# Patient Record
Sex: MALE | Race: WHITE | NOT HISPANIC OR LATINO | Employment: OTHER
[De-identification: names, ages, dates, MRNs, and addresses within clinical notes are randomized per-mention and may not be internally consistent; named-entity substitution may affect disease eponyms.]

---

## 2017-01-18 ENCOUNTER — RECORDS - HEALTHEAST (OUTPATIENT)
Dept: ADMINISTRATIVE | Facility: OTHER | Age: 82
End: 2017-01-18

## 2017-01-19 ENCOUNTER — RECORDS - HEALTHEAST (OUTPATIENT)
Dept: ADMINISTRATIVE | Facility: OTHER | Age: 82
End: 2017-01-19

## 2017-02-13 ENCOUNTER — OFFICE VISIT - HEALTHEAST (OUTPATIENT)
Dept: FAMILY MEDICINE | Facility: CLINIC | Age: 82
End: 2017-02-13

## 2017-02-13 DIAGNOSIS — I10 ESSENTIAL HYPERTENSION, BENIGN: ICD-10-CM

## 2017-02-13 DIAGNOSIS — E78.5 HYPERLIPEMIA: ICD-10-CM

## 2017-02-13 LAB
CHOLEST SERPL-MCNC: 217 MG/DL
FASTING STATUS PATIENT QL REPORTED: YES
HBA1C MFR BLD: 7.5 % (ref 3.5–6)
HDLC SERPL-MCNC: 44 MG/DL
LDLC SERPL CALC-MCNC: 148 MG/DL
TRIGL SERPL-MCNC: 126 MG/DL

## 2017-02-13 ASSESSMENT — MIFFLIN-ST. JEOR: SCORE: 1803.59

## 2017-02-21 ENCOUNTER — COMMUNICATION - HEALTHEAST (OUTPATIENT)
Dept: FAMILY MEDICINE | Facility: CLINIC | Age: 82
End: 2017-02-21

## 2017-02-21 DIAGNOSIS — I10 BENIGN ESSENTIAL HYPERTENSION: ICD-10-CM

## 2017-02-21 DIAGNOSIS — E11.9 DIABETES MELLITUS TYPE 2, NONINSULIN DEPENDENT (H): ICD-10-CM

## 2017-03-10 ENCOUNTER — COMMUNICATION - HEALTHEAST (OUTPATIENT)
Dept: LAB | Facility: CLINIC | Age: 82
End: 2017-03-10

## 2017-03-10 DIAGNOSIS — C69.90: ICD-10-CM

## 2017-03-10 DIAGNOSIS — E78.5 HYPERLIPEMIA: ICD-10-CM

## 2017-03-10 DIAGNOSIS — M47.817 LUMBOSACRAL SPONDYLOSIS WITHOUT MYELOPATHY: ICD-10-CM

## 2017-03-10 DIAGNOSIS — M79.89 SWELLING OF LIMB: ICD-10-CM

## 2017-03-13 ENCOUNTER — AMBULATORY - HEALTHEAST (OUTPATIENT)
Dept: LAB | Facility: CLINIC | Age: 82
End: 2017-03-13

## 2017-03-13 DIAGNOSIS — E78.5 HYPERLIPEMIA: ICD-10-CM

## 2017-03-13 DIAGNOSIS — M79.89 SWELLING OF LIMB: ICD-10-CM

## 2017-03-13 DIAGNOSIS — M47.817 LUMBOSACRAL SPONDYLOSIS WITHOUT MYELOPATHY: ICD-10-CM

## 2017-03-13 DIAGNOSIS — C69.90: ICD-10-CM

## 2017-03-13 LAB
CHOLEST SERPL-MCNC: 149 MG/DL
FASTING STATUS PATIENT QL REPORTED: YES
HDLC SERPL-MCNC: 47 MG/DL
LDLC SERPL CALC-MCNC: 76 MG/DL
TRIGL SERPL-MCNC: 129 MG/DL

## 2017-03-21 ENCOUNTER — COMMUNICATION - HEALTHEAST (OUTPATIENT)
Dept: FAMILY MEDICINE | Facility: CLINIC | Age: 82
End: 2017-03-21

## 2017-03-21 DIAGNOSIS — I10 ESSENTIAL HYPERTENSION, BENIGN: ICD-10-CM

## 2017-05-22 ENCOUNTER — COMMUNICATION - HEALTHEAST (OUTPATIENT)
Dept: FAMILY MEDICINE | Facility: CLINIC | Age: 82
End: 2017-05-22

## 2017-05-22 DIAGNOSIS — I10 HTN (HYPERTENSION): ICD-10-CM

## 2017-05-22 DIAGNOSIS — E78.5 HYPERLIPIDEMIA: ICD-10-CM

## 2017-05-24 ENCOUNTER — COMMUNICATION - HEALTHEAST (OUTPATIENT)
Dept: FAMILY MEDICINE | Facility: CLINIC | Age: 82
End: 2017-05-24

## 2017-06-14 ENCOUNTER — OFFICE VISIT - HEALTHEAST (OUTPATIENT)
Dept: FAMILY MEDICINE | Facility: CLINIC | Age: 82
End: 2017-06-14

## 2017-06-14 ENCOUNTER — RECORDS - HEALTHEAST (OUTPATIENT)
Dept: ADMINISTRATIVE | Facility: OTHER | Age: 82
End: 2017-06-14

## 2017-06-14 DIAGNOSIS — E78.5 HYPERLIPEMIA: ICD-10-CM

## 2017-06-14 DIAGNOSIS — E11.9 DIABETES MELLITUS TYPE 2, NONINSULIN DEPENDENT (H): ICD-10-CM

## 2017-06-14 DIAGNOSIS — Z00.00 WELL ADULT EXAM: ICD-10-CM

## 2017-06-14 DIAGNOSIS — I10 ESSENTIAL HYPERTENSION, BENIGN: ICD-10-CM

## 2017-06-14 DIAGNOSIS — M79.89 LEG SWELLING: ICD-10-CM

## 2017-06-14 LAB
CHOLEST SERPL-MCNC: 141 MG/DL
FASTING STATUS PATIENT QL REPORTED: YES
HBA1C MFR BLD: 7.3 % (ref 3.5–6)
HDLC SERPL-MCNC: 43 MG/DL
LDLC SERPL CALC-MCNC: 75 MG/DL
TRIGL SERPL-MCNC: 115 MG/DL

## 2017-06-14 ASSESSMENT — MIFFLIN-ST. JEOR: SCORE: 1783.53

## 2017-06-15 ENCOUNTER — RECORDS - HEALTHEAST (OUTPATIENT)
Dept: ADMINISTRATIVE | Facility: OTHER | Age: 82
End: 2017-06-15

## 2017-09-01 ENCOUNTER — RECORDS - HEALTHEAST (OUTPATIENT)
Dept: ADMINISTRATIVE | Facility: OTHER | Age: 82
End: 2017-09-01

## 2017-10-02 ENCOUNTER — COMMUNICATION - HEALTHEAST (OUTPATIENT)
Dept: FAMILY MEDICINE | Facility: CLINIC | Age: 82
End: 2017-10-02

## 2017-10-02 DIAGNOSIS — E11.9 DIABETES MELLITUS TYPE 2, NONINSULIN DEPENDENT (H): ICD-10-CM

## 2017-11-02 ENCOUNTER — COMMUNICATION - HEALTHEAST (OUTPATIENT)
Dept: FAMILY MEDICINE | Facility: CLINIC | Age: 82
End: 2017-11-02

## 2017-11-02 DIAGNOSIS — E11.9 DIABETES MELLITUS TYPE 2, NONINSULIN DEPENDENT (H): ICD-10-CM

## 2017-11-26 ENCOUNTER — COMMUNICATION - HEALTHEAST (OUTPATIENT)
Dept: FAMILY MEDICINE | Facility: CLINIC | Age: 82
End: 2017-11-26

## 2017-11-26 DIAGNOSIS — I10 HTN (HYPERTENSION): ICD-10-CM

## 2017-11-26 DIAGNOSIS — I10 ESSENTIAL HYPERTENSION, BENIGN: ICD-10-CM

## 2017-11-26 DIAGNOSIS — E78.5 HYPERLIPIDEMIA: ICD-10-CM

## 2017-12-05 ENCOUNTER — COMMUNICATION - HEALTHEAST (OUTPATIENT)
Dept: FAMILY MEDICINE | Facility: CLINIC | Age: 82
End: 2017-12-05

## 2017-12-05 DIAGNOSIS — E11.9 DIABETES MELLITUS TYPE 2, NONINSULIN DEPENDENT (H): ICD-10-CM

## 2017-12-18 ENCOUNTER — OFFICE VISIT - HEALTHEAST (OUTPATIENT)
Dept: FAMILY MEDICINE | Facility: CLINIC | Age: 82
End: 2017-12-18

## 2017-12-18 DIAGNOSIS — E11.9 DIABETES MELLITUS TYPE 2, NONINSULIN DEPENDENT (H): ICD-10-CM

## 2017-12-18 DIAGNOSIS — E78.2 MIXED HYPERLIPIDEMIA: ICD-10-CM

## 2017-12-18 DIAGNOSIS — I10 ESSENTIAL HYPERTENSION, BENIGN: ICD-10-CM

## 2017-12-18 DIAGNOSIS — M17.0 OSTEOARTHRITIS OF KNEES, BILATERAL: ICD-10-CM

## 2017-12-18 LAB
CHOLEST SERPL-MCNC: 147 MG/DL
FASTING STATUS PATIENT QL REPORTED: YES
HBA1C MFR BLD: 6.8 % (ref 3.5–6)
HDLC SERPL-MCNC: 45 MG/DL
LDLC SERPL CALC-MCNC: 79 MG/DL
TRIGL SERPL-MCNC: 115 MG/DL

## 2017-12-18 ASSESSMENT — MIFFLIN-ST. JEOR: SCORE: 1746.33

## 2018-01-05 ENCOUNTER — COMMUNICATION - HEALTHEAST (OUTPATIENT)
Dept: FAMILY MEDICINE | Facility: CLINIC | Age: 83
End: 2018-01-05

## 2018-01-05 DIAGNOSIS — E11.9 DIABETES MELLITUS TYPE 2, NONINSULIN DEPENDENT (H): ICD-10-CM

## 2018-01-07 ENCOUNTER — COMMUNICATION - HEALTHEAST (OUTPATIENT)
Dept: FAMILY MEDICINE | Facility: CLINIC | Age: 83
End: 2018-01-07

## 2018-01-07 DIAGNOSIS — I10 BENIGN ESSENTIAL HYPERTENSION: ICD-10-CM

## 2018-01-08 ENCOUNTER — COMMUNICATION - HEALTHEAST (OUTPATIENT)
Dept: FAMILY MEDICINE | Facility: CLINIC | Age: 83
End: 2018-01-08

## 2018-01-10 ENCOUNTER — COMMUNICATION - HEALTHEAST (OUTPATIENT)
Dept: FAMILY MEDICINE | Facility: CLINIC | Age: 83
End: 2018-01-10

## 2018-01-10 DIAGNOSIS — E78.5 HYPERLIPIDEMIA: ICD-10-CM

## 2018-02-06 ENCOUNTER — RECORDS - HEALTHEAST (OUTPATIENT)
Dept: ADMINISTRATIVE | Facility: OTHER | Age: 83
End: 2018-02-06

## 2018-02-21 ENCOUNTER — COMMUNICATION - HEALTHEAST (OUTPATIENT)
Dept: FAMILY MEDICINE | Facility: CLINIC | Age: 83
End: 2018-02-21

## 2018-02-21 DIAGNOSIS — I10 HTN (HYPERTENSION): ICD-10-CM

## 2018-03-19 ENCOUNTER — OFFICE VISIT - HEALTHEAST (OUTPATIENT)
Dept: FAMILY MEDICINE | Facility: CLINIC | Age: 83
End: 2018-03-19

## 2018-03-19 DIAGNOSIS — E11.9 DIABETES MELLITUS TYPE 2, NONINSULIN DEPENDENT (H): ICD-10-CM

## 2018-03-19 DIAGNOSIS — E78.5 HYPERLIPEMIA: ICD-10-CM

## 2018-03-19 DIAGNOSIS — Z01.810 PREOP CARDIOVASCULAR EXAM: ICD-10-CM

## 2018-03-19 DIAGNOSIS — I10 ESSENTIAL HYPERTENSION, BENIGN: ICD-10-CM

## 2018-03-19 DIAGNOSIS — M17.12 OSTEOARTHRITIS OF LEFT KNEE: ICD-10-CM

## 2018-03-19 LAB
ALBUMIN SERPL-MCNC: 3.8 G/DL (ref 3.5–5)
ALP SERPL-CCNC: 82 U/L (ref 45–120)
ALT SERPL W P-5'-P-CCNC: 14 U/L (ref 0–45)
ANION GAP SERPL CALCULATED.3IONS-SCNC: 13 MMOL/L (ref 5–18)
AST SERPL W P-5'-P-CCNC: 16 U/L (ref 0–40)
ATRIAL RATE - MUSE: 55 BPM
BILIRUB DIRECT SERPL-MCNC: 0.2 MG/DL
BILIRUB SERPL-MCNC: 0.7 MG/DL (ref 0–1)
BUN SERPL-MCNC: 33 MG/DL (ref 8–28)
CALCIUM SERPL-MCNC: 10 MG/DL (ref 8.5–10.5)
CHLORIDE BLD-SCNC: 102 MMOL/L (ref 98–107)
CHOLEST SERPL-MCNC: 153 MG/DL
CO2 SERPL-SCNC: 24 MMOL/L (ref 22–31)
CREAT SERPL-MCNC: 1.17 MG/DL (ref 0.7–1.3)
DIASTOLIC BLOOD PRESSURE - MUSE: NORMAL MMHG
ERYTHROCYTE [DISTWIDTH] IN BLOOD BY AUTOMATED COUNT: 12.5 % (ref 11–14.5)
FASTING STATUS PATIENT QL REPORTED: YES
GFR SERPL CREATININE-BSD FRML MDRD: 60 ML/MIN/1.73M2
GLUCOSE BLD-MCNC: 128 MG/DL (ref 70–125)
HBA1C MFR BLD: 7 % (ref 3.5–6)
HCT VFR BLD AUTO: 38.7 % (ref 40–54)
HDLC SERPL-MCNC: 48 MG/DL
HGB BLD-MCNC: 13.2 G/DL (ref 14–18)
INTERPRETATION ECG - MUSE: NORMAL
LDLC SERPL CALC-MCNC: 80 MG/DL
MCH RBC QN AUTO: 30.6 PG (ref 27–34)
MCHC RBC AUTO-ENTMCNC: 34.1 G/DL (ref 32–36)
MCV RBC AUTO: 90 FL (ref 80–100)
P AXIS - MUSE: 23 DEGREES
PLATELET # BLD AUTO: 239 THOU/UL (ref 140–440)
PMV BLD AUTO: 8.7 FL (ref 7–10)
POTASSIUM BLD-SCNC: 4.3 MMOL/L (ref 3.5–5)
PR INTERVAL - MUSE: 190 MS
PROT SERPL-MCNC: 6.8 G/DL (ref 6–8)
QRS DURATION - MUSE: 108 MS
QT - MUSE: 446 MS
QTC - MUSE: 426 MS
R AXIS - MUSE: -17 DEGREES
RBC # BLD AUTO: 4.31 MILL/UL (ref 4.4–6.2)
SODIUM SERPL-SCNC: 139 MMOL/L (ref 136–145)
SYSTOLIC BLOOD PRESSURE - MUSE: NORMAL MMHG
T AXIS - MUSE: -11 DEGREES
TRIGL SERPL-MCNC: 124 MG/DL
VENTRICULAR RATE- MUSE: 55 BPM
WBC: 8.4 THOU/UL (ref 4–11)

## 2018-03-19 ASSESSMENT — MIFFLIN-ST. JEOR: SCORE: 1738.39

## 2018-03-21 ASSESSMENT — MIFFLIN-ST. JEOR
SCORE: 1729.32
SCORE: 1738.39

## 2018-03-23 ENCOUNTER — ANESTHESIA - HEALTHEAST (OUTPATIENT)
Dept: SURGERY | Facility: CLINIC | Age: 83
End: 2018-03-23

## 2018-03-26 ENCOUNTER — SURGERY - HEALTHEAST (OUTPATIENT)
Dept: SURGERY | Facility: CLINIC | Age: 83
End: 2018-03-26

## 2018-03-26 ASSESSMENT — MIFFLIN-ST. JEOR
SCORE: 1729.32
SCORE: 1721.38

## 2018-03-30 ENCOUNTER — AMBULATORY - HEALTHEAST (OUTPATIENT)
Dept: CARE COORDINATION | Facility: CLINIC | Age: 83
End: 2018-03-30

## 2018-03-30 DIAGNOSIS — E11.9 DM (DIABETES MELLITUS) (H): ICD-10-CM

## 2018-03-30 DIAGNOSIS — I10 HTN (HYPERTENSION): ICD-10-CM

## 2018-04-04 ENCOUNTER — OFFICE VISIT - HEALTHEAST (OUTPATIENT)
Dept: FAMILY MEDICINE | Facility: CLINIC | Age: 83
End: 2018-04-04

## 2018-04-04 DIAGNOSIS — D64.9 ANEMIA: ICD-10-CM

## 2018-04-04 DIAGNOSIS — I10 ESSENTIAL HYPERTENSION, BENIGN: ICD-10-CM

## 2018-04-04 DIAGNOSIS — E11.9 DIABETES MELLITUS TYPE 2, NONINSULIN DEPENDENT (H): ICD-10-CM

## 2018-04-04 DIAGNOSIS — Z96.659 S/P TOTAL KNEE ARTHROPLASTY: ICD-10-CM

## 2018-05-09 ENCOUNTER — COMMUNICATION - HEALTHEAST (OUTPATIENT)
Dept: FAMILY MEDICINE | Facility: CLINIC | Age: 83
End: 2018-05-09

## 2018-05-09 DIAGNOSIS — E78.5 HYPERLIPIDEMIA: ICD-10-CM

## 2018-05-10 ENCOUNTER — COMMUNICATION - HEALTHEAST (OUTPATIENT)
Dept: FAMILY MEDICINE | Facility: CLINIC | Age: 83
End: 2018-05-10

## 2018-05-15 ENCOUNTER — RECORDS - HEALTHEAST (OUTPATIENT)
Dept: ADMINISTRATIVE | Facility: OTHER | Age: 83
End: 2018-05-15

## 2018-06-06 ENCOUNTER — OFFICE VISIT - HEALTHEAST (OUTPATIENT)
Dept: INTERNAL MEDICINE | Facility: CLINIC | Age: 83
End: 2018-06-06

## 2018-06-06 ENCOUNTER — COMMUNICATION - HEALTHEAST (OUTPATIENT)
Dept: INTERNAL MEDICINE | Facility: CLINIC | Age: 83
End: 2018-06-06

## 2018-06-06 DIAGNOSIS — J30.9 ALLERGIC RHINITIS: ICD-10-CM

## 2018-06-06 DIAGNOSIS — R05.9 COUGH: ICD-10-CM

## 2018-06-11 ENCOUNTER — RECORDS - HEALTHEAST (OUTPATIENT)
Dept: ADMINISTRATIVE | Facility: OTHER | Age: 83
End: 2018-06-11

## 2018-06-20 ENCOUNTER — RECORDS - HEALTHEAST (OUTPATIENT)
Dept: ADMINISTRATIVE | Facility: OTHER | Age: 83
End: 2018-06-20

## 2018-06-21 ENCOUNTER — OFFICE VISIT - HEALTHEAST (OUTPATIENT)
Dept: FAMILY MEDICINE | Facility: CLINIC | Age: 83
End: 2018-06-21

## 2018-06-21 DIAGNOSIS — I10 ESSENTIAL HYPERTENSION, BENIGN: ICD-10-CM

## 2018-06-21 DIAGNOSIS — M79.89 LEG SWELLING: ICD-10-CM

## 2018-06-21 DIAGNOSIS — E11.9 DIABETES MELLITUS TYPE 2, NONINSULIN DEPENDENT (H): ICD-10-CM

## 2018-06-21 DIAGNOSIS — Z00.00 MEDICARE ANNUAL WELLNESS VISIT, SUBSEQUENT: ICD-10-CM

## 2018-06-21 DIAGNOSIS — E78.5 HYPERLIPEMIA: ICD-10-CM

## 2018-06-21 DIAGNOSIS — D64.9 ANEMIA: ICD-10-CM

## 2018-06-21 LAB
ALBUMIN SERPL-MCNC: 3.9 G/DL (ref 3.5–5)
ALP SERPL-CCNC: 92 U/L (ref 45–120)
ALT SERPL W P-5'-P-CCNC: 12 U/L (ref 0–45)
ANION GAP SERPL CALCULATED.3IONS-SCNC: 10 MMOL/L (ref 5–18)
AST SERPL W P-5'-P-CCNC: 16 U/L (ref 0–40)
BILIRUB DIRECT SERPL-MCNC: 0.1 MG/DL
BILIRUB SERPL-MCNC: 0.5 MG/DL (ref 0–1)
BUN SERPL-MCNC: 28 MG/DL (ref 8–28)
CALCIUM SERPL-MCNC: 10.4 MG/DL (ref 8.5–10.5)
CHLORIDE BLD-SCNC: 102 MMOL/L (ref 98–107)
CHOLEST SERPL-MCNC: 153 MG/DL
CO2 SERPL-SCNC: 28 MMOL/L (ref 22–31)
CREAT SERPL-MCNC: 1.07 MG/DL (ref 0.7–1.3)
CREAT UR-MCNC: 63.5 MG/DL
ERYTHROCYTE [DISTWIDTH] IN BLOOD BY AUTOMATED COUNT: 12.9 % (ref 11–14.5)
FASTING STATUS PATIENT QL REPORTED: ABNORMAL
GFR SERPL CREATININE-BSD FRML MDRD: >60 ML/MIN/1.73M2
GLUCOSE BLD-MCNC: 130 MG/DL (ref 70–125)
HBA1C MFR BLD: 7 % (ref 3.5–6)
HCT VFR BLD AUTO: 39.8 % (ref 40–54)
HDLC SERPL-MCNC: 43 MG/DL
HGB BLD-MCNC: 13.3 G/DL (ref 14–18)
LDLC SERPL CALC-MCNC: 76 MG/DL
MCH RBC QN AUTO: 30.1 PG (ref 27–34)
MCHC RBC AUTO-ENTMCNC: 33.4 G/DL (ref 32–36)
MCV RBC AUTO: 90 FL (ref 80–100)
MICROALBUMIN UR-MCNC: 1.43 MG/DL (ref 0–1.99)
MICROALBUMIN/CREAT UR: 22.5 MG/G
PLATELET # BLD AUTO: 311 THOU/UL (ref 140–440)
PMV BLD AUTO: 7.7 FL (ref 7–10)
POTASSIUM BLD-SCNC: 4.7 MMOL/L (ref 3.5–5)
PROT SERPL-MCNC: 7.1 G/DL (ref 6–8)
RBC # BLD AUTO: 4.4 MILL/UL (ref 4.4–6.2)
SODIUM SERPL-SCNC: 140 MMOL/L (ref 136–145)
TRIGL SERPL-MCNC: 172 MG/DL
WBC: 9.6 THOU/UL (ref 4–11)

## 2018-06-21 ASSESSMENT — MIFFLIN-ST. JEOR: SCORE: 1749.73

## 2018-06-26 ENCOUNTER — RECORDS - HEALTHEAST (OUTPATIENT)
Dept: ADMINISTRATIVE | Facility: OTHER | Age: 83
End: 2018-06-26

## 2018-07-03 ENCOUNTER — COMMUNICATION - HEALTHEAST (OUTPATIENT)
Dept: FAMILY MEDICINE | Facility: CLINIC | Age: 83
End: 2018-07-03

## 2018-07-03 DIAGNOSIS — E11.9 DIABETES MELLITUS TYPE 2, NONINSULIN DEPENDENT (H): ICD-10-CM

## 2018-07-28 ENCOUNTER — COMMUNICATION - HEALTHEAST (OUTPATIENT)
Dept: FAMILY MEDICINE | Facility: CLINIC | Age: 83
End: 2018-07-28

## 2018-07-28 DIAGNOSIS — E78.5 HYPERLIPIDEMIA: ICD-10-CM

## 2018-08-03 ENCOUNTER — COMMUNICATION - HEALTHEAST (OUTPATIENT)
Dept: NURSING | Facility: CLINIC | Age: 83
End: 2018-08-03

## 2018-08-17 ENCOUNTER — COMMUNICATION - HEALTHEAST (OUTPATIENT)
Dept: FAMILY MEDICINE | Facility: CLINIC | Age: 83
End: 2018-08-17

## 2018-08-20 ENCOUNTER — RECORDS - HEALTHEAST (OUTPATIENT)
Dept: ADMINISTRATIVE | Facility: OTHER | Age: 83
End: 2018-08-20

## 2018-08-22 ENCOUNTER — RECORDS - HEALTHEAST (OUTPATIENT)
Dept: ADMINISTRATIVE | Facility: OTHER | Age: 83
End: 2018-08-22

## 2018-10-01 ENCOUNTER — RECORDS - HEALTHEAST (OUTPATIENT)
Dept: ADMINISTRATIVE | Facility: OTHER | Age: 83
End: 2018-10-01

## 2018-11-19 ENCOUNTER — COMMUNICATION - HEALTHEAST (OUTPATIENT)
Dept: FAMILY MEDICINE | Facility: CLINIC | Age: 83
End: 2018-11-19

## 2018-11-19 DIAGNOSIS — E11.9 DIABETES MELLITUS TYPE 2, NONINSULIN DEPENDENT (H): ICD-10-CM

## 2018-11-19 DIAGNOSIS — I10 HTN (HYPERTENSION): ICD-10-CM

## 2018-12-26 ENCOUNTER — COMMUNICATION - HEALTHEAST (OUTPATIENT)
Dept: FAMILY MEDICINE | Facility: CLINIC | Age: 83
End: 2018-12-26

## 2018-12-26 ENCOUNTER — OFFICE VISIT - HEALTHEAST (OUTPATIENT)
Dept: FAMILY MEDICINE | Facility: CLINIC | Age: 83
End: 2018-12-26

## 2018-12-26 DIAGNOSIS — M47.817 LUMBOSACRAL SPONDYLOSIS WITHOUT MYELOPATHY: ICD-10-CM

## 2018-12-26 DIAGNOSIS — E11.9 DIABETES MELLITUS TYPE 2, NONINSULIN DEPENDENT (H): ICD-10-CM

## 2018-12-26 DIAGNOSIS — I10 ESSENTIAL HYPERTENSION, BENIGN: ICD-10-CM

## 2018-12-26 DIAGNOSIS — I10 BENIGN ESSENTIAL HYPERTENSION: ICD-10-CM

## 2018-12-26 DIAGNOSIS — E78.00 PURE HYPERCHOLESTEROLEMIA: ICD-10-CM

## 2018-12-26 LAB
ALBUMIN SERPL-MCNC: 3.9 G/DL (ref 3.5–5)
ALP SERPL-CCNC: 99 U/L (ref 45–120)
ALT SERPL W P-5'-P-CCNC: 11 U/L (ref 0–45)
ANION GAP SERPL CALCULATED.3IONS-SCNC: 13 MMOL/L (ref 5–18)
AST SERPL W P-5'-P-CCNC: 14 U/L (ref 0–40)
BILIRUB DIRECT SERPL-MCNC: 0.2 MG/DL
BILIRUB SERPL-MCNC: 0.6 MG/DL (ref 0–1)
BUN SERPL-MCNC: 21 MG/DL (ref 8–28)
CALCIUM SERPL-MCNC: 9.8 MG/DL (ref 8.5–10.5)
CHLORIDE BLD-SCNC: 102 MMOL/L (ref 98–107)
CHOLEST SERPL-MCNC: 151 MG/DL
CO2 SERPL-SCNC: 25 MMOL/L (ref 22–31)
CREAT SERPL-MCNC: 1.09 MG/DL (ref 0.7–1.3)
FASTING STATUS PATIENT QL REPORTED: YES
GFR SERPL CREATININE-BSD FRML MDRD: >60 ML/MIN/1.73M2
GLUCOSE BLD-MCNC: 168 MG/DL (ref 70–125)
HBA1C MFR BLD: 7.1 % (ref 3.5–6)
HDLC SERPL-MCNC: 48 MG/DL
LDLC SERPL CALC-MCNC: 68 MG/DL
POTASSIUM BLD-SCNC: 4.8 MMOL/L (ref 3.5–5)
PROT SERPL-MCNC: 6.7 G/DL (ref 6–8)
SODIUM SERPL-SCNC: 140 MMOL/L (ref 136–145)
TRIGL SERPL-MCNC: 174 MG/DL

## 2019-01-02 ENCOUNTER — COMMUNICATION - HEALTHEAST (OUTPATIENT)
Dept: FAMILY MEDICINE | Facility: CLINIC | Age: 84
End: 2019-01-02

## 2019-01-02 DIAGNOSIS — E11.9 DIABETES MELLITUS TYPE 2, NONINSULIN DEPENDENT (H): ICD-10-CM

## 2019-04-10 ENCOUNTER — COMMUNICATION - HEALTHEAST (OUTPATIENT)
Dept: FAMILY MEDICINE | Facility: CLINIC | Age: 84
End: 2019-04-10

## 2019-04-10 DIAGNOSIS — E11.9 DIABETES MELLITUS TYPE 2, NONINSULIN DEPENDENT (H): ICD-10-CM

## 2019-05-21 ENCOUNTER — RECORDS - HEALTHEAST (OUTPATIENT)
Dept: ADMINISTRATIVE | Facility: OTHER | Age: 84
End: 2019-05-21

## 2019-05-23 ENCOUNTER — RECORDS - HEALTHEAST (OUTPATIENT)
Dept: ADMINISTRATIVE | Facility: OTHER | Age: 84
End: 2019-05-23

## 2019-06-16 ENCOUNTER — COMMUNICATION - HEALTHEAST (OUTPATIENT)
Dept: FAMILY MEDICINE | Facility: CLINIC | Age: 84
End: 2019-06-16

## 2019-06-16 DIAGNOSIS — I10 BENIGN ESSENTIAL HYPERTENSION: ICD-10-CM

## 2019-07-08 ENCOUNTER — COMMUNICATION - HEALTHEAST (OUTPATIENT)
Dept: FAMILY MEDICINE | Facility: CLINIC | Age: 84
End: 2019-07-08

## 2019-07-08 DIAGNOSIS — E11.9 DIABETES MELLITUS TYPE 2, NONINSULIN DEPENDENT (H): ICD-10-CM

## 2019-07-17 ENCOUNTER — OFFICE VISIT - HEALTHEAST (OUTPATIENT)
Dept: FAMILY MEDICINE | Facility: CLINIC | Age: 84
End: 2019-07-17

## 2019-07-17 DIAGNOSIS — E78.5 HYPERLIPIDEMIA: ICD-10-CM

## 2019-07-17 DIAGNOSIS — I10 ESSENTIAL HYPERTENSION, BENIGN: ICD-10-CM

## 2019-07-17 DIAGNOSIS — E78.00 PURE HYPERCHOLESTEROLEMIA: ICD-10-CM

## 2019-07-17 DIAGNOSIS — E11.9 DIABETES MELLITUS TYPE 2, NONINSULIN DEPENDENT (H): ICD-10-CM

## 2019-07-17 LAB
ALBUMIN SERPL-MCNC: 4.1 G/DL (ref 3.5–5)
ALP SERPL-CCNC: 79 U/L (ref 45–120)
ALT SERPL W P-5'-P-CCNC: <9 U/L (ref 0–45)
ANION GAP SERPL CALCULATED.3IONS-SCNC: 11 MMOL/L (ref 5–18)
AST SERPL W P-5'-P-CCNC: 14 U/L (ref 0–40)
BILIRUB DIRECT SERPL-MCNC: 0.2 MG/DL
BILIRUB SERPL-MCNC: 0.5 MG/DL (ref 0–1)
BUN SERPL-MCNC: 24 MG/DL (ref 8–28)
CALCIUM SERPL-MCNC: 10 MG/DL (ref 8.5–10.5)
CHLORIDE BLD-SCNC: 103 MMOL/L (ref 98–107)
CHOLEST SERPL-MCNC: 160 MG/DL
CO2 SERPL-SCNC: 26 MMOL/L (ref 22–31)
CREAT SERPL-MCNC: 1.26 MG/DL (ref 0.7–1.3)
CREAT UR-MCNC: 71.3 MG/DL
FASTING STATUS PATIENT QL REPORTED: YES
GFR SERPL CREATININE-BSD FRML MDRD: 55 ML/MIN/1.73M2
GLUCOSE BLD-MCNC: 129 MG/DL (ref 70–125)
HBA1C MFR BLD: 6.5 % (ref 3.5–6)
HDLC SERPL-MCNC: 49 MG/DL
LDLC SERPL CALC-MCNC: 81 MG/DL
MICROALBUMIN UR-MCNC: 1.54 MG/DL (ref 0–1.99)
MICROALBUMIN/CREAT UR: 21.6 MG/G
POTASSIUM BLD-SCNC: 4.9 MMOL/L (ref 3.5–5)
PROT SERPL-MCNC: 7 G/DL (ref 6–8)
SODIUM SERPL-SCNC: 140 MMOL/L (ref 136–145)
TRIGL SERPL-MCNC: 148 MG/DL

## 2019-07-17 ASSESSMENT — MIFFLIN-ST. JEOR: SCORE: 1690.76

## 2019-07-19 ENCOUNTER — RECORDS - HEALTHEAST (OUTPATIENT)
Dept: ADMINISTRATIVE | Facility: OTHER | Age: 84
End: 2019-07-19

## 2019-07-25 ENCOUNTER — RECORDS - HEALTHEAST (OUTPATIENT)
Dept: HEALTH INFORMATION MANAGEMENT | Facility: CLINIC | Age: 84
End: 2019-07-25

## 2019-08-13 ENCOUNTER — COMMUNICATION - HEALTHEAST (OUTPATIENT)
Dept: FAMILY MEDICINE | Facility: CLINIC | Age: 84
End: 2019-08-13

## 2019-08-13 DIAGNOSIS — I10 HTN (HYPERTENSION): ICD-10-CM

## 2019-09-09 ENCOUNTER — COMMUNICATION - HEALTHEAST (OUTPATIENT)
Dept: FAMILY MEDICINE | Facility: CLINIC | Age: 84
End: 2019-09-09

## 2019-09-09 DIAGNOSIS — E11.9 DIABETES MELLITUS TYPE 2, NONINSULIN DEPENDENT (H): ICD-10-CM

## 2019-10-04 ENCOUNTER — RECORDS - HEALTHEAST (OUTPATIENT)
Dept: ADMINISTRATIVE | Facility: OTHER | Age: 84
End: 2019-10-04

## 2019-11-30 ENCOUNTER — COMMUNICATION - HEALTHEAST (OUTPATIENT)
Dept: FAMILY MEDICINE | Facility: CLINIC | Age: 84
End: 2019-11-30

## 2019-11-30 DIAGNOSIS — I10 BENIGN ESSENTIAL HYPERTENSION: ICD-10-CM

## 2019-12-11 ENCOUNTER — RECORDS - HEALTHEAST (OUTPATIENT)
Dept: ADMINISTRATIVE | Facility: OTHER | Age: 84
End: 2019-12-11

## 2019-12-16 ENCOUNTER — COMMUNICATION - HEALTHEAST (OUTPATIENT)
Dept: FAMILY MEDICINE | Facility: CLINIC | Age: 84
End: 2019-12-16

## 2019-12-23 ENCOUNTER — OFFICE VISIT - HEALTHEAST (OUTPATIENT)
Dept: FAMILY MEDICINE | Facility: CLINIC | Age: 84
End: 2019-12-23

## 2019-12-23 ENCOUNTER — COMMUNICATION - HEALTHEAST (OUTPATIENT)
Dept: FAMILY MEDICINE | Facility: CLINIC | Age: 84
End: 2019-12-23

## 2019-12-23 DIAGNOSIS — E11.9 TYPE 2 DIABETES MELLITUS WITHOUT COMPLICATION, WITHOUT LONG-TERM CURRENT USE OF INSULIN (H): ICD-10-CM

## 2019-12-23 DIAGNOSIS — N20.1 CALCULUS OF URETER: ICD-10-CM

## 2019-12-23 DIAGNOSIS — K81.9 CHOLECYSTITIS: ICD-10-CM

## 2019-12-23 DIAGNOSIS — E78.00 PURE HYPERCHOLESTEROLEMIA: ICD-10-CM

## 2019-12-23 DIAGNOSIS — D72.829 LEUKOCYTOSIS, UNSPECIFIED TYPE: ICD-10-CM

## 2019-12-23 LAB
ALBUMIN SERPL-MCNC: 3.9 G/DL (ref 3.5–5)
ALP SERPL-CCNC: 90 U/L (ref 45–120)
ALT SERPL W P-5'-P-CCNC: <9 U/L (ref 0–45)
ANION GAP SERPL CALCULATED.3IONS-SCNC: 13 MMOL/L (ref 5–18)
AST SERPL W P-5'-P-CCNC: 11 U/L (ref 0–40)
BILIRUB DIRECT SERPL-MCNC: 0.2 MG/DL
BILIRUB SERPL-MCNC: 0.6 MG/DL (ref 0–1)
BUN SERPL-MCNC: 20 MG/DL (ref 8–28)
CALCIUM SERPL-MCNC: 10.1 MG/DL (ref 8.5–10.5)
CHLORIDE BLD-SCNC: 103 MMOL/L (ref 98–107)
CHOLEST SERPL-MCNC: 189 MG/DL
CO2 SERPL-SCNC: 28 MMOL/L (ref 22–31)
CREAT SERPL-MCNC: 1.04 MG/DL (ref 0.7–1.3)
ERYTHROCYTE [DISTWIDTH] IN BLOOD BY AUTOMATED COUNT: 13.1 % (ref 11–14.5)
FASTING STATUS PATIENT QL REPORTED: YES
GFR SERPL CREATININE-BSD FRML MDRD: >60 ML/MIN/1.73M2
GLUCOSE BLD-MCNC: 136 MG/DL (ref 70–125)
HCT VFR BLD AUTO: 38.6 % (ref 40–54)
HDLC SERPL-MCNC: 45 MG/DL
HGB BLD-MCNC: 13.3 G/DL (ref 14–18)
LDLC SERPL CALC-MCNC: 112 MG/DL
MCH RBC QN AUTO: 29.8 PG (ref 27–34)
MCHC RBC AUTO-ENTMCNC: 34.3 G/DL (ref 32–36)
MCV RBC AUTO: 87 FL (ref 80–100)
PLATELET # BLD AUTO: 417 THOU/UL (ref 140–440)
PMV BLD AUTO: 7.6 FL (ref 7–10)
POTASSIUM BLD-SCNC: 5.2 MMOL/L (ref 3.5–5)
PROT SERPL-MCNC: 7 G/DL (ref 6–8)
RBC # BLD AUTO: 4.44 MILL/UL (ref 4.4–6.2)
SODIUM SERPL-SCNC: 144 MMOL/L (ref 136–145)
TRIGL SERPL-MCNC: 162 MG/DL
WBC: 11 THOU/UL (ref 4–11)

## 2019-12-24 ENCOUNTER — COMMUNICATION - HEALTHEAST (OUTPATIENT)
Dept: UROLOGY | Facility: CLINIC | Age: 84
End: 2019-12-24

## 2019-12-30 ENCOUNTER — COMMUNICATION - HEALTHEAST (OUTPATIENT)
Dept: FAMILY MEDICINE | Facility: CLINIC | Age: 84
End: 2019-12-30

## 2019-12-30 ENCOUNTER — HOSPITAL ENCOUNTER (OUTPATIENT)
Dept: NUCLEAR MEDICINE | Facility: HOSPITAL | Age: 84
Discharge: HOME | End: 2019-12-30
Attending: FAMILY MEDICINE

## 2019-12-30 DIAGNOSIS — K81.9 CHOLECYSTITIS: ICD-10-CM

## 2019-12-31 ENCOUNTER — COMMUNICATION - HEALTHEAST (OUTPATIENT)
Dept: FAMILY MEDICINE | Facility: CLINIC | Age: 84
End: 2019-12-31

## 2020-01-13 ENCOUNTER — COMMUNICATION - HEALTHEAST (OUTPATIENT)
Dept: UROLOGY | Facility: CLINIC | Age: 85
End: 2020-01-13

## 2020-01-14 ENCOUNTER — COMMUNICATION - HEALTHEAST (OUTPATIENT)
Dept: FAMILY MEDICINE | Facility: CLINIC | Age: 85
End: 2020-01-14

## 2020-01-14 DIAGNOSIS — E11.9 DIABETES MELLITUS TYPE 2, NONINSULIN DEPENDENT (H): ICD-10-CM

## 2020-01-16 ENCOUNTER — COMMUNICATION - HEALTHEAST (OUTPATIENT)
Dept: UROLOGY | Facility: CLINIC | Age: 85
End: 2020-01-16

## 2020-01-27 ENCOUNTER — OFFICE VISIT - HEALTHEAST (OUTPATIENT)
Dept: UROLOGY | Facility: CLINIC | Age: 85
End: 2020-01-27

## 2020-01-27 DIAGNOSIS — N20.0 CALCULUS OF KIDNEY: ICD-10-CM

## 2020-01-27 DIAGNOSIS — N20.1 CALCULUS OF URETER: ICD-10-CM

## 2020-01-27 LAB
ALBUMIN UR-MCNC: NEGATIVE MG/DL
APPEARANCE UR: CLEAR
BILIRUB UR QL STRIP: NEGATIVE
COLOR UR AUTO: YELLOW
GLUCOSE UR STRIP-MCNC: ABNORMAL MG/DL
HGB UR QL STRIP: NEGATIVE
KETONES UR STRIP-MCNC: NEGATIVE MG/DL
LEUKOCYTE ESTERASE UR QL STRIP: NEGATIVE
NITRATE UR QL: NEGATIVE
PH UR STRIP: 5 [PH] (ref 5–8)
SP GR UR STRIP: 1.02 (ref 1–1.03)
UROBILINOGEN UR STRIP-ACNC: ABNORMAL

## 2020-02-12 ENCOUNTER — COMMUNICATION - HEALTHEAST (OUTPATIENT)
Dept: FAMILY MEDICINE | Facility: CLINIC | Age: 85
End: 2020-02-12

## 2020-02-12 DIAGNOSIS — E78.5 HYPERLIPIDEMIA: ICD-10-CM

## 2020-03-12 ENCOUNTER — COMMUNICATION - HEALTHEAST (OUTPATIENT)
Dept: FAMILY MEDICINE | Facility: CLINIC | Age: 85
End: 2020-03-12

## 2020-03-13 ENCOUNTER — COMMUNICATION - HEALTHEAST (OUTPATIENT)
Dept: UROLOGY | Facility: CLINIC | Age: 85
End: 2020-03-13

## 2020-03-16 ENCOUNTER — AMBULATORY - HEALTHEAST (OUTPATIENT)
Dept: UROLOGY | Facility: CLINIC | Age: 85
End: 2020-03-16

## 2020-07-01 ENCOUNTER — COMMUNICATION - HEALTHEAST (OUTPATIENT)
Dept: FAMILY MEDICINE | Facility: CLINIC | Age: 85
End: 2020-07-01

## 2020-07-01 DIAGNOSIS — E11.9 DIABETES MELLITUS TYPE 2, NONINSULIN DEPENDENT (H): ICD-10-CM

## 2020-07-01 DIAGNOSIS — I10 HTN (HYPERTENSION): ICD-10-CM

## 2020-07-24 ENCOUNTER — COMMUNICATION - HEALTHEAST (OUTPATIENT)
Dept: FAMILY MEDICINE | Facility: CLINIC | Age: 85
End: 2020-07-24

## 2020-07-26 ENCOUNTER — COMMUNICATION - HEALTHEAST (OUTPATIENT)
Dept: FAMILY MEDICINE | Facility: CLINIC | Age: 85
End: 2020-07-26

## 2020-07-26 DIAGNOSIS — I10 BENIGN ESSENTIAL HYPERTENSION: ICD-10-CM

## 2020-08-04 ENCOUNTER — OFFICE VISIT - HEALTHEAST (OUTPATIENT)
Dept: FAMILY MEDICINE | Facility: CLINIC | Age: 85
End: 2020-08-04

## 2020-08-04 DIAGNOSIS — N40.0 BENIGN PROSTATIC HYPERPLASIA WITHOUT LOWER URINARY TRACT SYMPTOMS: ICD-10-CM

## 2020-08-04 DIAGNOSIS — E78.5 HYPERLIPIDEMIA, UNSPECIFIED HYPERLIPIDEMIA TYPE: ICD-10-CM

## 2020-08-04 DIAGNOSIS — Z00.00 HEALTH CARE MAINTENANCE: ICD-10-CM

## 2020-08-04 DIAGNOSIS — E66.812 CLASS 2 SEVERE OBESITY DUE TO EXCESS CALORIES WITH SERIOUS COMORBIDITY AND BODY MASS INDEX (BMI) OF 36.0 TO 36.9 IN ADULT (H): ICD-10-CM

## 2020-08-04 DIAGNOSIS — I10 ESSENTIAL HYPERTENSION, BENIGN: ICD-10-CM

## 2020-08-04 DIAGNOSIS — E11.9 DIABETES MELLITUS TYPE 2, NONINSULIN DEPENDENT (H): ICD-10-CM

## 2020-08-04 DIAGNOSIS — E66.01 CLASS 2 SEVERE OBESITY DUE TO EXCESS CALORIES WITH SERIOUS COMORBIDITY AND BODY MASS INDEX (BMI) OF 36.0 TO 36.9 IN ADULT (H): ICD-10-CM

## 2020-08-04 LAB
ALBUMIN SERPL-MCNC: 3.9 G/DL (ref 3.5–5)
ALP SERPL-CCNC: 90 U/L (ref 45–120)
ALT SERPL W P-5'-P-CCNC: <9 U/L (ref 0–45)
ANION GAP SERPL CALCULATED.3IONS-SCNC: 12 MMOL/L (ref 5–18)
AST SERPL W P-5'-P-CCNC: 15 U/L (ref 0–40)
BILIRUB SERPL-MCNC: 0.5 MG/DL (ref 0–1)
BUN SERPL-MCNC: 24 MG/DL (ref 8–28)
CALCIUM SERPL-MCNC: 9.5 MG/DL (ref 8.5–10.5)
CHLORIDE BLD-SCNC: 105 MMOL/L (ref 98–107)
CHOLEST SERPL-MCNC: 149 MG/DL
CO2 SERPL-SCNC: 24 MMOL/L (ref 22–31)
CREAT SERPL-MCNC: 1.05 MG/DL (ref 0.7–1.3)
CREAT UR-MCNC: 142.5 MG/DL
ERYTHROCYTE [DISTWIDTH] IN BLOOD BY AUTOMATED COUNT: 13 % (ref 11–14.5)
FASTING STATUS PATIENT QL REPORTED: YES
GFR SERPL CREATININE-BSD FRML MDRD: >60 ML/MIN/1.73M2
GLUCOSE BLD-MCNC: 122 MG/DL (ref 70–125)
HBA1C MFR BLD: 6.7 %
HCT VFR BLD AUTO: 40.1 % (ref 40–54)
HDLC SERPL-MCNC: 47 MG/DL
HGB BLD-MCNC: 13.2 G/DL (ref 14–18)
LDLC SERPL CALC-MCNC: 77 MG/DL
MCH RBC QN AUTO: 29.3 PG (ref 27–34)
MCHC RBC AUTO-ENTMCNC: 32.8 G/DL (ref 32–36)
MCV RBC AUTO: 89 FL (ref 80–100)
MICROALBUMIN UR-MCNC: 3.61 MG/DL (ref 0–1.99)
MICROALBUMIN/CREAT UR: 25.3 MG/G
PLATELET # BLD AUTO: 241 THOU/UL (ref 140–440)
PMV BLD AUTO: 8.6 FL (ref 7–10)
POTASSIUM BLD-SCNC: 4.6 MMOL/L (ref 3.5–5)
PROT SERPL-MCNC: 6.6 G/DL (ref 6–8)
RBC # BLD AUTO: 4.49 MILL/UL (ref 4.4–6.2)
SODIUM SERPL-SCNC: 141 MMOL/L (ref 136–145)
TRIGL SERPL-MCNC: 124 MG/DL
WBC: 9.9 THOU/UL (ref 4–11)

## 2020-08-04 ASSESSMENT — MIFFLIN-ST. JEOR: SCORE: 1653.34

## 2020-08-06 ENCOUNTER — COMMUNICATION - HEALTHEAST (OUTPATIENT)
Dept: FAMILY MEDICINE | Facility: CLINIC | Age: 85
End: 2020-08-06

## 2020-08-11 ENCOUNTER — RECORDS - HEALTHEAST (OUTPATIENT)
Dept: ADMINISTRATIVE | Facility: OTHER | Age: 85
End: 2020-08-11

## 2020-08-11 LAB — RETINOPATHY: NEGATIVE

## 2020-08-14 ENCOUNTER — RECORDS - HEALTHEAST (OUTPATIENT)
Dept: HEALTH INFORMATION MANAGEMENT | Facility: CLINIC | Age: 85
End: 2020-08-14

## 2020-08-31 ENCOUNTER — COMMUNICATION - HEALTHEAST (OUTPATIENT)
Dept: FAMILY MEDICINE | Facility: CLINIC | Age: 85
End: 2020-08-31

## 2020-08-31 DIAGNOSIS — E11.9 DIABETES MELLITUS TYPE 2, NONINSULIN DEPENDENT (H): ICD-10-CM

## 2020-09-25 ENCOUNTER — COMMUNICATION - HEALTHEAST (OUTPATIENT)
Dept: FAMILY MEDICINE | Facility: CLINIC | Age: 85
End: 2020-09-25

## 2020-09-25 DIAGNOSIS — E11.9 DIABETES MELLITUS TYPE 2, NONINSULIN DEPENDENT (H): ICD-10-CM

## 2020-11-06 ENCOUNTER — RECORDS - HEALTHEAST (OUTPATIENT)
Dept: ADMINISTRATIVE | Facility: OTHER | Age: 85
End: 2020-11-06

## 2020-11-29 ENCOUNTER — HEALTH MAINTENANCE LETTER (OUTPATIENT)
Age: 85
End: 2020-11-29

## 2020-11-29 ENCOUNTER — COMMUNICATION - HEALTHEAST (OUTPATIENT)
Dept: SCHEDULING | Facility: CLINIC | Age: 85
End: 2020-11-29

## 2020-11-30 ENCOUNTER — AMBULATORY - HEALTHEAST (OUTPATIENT)
Dept: OTHER | Facility: CLINIC | Age: 85
End: 2020-11-30

## 2020-11-30 ENCOUNTER — DOCUMENTATION ONLY (OUTPATIENT)
Dept: OTHER | Facility: CLINIC | Age: 85
End: 2020-11-30

## 2020-12-02 ENCOUNTER — OFFICE VISIT - HEALTHEAST (OUTPATIENT)
Dept: PHARMACY | Facility: CLINIC | Age: 85
End: 2020-12-02

## 2020-12-02 DIAGNOSIS — E78.5 HYPERLIPIDEMIA, UNSPECIFIED HYPERLIPIDEMIA TYPE: ICD-10-CM

## 2020-12-02 DIAGNOSIS — I10 ESSENTIAL HYPERTENSION, BENIGN: ICD-10-CM

## 2020-12-02 DIAGNOSIS — Z78.9 TAKES DIETARY SUPPLEMENTS: ICD-10-CM

## 2020-12-02 DIAGNOSIS — R10.9 ABDOMINAL PAIN, UNSPECIFIED ABDOMINAL LOCATION: ICD-10-CM

## 2020-12-02 DIAGNOSIS — E78.5 HYPERLIPIDEMIA: ICD-10-CM

## 2020-12-02 RX ORDER — PRAVASTATIN SODIUM 40 MG
40 TABLET ORAL AT BEDTIME
Qty: 90 TABLET | Refills: 2 | Status: SHIPPED | OUTPATIENT
Start: 2020-12-02 | End: 2021-11-01

## 2020-12-08 ENCOUNTER — COMMUNICATION - HEALTHEAST (OUTPATIENT)
Dept: SCHEDULING | Facility: CLINIC | Age: 85
End: 2020-12-08

## 2020-12-14 ENCOUNTER — OFFICE VISIT - HEALTHEAST (OUTPATIENT)
Dept: FAMILY MEDICINE | Facility: CLINIC | Age: 85
End: 2020-12-14

## 2020-12-14 DIAGNOSIS — R10.84 ABDOMINAL PAIN, GENERALIZED: ICD-10-CM

## 2020-12-14 DIAGNOSIS — E66.01 MORBID OBESITY (H): ICD-10-CM

## 2021-01-19 ENCOUNTER — COMMUNICATION - HEALTHEAST (OUTPATIENT)
Dept: FAMILY MEDICINE | Facility: CLINIC | Age: 86
End: 2021-01-19

## 2021-02-11 ENCOUNTER — AMBULATORY - HEALTHEAST (OUTPATIENT)
Dept: NURSING | Facility: CLINIC | Age: 86
End: 2021-02-11

## 2021-02-19 ENCOUNTER — COMMUNICATION - HEALTHEAST (OUTPATIENT)
Dept: FAMILY MEDICINE | Facility: CLINIC | Age: 86
End: 2021-02-19

## 2021-02-19 DIAGNOSIS — I10 BENIGN ESSENTIAL HYPERTENSION: ICD-10-CM

## 2021-02-19 RX ORDER — LISINOPRIL 2.5 MG/1
2.5 TABLET ORAL DAILY
Qty: 90 TABLET | Refills: 3 | Status: SHIPPED | OUTPATIENT
Start: 2021-02-19 | End: 2021-09-29

## 2021-02-21 ENCOUNTER — COMMUNICATION - HEALTHEAST (OUTPATIENT)
Dept: FAMILY MEDICINE | Facility: CLINIC | Age: 86
End: 2021-02-21

## 2021-02-21 DIAGNOSIS — I10 HTN (HYPERTENSION): ICD-10-CM

## 2021-02-22 RX ORDER — AMLODIPINE BESYLATE 10 MG/1
10 TABLET ORAL DAILY
Qty: 90 TABLET | Refills: 1 | Status: SHIPPED | OUTPATIENT
Start: 2021-02-22 | End: 2021-09-03

## 2021-03-04 ENCOUNTER — AMBULATORY - HEALTHEAST (OUTPATIENT)
Dept: NURSING | Facility: CLINIC | Age: 86
End: 2021-03-04

## 2021-05-18 ENCOUNTER — OFFICE VISIT - HEALTHEAST (OUTPATIENT)
Dept: FAMILY MEDICINE | Facility: CLINIC | Age: 86
End: 2021-05-18

## 2021-05-18 DIAGNOSIS — E66.812 CLASS 2 SEVERE OBESITY DUE TO EXCESS CALORIES WITH SERIOUS COMORBIDITY AND BODY MASS INDEX (BMI) OF 36.0 TO 36.9 IN ADULT (H): ICD-10-CM

## 2021-05-18 DIAGNOSIS — Z23 NEED FOR 23-POLYVALENT PNEUMOCOCCAL POLYSACCHARIDE VACCINE: ICD-10-CM

## 2021-05-18 DIAGNOSIS — E66.01 CLASS 2 SEVERE OBESITY DUE TO EXCESS CALORIES WITH SERIOUS COMORBIDITY AND BODY MASS INDEX (BMI) OF 36.0 TO 36.9 IN ADULT (H): ICD-10-CM

## 2021-05-18 DIAGNOSIS — Z00.00 HEALTH CARE MAINTENANCE: ICD-10-CM

## 2021-05-18 DIAGNOSIS — I10 ESSENTIAL HYPERTENSION: ICD-10-CM

## 2021-05-18 DIAGNOSIS — E11.9 DIABETES MELLITUS TYPE 2, NONINSULIN DEPENDENT (H): ICD-10-CM

## 2021-05-18 LAB
ALBUMIN SERPL-MCNC: 3.7 G/DL (ref 3.5–5)
ALP SERPL-CCNC: 91 U/L (ref 45–120)
ALT SERPL W P-5'-P-CCNC: 10 U/L (ref 0–45)
ANION GAP SERPL CALCULATED.3IONS-SCNC: 11 MMOL/L (ref 5–18)
AST SERPL W P-5'-P-CCNC: 13 U/L (ref 0–40)
BILIRUB SERPL-MCNC: 0.5 MG/DL (ref 0–1)
BUN SERPL-MCNC: 27 MG/DL (ref 8–28)
CALCIUM SERPL-MCNC: 9.1 MG/DL (ref 8.5–10.5)
CHLORIDE BLD-SCNC: 106 MMOL/L (ref 98–107)
CHOLEST SERPL-MCNC: 159 MG/DL
CO2 SERPL-SCNC: 24 MMOL/L (ref 22–31)
CREAT SERPL-MCNC: 1.1 MG/DL (ref 0.7–1.3)
FASTING STATUS PATIENT QL REPORTED: YES
GFR SERPL CREATININE-BSD FRML MDRD: >60 ML/MIN/1.73M2
GLUCOSE BLD-MCNC: 136 MG/DL (ref 70–125)
HBA1C MFR BLD: 6.8 %
HDLC SERPL-MCNC: 41 MG/DL
LDLC SERPL CALC-MCNC: 93 MG/DL
POTASSIUM BLD-SCNC: 4.8 MMOL/L (ref 3.5–5)
PROT SERPL-MCNC: 6.2 G/DL (ref 6–8)
SODIUM SERPL-SCNC: 141 MMOL/L (ref 136–145)
TRIGL SERPL-MCNC: 124 MG/DL

## 2021-05-18 ASSESSMENT — MIFFLIN-ST. JEOR: SCORE: 1633.95

## 2021-05-18 ASSESSMENT — PATIENT HEALTH QUESTIONNAIRE - PHQ9: SUM OF ALL RESPONSES TO PHQ QUESTIONS 1-9: 4

## 2021-05-19 ENCOUNTER — COMMUNICATION - HEALTHEAST (OUTPATIENT)
Dept: FAMILY MEDICINE | Facility: CLINIC | Age: 86
End: 2021-05-19

## 2021-05-24 ENCOUNTER — RECORDS - HEALTHEAST (OUTPATIENT)
Dept: ADMINISTRATIVE | Facility: CLINIC | Age: 86
End: 2021-05-24

## 2021-05-27 ENCOUNTER — RECORDS - HEALTHEAST (OUTPATIENT)
Dept: ADMINISTRATIVE | Facility: CLINIC | Age: 86
End: 2021-05-27

## 2021-05-27 VITALS
RESPIRATION RATE: 20 BRPM | DIASTOLIC BLOOD PRESSURE: 60 MMHG | HEART RATE: 72 BPM | SYSTOLIC BLOOD PRESSURE: 127 MMHG | WEIGHT: 224.4 LBS | BODY MASS INDEX: 37.39 KG/M2 | HEIGHT: 65 IN

## 2021-05-27 VITALS — DIASTOLIC BLOOD PRESSURE: 63 MMHG | HEART RATE: 75 BPM | SYSTOLIC BLOOD PRESSURE: 136 MMHG | TEMPERATURE: 98 F

## 2021-05-27 ASSESSMENT — PATIENT HEALTH QUESTIONNAIRE - PHQ9: SUM OF ALL RESPONSES TO PHQ QUESTIONS 1-9: 4

## 2021-05-27 NOTE — TELEPHONE ENCOUNTER
Refill Approved    Rx renewed per Medication Renewal Policy. Medication was last renewed on 1/3/19.    Last office visit 12/26/18    Osmar Abdi, [facility] Triage/Med Refill 4/10/2019     Requested Prescriptions   Pending Prescriptions Disp Refills     metFORMIN (GLUCOPHAGE) 500 MG tablet 180 tablet 0     Sig: Take 1 tablet (500 mg total) by mouth 2 (two) times a day with meals.       Metformin Refill Protocol Passed - 4/10/2019 10:59 AM        Passed - Blood pressure in last 12 months     BP Readings from Last 1 Encounters:   12/26/18 134/64             Passed - LFT or AST or ALT in last 12 months     Albumin   Date Value Ref Range Status   12/26/2018 3.9 3.5 - 5.0 g/dL Final     Bilirubin, Total   Date Value Ref Range Status   12/26/2018 0.6 0.0 - 1.0 mg/dL Final     Bilirubin, Direct   Date Value Ref Range Status   12/26/2018 0.2 <=0.5 mg/dL Final     Alkaline Phosphatase   Date Value Ref Range Status   12/26/2018 99 45 - 120 U/L Final     AST   Date Value Ref Range Status   12/26/2018 14 0 - 40 U/L Final     ALT   Date Value Ref Range Status   12/26/2018 11 0 - 45 U/L Final     Protein, Total   Date Value Ref Range Status   12/26/2018 6.7 6.0 - 8.0 g/dL Final                Passed - GFR or Serum Creatinine in last 6 months     GFR MDRD Non Af Amer   Date Value Ref Range Status   12/26/2018 >60 >60 mL/min/1.73m2 Final     GFR MDRD Af Amer   Date Value Ref Range Status   12/26/2018 >60 >60 mL/min/1.73m2 Final             Passed - Visit with PCP or prescribing provider visit in last 6 months or next 3 months     Last office visit with prescriber/PCP: 12/26/2018 OR same dept: 12/26/2018 Andrew Doty MD OR same specialty: 12/26/2018 Andrew Doty MD Last physical: Visit date not found Last MTM visit: Visit date not found         Next appt within 3 mo: Visit date not found  Next physical within 3 mo: Visit date not found  Prescriber OR PCP: Andrew Doty MD  Last diagnosis associated with  med order: 1. Diabetes mellitus type 2, noninsulin dependent (H)  - metFORMIN (GLUCOPHAGE) 500 MG tablet; Take 1 tablet (500 mg total) by mouth 2 (two) times a day with meals.  Dispense: 180 tablet; Refill: 0     If protocol passes may refill for 12 months if within 3 months of last provider visit (or a total of 15 months).           Passed - A1C in last 6 months     Hemoglobin A1c   Date Value Ref Range Status   12/26/2018 7.1 (H) 3.5 - 6.0 % Final               Passed - Microalbumin in last year      Microalbumin, Random Urine   Date Value Ref Range Status   06/21/2018 1.43 0.00 - 1.99 mg/dL Final

## 2021-05-28 ENCOUNTER — RECORDS - HEALTHEAST (OUTPATIENT)
Dept: ADMINISTRATIVE | Facility: CLINIC | Age: 86
End: 2021-05-28

## 2021-05-29 NOTE — TELEPHONE ENCOUNTER
Refill Approved    Rx renewed per Medication Renewal Policy. Medication was last renewed on 12/26/18.    Pia Monge, [facility] Triage/Med Refill 6/17/2019     Requested Prescriptions   Pending Prescriptions Disp Refills     lisinopril (PRINIVIL,ZESTRIL) 2.5 MG tablet [Pharmacy Med Name: LISINOPRIL 2.5 MG TABLET] 90 tablet 0     Sig: TAKE 1 TABLET BY MOUTH EVERY DAY       Ace Inhibitors Refill Protocol Passed - 6/16/2019  9:51 AM        Passed - PCP or prescribing provider visit in past 12 months       Last office visit with prescriber/PCP: 12/26/2018 Andrew Doty MD OR same dept: 12/26/2018 Andrew Doty MD OR same specialty: 12/26/2018 Andrew Doty MD  Last physical: 6/21/2018 Last MTM visit: Visit date not found   Next visit within 3 mo: Visit date not found  Next physical within 3 mo: Visit date not found  Prescriber OR PCP: Andrew Doty MD  Last diagnosis associated with med order: 1. Benign essential hypertension  - lisinopril (PRINIVIL,ZESTRIL) 2.5 MG tablet [Pharmacy Med Name: LISINOPRIL 2.5 MG TABLET]; TAKE 1 TABLET BY MOUTH EVERY DAY  Dispense: 90 tablet; Refill: 0    If protocol passes may refill for 12 months if within 3 months of last provider visit (or a total of 15 months).             Passed - Serum Potassium in past 12 months     Lab Results   Component Value Date    Potassium 4.8 12/26/2018             Passed - Blood pressure filed in past 12 months     BP Readings from Last 1 Encounters:   12/26/18 134/64             Passed - Serum Creatinine in past 12 months     Creatinine   Date Value Ref Range Status   12/26/2018 1.09 0.70 - 1.30 mg/dL Final

## 2021-05-30 ENCOUNTER — RECORDS - HEALTHEAST (OUTPATIENT)
Dept: ADMINISTRATIVE | Facility: CLINIC | Age: 86
End: 2021-05-30

## 2021-05-30 VITALS — WEIGHT: 252 LBS | BODY MASS INDEX: 38.19 KG/M2 | HEIGHT: 68 IN

## 2021-05-30 NOTE — TELEPHONE ENCOUNTER
RN cannot approve Refill Request    RN can NOT refill this medication overdue for office visits and/or labs.    Osmar Abdi, [facility] Triage/Med Refill 7/9/2019    Requested Prescriptions   Pending Prescriptions Disp Refills     metFORMIN (GLUCOPHAGE) 500 MG tablet 180 tablet 0     Sig: Take 1 tablet (500 mg total) by mouth 2 (two) times a day with meals.       Metformin Refill Protocol Failed - 7/8/2019  6:23 PM        Failed - Visit with PCP or prescribing provider visit in last 6 months or next 3 months     Last office visit with prescriber/PCP: Visit date not found OR same dept: 12/26/2018 Andrew Doty MD OR same specialty: 12/26/2018 Andrew Doty MD Last physical: Visit date not found Last MTM visit: Visit date not found         Next appt within 3 mo: Visit date not found  Next physical within 3 mo: Visit date not found  Prescriber OR PCP: Andrew Doty MD  Last diagnosis associated with med order: 1. Diabetes mellitus type 2, noninsulin dependent (H)  - metFORMIN (GLUCOPHAGE) 500 MG tablet; Take 1 tablet (500 mg total) by mouth 2 (two) times a day with meals.  Dispense: 180 tablet; Refill: 0     If protocol passes may refill for 12 months if within 3 months of last provider visit (or a total of 15 months).           Failed - A1C in last 6 months     Hemoglobin A1c   Date Value Ref Range Status   12/26/2018 7.1 (H) 3.5 - 6.0 % Final               Failed - Microalbumin in last year      Microalbumin, Random Urine   Date Value Ref Range Status   06/21/2018 1.43 0.00 - 1.99 mg/dL Final                  Passed - Blood pressure in last 12 months     BP Readings from Last 1 Encounters:   12/26/18 134/64             Passed - LFT or AST or ALT in last 12 months     Albumin   Date Value Ref Range Status   12/26/2018 3.9 3.5 - 5.0 g/dL Final     Bilirubin, Total   Date Value Ref Range Status   12/26/2018 0.6 0.0 - 1.0 mg/dL Final     Bilirubin, Direct   Date Value Ref Range Status    12/26/2018 0.2 <=0.5 mg/dL Final     Alkaline Phosphatase   Date Value Ref Range Status   12/26/2018 99 45 - 120 U/L Final     AST   Date Value Ref Range Status   12/26/2018 14 0 - 40 U/L Final     ALT   Date Value Ref Range Status   12/26/2018 11 0 - 45 U/L Final     Protein, Total   Date Value Ref Range Status   12/26/2018 6.7 6.0 - 8.0 g/dL Final                Passed - GFR or Serum Creatinine in last 6 months     GFR MDRD Non Af Amer   Date Value Ref Range Status   12/26/2018 >60 >60 mL/min/1.73m2 Final     GFR MDRD Af Amer   Date Value Ref Range Status   12/26/2018 >60 >60 mL/min/1.73m2 Final

## 2021-05-30 NOTE — PROGRESS NOTES
Assessment:  1.  Non-insulin-dependent diabetes mellitus, checking status.  2.  Hypertension controlled.  3.  Hyperlipidemia, checking status.    Plan: Check fasting A1c, lipid hepatic and basic profiles.  Renewed pravastatin prescription and renewed metformin prescription for 90-day supplies.  Follow-up in 4 months i.e. November for next med check if the lab is okay and he continues to do well.  Continue to work at weight reduction through calorie restriction and minimizing the carbohydrates etc.  He understands and agrees.    Subjective: 84-year-old male presenting for follow-up on the above.  Regarding diabetes he brings in his list of blood sugars checked faithfully on a daily basis since December.  Most of the readings are in the 1 30-1 60 range but occasional 120 readings and occasional 170 readings.  He notes he had gone to [facility] last year in the late winter and early spring.  Regarding hypertension no headaches or dizziness or leg swelling.  Regarding lipids no diarrhea constipation muscle aching or muscle weakness.  He notes he has been frustrated that he has not lost more weight with the way that he has tried to avoid bread etc. and his diet.  Patient Active Problem List   Diagnosis     (Lower) Leg Localized Swelling Bilateral     Nephrolithiasis     Choroid Melanoma     Diabetes mellitus type 2, noninsulin dependent (H)     Hyperlipemia     Primary Osteoarthritis Of The Lumbar Vertebrae     Benign Essential Hypertension     Allergic rhinitis     Malignant Melanoma Of The Eye     Anemia     BPH (benign prostatic hyperplasia)     Osteoarthritis of knees, bilateral     S/P total knee arthroplasty     Past Medical History:   Diagnosis Date     Cancer (H)     Eye     Diabetes mellitus (H)      Hyperlipidemia      Hypertension      Kidney disease      Kidney stones      Peptic ulceration 2011    see EGD that year     Pure hypercholesterolemia      No Known Allergies  Current Outpatient  "Medications   Medication Sig Dispense Refill     amLODIPine (NORVASC) 10 MG tablet TAKE ONE TABLET BY MOUTH EVERY DAY 90 tablet 2     aspirin 325 MG tablet Take 1 tablet (325 mg total) by mouth 2 (two) times a day with meals. 60 tablet 0     blood glucose test (ONETOUCH ULTRA TEST) strips Use 1 each As Directed daily Dispense brand per patient's insurance at pharmacy discretion.(E11.9). 100 strip 2     cetirizine (ZYRTEC) 10 mg cap Take by mouth.       generic lancets Use 1 each As Directed daily. Dispense brand per patient's insurance at pharmacy discretion.(E11.9) 100 each 1     hydroCHLOROthiazide (MICROZIDE) 12.5 mg capsule TAKE ONE CAPSULE BY MOUTH EVERY DAY 90 capsule 2     lisinopril (PRINIVIL,ZESTRIL) 2.5 MG tablet TAKE 1 TABLET BY MOUTH EVERY DAY 90 tablet 1     metFORMIN (GLUCOPHAGE) 500 MG tablet Take 1 tablet (500 mg total) by mouth 2 (two) times a day with meals. 180 tablet 1     pravastatin (PRAVACHOL) 80 MG tablet TAKE ONE-HALF TABLET BY MOUTH EVERY EVENING 45 tablet 1     psyllium with dextrose (METAMUCIL JAR) powder Take by mouth 3 (three) times a day.       vitamin A-vitamin C-vit E-min (OCUVITE) Tab tablet Take 1 tablet by mouth daily.        No current facility-administered medications for this visit.      All other review of systems are negative.    Objective:/70   Pulse 68   Ht 5' 5.5\" (1.664 m)   Wt (!) 235 lb (106.6 kg)   SpO2 94%   BMI 38.51 kg/m    H ENT exam is negative.  Neck supple without adenopathy or thyromegaly.  Lungs clear.  Heart regular rate and rhythm without murmur.  Abdomen shows no masses tenderness or paraspinal megaly.  No pitting edema at the ankle.  He is alert with clear speech.  Well-healed scar at the knee consistent with the knee replacement surgery.  He does follow with his ophthalmologist for the history of the choroid melanoma.  "

## 2021-05-30 NOTE — TELEPHONE ENCOUNTER
RN cannot approve Refill Request    RN can NOT refill this medication PCP messaged that patient is overdue for Labs and Office Visit. Last office visit: 12/26/2018 Andrew Doty MD Last Physical: 6/21/2018 Last MTM visit: Visit date not found Last visit same specialty: 12/26/2018 Andrew Doty MD.  Next visit within 3 mo: Visit date not found  Next physical within 3 mo: Visit date not found      Olivia OGDEN Marita, [facility] Triage/Med Refill 7/8/2019    Requested Prescriptions   Pending Prescriptions Disp Refills     metFORMIN (GLUCOPHAGE) 500 MG tablet [Pharmacy Med Name: METFORMIN  MG TABLET] 180 tablet 0     Sig: TAKE 1 TABLET BY MOUTH TWICE A DAY WITH MEALS       Metformin Refill Protocol Failed - 7/8/2019  1:35 AM        Failed - Visit with PCP or prescribing provider visit in last 6 months or next 3 months     Last office visit with prescriber/PCP: Visit date not found OR same dept: 12/26/2018 Andrew Doty MD OR same specialty: 12/26/2018 Andrew Doty MD Last physical: Visit date not found Last MTM visit: Visit date not found         Next appt within 3 mo: Visit date not found  Next physical within 3 mo: Visit date not found  Prescriber OR PCP: Andrew Doty MD  Last diagnosis associated with med order: 1. Diabetes mellitus type 2, noninsulin dependent (H)  - metFORMIN (GLUCOPHAGE) 500 MG tablet [Pharmacy Med Name: METFORMIN  MG TABLET]; TAKE 1 TABLET BY MOUTH TWICE A DAY WITH MEALS  Dispense: 180 tablet; Refill: 0     If protocol passes may refill for 12 months if within 3 months of last provider visit (or a total of 15 months).           Failed - A1C in last 6 months     Hemoglobin A1c   Date Value Ref Range Status   12/26/2018 7.1 (H) 3.5 - 6.0 % Final               Failed - Microalbumin in last year      Microalbumin, Random Urine   Date Value Ref Range Status   06/21/2018 1.43 0.00 - 1.99 mg/dL Final                  Passed - Blood pressure in last 12 months      BP Readings from Last 1 Encounters:   12/26/18 134/64             Passed - LFT or AST or ALT in last 12 months     Albumin   Date Value Ref Range Status   12/26/2018 3.9 3.5 - 5.0 g/dL Final     Bilirubin, Total   Date Value Ref Range Status   12/26/2018 0.6 0.0 - 1.0 mg/dL Final     Bilirubin, Direct   Date Value Ref Range Status   12/26/2018 0.2 <=0.5 mg/dL Final     Alkaline Phosphatase   Date Value Ref Range Status   12/26/2018 99 45 - 120 U/L Final     AST   Date Value Ref Range Status   12/26/2018 14 0 - 40 U/L Final     ALT   Date Value Ref Range Status   12/26/2018 11 0 - 45 U/L Final     Protein, Total   Date Value Ref Range Status   12/26/2018 6.7 6.0 - 8.0 g/dL Final                Passed - GFR or Serum Creatinine in last 6 months     GFR MDRD Non Af Amer   Date Value Ref Range Status   12/26/2018 >60 >60 mL/min/1.73m2 Final     GFR MDRD Af Amer   Date Value Ref Range Status   12/26/2018 >60 >60 mL/min/1.73m2 Final

## 2021-05-30 NOTE — TELEPHONE ENCOUNTER
RN cannot approve Refill Request    RN can NOT refill this medication Protocol failed and NO refill given.       Pia Monge, [facility] Triage/Med Refill 7/8/2019    Requested Prescriptions   Pending Prescriptions Disp Refills     metFORMIN (GLUCOPHAGE) 500 MG tablet 180 tablet 0     Sig: Take 1 tablet (500 mg total) by mouth 2 (two) times a day with meals.       Metformin Refill Protocol Failed - 7/8/2019 10:42 AM        Failed - Visit with PCP or prescribing provider visit in last 6 months or next 3 months     Last office visit with prescriber/PCP: Visit date not found OR same dept: 12/26/2018 Andrew Doty MD OR same specialty: 12/26/2018 Andrew Doty MD Last physical: Visit date not found Last MTM visit: Visit date not found         Next appt within 3 mo: Visit date not found  Next physical within 3 mo: Visit date not found  Prescriber OR PCP: Andrew Doty MD  Last diagnosis associated with med order: 1. Diabetes mellitus type 2, noninsulin dependent (H)  - metFORMIN (GLUCOPHAGE) 500 MG tablet; Take 1 tablet (500 mg total) by mouth 2 (two) times a day with meals.  Dispense: 180 tablet; Refill: 0     If protocol passes may refill for 12 months if within 3 months of last provider visit (or a total of 15 months).           Failed - A1C in last 6 months     Hemoglobin A1c   Date Value Ref Range Status   12/26/2018 7.1 (H) 3.5 - 6.0 % Final               Failed - Microalbumin in last year      Microalbumin, Random Urine   Date Value Ref Range Status   06/21/2018 1.43 0.00 - 1.99 mg/dL Final                  Passed - Blood pressure in last 12 months     BP Readings from Last 1 Encounters:   12/26/18 134/64             Passed - LFT or AST or ALT in last 12 months     Albumin   Date Value Ref Range Status   12/26/2018 3.9 3.5 - 5.0 g/dL Final     Bilirubin, Total   Date Value Ref Range Status   12/26/2018 0.6 0.0 - 1.0 mg/dL Final     Bilirubin, Direct   Date Value Ref Range Status   12/26/2018  0.2 <=0.5 mg/dL Final     Alkaline Phosphatase   Date Value Ref Range Status   12/26/2018 99 45 - 120 U/L Final     AST   Date Value Ref Range Status   12/26/2018 14 0 - 40 U/L Final     ALT   Date Value Ref Range Status   12/26/2018 11 0 - 45 U/L Final     Protein, Total   Date Value Ref Range Status   12/26/2018 6.7 6.0 - 8.0 g/dL Final                Passed - GFR or Serum Creatinine in last 6 months     GFR MDRD Non Af Amer   Date Value Ref Range Status   12/26/2018 >60 >60 mL/min/1.73m2 Final     GFR MDRD Af Amer   Date Value Ref Range Status   12/26/2018 >60 >60 mL/min/1.73m2 Final

## 2021-05-31 ENCOUNTER — RECORDS - HEALTHEAST (OUTPATIENT)
Dept: ADMINISTRATIVE | Facility: CLINIC | Age: 86
End: 2021-05-31

## 2021-05-31 VITALS — HEIGHT: 67 IN | WEIGHT: 242 LBS | BODY MASS INDEX: 37.98 KG/M2

## 2021-05-31 VITALS — WEIGHT: 250.2 LBS | HEIGHT: 67 IN | BODY MASS INDEX: 39.27 KG/M2

## 2021-05-31 NOTE — TELEPHONE ENCOUNTER
Refill Approved    Rx renewed per Medication Renewal Policy. Medication was last renewed on :  HCTZ on 11/21/2018 for 90/2;  .Amlodipine for 90/2 on 11/21/2018  Last OV 7/17/2019  Keyla Matthew, [facility] Triage/Med Refill 8/14/2019     Requested Prescriptions   Pending Prescriptions Disp Refills     amLODIPine (NORVASC) 10 MG tablet [Pharmacy Med Name: AMLODIPINE BESYLATE 10 MG TAB] 90 tablet 1     Sig: TAKE 1 TABLET BY MOUTH EVERY DAY       Calcium-Channel Blockers Protocol Passed - 8/13/2019  7:37 AM        Passed - PCP or prescribing provider visit in past 12 months or next 3 months     Last office visit with prescriber/PCP: 7/17/2019 Andrew Doty MD OR same dept: 7/17/2019 Andrew Doty MD OR same specialty: 7/17/2019 Andrew Doty MD  Last physical: 6/21/2018 Last MTM visit: Visit date not found   Next visit within 3 mo: Visit date not found  Next physical within 3 mo: Visit date not found  Prescriber OR PCP: Andrew Doty MD  Last diagnosis associated with med order: 1. HTN (hypertension)  - amLODIPine (NORVASC) 10 MG tablet [Pharmacy Med Name: AMLODIPINE BESYLATE 10 MG TAB]; TAKE 1 TABLET BY MOUTH EVERY DAY  Dispense: 90 tablet; Refill: 1  - hydroCHLOROthiazide (MICROZIDE) 12.5 mg capsule [Pharmacy Med Name: HYDROCHLOROTHIAZIDE 12.5 MG CP]; TAKE 1 CAPSULE BY MOUTH EVERY DAY  Dispense: 90 capsule; Refill: 1    If protocol passes may refill for 12 months if within 3 months of last provider visit (or a total of 15 months).             Passed - Blood pressure filed in past 12 months     BP Readings from Last 1 Encounters:   07/17/19 128/70             hydroCHLOROthiazide (MICROZIDE) 12.5 mg capsule [Pharmacy Med Name: HYDROCHLOROTHIAZIDE 12.5 MG CP] 90 capsule 1     Sig: TAKE 1 CAPSULE BY MOUTH EVERY DAY       Diuretics/Combination Diuretics Refill Protocol  Passed - 8/13/2019  7:37 AM        Passed - Visit with PCP or prescribing provider visit in past 12 months     Last office visit  with prescriber/PCP: 7/17/2019 Andrew Doty MD OR same dept: 7/17/2019 Andrew Doty MD OR same specialty: 7/17/2019 Andrew Doty MD  Last physical: 6/21/2018 Last MTM visit: Visit date not found   Next visit within 3 mo: Visit date not found  Next physical within 3 mo: Visit date not found  Prescriber OR PCP: Andrew Doty MD  Last diagnosis associated with med order: 1. HTN (hypertension)  - amLODIPine (NORVASC) 10 MG tablet [Pharmacy Med Name: AMLODIPINE BESYLATE 10 MG TAB]; TAKE 1 TABLET BY MOUTH EVERY DAY  Dispense: 90 tablet; Refill: 1  - hydroCHLOROthiazide (MICROZIDE) 12.5 mg capsule [Pharmacy Med Name: HYDROCHLOROTHIAZIDE 12.5 MG CP]; TAKE 1 CAPSULE BY MOUTH EVERY DAY  Dispense: 90 capsule; Refill: 1    If protocol passes may refill for 12 months if within 3 months of last provider visit (or a total of 15 months).             Passed - Serum Potassium in past 12 months      Lab Results   Component Value Date    Potassium 4.9 07/17/2019             Passed - Serum Sodium in past 12 months      Lab Results   Component Value Date    Sodium 140 07/17/2019             Passed - Blood pressure on file in past 12 months     BP Readings from Last 1 Encounters:   07/17/19 128/70             Passed - Serum Creatinine in past 12 months      Creatinine   Date Value Ref Range Status   07/17/2019 1.26 0.70 - 1.30 mg/dL Final

## 2021-06-01 VITALS — WEIGHT: 240 LBS | BODY MASS INDEX: 38.57 KG/M2 | HEIGHT: 66 IN

## 2021-06-01 VITALS — BODY MASS INDEX: 39.38 KG/M2 | WEIGHT: 244 LBS

## 2021-06-01 VITALS — HEIGHT: 67 IN | WEIGHT: 242 LBS | BODY MASS INDEX: 37.98 KG/M2

## 2021-06-01 VITALS — BODY MASS INDEX: 36.53 KG/M2 | HEIGHT: 68 IN | WEIGHT: 241 LBS

## 2021-06-01 NOTE — TELEPHONE ENCOUNTER
Refill Approved    Rx renewed per Medication Renewal Policy. Medication was last renewed on 11/21/18.    Eda Porter, [facility] Triage/Med Refill 9/9/2019     Requested Prescriptions   Pending Prescriptions Disp Refills     ONETOUCH ULTRA BLUE TEST STRIP strips [Pharmacy Med Name: ONE TOUCH ULTRA BLUE TEST STRP] 100 strip 1     Sig: USE TO TEST ONCE DAILY       Diabetic Supplies Refill Protocol Passed - 9/9/2019  9:03 AM        Passed - Visit with PCP or prescribing provider visit in last 6 months     Last office visit with prescriber/PCP: 7/17/2019 Andrew Doty MD OR same dept: 7/17/2019 Andrew Doty MD OR same specialty: 7/17/2019 Andrew Doty MD  Last physical: 6/21/2018 Last MTM visit: Visit date not found   Next visit within 3 mo: Visit date not found  Next physical within 3 mo: Visit date not found  Prescriber OR PCP: Andrew Doty MD  Last diagnosis associated with med order: 1. Diabetes mellitus type 2, noninsulin dependent (H)  - ONETOUCH ULTRA BLUE TEST STRIP strips [Pharmacy Med Name: ONE TOUCH ULTRA BLUE TEST STRP]; USE TO TEST ONCE DAILY  Dispense: 100 strip; Refill: 1    If protocol passes may refill for 12 months if within 3 months of last provider visit (or a total of 15 months).             Passed - A1C in last 6 months     Hemoglobin A1c   Date Value Ref Range Status   07/17/2019 6.5 (H) 3.5 - 6.0 % Final

## 2021-06-02 VITALS — BODY MASS INDEX: 36.88 KG/M2 | WEIGHT: 239 LBS

## 2021-06-03 VITALS — BODY MASS INDEX: 37.77 KG/M2 | WEIGHT: 235 LBS | HEIGHT: 66 IN

## 2021-06-03 NOTE — TELEPHONE ENCOUNTER
Refill Approved    Rx renewed per Medication Renewal Policy. Medication was last renewed on 6/17/19.    Pia Monge, [facility] Triage/Med Refill 11/30/2019     Requested Prescriptions   Pending Prescriptions Disp Refills     lisinopril (PRINIVIL,ZESTRIL) 2.5 MG tablet [Pharmacy Med Name: LISINOPRIL 2.5 MG TABLET] 90 tablet 1     Sig: TAKE 1 TABLET BY MOUTH EVERY DAY       Ace Inhibitors Refill Protocol Passed - 11/30/2019 12:47 AM        Passed - PCP or prescribing provider visit in past 12 months       Last office visit with prescriber/PCP: 7/17/2019 Andrew Doty MD OR same dept: 7/17/2019 Andrew Doty MD OR same specialty: 7/17/2019 Andrew Doty MD  Last physical: 6/21/2018 Last MTM visit: Visit date not found   Next visit within 3 mo: Visit date not found  Next physical within 3 mo: Visit date not found  Prescriber OR PCP: Andrew Doty MD  Last diagnosis associated with med order: 1. Benign essential hypertension  - lisinopril (PRINIVIL,ZESTRIL) 2.5 MG tablet [Pharmacy Med Name: LISINOPRIL 2.5 MG TABLET]; TAKE 1 TABLET BY MOUTH EVERY DAY  Dispense: 90 tablet; Refill: 1    If protocol passes may refill for 12 months if within 3 months of last provider visit (or a total of 15 months).             Passed - Serum Potassium in past 12 months     Lab Results   Component Value Date    Potassium 4.9 07/17/2019             Passed - Blood pressure filed in past 12 months     BP Readings from Last 1 Encounters:   07/17/19 128/70             Passed - Serum Creatinine in past 12 months     Creatinine   Date Value Ref Range Status   07/17/2019 1.26 0.70 - 1.30 mg/dL Final

## 2021-06-04 VITALS
DIASTOLIC BLOOD PRESSURE: 63 MMHG | OXYGEN SATURATION: 98 % | HEART RATE: 68 BPM | HEIGHT: 66 IN | TEMPERATURE: 96.6 F | BODY MASS INDEX: 36.16 KG/M2 | SYSTOLIC BLOOD PRESSURE: 127 MMHG | WEIGHT: 225 LBS

## 2021-06-04 VITALS
TEMPERATURE: 98 F | DIASTOLIC BLOOD PRESSURE: 62 MMHG | WEIGHT: 231 LBS | SYSTOLIC BLOOD PRESSURE: 110 MMHG | OXYGEN SATURATION: 98 % | BODY MASS INDEX: 36.18 KG/M2 | HEART RATE: 68 BPM

## 2021-06-04 NOTE — PROGRESS NOTES
Assessment:  1.  Follow-up cholecystitis, abdominal pain resolved, but in a diabetic definite risk of future difficulty.  2.  Chronic left distal ureteral stone not causing any symptoms.  3.  Type 2 diabetes mellitus, recent A1c is 7.2%.  4.  Hyperlipidemia, checking status.  5.  Follow-up leukocytosis.    Plan: Check CBC, basic and hepatic profiles and lipid profile.  Obtain HIDA scan and then obtain general surgery consultation regarding pros and cons of proceeding with cholecystectomy in this particular circumstance.  The patient and his wife understand and agree with pursuing the above.  If he has any recurrence of abdominal pain he will let us know.  At a bare minimum follow-up to see me in March for his diabetic recheck but earlier if any difficulties with any of the above.  I did explain that diabetics have a higher risk of complications with gallstones and although there were not stones seen on his CT scan of the abdomen, he did have sludge and he did have cholecystitis and with his sequence of symptoms that is the most likely cause of his abdominal pain episode that required a hospitalization.    Subjective: 84-year-old male presenting for follow-up of hospitalization earlier in December.  He had had an egg roll and then developed indigestion and then developed pain that was progressive and became a 9-10 out of level which led him to the hospital to have the evaluation which can be seen.  He notes he has had the calculus in the left distal ureter for perhaps 20 years and he did not have any left flank pain when this episode in December occurred.  It was all mid abdominal pain and across the abdomen.  Since he got home from the hospital, he has not had any abdominal pain.  He notes that they were going to do a scan in the hospital but because the machine was down it could not be done then.  He has no fever or nausea or vomiting at this time.  Patient Active Problem List   Diagnosis     (Lower) Leg Localized  Swelling Bilateral     Nephrolithiasis     Choroid Melanoma     Type 2 diabetes mellitus without complication, without long-term current use of insulin (H)     Hyperlipemia     Primary Osteoarthritis Of The Lumbar Vertebrae     Benign Essential Hypertension     Allergic rhinitis     Malignant Melanoma Of The Eye     Anemia     BPH (benign prostatic hyperplasia)     Osteoarthritis of knees, bilateral     S/P total knee arthroplasty     Abdominal pain     Calculus of ureter     Leukocytosis, unspecified type     Hemorrhage of rectum and anus     Superior mesenteric artery stenosis (H)     Past Medical History:   Diagnosis Date     Cancer (H)     Eye     Diabetes mellitus (H)      Hyperlipidemia      Hypertension      Kidney disease      Kidney stones      Peptic ulceration 2011    see EGD that year     Pure hypercholesterolemia      No Known Allergies  Current Outpatient Medications   Medication Sig Dispense Refill     amLODIPine (NORVASC) 10 MG tablet Take 1 tablet (10 mg total) by mouth daily. 90 tablet 3     aspirin 81 MG EC tablet Take 81 mg by mouth daily.       cetirizine (ZYRTEC) 10 mg cap Take 10 mg by mouth daily as needed.        hydroCHLOROthiazide (MICROZIDE) 12.5 mg capsule Take 1 capsule (12.5 mg total) by mouth daily. 90 capsule 3     lisinopril (PRINIVIL,ZESTRIL) 2.5 MG tablet TAKE 1 TABLET BY MOUTH EVERY DAY 90 tablet 2     metFORMIN (GLUCOPHAGE) 500 MG tablet Take 1 tablet (500 mg total) by mouth 2 (two) times a day with meals. 180 tablet 1     pravastatin (PRAVACHOL) 80 MG tablet TAKE ONE-HALF TABLET BY MOUTH EVERY EVENING 45 tablet 1     psyllium (METAMUCIL) 3.4 gram packet Take 1 packet by mouth 2 (two) times a day.       vitamin A-vitamin C-vit E-min (OCUVITE) Tab tablet Take 1 tablet by mouth daily.        generic lancets Use 1 each As Directed daily. Dispense brand per patient's insurance at pharmacy discretion.(E11.9) 100 each 1     ONETOUCH ULTRA BLUE TEST STRIP strips USE TO TEST ONCE DAILY  100 strip 3     No current facility-administered medications for this visit.      He has an average of 1-2 drinks of alcohol per week.  He does not smoke.  All other review of systems are negative for any other changes.    Objective:/62   Pulse 68   Temp 98  F (36.7  C) (Oral)   Wt (!) 231 lb (104.8 kg)   SpO2 98%   BMI 36.18 kg/m    HEENT is negative.  Neck supple.  Lungs clear.  Heart regular rate and rhythm without murmur.  Abdomen rotund but no masses tenderness or hepatosplenomegaly.  At most trace edema at the ankles.  He is alert with clear speech.    See the CT of the abdomen reports from earlier in December which showed that the gallbladder had mild distention with surrounding inflammation.  There is also moderate to severe atrophy of the pancreas.  Then there was the 1 cm stone in the left distal ureter that was not causing hydronephrosis.

## 2021-06-04 NOTE — TELEPHONE ENCOUNTER
Message left for patient to call clinic to set up an appointment for kidney stone follow-up.  Mel Zuluaga RN

## 2021-06-04 NOTE — TELEPHONE ENCOUNTER
The liver scan(HIDA) was negative.  Recommend following through with surgery consultation regarding their opinion.  Call or return if any recurrence of the abdominal pain.

## 2021-06-05 ENCOUNTER — HEALTH MAINTENANCE LETTER (OUTPATIENT)
Age: 86
End: 2021-06-05

## 2021-06-05 VITALS
BODY MASS INDEX: 35.21 KG/M2 | WEIGHT: 224.8 LBS | TEMPERATURE: 96.3 F | HEART RATE: 68 BPM | DIASTOLIC BLOOD PRESSURE: 67 MMHG | RESPIRATION RATE: 20 BRPM | SYSTOLIC BLOOD PRESSURE: 149 MMHG

## 2021-06-05 NOTE — TELEPHONE ENCOUNTER
Refill Approved    Rx renewed per Medication Renewal Policy. Medication was last renewed on 7/17/19.    Pia Monge, [facility] Triage/Med Refill 1/17/2020     Requested Prescriptions   Pending Prescriptions Disp Refills     metFORMIN (GLUCOPHAGE) 500 MG tablet [Pharmacy Med Name: METFORMIN  MG TABLET] 180 tablet 1     Sig: TAKE 1 TABLET BY MOUTH TWICE A DAY WITH MEALS       Metformin Refill Protocol Passed - 1/14/2020  4:50 PM        Passed - Blood pressure in last 12 months     BP Readings from Last 1 Encounters:   12/23/19 110/62             Passed - LFT or AST or ALT in last 12 months     Albumin   Date Value Ref Range Status   12/23/2019 3.9 3.5 - 5.0 g/dL Final     Bilirubin, Total   Date Value Ref Range Status   12/23/2019 0.6 0.0 - 1.0 mg/dL Final     Bilirubin, Direct   Date Value Ref Range Status   12/23/2019 0.2 <=0.5 mg/dL Final     Alkaline Phosphatase   Date Value Ref Range Status   12/23/2019 90 45 - 120 U/L Final     AST   Date Value Ref Range Status   12/23/2019 11 0 - 40 U/L Final     ALT   Date Value Ref Range Status   12/23/2019 <9 0 - 45 U/L Final     Protein, Total   Date Value Ref Range Status   12/23/2019 7.0 6.0 - 8.0 g/dL Final                Passed - GFR or Serum Creatinine in last 6 months     GFR MDRD Non Af Amer   Date Value Ref Range Status   12/23/2019 >60 >60 mL/min/1.73m2 Final     GFR MDRD Af Amer   Date Value Ref Range Status   12/23/2019 >60 >60 mL/min/1.73m2 Final             Passed - Visit with PCP or prescribing provider visit in last 6 months or next 3 months     Last office visit with prescriber/PCP: 12/23/2019 OR same dept: 12/23/2019 Andrew Doty MD OR same specialty: 12/23/2019 Andrew Doty MD Last physical: Visit date not found Last MTM visit: Visit date not found         Next appt within 3 mo: Visit date not found  Next physical within 3 mo: Visit date not found  Prescriber OR PCP: Andrew Doty MD  Last diagnosis associated with med order:  1. Diabetes mellitus type 2, noninsulin dependent (H)  - metFORMIN (GLUCOPHAGE) 500 MG tablet [Pharmacy Med Name: METFORMIN  MG TABLET]; TAKE 1 TABLET BY MOUTH TWICE A DAY WITH MEALS  Dispense: 180 tablet; Refill: 1     If protocol passes may refill for 12 months if within 3 months of last provider visit (or a total of 15 months).           Passed - A1C in last 6 months     Hemoglobin A1c   Date Value Ref Range Status   12/12/2019 7.2 (H) 4.2 - 6.1 % Final               Passed - Microalbumin in last year      Microalbumin, Random Urine   Date Value Ref Range Status   07/17/2019 1.54 0.00 - 1.99 mg/dL Final

## 2021-06-05 NOTE — PROGRESS NOTES
Patient presents to the office today for evaluation from a Primary Care Referral.  Mel Zuluaga RN

## 2021-06-05 NOTE — PATIENT INSTRUCTIONS - HE
Ureteroscopy    Ureteroscopy is a procedure which is done for clearance of stones from the ureter, kidney or both. There are no incisions involved. The procedure involves your surgeon placing a small scope into your urethra. This is the opening where urine leaves your body.  The surgeon watches as they carefully guide the scope to the stone(s).  Modern flexible ureteroscopes can be used to reach virtually any location within the urinary tract.     The size, shape and location of the stone determines how best to treat the stone(s).  Whenever possible, stones are removed in one piece.  Larger stones need to be broken using a laser before removing in smaller pieces.  The goal is to remove all stones and stone fragments from that side of the body in a single treatment.  Complete stone clearance is an important step to prevent future kidney stone episodes.    Surgery:    Same day outpatient procedure    30-60 minutes    Procedure done in hospital surgical suite    General anesthesia (you will be asleep during the procedure)     Antibiotic prior to surgery to prevent infection    Physician will visit with you and respond to any questions or concerns and consent will be signed prior to going to the operating room    Risks:    Infection - Preoperative antibiotics should prevent new infections but it is possible that unanticipated bacteria may be introduced at time of surgery or that the stones were actually chronically infected before surgery      Injury - The ureter may be injured during this procedure.  This is most likely to happen if the ureter was very inflamed before surgery or if a stone is very tightly impacted.  The surgeon will not aggressively treat a stone if this creates a risk of injury.        Inaccessible Stones -A single procedure is effective in 95% of cases, but if your ureter is very narrow or your kidney stone is very impacted, a stent will be placed and the procedure stopped.  In 1-2 weeks after the  ureter has relaxed, the patient will be brought back to surgery and the procedure can be safely performed.      Incomplete stone clearance -Occasionally stone or stone fragments may not be completely cleared.  These may pass on their own, which may cause discomfort.  Our goal is to remove all possible stones and fragments.    Stent:      An internal soft tube will be placed between the kidney and the bladder while in surgery (after the stone is cleared). The stent will keep the kidney draining.    What should I expect?     It is common for a stent to cause some irritation and discomfort.   You may have:      The need to urinate suddenly     The need to urinate often     Pain during urination     A dull backache, which may get worse during urination     Blood stained urine (like fruit punch) and occasional small clots    It s important to remember the stent is necessary and only temporary. To feel more comfortable:      Drink more than you normally would but you do not have to constantly  flush your kidneys     Limiting your activity may decrease irritation or bleeding    Ibuprofen - 2 tablets every 6-8 hours     Use pain medications as directed.    When is the stent removed?    Most stents are removed within 5 days to 2 weeks after a procedure.     How is the stent removed?     Your stent will be [facility] in [facility] with a small telescope and a grasping tool.  It usually takes less than 1 minute to remove the stent.    What should I expect after the stent is removed?     You should feel normal by the next day    Some patients find:    An increase in back pain about an hour after the stent is removed as the kidney fills up with urine before it starts to empty.  It can be as uncomfortable as your initial stone episode.  Taking pain medications before stent removal may be helpful, but you would need someone else to drive you to and from your appointment.    Bladder symptoms usually disappear by the next  morning.    Small amounts of blood in the urine may be seen occasionally for up to a week.    Diet:      After surgery, there are no dietary restrictions - Drink to thirst, there is no need to increase intake of fluids, as this may increase nausea symptoms. Try to eat smaller, more frequent snacks, instead of large meals.    Activity:    Many people return to work within 1-2 days. Fatigue is normal for a couple of weeks following surgery. With increased activity you may experience more discomfort and you may notice more blood in your urine.      Post-Operative Symptom Control    While you recover from your procedure, you can take steps to ease your recovery.    Medications that prevent further episodes of severe pain and help stones pass: Take these as prescribed on a regular basis even if you are NOT in pain      Ibuprofen (Advil or Motrin) - Is available over the counter Take 2 (200mg) tablets every 6 hours until the stone passes.  o prevents spasm of the ureter.    o Decreases pain      Dramamine - (drowsy version, non-generic formulation) Is available over the counter and decreases spasm of the ureter.  Take 50mg at bedtime every night until the stone passes. In addition, take every 6 hours as needed.  Dramamine:  o Decreases nausea  o Decreases acute pain  o Decreases recurrence of pain for next 24 hours  o Will help you sleep        *This medication will cause increased drowsiness, do not drive or operate machinery for 6 hours      Flomax- Studies show that Flomax decreases irritation from stents.   o Take every day with food until stone passes even if you do not have pain  o Flomax does not relieve pain.        *This medication may cause nasal congestion or light-headedness      Detrol ( Tolterodine) - After surgery Detrol may decrease stent irritation and pelvic pain  o Take as prescribed     *This medication may cause dry mouth, constipation or blurry vision. Stop medication if unable to  urinate.    Medication that are taken as needed to manage break through symptoms: Take these ONLY as required and hopefully not at all      Narcotics (Percocet, Vicodin, Dilaudid)- take as prescribed for severe pain unrelieved by ibuprofen and dramamine  o Take as prescribed for severe pain  o Narcotics have significant side effects and only  cover-up  pain. They have no effect on cause of pain.  o Common side effects:  - Confusion, disorientation and sedation - DO NOT DRIVE OR OPERATE MACHINERY WITHIN 24 HOURS  - Nausea - take Dramamine or Zofran  or Haldol to help control  - Constipation  - Sleep disturbances      Ondansetron (Zofran)-  o Take as prescribed  o Reserve for severe nausea  o May cause constipation, start over the counter Miralax if needed to treat this    Haldol-  o Take as prescribed  o Reserve for severe nausea    Warning Signs/Symptoms - Please call the [facility] 24 hours a day at [phone] IMMEDIATELY if you experience any of these:    Fever greater than 100.1     Chills    Pain NOT CONTROLLED by pain medications    Heavy bleeding or large clots in urine (small clots can be normal)    Persistent nausea and/or vomiting    Post-Operative Follow up:    The stone(s) will be sent from surgery to a lab for composition analysis.  These results are usually available before a one month post-operative visit.  If you had laser treatment to break up your stone, you will usually be scheduled for a low dose CT scan prior to your one month appointment.  This scan allows your surgeon to confirm that all stone fragments were cleared at time of surgery and that there have been no complications.  These results along with possible labs and urine studies will help us develop an individualized plan to prevent new stones from forming and keep existing stones from enlarging.  This visit is usually scheduled about 1 month after your original surgery.    [facility] can respond to your  questions or concerns 24 hours a day at [phone].    Patient Stated Goal: Know what to expect after surgery  Ureteroscopy    Ureteroscopy is a procedure which is done for clearance of stones from the ureter, kidney or both. There are no incisions involved. The procedure involves your surgeon placing a small scope into your urethra. This is the opening where urine leaves your body.  The surgeon watches as they carefully guide the scope to the stone(s).  Modern flexible ureteroscopes can be used to reach virtually any location within the urinary tract.     The size, shape and location of the stone determines how best to treat the stone(s).  Whenever possible, stones are removed in one piece.  Larger stones need to be broken using a laser before removing in smaller pieces.  The goal is to remove all stones and stone fragments from that side of the body in a single treatment.  Complete stone clearance is an important step to prevent future kidney stone episodes.    Surgery:    Same day outpatient procedure    30-60 minutes    Procedure done in hospital surgical suite    General anesthesia (you will be asleep during the procedure)     Antibiotic prior to surgery to prevent infection    Physician will visit with you and respond to any questions or concerns and consent will be signed prior to going to the operating room    Risks:    Infection - Preoperative antibiotics should prevent new infections but it is possible that unanticipated bacteria may be introduced at time of surgery or that the stones were actually chronically infected before surgery      Injury - The ureter may be injured during this procedure.  This is most likely to happen if the ureter was very inflamed before surgery or if a stone is very tightly impacted.  The surgeon will not aggressively treat a stone if this creates a risk of injury.        Inaccessible Stones -A single procedure is effective in 95% of cases, but if your ureter is very narrow or  your kidney stone is very impacted, a stent will be placed and the procedure stopped.  In 1-2 weeks after the ureter has relaxed, the patient will be brought back to surgery and the procedure can be safely performed.      Incomplete stone clearance -Occasionally stone or stone fragments may not be completely cleared.  These may pass on their own, which may cause discomfort.  Our goal is to remove all possible stones and fragments.    Stent:      An internal soft tube will be placed between the kidney and the bladder while in surgery (after the stone is cleared). The stent will keep the kidney draining.    What should I expect?     It is common for a stent to cause some irritation and discomfort.   You may have:      The need to urinate suddenly     The need to urinate often     Pain during urination     A dull backache, which may get worse during urination     Blood stained urine (like fruit punch) and occasional small clots    It s important to remember the stent is necessary and only temporary. To feel more comfortable:      Drink more than you normally would but you do not have to constantly  flush your kidneys     Limiting your activity may decrease irritation or bleeding    Ibuprofen - 2 tablets every 6-8 hours     Use pain medications as directed.    When is the stent removed?    Most stents are removed within 5 days to 2 weeks after a procedure.     How is the stent removed?     Your stent will be removed in [facility] with a small telescope and a grasping tool.  It usually takes less than 1 minute to remove the stent.    What should I expect after the stent is removed?     You should feel normal by the next day    Some patients find:    An increase in back pain about an hour after the stent is removed as the kidney fills up with urine before it starts to empty.  It can be as uncomfortable as your initial stone episode.  Taking pain medications before stent removal may be helpful, but you would  need someone else to drive you to and from your appointment.    Bladder symptoms usually disappear by the next morning.    Small amounts of blood in the urine may be seen occasionally for up to a week.    Diet:      After surgery, there are no dietary restrictions - Drink to thirst, there is no need to increase intake of fluids, as this may increase nausea symptoms. Try to eat smaller, more frequent snacks, instead of large meals.    Activity:    Many people return to work within 1-2 days. Fatigue is normal for a couple of weeks following surgery. With increased activity you may experience more discomfort and you may notice more blood in your urine.      Post-Operative Symptom Control    While you recover from your procedure, you can take steps to ease your recovery.    Medications that prevent further episodes of severe pain and help stones pass: Take these as prescribed on a regular basis even if you are NOT in pain      Ibuprofen (Advil or Motrin) - Is available over the counter Take 2 (200mg) tablets every 6 hours until the stone passes.  o prevents spasm of the ureter.    o Decreases pain      Dramamine - (drowsy version, non-generic formulation) Is available over the counter and decreases spasm of the ureter.  Take 50mg at bedtime every night until the stone passes. In addition, take every 6 hours as needed.  Dramamine:  o Decreases nausea  o Decreases acute pain  o Decreases recurrence of pain for next 24 hours  o Will help you sleep        *This medication will cause increased drowsiness, do not drive or operate machinery for 6 hours      Flomax- Studies show that Flomax decreases irritation from stents.   o Take every day with food until stone passes even if you do not have pain  o Flomax does not relieve pain.        *This medication may cause nasal congestion or light-headedness      Detrol ( Tolterodine) - After surgery Detrol may decrease stent irritation and pelvic pain  o Take as prescribed     *This  medication may cause dry mouth, constipation or blurry vision. Stop medication if unable to urinate.    Medication that are taken as needed to manage break through symptoms: Take these ONLY as required and hopefully not at all      Narcotics (Percocet, Vicodin, Dilaudid)- take as prescribed for severe pain unrelieved by ibuprofen and dramamine  o Take as prescribed for severe pain  o Narcotics have significant side effects and only  cover-up  pain. They have no effect on cause of pain.  o Common side effects:  - Confusion, disorientation and sedation - DO NOT DRIVE OR OPERATE MACHINERY WITHIN 24 HOURS  - Nausea - take Dramamine or Zofran  or Haldol to help control  - Constipation  - Sleep disturbances      Ondansetron (Zofran)-  o Take as prescribed  o Reserve for severe nausea  o May cause constipation, start over the counter Miralax if needed to treat this    Haldol-  o Take as prescribed  o Reserve for severe nausea    Warning Signs/Symptoms - Please call the [facility] 24 hours a day at [phone] IMMEDIATELY if you experience any of these:    Fever greater than 100.1     Chills    Pain NOT CONTROLLED by pain medications    Heavy bleeding or large clots in urine (small clots can be normal)    Persistent nausea and/or vomiting    Post-Operative Follow up:    The stone(s) will be sent from surgery to a lab for composition analysis.  These results are usually available before a one month post-operative visit.  If you had laser treatment to break up your stone, you will usually be scheduled for a low dose CT scan prior to your one month appointment.  This scan allows your surgeon to confirm that all stone fragments were cleared at time of surgery and that there have been no complications.  These results along with possible labs and urine studies will help us develop an individualized plan to prevent new stones from forming and keep existing stones from enlarging.  This visit is usually scheduled about  1 month after your original surgery.    [facility] can respond to your questions or concerns 24 hours a day at [phone].

## 2021-06-05 NOTE — PROGRESS NOTES
Assessment/Plan:        Diagnoses and all orders for this visit:    Calculus of ureter  -     Verify informed consent; Standing  -     Diet NPO; Standing  -     Place sequential compression device; Standing  -     XR Retrograde Pyelogram W or WO KUB Intraoperative; Standing  -     levoFLOXacin 500 mg/100 mL IVPB 500 mg (LEVAQUIN)  -     ketorolac injection 15 mg (TORADOL)  -     acetaminophen tablet 1,000 mg (TYLENOL)  -     sterile water IR irrigation solution 3,000 mL  -     Patient Stated Goal: Know what to expect after surgery  -     Ureteroscopy Education  -     Case Request: CYSTOURETEROSCOPY, WITH LITHOTRIPSY USING HOLMIUM LASER AND URETERAL STENT INSERTION LEFT; Standing  -     XR Abdomen AP; Standing  -     Case Request: CYSTOURETEROSCOPY, WITH LITHOTRIPSY USING HOLMIUM LASER AND URETERAL STENT INSERTION LEFT    Calculus of kidney  -     Urinalysis Macroscopic      Stone Management Plan  [facility] 1/27/2020   Urinary Tract Infection No suspicion of infection   Renal Colic Asymptomatic at this time   Renal Failure No suspicion of renal failure   Current CT date 12/12/2020   Right sided stones? Yes   R Number of ureteral stones No ureteral stones   R Number of kidney stones  1   R GSD of kidney stones 2 - 4   R Hydronephrosis None   R Stone Event No current event   R Current Plan Observe   Observe rationale Limited stone burden with good prognosis for spontaneous passage   Left sided stones? Yes   L Number of ureteral stones 1   L GSD of ureteral stones 14   L Location of ureteral stone Distal   L Number of kidney stones  No renal stones   L GSD of kidney stones N/A   L Hydronephrosis None   L Stone Event New event   Diagnosis date 12/12/2020   Initial location of primary symptomatic stone Distal   Initial GSD of primary symptomatic stone 14   L Current Plan Clear   Clear rationale Poor prognosis         Subjective:      HPI  Mr. Preston Fortune is a 84 y.o.  male presenting to the  "[facility] following hospitalization in December for abdominal pain with incidentally found nonobstructing left distal ureteral stone.    He is a recurrent unidentified composition stone former who has not required stone clearance procedures. He has not previously participated in stone risk evaluation. He has identified modifiable stone risks including:  acidic urine and type II diabetes. He has no identified non-modifiable stone risk factors.    He was hospitalized in December for acute onset mid abdominal pain with nausea and vomiting. CT incidentally noted a nonobstructing, large left distal ureteral stone. Additional findings included partial occlusion of SMA and gall bladder inflammation. He was seen by General Surgery with no interventions recommended. His symptoms spontaneously resolved and he was discharged home. His PCP recommended he return for follow up and he seems to not recall details of our conversation during hospital consultation. He is asymptomatic at present. He denies symptoms of fever, chills, flank pain, nausea, vomiting, urinary frequency and dysuria. He remarks \"I've had a stone for ~ 25 years and it has never caused me a problem\".    CT scan from 12/12/19 is personally reviewed and demonstrates a 14 x 9 mm left distal ureteral stone (< 500 HU). There is a 4 mm right upper pole renal stone. Both of these stones are new from last comparison imaging in 2008.    Significant labs from presentation include no hematuria, no pyuria, negative nitrite, no bacteria, normal WBC, normal creatinine and slightly elevated potassium.    PLAN    85 yo DM male with hx of stone events no prior surgical stone interventions. Asymptomatic left distal ureteral stone. Nonobstructing right renal stone.    Will proceed with ureterscopic stone clearance in March, per patient's request. Risks and benefits were detailed of ureteroscopic stone clearance including potential issues of urinary or " systemic infection, ureteral injury, inaccessible stone, incomplete stone clearance, multiple surgeries, and stent related symptoms of urgency, frequency and hematuria Patient verbalized understanding. Patient agrees with plan as discussed. Preoperative evaluation with primary care has been requested.    Patient also seen and examined by DEWAYNE Cummings   Review of Systems  A 12 point comprehensive review of systems is negative except for HPI    Past Medical History:   Diagnosis Date     Cancer (H)     Eye     Diabetes mellitus (H)      Hyperlipidemia      Hypertension      Kidney disease      Kidney stones      Peptic ulceration 2011    see EGD that year     Pure hypercholesterolemia      Past Surgical History:   Procedure Laterality Date     EYE SURGERY       [place] KNEE SCOPE,DIAGNOSTIC      Description: Arthroscopy Knee Left;  Recorded: 03/17/2008;  Comments: cartilage surgery     [place] RECONSTR TOTAL SHOULDER IMPLANT      Description: Shoulder Arthroplasty Total Shoulder Replacement;  Recorded: 05/06/2008;     [place] TOTAL KNEE ARTHROPLASTY Left 3/26/2018    Procedure: LEFT TOTAL KNEE ARTHROPLASTY;  Surgeon: Darrell Wu MD;  Location: [facility];  Service: Orthopedics     [place] TYMPANOPLASTY      Description: Tympanoplasty;  Recorded: 03/17/2008;       Current Outpatient Medications   Medication Sig Dispense Refill     amLODIPine (NORVASC) 10 MG tablet Take 1 tablet (10 mg total) by mouth daily. 90 tablet 3     aspirin 81 MG EC tablet Take 81 mg by mouth daily.       cetirizine (ZYRTEC) 10 mg cap Take 10 mg by mouth daily as needed.        generic lancets Use 1 each As Directed daily. Dispense brand per patient's insurance at pharmacy discretion.(E11.9) 100 each 1     hydroCHLOROthiazide (MICROZIDE) 12.5 mg capsule Take 1 capsule (12.5 mg total) by mouth daily. 90 capsule 3     lisinopril (PRINIVIL,ZESTRIL) 2.5 MG tablet TAKE 1 TABLET BY MOUTH EVERY DAY 90 tablet 2     metFORMIN (GLUCOPHAGE) 500 MG  tablet TAKE 1 TABLET BY MOUTH TWICE A DAY WITH MEALS 180 tablet 1     ONETOUCH ULTRA BLUE TEST STRIP strips USE TO TEST ONCE DAILY 100 strip 3     pravastatin (PRAVACHOL) 80 MG tablet TAKE ONE-HALF TABLET BY MOUTH EVERY EVENING 45 tablet 1     psyllium (METAMUCIL) 3.4 gram packet Take 1 packet by mouth 2 (two) times a day.       vitamin A-vitamin C-vit E-min (OCUVITE) Tab tablet Take 1 tablet by mouth daily.        No current facility-administered medications for this visit.        No Known Allergies    Social History     Socioeconomic History     Marital status:      Spouse name: Not on file     Number of children: Not on file     Years of education: Not on file     Highest education level: Not on file   Occupational History     Occupation: retired    Social Needs     Financial resource strain: Not on file     Food insecurity:     Worry: Not on file     Inability: Not on file     Transportation needs:     Medical: Not on file     Non-medical: Not on file   Tobacco Use     Smoking status: Former Smoker     Types: Cigarettes     Smokeless tobacco: Former User     Quit date: 12/8/1967   Substance and Sexual Activity     Alcohol use: Not on file     Drug use: No     Sexual activity: Not on file   Lifestyle     Physical activity:     Days per week: Not on file     Minutes per session: Not on file     Stress: Not on file   Relationships     Social connections:     Talks on phone: Not on file     Gets together: Not on file     Attends [employer] service: Not on file     Active member of club or organization: Not on file     Attends meetings of clubs or organizations: Not on file     Relationship status: Not on file     Intimate partner violence:     Fear of current or ex partner: Not on file     Emotionally abused: Not on file     Physically abused: Not on file     Forced sexual activity: Not on file   Other Topics Concern     Not on file   Social History Narrative     5 grown kids. Retired   "\"built steel bridges\".        Family History   Problem Relation Age of Onset     Diabetes Father        Objective:      Physical Exam  Vitals:    01/27/20 1146   BP: 136/63   Pulse: 75   Temp: 98  F (36.7  C)     General - well developed, well nourished, appropriate for age. Appears no distress at this time   Heart - regular rate and rhythm, no murmur  Respiratory - normal effort, clear to auscultation, good air entry without adventitious noises  Abdomen - mildly obese soft, non-tender, no hepatosplenomegaly, no masses.   - no flank tenderness, no suprapubic tenderness, kidney and bladder non-palpable  MSK - normal spinal curvature. no spinal tenderness. normal gait. muscular strength intact.  Neurology - cranial nerves II-XII grossly intact, normal sensation, no unsteadiness  Skin - intact, no bruising, no gouty tophi  Psych - oriented to time, place, and person, normal mood and affect.    Labs  Urinalysis POC (Office):  Nitrite, UA   Date Value Ref Range Status   01/27/2020 Negative Negative Final   03/25/2015 Negative Negative Final       Lab Urinalysis:  Blood, UA   Date Value Ref Range Status   01/27/2020 Negative Negative Final   03/25/2015 Negative Negative Final     Nitrite, UA   Date Value Ref Range Status   01/27/2020 Negative Negative Final   03/25/2015 Negative Negative Final     Leukocytes, UA   Date Value Ref Range Status   01/27/2020 Negative Negative Final   03/25/2015 Negative Negative Final     pH, UA   Date Value Ref Range Status   01/27/2020 5.0 5.0 - 8.0 Final   03/25/2015 6.0 4.5 - 8.0 Final    and Acute Labs   CBC   WBC   Date Value Ref Range Status   12/23/2019 11.0 4.0 - 11.0 thou/uL Final   12/13/2019 13.0 (H) 4.0 - 11.0 thou/uL Final   12/12/2019 17.0 (H) 4.0 - 11.0 thou/uL Final     Hemoglobin   Date Value Ref Range Status   12/23/2019 13.3 (L) 14.0 - 18.0 g/dL Final   12/13/2019 12.2 (L) 14.0 - 18.0 g/dL Final   12/12/2019 12.7 (L) 14.0 - 18.0 g/dL Final     Platelets   Date Value Ref " Range Status   12/23/2019 417 140 - 440 thou/uL Final   12/13/2019 215 140 - 440 thou/uL Final   12/12/2019 285 140 - 440 thou/uL Final    and Renal Panel  [facility]  Creatinine   Date Value Ref Range Status   12/23/2019 1.04 0.70 - 1.30 mg/dL Final   12/13/2019 0.92 0.70 - 1.30 mg/dL Final   12/12/2019 0.91 0.70 - 1.30 mg/dL Final     Potassium   Date Value Ref Range Status   12/23/2019 5.2 (H) 3.5 - 5.0 mmol/L Final   12/13/2019 4.0 3.5 - 5.0 mmol/L Final   12/12/2019 4.1 3.5 - 5.0 mmol/L Final     Calcium   Date Value Ref Range Status   12/23/2019 10.1 8.5 - 10.5 mg/dL Final   12/13/2019 9.3 8.5 - 10.5 mg/dL Final   12/12/2019 9.6 8.5 - 10.5 mg/dL Final

## 2021-06-06 NOTE — TELEPHONE ENCOUNTER
Please notify him that Dr. Doty will be back on Monday. Send this message back to Dr. Doty's bucket afterward.

## 2021-06-06 NOTE — TELEPHONE ENCOUNTER
Refill Approved    Rx renewed per Medication Renewal Policy. Medication was last renewed on 7/17/19.    Saskia Curry, [facility] Triage/Med Refill 2/17/2020     Requested Prescriptions   Pending Prescriptions Disp Refills     pravastatin (PRAVACHOL) 80 MG tablet [Pharmacy Med Name: PRAVASTATIN SODIUM 80 MG TAB] 45 tablet 1     Sig: TAKE ONE-HALF TABLET BY MOUTH EVERY EVENING       Statins Refill Protocol (Hmg CoA Reductase Inhibitors) Passed - 2/12/2020  4:41 PM        Passed - PCP or prescribing provider visit in past 12 months      Last office visit with prescriber/PCP: 12/23/2019 Andrew Doty MD OR same dept: 12/23/2019 Andrew Doty MD OR same specialty: 12/23/2019 Andrew Doty MD  Last physical: 6/21/2018 Last MTM visit: Visit date not found   Next visit within 3 mo: Visit date not found  Next physical within 3 mo: Visit date not found  Prescriber OR PCP: Andrew Doty MD  Last diagnosis associated with med order: 1. Hyperlipidemia  - pravastatin (PRAVACHOL) 80 MG tablet [Pharmacy Med Name: PRAVASTATIN SODIUM 80 MG TAB]; TAKE ONE-HALF TABLET BY MOUTH EVERY EVENING  Dispense: 45 tablet; Refill: 1    If protocol passes may refill for 12 months if within 3 months of last provider visit (or a total of 15 months).

## 2021-06-06 NOTE — TELEPHONE ENCOUNTER
Upcoming Appointment Question  When is the appointment: 3/20/20  What is your appointment for?: The patient is scheduled to come in clinic on 3/20/20 for pre op.  Patient is scheduled to have kidney stone procedure on 3/24/20   The patient is not having any pain and no difficulty with urination.  He is asking to delay the stone procedure?  Isaiah  is asking for Dr Doty's advise please.   Who is your appointment scheduled with?: PCP only  What is your question/concern?: see above  Okay to leave a detailed message?: Yes [phone]

## 2021-06-06 NOTE — PROGRESS NOTES
Phone call    Large (14 mm) left distal stone, deferred surgery, currently has evolving sinus infection.    Pain free    Risk of renal damage vs scheduled surgery in 84 year old DM in time of COVID risk    Will follow up by phone in 2-3 weeks. Suspect will delay surgery further. May consider repeat imaging.

## 2021-06-06 NOTE — TELEPHONE ENCOUNTER
Pt contacted. Pt wanted to cancel preop for 3/16/2020. This has been canceled.  Pt wanting to give Dr Doty this information. He will wait a little longer for the surgery.

## 2021-06-06 NOTE — TELEPHONE ENCOUNTER
I called and talked with him.  He notes that he had been in [place] and he had a sinus infection occur that he is still recovering from.  I explained that when that is cleared up it is fine for him to reschedule for the surgery and reschedule the preop.

## 2021-06-08 NOTE — PROGRESS NOTES
Assessment:  #1.  Non-insulin-dependent diabetes mellitus, not controlled.  2.  Hypertension controlled.  3.  Hyperlipidemia, checking status off of his pravastatin.  #4.  Bowel movement changes resolved off of pravastatin.    Plan: Check fasting A1c, lipid hepatic and basic profiles.  Recommend he try restarting pravastatin at the 40 mg dose i.e. half of the 80 mg pill.  If A1c is significantly increased, would have him start trial of metformin medication.  Otherwise continue efforts at weight reduction with diet and exercise.  Refills on medication as needed.  Follow-up in 3-4 months or earlier as needed.    Subjective: 81-year-old male here for follow-up of the above.  He did have a colonoscopy and only benign lipoma was found.  He did go ahead and stop the pravastatin and his bowel movement issues did substantially improved.  Regarding diabetes, he notes his average morning blood sugar is up about 20 points.  Most are in the 150-160 type range.  Hypertension no headaches or dizziness or leg swelling.  Regarding lipids, he is off the pravastatin as above because of the bowel movement changes, his last fasting reading in November had been on the 80 mg of pravastatin.  Past Medical History:   Diagnosis Date     Cancer     Eye     Diabetes mellitus      Hyperlipidemia      Hypertension      Kidney disease      Kidney stones      Peptic ulceration 2011    see EGD that year     Pure hypercholesterolemia      No Known Allergies  Current Outpatient Prescriptions   Medication Sig Dispense Refill     ACETAMINOPHEN (TYLENOL ARTHRITIS PAIN ORAL) Take 1 tablet by mouth 4 (four) times a day as needed.       amLODIPine (NORVASC) 10 MG tablet TAKE 1 TABLET BY MOUTH DAILY 90 tablet 2     aspirin 81 MG EC tablet Take 81 mg by mouth daily.       cholecalciferol, vitamin D3, (VITAMIN D3) 1,000 unit capsule Take 1,000 Units by mouth daily.       doxazosin (CARDURA) 4 MG tablet TAKE 1 TABLET BY MOUTH EVERY EVENING 90 tablet 2      "fexofenadine (ALLEGRA) 180 MG tablet Take 180 mg by mouth daily as needed.       FOLIC ACID ORAL Take by mouth daily.       hydroCHLOROthiazide (MICROZIDE) 12.5 mg capsule TAKE 1 CAPSULE BY MOUTH EVERY MORNING 90 capsule 3     ibuprofen 200 mg cap Take 2 tablets by mouth 3 (three) times a day.       lisinopril (PRINIVIL,ZESTRIL) 2.5 MG tablet TAKE 1 TABLET BY MOUTH DAILY 90 tablet 0     omeprazole (PRILOSEC) 20 MG capsule TAKE 1 CAPSULE BY MOUTH DAILY 30 capsule 3     ONE TOUCH ULTRA TEST Strp        ONETOUCH ULTRA TEST strips TESTING DAILY 100 strip 2     vitamin A-vitamin C-vit E-min (OCUVITE) Tab tablet Take by mouth daily.       pravastatin (PRAVACHOL) 80 MG tablet TAKE 1 TABLET BY MOUTH EVERY EVENING 90 tablet 0     No current facility-administered medications for this visit.      He is not taking the pravastatin but otherwise the list is accurate above.  All other review of systems negative further changes.  He does use a cane for ambulation.    Objective:  Visit Vitals     /66     Pulse 78     Resp 18     Ht 5' 7.75\" (1.721 m)     Wt (!) 252 lb (114.3 kg)     BMI 38.6 kg/m2     Nose and oropharynx are negative.  Neck is supple without adenopathy or thyromegaly.  Lungs are clear.  Heart regular rate and rhythm without murmur.  Abdomen rotund but no masses tenderness or hepatosplenomegaly.  Trace edema at the ankles.  Ambulates slowly but independently with his single pronged cane.  He is alert with clear speech.  "

## 2021-06-09 NOTE — TELEPHONE ENCOUNTER
RN cannot approve Refill Request    RN can NOT refill this medication Protocol failed and NO refill given.      Pia Monge, [facility] Triage/Med Refill 7/2/2020    Requested Prescriptions   Pending Prescriptions Disp Refills     metFORMIN (GLUCOPHAGE) 500 MG tablet [Pharmacy Med Name: METFORMIN  MG TABLET] 180 tablet 1     Sig: TAKE 1 TABLET BY MOUTH TWICE A DAY WITH MEALS       Metformin Refill Protocol Failed - 7/1/2020  5:03 PM        Failed - Visit with PCP or prescribing provider visit in last 6 months or next 3 months     Last office visit with prescriber/PCP: Visit date not found OR same dept: 12/23/2019 Andrew Doty MD OR same specialty: 12/23/2019 Andrew Doty MD Last physical: Visit date not found Last MTM visit: Visit date not found         Next appt within 3 mo: Visit date not found  Next physical within 3 mo: Visit date not found  Prescriber OR PCP: Andrew Doty MD  Last diagnosis associated with med order: 1. HTN (hypertension)  - amLODIPine (NORVASC) 10 MG tablet; TAKE 1 TABLET BY MOUTH EVERY DAY  Dispense: 90 tablet; Refill: 1  - hydroCHLOROthiazide (MICROZIDE) 12.5 mg capsule; TAKE 1 CAPSULE BY MOUTH EVERY DAY  Dispense: 90 capsule; Refill: 1    2. Diabetes mellitus type 2, noninsulin dependent (H)  - metFORMIN (GLUCOPHAGE) 500 MG tablet [Pharmacy Med Name: METFORMIN  MG TABLET]; TAKE 1 TABLET BY MOUTH TWICE A DAY WITH MEALS  Dispense: 180 tablet; Refill: 1     If protocol passes may refill for 12 months if within 3 months of last provider visit (or a total of 15 months).           Failed - A1C in last 6 months     Hemoglobin A1c   Date Value Ref Range Status   12/12/2019 7.2 (H) 4.2 - 6.1 % Final               Passed - Blood pressure in last 12 months     BP Readings from Last 1 Encounters:   01/27/20 136/63             Passed - LFT or AST or ALT in last 12 months     Albumin   Date Value Ref Range Status   12/23/2019 3.9 3.5 - 5.0 g/dL Final     Bilirubin,  Total   Date Value Ref Range Status   12/23/2019 0.6 0.0 - 1.0 mg/dL Final     Bilirubin, Direct   Date Value Ref Range Status   12/23/2019 0.2 <=0.5 mg/dL Final     Alkaline Phosphatase   Date Value Ref Range Status   12/23/2019 90 45 - 120 U/L Final     AST   Date Value Ref Range Status   12/23/2019 11 0 - 40 U/L Final     ALT   Date Value Ref Range Status   12/23/2019 <9 0 - 45 U/L Final     Protein, Total   Date Value Ref Range Status   12/23/2019 7.0 6.0 - 8.0 g/dL Final                Passed - GFR or Serum Creatinine in last 6 months     GFR MDRD Non Af Amer   Date Value Ref Range Status   12/23/2019 >60 >60 mL/min/1.73m2 Final     GFR MDRD Af Amer   Date Value Ref Range Status   12/23/2019 >60 >60 mL/min/1.73m2 Final             Passed - Microalbumin in last year      Microalbumin, Random Urine   Date Value Ref Range Status   07/17/2019 1.54 0.00 - 1.99 mg/dL Final                   Signed Prescriptions Disp Refills    amLODIPine (NORVASC) 10 MG tablet 90 tablet 1     Sig: TAKE 1 TABLET BY MOUTH EVERY DAY       Calcium-Channel Blockers Protocol Passed - 7/1/2020  5:03 PM        Passed - PCP or prescribing provider visit in past 12 months or next 3 months     Last office visit with prescriber/PCP: 12/23/2019 Andrew Doty MD OR same dept: 12/23/2019 Andrew Doty MD OR same specialty: 12/23/2019 Andrew Doty MD  Last physical: 6/21/2018 Last MTM visit: Visit date not found   Next visit within 3 mo: Visit date not found  Next physical within 3 mo: Visit date not found  Prescriber OR PCP: Andrew Doty MD  Last diagnosis associated with med order: 1. HTN (hypertension)  - amLODIPine (NORVASC) 10 MG tablet; TAKE 1 TABLET BY MOUTH EVERY DAY  Dispense: 90 tablet; Refill: 1  - hydroCHLOROthiazide (MICROZIDE) 12.5 mg capsule; TAKE 1 CAPSULE BY MOUTH EVERY DAY  Dispense: 90 capsule; Refill: 1    2. Diabetes mellitus type 2, noninsulin dependent (H)  - metFORMIN (GLUCOPHAGE) 500 MG tablet [Pharmacy  Med Name: METFORMIN  MG TABLET]; TAKE 1 TABLET BY MOUTH TWICE A DAY WITH MEALS  Dispense: 180 tablet; Refill: 1    If protocol passes may refill for 12 months if within 3 months of last provider visit (or a total of 15 months).             Passed - Blood pressure filed in past 12 months     BP Readings from Last 1 Encounters:   01/27/20 136/63               hydroCHLOROthiazide (MICROZIDE) 12.5 mg capsule 90 capsule 1     Sig: TAKE 1 CAPSULE BY MOUTH EVERY DAY       Diuretics/Combination Diuretics Refill Protocol  Passed - 7/1/2020  5:03 PM        Passed - Visit with PCP or prescribing provider visit in past 12 months     Last office visit with prescriber/PCP: 12/23/2019 Andrew Doty MD OR same dept: 12/23/2019 Andrew Doty MD OR same specialty: 12/23/2019 Andrew Doty MD  Last physical: 6/21/2018 Last MTM visit: Visit date not found   Next visit within 3 mo: Visit date not found  Next physical within 3 mo: Visit date not found  Prescriber OR PCP: Andrew Doty MD  Last diagnosis associated with med order: 1. HTN (hypertension)  - amLODIPine (NORVASC) 10 MG tablet; TAKE 1 TABLET BY MOUTH EVERY DAY  Dispense: 90 tablet; Refill: 1  - hydroCHLOROthiazide (MICROZIDE) 12.5 mg capsule; TAKE 1 CAPSULE BY MOUTH EVERY DAY  Dispense: 90 capsule; Refill: 1    2. Diabetes mellitus type 2, noninsulin dependent (H)  - metFORMIN (GLUCOPHAGE) 500 MG tablet [Pharmacy Med Name: METFORMIN  MG TABLET]; TAKE 1 TABLET BY MOUTH TWICE A DAY WITH MEALS  Dispense: 180 tablet; Refill: 1    If protocol passes may refill for 12 months if within 3 months of last provider visit (or a total of 15 months).             Passed - Serum Potassium in past 12 months      Lab Results   Component Value Date    Potassium 5.2 (H) 12/23/2019             Passed - Serum Sodium in past 12 months      Lab Results   Component Value Date    Sodium 144 12/23/2019             Passed - Blood pressure on file in past 12 months     BP  Readings from Last 1 Encounters:   01/27/20 136/63             Passed - Serum Creatinine in past 12 months      Creatinine   Date Value Ref Range Status   12/23/2019 1.04 0.70 - 1.30 mg/dL Final

## 2021-06-09 NOTE — TELEPHONE ENCOUNTER
Refill Approved    Rx renewed per Medication Renewal Policy. Medication was last renewed on 8/14/19.    Pia Monge, [facility] Triage/Med Refill 7/2/2020     Requested Prescriptions   Pending Prescriptions Disp Refills     amLODIPine (NORVASC) 10 MG tablet [Pharmacy Med Name: AMLODIPINE BESYLATE 10 MG TAB] 90 tablet 3     Sig: TAKE 1 TABLET BY MOUTH EVERY DAY       Calcium-Channel Blockers Protocol Passed - 7/1/2020  5:03 PM        Passed - PCP or prescribing provider visit in past 12 months or next 3 months     Last office visit with prescriber/PCP: 12/23/2019 Andrew Doty MD OR same dept: 12/23/2019 Andrew Doty MD OR same specialty: 12/23/2019 Andrew Doty MD  Last physical: 6/21/2018 Last MTM visit: Visit date not found   Next visit within 3 mo: Visit date not found  Next physical within 3 mo: Visit date not found  Prescriber OR PCP: Andrew Doty MD  Last diagnosis associated with med order: 1. HTN (hypertension)  - amLODIPine (NORVASC) 10 MG tablet [Pharmacy Med Name: AMLODIPINE BESYLATE 10 MG TAB]; TAKE 1 TABLET BY MOUTH EVERY DAY  Dispense: 90 tablet; Refill: 3  - hydroCHLOROthiazide (MICROZIDE) 12.5 mg capsule [Pharmacy Med Name: HYDROCHLOROTHIAZIDE 12.5 MG CP]; TAKE 1 CAPSULE BY MOUTH EVERY DAY  Dispense: 90 capsule; Refill: 3    2. Diabetes mellitus type 2, noninsulin dependent (H)  - metFORMIN (GLUCOPHAGE) 500 MG tablet [Pharmacy Med Name: METFORMIN  MG TABLET]; TAKE 1 TABLET BY MOUTH TWICE A DAY WITH MEALS  Dispense: 180 tablet; Refill: 1    If protocol passes may refill for 12 months if within 3 months of last provider visit (or a total of 15 months).             Passed - Blood pressure filed in past 12 months     BP Readings from Last 1 Encounters:   01/27/20 136/63                hydroCHLOROthiazide (MICROZIDE) 12.5 mg capsule [Pharmacy Med Name: HYDROCHLOROTHIAZIDE 12.5 MG CP] 90 capsule 3     Sig: TAKE 1 CAPSULE BY MOUTH EVERY DAY       Diuretics/Combination  Diuretics Refill Protocol  Passed - 7/1/2020  5:03 PM        Passed - Visit with PCP or prescribing provider visit in past 12 months     Last office visit with prescriber/PCP: 12/23/2019 Andrew Doty MD OR same dept: 12/23/2019 Andrew Doty MD OR same specialty: 12/23/2019 Andrew Doty MD  Last physical: 6/21/2018 Last MTM visit: Visit date not found   Next visit within 3 mo: Visit date not found  Next physical within 3 mo: Visit date not found  Prescriber OR PCP: Andrew Doty MD  Last diagnosis associated with med order: 1. HTN (hypertension)  - amLODIPine (NORVASC) 10 MG tablet [Pharmacy Med Name: AMLODIPINE BESYLATE 10 MG TAB]; TAKE 1 TABLET BY MOUTH EVERY DAY  Dispense: 90 tablet; Refill: 3  - hydroCHLOROthiazide (MICROZIDE) 12.5 mg capsule [Pharmacy Med Name: HYDROCHLOROTHIAZIDE 12.5 MG CP]; TAKE 1 CAPSULE BY MOUTH EVERY DAY  Dispense: 90 capsule; Refill: 3    2. Diabetes mellitus type 2, noninsulin dependent (H)  - metFORMIN (GLUCOPHAGE) 500 MG tablet [Pharmacy Med Name: METFORMIN  MG TABLET]; TAKE 1 TABLET BY MOUTH TWICE A DAY WITH MEALS  Dispense: 180 tablet; Refill: 1    If protocol passes may refill for 12 months if within 3 months of last provider visit (or a total of 15 months).             Passed - Serum Potassium in past 12 months      Lab Results   Component Value Date    Potassium 5.2 (H) 12/23/2019             Passed - Serum Sodium in past 12 months      Lab Results   Component Value Date    Sodium 144 12/23/2019             Passed - Blood pressure on file in past 12 months     BP Readings from Last 1 Encounters:   01/27/20 136/63             Passed - Serum Creatinine in past 12 months      Creatinine   Date Value Ref Range Status   12/23/2019 1.04 0.70 - 1.30 mg/dL Final                metFORMIN (GLUCOPHAGE) 500 MG tablet [Pharmacy Med Name: METFORMIN  MG TABLET] 180 tablet 1     Sig: TAKE 1 TABLET BY MOUTH TWICE A DAY WITH MEALS       Metformin Refill Protocol  Failed - 7/1/2020  5:03 PM        Failed - Visit with PCP or prescribing provider visit in last 6 months or next 3 months     Last office visit with prescriber/PCP: Visit date not found OR same dept: 12/23/2019 Andrew Doty MD OR same specialty: 12/23/2019 Andrew Doty MD Last physical: Visit date not found Last MTM visit: Visit date not found         Next appt within 3 mo: Visit date not found  Next physical within 3 mo: Visit date not found  Prescriber OR PCP: Andrew Doty MD  Last diagnosis associated with med order: 1. HTN (hypertension)  - amLODIPine (NORVASC) 10 MG tablet [Pharmacy Med Name: AMLODIPINE BESYLATE 10 MG TAB]; TAKE 1 TABLET BY MOUTH EVERY DAY  Dispense: 90 tablet; Refill: 3  - hydroCHLOROthiazide (MICROZIDE) 12.5 mg capsule [Pharmacy Med Name: HYDROCHLOROTHIAZIDE 12.5 MG CP]; TAKE 1 CAPSULE BY MOUTH EVERY DAY  Dispense: 90 capsule; Refill: 3    2. Diabetes mellitus type 2, noninsulin dependent (H)  - metFORMIN (GLUCOPHAGE) 500 MG tablet [Pharmacy Med Name: METFORMIN  MG TABLET]; TAKE 1 TABLET BY MOUTH TWICE A DAY WITH MEALS  Dispense: 180 tablet; Refill: 1     If protocol passes may refill for 12 months if within 3 months of last provider visit (or a total of 15 months).           Failed - A1C in last 6 months     Hemoglobin A1c   Date Value Ref Range Status   12/12/2019 7.2 (H) 4.2 - 6.1 % Final               Passed - Blood pressure in last 12 months     BP Readings from Last 1 Encounters:   01/27/20 136/63             Passed - LFT or AST or ALT in last 12 months     Albumin   Date Value Ref Range Status   12/23/2019 3.9 3.5 - 5.0 g/dL Final     Bilirubin, Total   Date Value Ref Range Status   12/23/2019 0.6 0.0 - 1.0 mg/dL Final     Bilirubin, Direct   Date Value Ref Range Status   12/23/2019 0.2 <=0.5 mg/dL Final     Alkaline Phosphatase   Date Value Ref Range Status   12/23/2019 90 45 - 120 U/L Final     AST   Date Value Ref Range Status   12/23/2019 11 0 - 40 U/L Final      ALT   Date Value Ref Range Status   12/23/2019 <9 0 - 45 U/L Final     Protein, Total   Date Value Ref Range Status   12/23/2019 7.0 6.0 - 8.0 g/dL Final                Passed - GFR or Serum Creatinine in last 6 months     GFR MDRD Non Af Amer   Date Value Ref Range Status   12/23/2019 >60 >60 mL/min/1.73m2 Final     GFR MDRD Af Amer   Date Value Ref Range Status   12/23/2019 >60 >60 mL/min/1.73m2 Final             Passed - Microalbumin in last year      Microalbumin, Random Urine   Date Value Ref Range Status   07/17/2019 1.54 0.00 - 1.99 mg/dL Final

## 2021-06-09 NOTE — TELEPHONE ENCOUNTER
Patient needs office visit with labs with Dr. Doty or another provider in July.  I renewed his medication for Metformin for 3 months only.    Alberto Mckeon MD

## 2021-06-09 NOTE — TELEPHONE ENCOUNTER
Talked with him.  Because of my leave due to back surgery, his recent appointment had to be cancelled.  Appropriately given where he lives in [place], he decided to try a physician at the [facility].  I told him I thought that was a good idea. While I do not know the physicians there personally, I have heard good things about the clinic.  He will call if any questions.

## 2021-06-10 NOTE — TELEPHONE ENCOUNTER
Refill Approved    Rx renewed per Medication Renewal Policy. Medication was last renewed on 11/30/19.    Pia Monge, [facility] Triage/Med Refill 7/28/2020     Requested Prescriptions   Pending Prescriptions Disp Refills     lisinopriL (PRINIVIL,ZESTRIL) 2.5 MG tablet [Pharmacy Med Name: LISINOPRIL 2.5 MG TABLET] 90 tablet 2     Sig: TAKE 1 TABLET BY MOUTH EVERY DAY       Ace Inhibitors Refill Protocol Passed - 7/26/2020  5:40 PM        Passed - PCP or prescribing provider visit in past 12 months       Last office visit with prescriber/PCP: 12/23/2019 Andrew Doty MD OR same dept: 12/23/2019 Andrew Doty MD OR same specialty: 12/23/2019 Andrew Doty MD  Last physical: 6/21/2018 Last MTM visit: Visit date not found   Next visit within 3 mo: Visit date not found  Next physical within 3 mo: Visit date not found  Prescriber OR PCP: Andrew Doty MD  Last diagnosis associated with med order: 1. Benign essential hypertension  - lisinopriL (PRINIVIL,ZESTRIL) 2.5 MG tablet [Pharmacy Med Name: LISINOPRIL 2.5 MG TABLET]; TAKE 1 TABLET BY MOUTH EVERY DAY  Dispense: 90 tablet; Refill: 2    If protocol passes may refill for 12 months if within 3 months of last provider visit (or a total of 15 months).             Passed - Serum Potassium in past 12 months     Lab Results   Component Value Date    Potassium 5.2 (H) 12/23/2019             Passed - Blood pressure filed in past 12 months     BP Readings from Last 1 Encounters:   01/27/20 136/63             Passed - Serum Creatinine in past 12 months     Creatinine   Date Value Ref Range Status   12/23/2019 1.04 0.70 - 1.30 mg/dL Final

## 2021-06-10 NOTE — PROGRESS NOTES
Assessment and Plan:       1. Diabetes mellitus type 2, noninsulin dependent (H)  controlled  - Glycosylated Hemoglobin A1c  - Microalbumin, Random Urine    2. Benign Essential Hypertension  controlled    3. Hyperlipidemia, unspecified hyperlipidemia type    - Comprehensive Metabolic Panel  - Lipid Cascade FASTING    4. Benign prostatic hyperplasia without lower urinary tract symptoms      5. Class 2 severe obesity due to excess calories with serious comorbidity and body mass index (BMI) of 36.0 to 36.9 in adult (H)      6. Health care maintenance    - 2(CBC w/o Differential)      PLAN:  Fasting labs    Complete health care directive    The following high BMI interventions were performed this visit: encouragement to exercise and dietary management education, guidance, and counseling         The patient's current medical problems were reviewed.    The following high BMI interventions were performed this visit: encouragement to exercise and dietary management education, guidance, and counseling  The following health maintenance schedule was reviewed with the patient and provided in printed form in the after visit summary:   Health Maintenance   Topic Date Due     HEPATITIS B VACCINES (1 of 3 - Risk 3-dose series) 06/27/1954     MEDICARE ANNUAL WELLNESS VISIT  06/21/2019     A1C  06/12/2020     DIABETIC FOOT EXAM  07/17/2020     MICROALBUMIN  07/17/2020     FALL RISK ASSESSMENT  07/17/2020     INFLUENZA VACCINE RULE BASED (1) 08/01/2020     DIABETIC EYE EXAM  10/04/2020     BMP  12/23/2020     LIPID  12/23/2020     TD 18+ HE  06/12/2022     ADVANCE CARE PLANNING  06/21/2023     COLORECTAL CANCER SCREENING  01/18/2027     PNEUMOCOCCAL IMMUNIZATION 65+ LOW/MEDIUM RISK  Completed     ZOSTER VACCINES  Completed        Subjective:   Chief Complaint: Preston Fortune is an 85 y.o. male here for an Annual Wellness visit.   HPI:   Eye appt coming up.    Review of Systems:  Past h/o melanoma of the eye.  Ear discharge  High  blood pressure  No cough on ACE  No muscle aches on statin.    Please see above.  The rest of the review of systems are negative for all systems.    Patient Care Team:  Luis Angel Parra MD as PCP - General (Family Medicine)  Andrew Doty MD as Assigned PCP     Patient Active Problem List   Diagnosis     (Lower) Leg Localized Swelling Bilateral     Nephrolithiasis     Choroid Melanoma     Type 2 diabetes mellitus without complication, without long-term current use of insulin (H)     Hyperlipemia     Primary Osteoarthritis Of The Lumbar Vertebrae     Benign Essential Hypertension     Allergic rhinitis     Malignant Melanoma Of The Eye     Anemia     BPH (benign prostatic hyperplasia)     Osteoarthritis of knees, bilateral     S/P total knee arthroplasty     Abdominal pain     Calculus of ureter     Leukocytosis, unspecified type     Hemorrhage of rectum and anus     Superior mesenteric artery stenosis (H)     Past Medical History:   Diagnosis Date     Cancer (H)     Eye     Diabetes mellitus (H)      Hyperlipidemia      Hypertension      Kidney disease      Kidney stones      Peptic ulceration 2011    see EGD that year     Pure hypercholesterolemia       Past Surgical History:   Procedure Laterality Date     EYE SURGERY       TN KNEE SCOPE,DIAGNOSTIC      Description: Arthroscopy Knee Left;  Recorded: 03/17/2008;  Comments: cartilage surgery     TN RECONSTR TOTAL SHOULDER IMPLANT      Description: Shoulder Arthroplasty Total Shoulder Replacement;  Recorded: 05/06/2008;     TN TOTAL KNEE ARTHROPLASTY Left 3/26/2018    Procedure: LEFT TOTAL KNEE ARTHROPLASTY;  Surgeon: Darrell Wu MD;  Location: [facility];  Service: Orthopedics     TN TYMPANOPLASTY      Description: Tympanoplasty;  Recorded: 03/17/2008;      Family History   Problem Relation Age of Onset     Diabetes Father       Social History     Socioeconomic History     Marital status:      Spouse name: Not on file     Number of  "children: Not on file     Years of education: Not on file     Highest education level: Not on file   Occupational History     Occupation: retired    Social Needs     Financial resource strain: Not on file     Food insecurity     Worry: Not on file     Inability: Not on file     Transportation needs     Medical: Not on file     Non-medical: Not on file   Tobacco Use     Smoking status: Former Smoker     Types: Cigarettes     Smokeless tobacco: Former User     Quit date: 12/8/1967   Substance and Sexual Activity     Alcohol use: Not on file     Drug use: No     Sexual activity: Not on file   Lifestyle     Physical activity     Days per week: Not on file     Minutes per session: Not on file     Stress: Not on file   Relationships     Social connections     Talks on phone: Not on file     Gets together: Not on file     Attends [employer] service: Not on file     Active member of club or organization: Not on file     Attends meetings of clubs or organizations: Not on file     Relationship status: Not on file     Intimate partner violence     Fear of current or ex partner: Not on file     Emotionally abused: Not on file     Physically abused: Not on file     Forced sexual activity: Not on file   Other Topics Concern     Not on file   Social History Narrative     5 grown kids. 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       Current Outpatient Medications   Medication Sig Dispense Refill     amLODIPine (NORVASC) 10 MG tablet TAKE 1 TABLET BY MOUTH EVERY DAY 90 tablet 1     aspirin 81 MG EC tablet Take 81 mg by mouth daily.       cetirizine (ZYRTEC) 10 mg cap Take 10 mg by mouth daily as needed.        generic lancets Use 1 each As Directed daily. Dispense brand per patient's insurance at pharmacy discretion.(E11.9) 100 each 1     hydroCHLOROthiazide (MICROZIDE) 12.5 mg capsule TAKE 1 CAPSULE BY MOUTH EVERY DAY 90 capsule 1     lisinopriL (PRINIVIL,ZESTRIL) 2.5 MG tablet TAKE 1 TABLET BY MOUTH EVERY DAY 90 tablet " "1     metFORMIN (GLUCOPHAGE) 500 MG tablet TAKE 1 TABLET BY MOUTH TWICE A DAY WITH MEALS 180 tablet 0     ONETOUCH ULTRA BLUE TEST STRIP strips USE TO TEST ONCE DAILY 100 strip 3     pravastatin (PRAVACHOL) 80 MG tablet TAKE ONE-HALF TABLET BY MOUTH EVERY EVENING 45 tablet 4     psyllium (METAMUCIL) 3.4 gram packet Take 1 packet by mouth 2 (two) times a day.       vitamin A-vitamin C-vit E-min (OCUVITE) Tab tablet Take 1 tablet by mouth daily.        No current facility-administered medications for this visit.       Objective:   Vital Signs:   Visit Vitals  /63 (Patient Site: Left Arm, Patient Position: Sitting, Cuff Size: Adult Large)   Pulse 68   Temp 96.6  F (35.9  C) (Oral)   Ht 5' 6\" (1.676 m)   Wt (!) 225 lb (102.1 kg)   SpO2 98%   BMI 36.32 kg/m       Wt Readings from Last 3 Encounters:   08/04/20 (!) 225 lb (102.1 kg)   12/23/19 (!) 231 lb (104.8 kg)   12/11/19 (!) 237 lb 3.2 oz (107.6 kg)          VisionScreening:  No exam data present     PHYSICAL EXAM  /63 (Patient Site: Left Arm, Patient Position: Sitting, Cuff Size: Adult Large)   Pulse 68   Temp 96.6  F (35.9  C) (Oral)   Ht 5' 6\" (1.676 m)   Wt (!) 225 lb (102.1 kg)   SpO2 98%   BMI 36.32 kg/m      General Appearance:    Alert, cooperative, no distress, appears stated age   Head:    Normocephalic, without obvious abnormality, atraumatic   Eyes:    PERRL, conjunctiva/corneas clear, EOM's intact, fundi     benign, both eyes        Ears:    Normal TM's and external ear canals, both ears   Nose:   Nares normal, septum midline, mucosa normal, no drainage    or sinus tenderness   Throat:   Lips, mucosa, and tongue normal; teeth and gums normal   Neck:   Supple, symmetrical, trachea midline, no adenopathy;        thyroid:  No enlargement/tenderness/nodules; no carotid    bruit or JVD   Back:     Symmetric, no curvature, ROM normal, no CVA tenderness   Lungs:     Clear to auscultation bilaterally, respirations unlabored   Chest wall:    No " tenderness or deformity   Heart:    Regular rate and rhythm, S1 and S2 normal, no murmur, rub    or gallop   Abdomen:     Soft, non-tender, bowel sounds active all four quadrants,     no masses, no organomegaly   Genitalia:    No exam   Rectal:    No exam   Extremities:   Extremities normal, atraumatic, no cyanosis or edema       Skin:   Skin color, texture, turgor normal, no rashes or lesions   Lymph nodes:   Cervical, supraclavicular, and axillary nodes normal   Neurologic:   CNII-XII intact. Normal strength, sensation and reflexes       Throughout    FEET:  MF TESTING: NL              SKIN EXAM:  NL              VASCULAR:   R DP  PULSE: +                                      L DP  PULSE: +                                      R PT  PULSE: +                                      L PT  PULSE: -         Lab Results   Component Value Date    HGBA1C 7.2 (H) 12/12/2019     Results for orders placed or performed during the hospital encounter of 12/11/19   Comprehensive Metabolic Panel   Result Value Ref Range    Sodium 137 136 - 145 mmol/L    Potassium 4.1 3.5 - 5.0 mmol/L    Chloride 103 98 - 107 mmol/L    CO2 24 22 - 31 mmol/L    Anion Gap, Calculation 10 5 - 18 mmol/L    Glucose 176 (H) 70 - 125 mg/dL    BUN 15 8 - 28 mg/dL    Creatinine 0.91 0.70 - 1.30 mg/dL    GFR MDRD Af Amer >60 >60 mL/min/1.73m2    GFR MDRD Non Af Amer >60 >60 mL/min/1.73m2    Bilirubin, Total 0.7 0.0 - 1.0 mg/dL    Calcium 9.6 8.5 - 10.5 mg/dL    Protein, Total 6.6 6.0 - 8.0 g/dL    Albumin 3.5 3.5 - 5.0 g/dL    Alkaline Phosphatase 79 45 - 120 U/L    AST 12 0 - 40 U/L    ALT <9 0 - 45 U/L     Lab Results   Component Value Date    MICROALBUR 1.54 07/17/2019           Assessment Results 6/21/2018   Activities of Daily Living No help needed   Instrumental Activities of Daily Living No help needed   Mini Cog Total Score 4   Some recent data might be hidden     A Mini-Cog score of 0-2 suggests the possibility of dementia, score of 3-5 suggests no  dementia    Identified Health Risks:

## 2021-06-10 NOTE — PATIENT INSTRUCTIONS - HE
Fasting labs    Complete health care directive    The following high BMI interventions were performed this visit: encouragement to exercise and dietary management education, guidance, and counseling

## 2021-06-11 NOTE — TELEPHONE ENCOUNTER
Refill Approved    Rx renewed per Medication Renewal Policy. Medication was last renewed on 9/9/19.    Pia Monge, [facility] Connection Triage/Med Refill 9/2/2020     Requested Prescriptions   Pending Prescriptions Disp Refills     blood glucose test (ONETOUCH ULTRA BLUE TEST STRIP) strips 100 strip 3     Sig: USE TO TEST ONCE DAILY       Diabetic Supplies Refill Protocol Passed - 8/31/2020  8:16 AM        Passed - Visit with PCP or prescribing provider visit in last 6 months     Last office visit with prescriber/PCP: Visit date not found OR same dept: Visit date not found OR same specialty: 12/23/2019 Andrew Doty MD  Last physical: 8/4/2020 Last MTM visit: Visit date not found   Next visit within 3 mo: Visit date not found  Next physical within 3 mo: Visit date not found  Prescriber OR PCP: Luis Angel Parra MD  Last diagnosis associated with med order: 1. Diabetes mellitus type 2, noninsulin dependent (H)  - blood glucose test (ONETOUCH ULTRA BLUE TEST STRIP) strips; USE TO TEST ONCE DAILY  Dispense: 100 strip; Refill: 3    If protocol passes may refill for 12 months if within 3 months of last provider visit (or a total of 15 months).             Passed - A1C in last 6 months     Hemoglobin A1c   Date Value Ref Range Status   08/04/2020 6.7 (H) <=5.6 % Final     Comment:     Normal <5.7% Prediabete 5.7-6.4% Diabletes 6.5% or higher - adopted from ADA consensus guidelines

## 2021-06-11 NOTE — TELEPHONE ENCOUNTER
Refill Approved    Rx renewed per Medication Renewal Policy. Medication was last renewed on 7/2/20, last OV 8/4/20.    Beatrice Tomlinson, [facility] Triage/Med Refill 9/28/2020     Requested Prescriptions   Pending Prescriptions Disp Refills     metFORMIN (GLUCOPHAGE) 500 MG tablet [Pharmacy Med Name: METFORMIN  MG TABLET] 180 tablet 0     Sig: TAKE 1 TABLET BY MOUTH TWICE A DAY WITH MEALS       Metformin Refill Protocol Passed - 9/25/2020 12:39 AM        Passed - Blood pressure in last 12 months     BP Readings from Last 1 Encounters:   08/04/20 127/63             Passed - LFT or AST or ALT in last 12 months     Albumin   Date Value Ref Range Status   08/04/2020 3.9 3.5 - 5.0 g/dL Final     Bilirubin, Total   Date Value Ref Range Status   08/04/2020 0.5 0.0 - 1.0 mg/dL Final     Bilirubin, Direct   Date Value Ref Range Status   12/23/2019 0.2 <=0.5 mg/dL Final     Alkaline Phosphatase   Date Value Ref Range Status   08/04/2020 90 45 - 120 U/L Final     AST   Date Value Ref Range Status   08/04/2020 15 0 - 40 U/L Final     ALT   Date Value Ref Range Status   08/04/2020 <9 0 - 45 U/L Final     Protein, Total   Date Value Ref Range Status   08/04/2020 6.6 6.0 - 8.0 g/dL Final                Passed - GFR or Serum Creatinine in last 6 months     GFR MDRD Non Af Amer   Date Value Ref Range Status   08/04/2020 >60 >60 mL/min/1.73m2 Final     GFR MDRD Af Amer   Date Value Ref Range Status   08/04/2020 >60 >60 mL/min/1.73m2 Final             Passed - Visit with PCP or prescribing provider visit in last 6 months or next 3 months     Last office visit with prescriber/PCP: Visit date not found OR same dept: 12/23/2019 Andrew Doty MD OR same specialty: 12/23/2019 Andrew Doty MD Last physical: Visit date not found Last MTM visit: Visit date not found         Next appt within 3 mo: Visit date not found  Next physical within 3 mo: Visit date not found  Prescriber OR PCP: Alberto Mckeon MD  Last diagnosis  associated with med order: 1. Diabetes mellitus type 2, noninsulin dependent (H)  - metFORMIN (GLUCOPHAGE) 500 MG tablet [Pharmacy Med Name: METFORMIN  MG TABLET]; TAKE 1 TABLET BY MOUTH TWICE A DAY WITH MEALS  Dispense: 180 tablet; Refill: 0     If protocol passes may refill for 12 months if within 3 months of last provider visit (or a total of 15 months).           Passed - A1C in last 6 months     Hemoglobin A1c   Date Value Ref Range Status   08/04/2020 6.7 (H) <=5.6 % Final     Comment:     Normal <5.7% Prediabete 5.7-6.4% Diabletes 6.5% or higher - adopted from ADA consensus guidelines               Passed - Microalbumin in last year      Microalbumin, Random Urine   Date Value Ref Range Status   08/04/2020 3.61 (H) 0.00 - 1.99 mg/dL Final

## 2021-06-11 NOTE — PROGRESS NOTES
Assessment:      Healthy male exam.    1. Well adult exam     2. Diabetes mellitus type 2, noninsulin dependent  metFORMIN (GLUCOPHAGE) 500 MG tablet    Glycosylated Hemoglobin A1c    Basic Metabolic Panel   3. Benign Essential Hypertension  Basic Metabolic Panel   4. Hyperlipemia  Lipid Cascade    Hepatic Profile   5. Leg swelling  Hemoglobin          Plan:       Recommend discontinue doxazosin.  recommend start metformin 500 mg p.o. twice daily-#60 refillable ×3 done.  Reinforced that if he ever has any vomiting or persisting diarrhea to let us know, that he would hold metformin in those situations as well as with any surgery or contrast dye exposure.  Continue to follow blood sugars.  Follow-up diabetic recheck in 3 months.  Call earlier if any difficulties.  Recommend that he continue efforts at weight reduction.  Continue to use cane for gait instability.  Use low-sodium diet physical activity and avoiding prolonged sitting to minimize swelling in the legs at the same time he continues to pursue weight reduction.  Definitely try avoiding any caffeine and alcohol for several weeks to see if that helps the nighttime urination.    Subjective:      Preston Fortune is a 81 y.o. male who presents for an annual exam. The patient reports that there is not domestic violence in his life.  He has intermittent nighttime urination and some nights he does not have another nights he can be up 4-5 times.  He does have occasional caffeine and occasional drink of alcohol but not more than 2 drinks of alcohol per week.  He notes his blood sugars have tended to be higher and he does check them twice a day.  See the scanned report.  He has not been on medication before for his diabetes.  Over the last 2 years his A1c has gradually increased.    Healthy Habits:   Regular Exercise: Yes  Sunscreen Use: No  Healthy Diet: Yes  Dental Visits Regularly: Yes  Seat Belt: Yes  Sexually active: Yes  Monthly Self Testicular Exams:   No  Hemoccults: No  Flex Sig: No  Colonoscopy: Yes  Lipid Profile: Yes  Glucose Screen: Yes  Prevention of Osteoporosis: Yes  Last Dexa: No  Guns at Home:  No      Immunization History   Administered Date(s) Administered     DT (pediatric) 10/29/1990, 11/20/2002     Hep A, historic 09/03/2008, 07/06/2009     Influenza, inj, historic 11/08/2007, 10/02/2008, 10/15/2009, 10/04/2010, 10/13/2011, 09/24/2012     Influenza,seasonal quad, PF, 36+MOS 10/12/2015     Pneumo Conj 13-V (2010&after) 10/12/2015     Pneumo Polysac 23-V 11/17/1999, 09/03/2008     Td, historic 11/20/2002, 06/12/2012     Tdap 06/12/2012     ZOSTER 11/11/2008     Immunization status: up to date and documented.    No exam data present     Current Outpatient Prescriptions   Medication Sig Dispense Refill     ACETAMINOPHEN (TYLENOL ARTHRITIS PAIN ORAL) Take 1 tablet by mouth 4 (four) times a day as needed.       amLODIPine (NORVASC) 10 MG tablet TAKE 1 TABLET BY MOUTH DAILY 90 tablet 1     aspirin 81 MG EC tablet Take 81 mg by mouth daily.       cholecalciferol, vitamin D3, (VITAMIN D3) 1,000 unit capsule Take 1,000 Units by mouth daily.       fexofenadine (ALLEGRA) 180 MG tablet Take 180 mg by mouth daily as needed.       FOLIC ACID ORAL Take by mouth daily.       hydroCHLOROthiazide (MICROZIDE) 12.5 mg capsule TAKE 1 CAPSULE BY MOUTH EVERY MORNING 90 capsule 3     ibuprofen 200 mg cap Take 2 tablets by mouth 3 (three) times a day.       lisinopril (PRINIVIL,ZESTRIL) 2.5 MG tablet TAKE 1 TABLET BY MOUTH DAILY 90 tablet 3     omeprazole (PRILOSEC) 20 MG capsule TAKE 1 CAPSULE BY MOUTH DAILY 30 capsule 3     ONE TOUCH ULTRA TEST Strp        ONETOUCH ULTRA TEST strips TESTING DAILY 100 strip 3     pravastatin (PRAVACHOL) 80 MG tablet Take 0.5 tablets (40 mg total) by mouth every evening. 45 tablet 1     vitamin A-vitamin C-vit E-min (OCUVITE) Tab tablet Take by mouth daily.       metFORMIN (GLUCOPHAGE) 500 MG tablet Take 1 tablet (500 mg total) by mouth 2  "(two) times a day with meals. 60 tablet 3     No current facility-administered medications for this visit.      Past Medical History:   Diagnosis Date     Cancer     Eye     Diabetes mellitus      Hyperlipidemia      Hypertension      Kidney disease      Kidney stones      Peptic ulceration 2011    see EGD that year     Pure hypercholesterolemia      Past Surgical History:   Procedure Laterality Date     PA KNEE SCOPE,DIAGNOSTIC      Description: Arthroscopy Knee Left;  Recorded: 03/17/2008;  Comments: cartilage surgery     PA RECONSTR TOTAL SHOULDER IMPLANT      Description: Shoulder Arthroplasty Total Shoulder Replacement;  Recorded: 05/06/2008;     PA TYMPANOPLASTY      Description: Tympanoplasty;  Recorded: 03/17/2008;     Review of patient's allergies indicates no known allergies.  Family History   Problem Relation Age of Onset     Diabetes Father      Social History     Social History     Marital status:      Spouse name: N/A     Number of children: N/A     Years of education: N/A     Occupational History     Not on file.     Social History Main Topics     Smoking status: Former Smoker     Types: Cigarettes     Smokeless tobacco: Former User     Quit date: 12/8/1967     Alcohol use 1.0 oz/week     2 Standard drinks or equivalent per week     Drug use: No     Sexual activity: Not on file     Other Topics Concern     Not on file     Social History Narrative       Review of Systems  Review of Systems   He notes he tends to have aching in the various joints and notes that  arthritis is there and notes that he just does feel older.          Objective:     Vitals:    06/14/17 0759   BP: 126/50   Pulse: 60   Temp: 98.6  F (37  C)   Weight: (!) 250 lb 3.2 oz (113.5 kg)   Height: 5' 7\" (1.702 m)     Body mass index is 39.19 kg/(m^2).    Physical  Physical Exam   Head normocephalic.  External ears and TMs normal.  Eyes show pupils equal round and reactive to light and accommodation.  He does see ophthalmologist " on regular basis for his eye situation.  Nose and oropharynx negative.  Neck supple without adenopathy or thyromegaly.  Lungs clear.  Heart regular rate and rhythm without murmur.  Abdomen shows no masses tenderness or hepatosplenomegaly.  He is overweight.  Genitalia normal and no hernia.  Rectal examination shows smooth enlargement of the prostate and no focal nodules.  Extremities show trace edema at the ankles.  Is alert with clear speech.  He ambulates with his cane.  Is somewhat slow to walk.  No focal neurologic abnormalities.

## 2021-06-13 NOTE — TELEPHONE ENCOUNTER
Minry;  It is the 10 mg amlodipine dose mentioned in the discharge summary (two 5 mg tabs or one 10 mg tab).

## 2021-06-13 NOTE — PROGRESS NOTES
MTM Transition of Care Encounter  Assessment & Plan                                                     Post Discharge Medication Reconciliation Status: discharge medications reconciled and changed, per note/orders    Abdominal Pain: Patient is no longer having symptoms.  Encouraged him to continue low-fat diet.  He will discuss future surgery with PCP at upcoming appointment.    Hypertension: Reviewed discharge summary, and rationale for discontinuing hydrochlorothiazide.  Discussed that it was likely discontinued due to risk of cholelithiasis.  Reviewed that possibly it was discontinued in case he develops vomiting and the risk of TONIA.  He does not check his blood pressure at home, but review to monitor for chest pain.  We will check his blood pressure at his upcoming appointment, and will defer to PCP about whether to restart HCTZ.  Discussed to closely monitor for significant changes in edema, it does sound like his edema is slightly worse, likely due to loss of diuretic and concomitant amlodipine.  He will monitor.  Plan:  1.  BP check at PCP appointment.  Remain off HCTZ until then  2.  Closely monitor for worsening in edema.    Type 2 Diabetes: A1c was checked on admission and is at goal less than 7%.  Reported blood sugars at home sound well controlled and at goal.  Will refill his pravastatin for 40 mg tablets so that he does not need to split half.    Supplements: Okay to continue supplements.  Last vitamin D level was at goal, but value is outdated.      Follow Up  As needed with MTM  PCP on 12/14/2020    Subjective & Objective                                                       Preston Fortune is a 85 y.o. male called for a transitions of care visit. he was discharged from [facility] on 11/30/2020 for abdominal pain.    Patient consented to a telehealth visit: Yes    Chief Complaint: Patient wonders why hydrochlorothiazide was stopped    Medication Adherence/Access: No adherence issues.  He wonders  about the timing of his medicines.    Abdominal Pain: presented with acute abdominal pain following a meal and admitted on 11/26/2020 for leukocytosis. Hospital course was significant for elevated LFTs which trended downwards but continued to be slightly elevated. Underwent evaluation by general surgery, CT of the abdomen pelvis did not show any worsening of the superior mesenteric artery stenosis. There was concern of patient having acute cholecystitis but exam was absolutely asymptomatic, his WBC count which was elevated on presentation also resolved. After discussion with general surgery it became evident that there was no intent for the patient to undergo urgent cholecystectomy, and can be revisited as an outpatient.At discharge they discontinuing patient's hydrochlorothiazide as it may worsen the issue of acute cholecystitis. Advised a low-fat diet as well.  Since home, he denies any more abdominal pain.  Normal bowel movements.  Is following the low-fat diet.    Hypertension: Continues amlodipine 10 mg daily and lisinopril 2.5 mg daily. No longer taking hydrochlorothiazide 12.5 mg daily.  Not sure why it was stopped, but confirms that he is no longer taking.  Patient does not monitor BP at home.  Reports that his lower feet are swollen, wears compression stockings. Had some edema prior as well, but this is a little bit worse however not bothersome.   BP Readings from Last 3 Encounters:   11/30/20 159/71   08/04/20 127/63   01/27/20 136/63       Type 2 Diabetes: Currently taking metformin 500 mg two times a day.   Tests BG 1 times daily. Reports blood sugars: 140 this morning, but in general 120-125. No diarhhea.    Last A1c checked 11/26/20 = 6.9%.   Microalbumin checked 8/4/20  Taking pravastatin 80 mg half tablet (40 mg) HS. Hard to split and sometimes does not get an exact half. Last lipids checked 12/23/19  Is taking aspirin 81 mg for primary prevention.     Supplements: Taking Preservision twice daily and  vitamin D 2000 units daily.  Vitamin D, Total (25-Hydroxy)   Date Value Ref Range Status   10/13/2011 53.0 30.0 - 80.0 ng/mL Final     Comment:                    Deficiency              <10.0 ng/mL               Insufficiency       10.0-29.9 ng/mL               Sufficiency         30.0-80.0 ng/mL               Toxicity (possible)    >100.0 ng/mL           PMH: reviewed in EPIC   Allergies/ADRs: reviewed in EPIC   Alcohol: reviewed in EPIC  Tobacco:   Social History     Tobacco Use   Smoking Status Former Smoker     Types: Cigarettes   Smokeless Tobacco Former User     Quit date: 12/8/1967     Recent Vitals:   BP Readings from Last 3 Encounters:   11/30/20 159/71   08/04/20 127/63   01/27/20 136/63      Wt Readings from Last 3 Encounters:   11/30/20 (!) 236 lb 3.2 oz (107.1 kg)   08/04/20 (!) 225 lb (102.1 kg)   12/23/19 (!) 231 lb (104.8 kg)     ----------------    The patient declined an after visit summary    I spent 22 minutes with this patient today;  . All changes were made via collaborative practice agreement with Luis Angel Parra MD. A copy of the visit note was provided to the patient's provider.     Linnea Parish, Pharm.D., [facility]  Medication Therapy Management Pharmacist  [facility]    Telemedicine Visit Details    Type of service:  Telephone     Start Time: 10:05 AM  End Time (time video/phone call stopped): 10:27 AM    Originating Location (pt. Location): Home    Distant Location (provider location):  [place] MED[facility]ERAPY MANAGEMENT [facility]    Mode of Communication:   Telephone     Current Outpatient Medications   Medication Sig Dispense Refill     acetaminophen (TYLENOL) 500 MG tablet Take 500 mg by mouth every 6 (six) hours as needed for pain.       amLODIPine (NORVASC) 10 MG tablet TAKE 1 TABLET BY MOUTH EVERY DAY 90 tablet 1     aspirin 81 MG EC tablet Take 81 mg by mouth daily.       blood glucose test (ONETOUCH ULTRA BLUE TEST STRIP) strips USE TO TEST  ONCE DAILY 100 strip 3     cetirizine (ZYRTEC) 10 mg cap Take 10 mg by mouth daily as needed.        generic lancets Use 1 each As Directed daily. Dispense brand per patient's insurance at pharmacy discretion.(E11.9) 100 each 1     lisinopriL (PRINIVIL,ZESTRIL) 2.5 MG tablet TAKE 1 TABLET BY MOUTH EVERY DAY 90 tablet 1     metFORMIN (GLUCOPHAGE) 500 MG tablet TAKE 1 TABLET BY MOUTH TWICE A DAY WITH MEALS 180 tablet 2     pravastatin (PRAVACHOL) 80 MG tablet TAKE ONE-HALF TABLET BY MOUTH EVERY EVENING 45 tablet 4     psyllium (METAMUCIL) 3.4 gram packet Take 1 packet by mouth daily.        vit C/E/zinc ox/jadiel/lut/zeax (ICAPS AREDS2 ORAL) Take 1 capsule by mouth 2 (two) times a day.       No current facility-administered medications for this visit.

## 2021-06-13 NOTE — TELEPHONE ENCOUNTER
Recently hospitalized - discharged one week ago. Medication list includes Amlodipine 10 mg daily - pharmacy [facility] [place]     Has been taking amlodipine 10 mg daily - states the pharmacy gave him a prescription for 5 mg tablets.     [facility] - [phone]    Called pharmacy - Amlodipine 5mg prescription was sent to pharmacy on 11/30 (Dr. Alexandre)- unclear why the dosage would've changed. Patient filled the prescription yesterday. Took 10 mg today. Patient states he had no low BP issues in the hospital. Advised patient that message would be sent to PCP tomorrow to advise on dosage going forward. Patient has follow-up appointment scheduled for 12/14.    Mary Jimenes RN, Triage Nurse Advisor      Reason for Disposition    Caller has NON-URGENT medication question about med that PCP prescribed and triager unable to answer question    Additional Information    Negative: Drug overdose and nurse unable to answer question    Negative: Caller requesting information not related to medicine    Negative: Caller requesting a prescription for Strep throat and has a positive culture result    Negative: Rash while taking a medication or within 3 days of stopping it    Negative: Immunization reaction suspected    Negative: [1] Asthma and [2] having symptoms of asthma (cough, wheezing, etc)    Negative: MORE THAN A DOUBLE DOSE of a prescription or over-the-counter (OTC) drug    Negative: [1] DOUBLE DOSE (an extra dose or lesser amount) of over-the-counter (OTC) drug AND [2] any symptoms (e.g., dizziness, nausea, pain, sleepiness)    Negative: [1] DOUBLE DOSE (an extra dose or lesser amount) of prescription drug AND [2] any symptoms (e.g., dizziness, nausea, pain, sleepiness)    Negative: Took another person's prescription drug    Negative: [1] DOUBLE DOSE (an extra dose or lesser amount) of prescription drug AND [2] NO symptoms (Exception: a double dose of antibiotics)    Negative: Diabetes drug error or overdose (e.g., insulin or  "extra dose)    Negative: [1] Request for URGENT new prescription or refill of \"essential\" medication (i.e., likelihood of harm to patient if not taken) AND [2] triager unable to fill per unit policy    Negative: [1] Prescription not at pharmacy AND [2] was prescribed today by PCP    Negative: Pharmacy calling with prescription questions and triager unable to answer question    Negative: Caller has URGENT medication question about med that PCP prescribed and triager unable to answer question    Protocols used: MEDICATION QUESTION CALL-A-AH      "

## 2021-06-13 NOTE — TELEPHONE ENCOUNTER
Left message to call back for: returning pt call  Information to relay to patient:  See both providers message below.

## 2021-06-13 NOTE — TELEPHONE ENCOUNTER
Pt should could continue taking amlodipine 20 mg daily as included in his recent hospital discharge summary.  A BP check in the clinic would be a good idea.  Just schedule a nurse BP check.  Also if he is able to do any home BP checks those would be good too.   If he only has the 5 mg amlodipine tablets then he can take two daily.  Let me know when a refill is needed and then I can call in the 10 mg tabs.   He should report any dizziness or lightheadedness.

## 2021-06-13 NOTE — PROGRESS NOTES
"Hospital Follow-up Visit:    Assessment/Plan:     1. Abdominal pain, generalized  Possible recent acut cholecystitis suggested by WBC, CT Scan, and hepato biliary scan.  - Ambulatory referral to General Surgery        PLAN:   Consult with General Surgery for possible gallbladder disease and need for an \"interval cholycystectomy\"          Subjective:     Preston Fortune is a 85 y.o. male who presents for a hospital discharge follow up.  Follow up abd pain episode (1st episode about a year ago).   This was the 2nd episode, pain up to 8/10.  Had Thanksgiving dinner about 1-2pm and then started having discomfort within an hour and by that evening had his son bring him to the ER.       Hospital/Nursing Home/ Rehab Facility: [facility]  Date of Admission: 11-26-20  Date of Discharge:11-30-20  Reason(s) for Admission:intense pain over the whole gut.  Felt like on fire.  After the first day had no pain.             Do you have any problems taking your medication regularly?  None       Have you had any changes in your medication since discharge? None       Have you had any difficulty following your discharge or treatment plan?  No    Summary of hospitalization:  Hospital discharge summary reviewed  Diagnostic Tests/Treatments reviewed.  Follow up needed: follow up with general surgery.  Other Healthcare Providers Involved in Patient's Care: Patient Care Team:  Luis Angel Parra MD as PCP - General (Family Medicine)  Luis Angel Parra MD as Assigned PCP  Vasyl Bright MD as Assigned Surgical Provider  Linnea Parish, PharmD as Pharmacist (Pharmacist)      Update since discharge: {improved   Information from family, SNF, care coordination: patient     Post Discharge Medication Reconciliation: discharge medications reconciled, continue medications without change  Plan of care communicated with: patient    Objective:     Vitals:    12/14/20 1035   BP: 149/67   Pulse: 68   Resp: 20   Temp: (!) 96.3 "  F (35.7  C)   TempSrc: Oral   Weight: (!) 224 lb 12.8 oz (102 kg)         Physical Exam:  /67 (Patient Site: Left Arm, Patient Position: Sitting, Cuff Size: Adult Regular)   Pulse 68   Temp (!) 96.3  F (35.7  C) (Oral)   Resp 20   Wt (!) 224 lb 12.8 oz (102 kg)   BMI 35.21 kg/m      General Appearance:    Alert, cooperative, no distress, appears stated age                       Neck:   Supple, symmetrical, trachea midline, no adenopathy;        thyroid:  No enlargement/tenderness/nodules; no carotid    bruit or JVD       Lungs:     Clear to auscultation bilaterally, respirations unlabored   Chest wall:    No tenderness or deformity   Heart:    Regular rate and rhythm, S1 and S2 normal, no murmur, rub    or gallop   Abdomen:     Soft, non-tender, bowel sounds active all four quadrants,     no masses, no organomegaly  SPECIFICALLY NEG TESFAYE AND NO RUQ TX           Extremities:   Extremities normal, atraumatic, no cyanosis or edema       Skin:   Skin color, texture, turgor normal, no rashes or lesions   Lymph nodes:   Cervical, supraclavicular, and axillary nodes normal   Neurologic:   CNII-XII intact. Normal strength, sensation and reflexes       throughout       Lab Results   Component Value Date    WBC 8.0 11/29/2020    HGB 10.6 (L) 11/29/2020    HCT 32.5 (L) 11/29/2020    MCV 88 11/29/2020     11/30/2020       Coding guidelines for this visit:  Type of Medical   Decision Making Face-to-Face Visit       within 7 Days of discharge Face-to-Face Visit        within 14 days of discharge   Moderate Complexity [id number]   High Complexity [id number]     NM Hepatobiliary WO EF Or Pharm (Order [id number])  Imaging  Date: 11/28/2020 Department: [facility] Released By: Rhonda Menon CNMT Authorizing: Osmar Hidalgo MD   Study Result    EXAM: NM HEPATOBILIARY WO EF OR PHARM  LOCATION: [facility]  DATE/TIME: 11/28/2020 10:06  AM     INDICATION: Cholelithiasis abdominal pain, gallstones, elevated lft's  COMPARISON: Ultrasound 11/27/2020  TECHNIQUE: 8.53 mCi of Tc-99m mebrofenin, IV. Anterior planar imaging of the abdomen immediately after injection and at 20 hours post injection     FINDINGS: At 1 hour postinjection, no radiotracer uptake visualized within the biliary system, gallbladder or bowel. Delayed radiotracer uptake within the cardiac blood pool is consistent with hepatocellular dysfunction. At 20 hours post injection, a   small amount of radiotracer within the bowel. The biliary system and gallbladder are not visualized.     IMPRESSION:   1.  No visualization of the gallbladder at 20 hours after radiotracer injection. This is most consistent with a cystic duct obstruction due to acute cholecystitis.  2.  Hepatocellular dysfunction.     CT Abdomen Pelvis Without Oral With IV Contrast (Order [id number])  Imaging  Date: 12/12/2019 Department: [facility] MS/Bariatric/Renal Released By/Authorizing: Rl Cuadra MD (auto-released)   Study Result    EXAM: CT ABDOMEN PELVIS WO ORAL W IV CONTRAST  LOCATION: [facility]  DATE/TIME: 12/12/2019 2:05 PM     INDICATION: Abdominal pain, acute, nonlocalized pain  COMPARISON: CT 10/29/2008 and 12/11/2019  TECHNIQUE: CT scan of the abdomen and pelvis was performed following injection of IV contrast. Multiplanar reformats were obtained. Dose reduction techniques were used.  CONTRAST: Iohexol (Omni) 100 mL     FINDINGS:   LOWER CHEST: Heart size is normal. Coronary atherosclerosis is extensive as is mitral annular calcification. The lung bases are clear.     HEPATOBILIARY: No focal liver lesion. Gallbladder is mildly distended with surrounding inflammation. No radiopaque stones.     PANCREAS: Moderately to severely atrophic pancreas.     SPLEEN: Normal.     ADRENAL GLANDS: Normal.     KIDNEYS/BLADDER: There are several small bilateral simple appearing renal  cysts. Nonobstructive 3 mm right renal calculus. 10 mm calculus in the downstream left ureter just above the UVJ however, there is no hydronephrosis or hydroureter.     BOWEL: No bowel obstruction. No free fluid or free air. Normal appendix. Small fat-containing paraumbilical hernia.     LYMPH NODES: No adenopathy.     VASCULATURE: Moderate aortic atherosclerosis. No aneurysm. Prominent atherosclerosis at the origin of the SMA as noted on the prior study.     PELVIC ORGANS: Normal.     OTHER: None.     MUSCULOSKELETAL: Severe multilevel disc and endplate degenerative findings in the lumbar spine. Severe multilevel facet arthropathy.     IMPRESSION:   1.  Redemonstrated is the nonobstructive 10 mm calculus in the downstream left ureter.  2.  Mildly distended gallbladder with surrounding inflammation. Inflammation is new from the comparison of 1 day prior. Though there are no stones, correlate with suspicion of cholecystitis.  3.  Nonobstructive 3 mm right renal calculus.  4.  Additional chronic findings as above.          Electronically signed by Luis Angel Parra MD 12/14/20 10:41 AM

## 2021-06-13 NOTE — PATIENT INSTRUCTIONS - HE
"Consult with General Surgery for possible gallbladder disease and need for an \"interval cholycystectomy\"  "

## 2021-06-14 NOTE — PROGRESS NOTES
Assessment:  1.  Non-insulin-dependent diabetes mellitus, checking status.  2.  Hypertension controlled.  3.  Hyperlipidemia, checking status.  4.  Left temple seborrheic keratosis.  5.  Bilateral knee osteoarthritis.    Plan: Check fasting A1c, lipid hepatic and basic profiles.  Reassured regarding the left temple area.  Refer to orthopedics regarding the knees and for consideration of trial of steroid injection.  Continue diet and exercise efforts and weight reduction efforts.  If lab is fine follow-up at minimum in 4 months.    Subjective: 82-year-old male presenting for follow-up on the above and evaluation.  Regarding diabetes he does check blood sugars daily and many of the readings are in the 130-150 range.  He does not check other times of the day.  He has been trying to watch diet.  Regarding hypertension no headaches dizziness or leg swelling.  Regarding lipids no diarrhea constipation muscle aching or muscle weakness.  He has a spot on the left temple that he specifically wants to get checked that showed up in the last several months and his wife was concerned about it.  He has a history of the choroidal melanoma.  Past Medical History:   Diagnosis Date     Cancer     Eye     Diabetes mellitus      Hyperlipidemia      Hypertension      Kidney disease      Kidney stones      Peptic ulceration 2011    see EGD that year     Pure hypercholesterolemia      No Known Allergies  Current Outpatient Prescriptions   Medication Sig Dispense Refill     amLODIPine (NORVASC) 10 MG tablet TAKE 1 TABLET BY MOUTH DAILY 90 tablet 0     aspirin 81 MG EC tablet Take 81 mg by mouth daily.       blood glucose test (ONETOUCH ULTRA TEST) strips Use 1 each As Directed daily. Dispense brand per patient's insurance at pharmacy discretion.(E11.9) 100 strip 1     cholecalciferol, vitamin D3, (VITAMIN D3) 1,000 unit capsule Take 1,000 Units by mouth daily.       FOLIC ACID ORAL Take by mouth daily.       generic lancets Use 1 each As  "Directed daily. Dispense brand per patient's insurance at pharmacy discretion.(E11.9) 100 each 1     hydroCHLOROthiazide (MICROZIDE) 12.5 mg capsule TAKE 1 CAPSULE BY MOUTH EVERY MORNING 90 capsule 0     ibuprofen 200 mg cap Take 2 tablets by mouth 3 (three) times a day.       lisinopril (PRINIVIL,ZESTRIL) 2.5 MG tablet TAKE 1 TABLET BY MOUTH DAILY 90 tablet 3     loratadine (CLARITIN) 10 mg tablet Take 10 mg by mouth daily.       metFORMIN (GLUCOPHAGE) 500 MG tablet TAKE 1 TABLET BY MOUTH TWICE DAILY WITH MEALS 60 tablet 0     omeprazole (PRILOSEC) 20 MG capsule TAKE 1 CAPSULE BY MOUTH DAILY 30 capsule 3     pravastatin (PRAVACHOL) 80 MG tablet TAKE 1/2 TABLET(40 MG) BY MOUTH EVERY EVENING 45 tablet 0     vitamin A-vitamin C-vit E-min (OCUVITE) Tab tablet Take by mouth daily.       ACETAMINOPHEN (TYLENOL ARTHRITIS PAIN ORAL) Take 1 tablet by mouth 4 (four) times a day as needed.       No current facility-administered medications for this visit.      He also has had bilateral knee pain is been ongoing and knows that he has arthritis in the joints.  He notes that he had a buckle handle tear in his left knee and had surgery on that in years past.  He is seen [facility] before and also had a right shoulder surgery.    Objective:/72  Pulse 60  Ht 5' 7\" (1.702 m)  Wt (!) 242 lb (109.8 kg)  BMI 37.9 kg/m2  HEENT shows no acute change.  The left temple has a typical seborrheic keratosis.  That is within the hairline.  Neck supple without adenopathy or thyromegaly.  Lungs clear.  Heart regular rate and rhythm without murmur.  Abdomen shows no masses tenderness or hepatosplenomegaly.  No pedal edema.  He does walk with a cane.  Bilateral knees have the thickening consistent with chronic knee osteoarthritis.  "

## 2021-06-15 NOTE — TELEPHONE ENCOUNTER
Refill Approved    Rx renewed per Medication Renewal Policy. Medication was last renewed on 7/28/20, last OV 12/14/20.    Beatrice Tomlinson, [facility] Triage/Med Refill 2/19/2021     Requested Prescriptions   Pending Prescriptions Disp Refills     lisinopriL (PRINIVIL,ZESTRIL) 2.5 MG tablet [Pharmacy Med Name: LISINOPRIL 2.5 MG TABLET] 90 tablet 1     Sig: TAKE 1 TABLET BY MOUTH EVERY DAY       [facility] Inhibitors Refill Protocol Passed - 2/19/2021  1:06 PM        Passed - PCP or prescribing provider visit in past 12 months       Last office visit with prescriber/PCP: 12/23/2019 Andrew Doty MD OR same dept: Visit date not found OR same specialty: 12/14/2020 Luis Angel Parra MD  Last physical: 6/21/2018 Last MTM visit: Visit date not found   Next visit within 3 mo: Visit date not found  Next physical within 3 mo: Visit date not found  Prescriber OR PCP: Andrew Doty MD  Last diagnosis associated with med order: 1. Benign essential hypertension  - lisinopriL (PRINIVIL,ZESTRIL) 2.5 MG tablet [Pharmacy Med Name: LISINOPRIL 2.5 MG TABLET]; TAKE 1 TABLET BY MOUTH EVERY DAY  Dispense: 90 tablet; Refill: 1    If protocol passes may refill for 12 months if within 3 months of last provider visit (or a total of 15 months).             Passed - Serum Potassium in past 12 months     Lab Results   Component Value Date    Potassium 4.0 11/30/2020             Passed - Blood pressure filed in past 12 months     BP Readings from Last 1 Encounters:   12/14/20 149/67             Passed - Serum Creatinine in past 12 months     Creatinine   Date Value Ref Range Status   11/30/2020 0.87 0.70 - 1.30 mg/dL Final

## 2021-06-16 PROBLEM — E66.01 MORBID OBESITY (H): Status: ACTIVE | Noted: 2020-12-14

## 2021-06-16 PROBLEM — R10.9 ABDOMINAL PAIN: Chronic | Status: ACTIVE | Noted: 2019-12-12

## 2021-06-16 PROBLEM — K55.1 SUPERIOR MESENTERIC ARTERY STENOSIS (H): Chronic | Status: ACTIVE | Noted: 2019-12-12

## 2021-06-16 PROBLEM — Z96.659 S/P TOTAL KNEE ARTHROPLASTY: Status: ACTIVE | Noted: 2018-03-26

## 2021-06-16 PROBLEM — K62.5 HEMORRHAGE OF RECTUM AND ANUS: Status: ACTIVE | Noted: 2019-05-21

## 2021-06-16 PROBLEM — R79.89 ABNORMAL LFTS: Status: ACTIVE | Noted: 2020-11-28

## 2021-06-16 PROBLEM — M17.0 OSTEOARTHRITIS OF KNEES, BILATERAL: Status: ACTIVE | Noted: 2018-03-07

## 2021-06-16 PROBLEM — K55.1: Status: ACTIVE | Noted: 2020-11-26

## 2021-06-16 NOTE — ANESTHESIA CARE TRANSFER NOTE
Last vitals:   Vitals:    03/26/18 0928   BP: 106/55   Pulse: 67   Resp: 16   Temp: 36.9  C (98.4  F)   SpO2: 100%     Patient's level of consciousness is drowsy  Spontaneous respirations: yes  Maintains airway independently: yes  Dentition unchanged: yes  Oropharynx: oropharynx clear of all foreign objects    QCDR Measures:  ASA# 20 - Surgical Safety Checklist: WHO surgical safety checklist completed prior to induction  PQRS# 430 - Adult PONV Prevention: 4558F - Pt received => 2 anti-emetic agents (different classes) preop & intraop  ASA# 8 - Peds PONV Prevention: NA - Not pediatric patient, not GA or 2 or more risk factors NOT present  PQRS# 424 - Dang-op Temp Management: 4559F - At least one body temp DOCUMENTED => 35.5C or 95.9F within required timeframe  PQRS# 426 - PACU Transfer Protocol: - Transfer of care checklist used  ASA# 14 - Acute Post-op Pain: ASA14B - Patient did NOT experience pain >= 7 out of 10   Nkechi Billings

## 2021-06-16 NOTE — ANESTHESIA PROCEDURE NOTES
Peripheral Block    Patient location during procedure: pre-op  Start time: 3/26/2018 7:03 AM  End time: 3/26/2018 7:05 AM  post-op analgesia per surgeon order as noted in medical record  Staffing:  Performing  Anesthesiologist: GAEL BERRY  Preanesthetic Checklist  Completed: patient identified, site marked, risks, benefits, and alternatives discussed, timeout performed, consent obtained, at patient's request, airway assessed, oxygen available, suction available, emergency drugs available and hand hygiene performed  Peripheral Block  Block type: saphenous, adductor canal block  Prep: ChloraPrep  Patient position: supine  Patient monitoring: cardiac monitor, continuous pulse oximetry, heart rate and blood pressure  Laterality: left  Injection technique: ultrasound guided    Ultrasound used to visualize needle placement in proximity to nerve being blocked: yes   Permanent ultrasound image captured for medical record  Sterile gel and probe cover used for ultrasound.    Needle  Needle type: echogenic   Needle gauge: 21 G  Needle length: 4 in    Assessment  Injection assessment: no difficulty with injection, negative aspiration for heme, incremental injection and no paresthesia on injection

## 2021-06-16 NOTE — PROGRESS NOTES
Preoperative Exam    Scheduled Procedure: LEFT TOTAL KNEE ARTHROPLASTY  Surgery Date:  3/26/18  Surgery Location: [facility], fax [phone]    Surgeon:  Dr. Darrell Wu    Assessment/Plan:     1. Preop cardiovascular exam  Glycosylated Hemoglobin A1c    Basic Metabolic Panel    HM2(CBC w/o Differential)    Electrocardiogram Perform - Clinic   2. Osteoarthritis of left knee     3. Diabetes mellitus type 2, noninsulin dependent  Glycosylated Hemoglobin A1c    Basic Metabolic Panel   4. Benign Essential Hypertension  Basic Metabolic Panel    Electrocardiogram Perform - Clinic   5. Hyperlipemia  Lipid Cascade    Hepatic Profile     He understands to hold the aspirin here in the week before his surgery, hold the ibuprofen for 4 days before the procedure, hold the metformin for 24 hours before the procedure and then can resume that when he is eating again after the procedure, hold the multiple vitamin for his I i.e. Ocuvite for this next week due to its vitamin E component, and on the morning of surgery he can take the blood pressure medicines including the hydrochlorothiazide lisinopril, and take his amlodipine per his regular schedule.  I did explain that since he is on 3 medications it would be okay for him to take the amlodipine in the evening instead of the morning and then continue to take the lisinopril and hydrochlorothiazide in the morning to likely have overall more even blood pressure control.      Surgical Procedure Risk: Low (reported cardiac risk generally < 1%)  Have you had prior anesthesia?: Yes  Have you or any family members had a previous anesthesia reaction:  No  Do you or any family members have a history of a clotting or bleeding disorder?: No  Cardiac Risk Assessment: no increased risk for major cardiac complications    Patient approved for surgery with general or local anesthesia.    Please Note:  no special considerations    Functional Status: Independent  Patient plans to recover  at home with family.     Subjective:      Preston Fortune is a 82 y.o. male who presents for a preoperative consultation.  He has had ongoing left knee pain for long time- had history  Of surgery left knee for buckle handle tear 30 years ago or more. Lately worsening progressively. Has left knee pain that worsens with any activity.    He does check blood sugars on a daily basis and they are usually about 140.  His limited activity due to his knee has made it hard for him to pursue weight reduction.    All other systems reviewed and are negative, other than those listed in the HPI.    Pertinent History  Do you have difficulty breathing or chest pain after walking up a flight of stairs: No  History of obstructive sleep apnea: No  Steroid use in the last 6 months: No  Frequent Aspirin/NSAID use: Yes: daily asa & ibuprofen  Prior Blood Transfusion: No  Prior Blood Transfusion Reaction: N/A  If for some reason prior to, during or after the procedure, if it is medically indicated, would you be willing to have a blood transfusion?:  There is no transfusion refusal.    Current Outpatient Prescriptions   Medication Sig Dispense Refill     amLODIPine (NORVASC) 10 MG tablet Take 1 tablet (10 mg total) by mouth daily. 90 tablet 2     blood glucose test (ONETOUCH ULTRA TEST) strips Use 1 each As Directed daily. Dispense brand per patient's insurance at pharmacy discretion.(E11.9) 100 strip 1     cholecalciferol, vitamin D3, (VITAMIN D3) 1,000 unit capsule Take 1,000 Units by mouth daily.       FOLIC ACID ORAL Take by mouth daily.       generic lancets Use 1 each As Directed daily. Dispense brand per patient's insurance at pharmacy discretion.(E11.9) 100 each 1     hydroCHLOROthiazide (MICROZIDE) 12.5 mg capsule TAKE 1 CAPSULE BY MOUTH EVERY MORNING 90 capsule 0     ibuprofen 200 mg cap Take 2 tablets by mouth 3 (three) times a day.       lisinopril (PRINIVIL,ZESTRIL) 2.5 MG tablet Take 1 tablet (2.5 mg total) by mouth daily. 90  tablet 3     loratadine (CLARITIN) 10 mg tablet Take 10 mg by mouth daily.       metFORMIN (GLUCOPHAGE) 500 MG tablet TAKE 1 TABLET BY MOUTH TWICE DAILY WITH MEALS 180 tablet 1     omeprazole (PRILOSEC) 20 MG capsule TAKE 1 CAPSULE BY MOUTH DAILY 30 capsule 3     pravastatin (PRAVACHOL) 80 MG tablet TAKE 1/2 TABLET(40 MG) BY MOUTH EVERY EVENING 45 tablet 0     vitamin A-vitamin C-vit E-min (OCUVITE) Tab tablet Take by mouth daily.       aspirin 81 MG EC tablet Take 81 mg by mouth daily.       No current facility-administered medications for this visit.         No Known Allergies    Patient Active Problem List   Diagnosis     (Lower) Leg Localized Swelling Bilateral     Nephrolithiasis     Choroid Melanoma     Diabetes mellitus type 2, noninsulin dependent     Hyperlipemia     Primary Osteoarthritis Of The Lumbar Vertebrae     Benign Essential Hypertension     Allergic Rhinitis     Malignant Melanoma Of The Eye     Anemia     BPH (benign prostatic hyperplasia)     Osteoarthritis of knees, bilateral       Past Medical History:   Diagnosis Date     Cancer     Eye     Diabetes mellitus      Hyperlipidemia      Hypertension      Kidney disease      Kidney stones      Peptic ulceration 2011    see EGD that year     Pure hypercholesterolemia        Past Surgical History:   Procedure Laterality Date     CA KNEE SCOPE,DIAGNOSTIC      Description: Arthroscopy Knee Left;  Recorded: 03/17/2008;  Comments: cartilage surgery     CA RECONSTR TOTAL SHOULDER IMPLANT      Description: Shoulder Arthroplasty Total Shoulder Replacement;  Recorded: 05/06/2008;     CA TYMPANOPLASTY      Description: Tympanoplasty;  Recorded: 03/17/2008;       Social History     Social History     Marital status:      Spouse name: N/A     Number of children: N/A     Years of education: N/A     Occupational History     Not on file.     Social History Main Topics     Smoking status: Former Smoker     Types: Cigarettes     Smokeless tobacco: Former  "User     Quit date: 12/8/1967     Alcohol use 1.0 oz/week     2 Standard drinks or equivalent per week     Drug use: No     Sexual activity: Not on file     Other Topics Concern     Not on file     Social History Narrative       Patient Care Team:  Andrew Doty MD as PCP - General          Objective:     Vitals:    03/19/18 1035   BP: 132/60   Pulse: 60   Resp: 18   Temp: 97.7  F (36.5  C)   TempSrc: Oral   SpO2: 96%   Weight: (!) 242 lb (109.8 kg)   Height: 5' 6.5\" (1.689 m)         Physical Exam:  Alert male.  Head normocephalic.  External ears and TMs normal.  Eyes show pupils equal round and react to light and accommodation.  Nose and oropharynx no acute change.  Neck supple without adenopathy or thyromegaly.  Lungs are clear to auscultation.  Heart regular rate and rhythm without murmur.  Abdomen shows no masses tenderness or hepatosplenomegaly but he does have panniculus present.  Genitalia show no acute change.  Only trace edema at the ankles.  Peripheral pulses okay.  No focal neurologic abnormalities.  Is alert with clear speech.    There are no Patient Instructions on file for this visit.    EKG: Normal sinus rhythm.  I do not see any acute change.  It looks unchanged from 2/21/2012.  That EKG is available in the old all scripts chart.    Labs:  Labs pending at this time.  Results will be reviewed when available.    Immunization History   Administered Date(s) Administered     DT (pediatric) 10/29/1990, 11/20/2002     Hep A, historic 09/03/2008, 07/06/2009     Influenza high dose, seasonal 11/02/2016, 11/01/2017     Influenza, inj, historic,unspecified 11/08/2007, 10/02/2008, 10/15/2009, 10/04/2010, 10/13/2011, 09/24/2012     Influenza,seasonal quad, PF, 36+MOS 10/12/2015     Pneumo Conj 13-V (2010&after) 10/12/2015     Pneumo Polysac 23-V 11/17/1999, 09/03/2008     Td,adult,historic,unspecified 11/20/2002, 06/12/2012     Tdap 06/12/2012     ZOSTER, LIVE 11/11/2008           Electronically signed by " Andrew Doty MD 03/19/18 10:28 AM

## 2021-06-16 NOTE — ANESTHESIA PROCEDURE NOTES
Spinal Block    Patient location during procedure: OR  Start time: 3/26/2018 7:25 AM  End time: 3/26/2018 7:30 AM  Reason for block: primary anesthetic    Staffing:  Performing  Anesthesiologist: GAEL BERRY    Preanesthetic Checklist  Completed: patient identified, risks, benefits, and alternatives discussed, timeout performed, consent obtained, airway assessed, oxygen available, suction available, emergency drugs available and hand hygiene performed  Spinal Block  Patient position: sitting  Prep: ChloraPrep  Patient monitoring: heart rate, continuous pulse ox and blood pressure  Approach: left paramedian  Location: L3-4  Injection technique: single-shot  Needle gauge: 22 G

## 2021-06-16 NOTE — ANESTHESIA PROCEDURE NOTES
Peripheral Block    Patient location during procedure: pre-op  Start time: 3/26/2018 6:55 AM  End time: 3/26/2018 7:02 AM  post-op analgesia per surgeon order as noted in medical record  Staffing:  Performing  Anesthesiologist: GAEL BERRY  Preanesthetic Checklist  Completed: patient identified, site marked, risks, benefits, and alternatives discussed, timeout performed, consent obtained, at patient's request, airway assessed, oxygen available, suction available, emergency drugs available and hand hygiene performed  Peripheral Block  Block type: sciatic, popliteal (selective tibial)  Prep: ChloraPrep  Patient position: supine  Patient monitoring: cardiac monitor, continuous pulse oximetry, heart rate and blood pressure  Laterality: left  Injection technique: ultrasound guided    Ultrasound used to visualize needle placement in proximity to nerve being blocked: yes   Permanent ultrasound image captured for medical record  Sterile gel and probe cover used for ultrasound.    Needle  Needle type: echogenic   Needle gauge: 21 G  Needle length: 4 in  no peripheral nerve catheter placed  Assessment  Injection assessment: no difficulty with injection, negative aspiration for heme and incremental injection

## 2021-06-16 NOTE — ANESTHESIA POSTPROCEDURE EVALUATION
Patient: Preston Fortune  LEFT TOTAL KNEE ARTHROPLASTY  Anesthesia type: spinal    Patient location: PACU  Last vitals:   Vitals:    03/26/18 1010   BP: 106/57   Pulse: 61   Resp: 16   Temp: 36.6  C (97.8  F)   SpO2: 99%     Post vital signs: stable  Level of consciousness: awake and responds to simple questions  Post-anesthesia pain: pain controlled  Post-anesthesia nausea and vomiting: no  Pulmonary: unassisted, return to baseline  Cardiovascular: stable and blood pressure at baseline  Hydration: adequate  Anesthetic events: no    QCDR Measures:  ASA# 11 - Dang-op Cardiac Arrest: ASA11B - Patient did NOT experience unanticipated cardiac arrest  ASA# 12 - Dang-op Mortality Rate: ASA12B - Patient did NOT die  ASA# 13 - PACU Re-Intubation Rate: NA - No ETT / LMA used for case  ASA# 10 - Composite Anes Safety: ASA10A - No serious adverse event    Additional Notes:

## 2021-06-16 NOTE — ANESTHESIA PREPROCEDURE EVALUATION
Anesthesia Evaluation      Patient summary reviewed     Airway   Mallampati: II   Pulmonary - negative ROS and normal exam                          Cardiovascular - normal exam  (+) hypertension, ,     Rhythm: regular  Rate: normal,         Neuro/Psych - negative ROS     Endo/Other    (+) diabetes mellitus, arthritis,      GI/Hepatic/Renal            Dental    (+) lower dentures and upper dentures                Chemistry        Component Value Date/Time     03/19/2018 1125    K 4.3 03/19/2018 1125     03/19/2018 1125    CO2 24 03/19/2018 1125    BUN 33 (H) 03/19/2018 1125    CREATININE 1.17 03/19/2018 1125     (H) 03/19/2018 1125        Component Value Date/Time    CALCIUM 10.0 03/19/2018 1125    ALKPHOS 82 03/19/2018 1125    AST 16 03/19/2018 1125    ALT 14 03/19/2018 1125    BILITOT 0.7 03/19/2018 1125        Lab Results   Component Value Date    WBC 8.4 03/19/2018    HGB 13.2 (L) 03/19/2018    HCT 38.7 (L) 03/19/2018    MCV 90 03/19/2018     03/19/2018                Anesthesia Plan  Planned anesthetic: spinal and peripheral nerve block    ASA 2     Anesthetic plan and risks discussed with: patient    Post-op plan: routine recovery

## 2021-06-17 NOTE — PROGRESS NOTES
Assessment and Plan:     Patient has been advised of split billing requirements and indicates understanding: Yes  1. Health care maintenance    - Lipid [place] FASTING  - Comprehensive Metabolic Panel    2. Essential hypertension  controlled    3. Diabetes mellitus type 2, noninsulin dependent (H)  controlled  - Glycosylated Hemoglobin A1c    4. Class 2 severe obesity due to excess calories with serious comorbidity and body mass index (BMI) of 36.0 to 36.9 in adult (H)      5. Need for 23-polyvalent pneumococcal polysaccharide vaccine    - Pneumococcal polysaccharide vaccine 23-valent 1 yo or older, subq/IM      PLAN:  Recommend Pneumovax 23 booster    Fasting labs    The following high BMI interventions were performed this visit: encouragement to exercise and dietary management education, guidance, and counseling      The patient's current medical problems were reviewed.    I have had an Advance Directives discussion with the patient.   HAS ONE IN PROGRESS AT HOME.  The following health maintenance schedule was reviewed with the patient and provided in printed form in the after visit summary:   Health Maintenance   Topic Date Due     DIABETIC FOOT EXAM  07/17/2020     A1C  05/26/2021     LIPID  08/04/2021     MICROALBUMIN  08/04/2021     DIABETIC EYE EXAM  08/11/2021     BMP  11/30/2021     MEDICARE ANNUAL WELLNESS VISIT  05/18/2022     FALL RISK ASSESSMENT  05/18/2022     TD 18+ HE  06/12/2022     ADVANCE CARE PLANNING  11/30/2025     COLORECTAL CANCER SCREENING  01/18/2027     Pneumococcal Vaccine: Pediatrics (0 to 5 Years) and At-Risk Patients (6 to 64 Years)  Completed     Pneumococcal Vaccine: 65+ Years  Completed     INFLUENZA VACCINE RULE BASED  Completed     ZOSTER VACCINES  Completed     COVID-19 Vaccine  Completed       Subjective:   Chief Complaint: Preston Fortune is an 85 y.o. male here for an Annual Wellness visit.   HPI:   Sees optho regularly. Had a history of eye cancer.    Review of Systems:    discharge from the left ear  Nasal discharge  Swelling in ankles or legs  Diarrhea, episode of this a month ago.   Loss of memory  No cough on lisinopril  No muscle aches on the statin.    Please see above.  The rest of the review of systems are negative for all systems.    Patient Care Team:  Luis Angel Parra MD as PCP - General (Family Medicine)  Luis Angel Parra MD as Assigned PCP  Vasyl Bright MD as Assigned Surgical Provider  Linnea Parish, PharmD as Pharmacist (Pharmacist)     Patient Active Problem List   Diagnosis     (Lower) Leg Localized Swelling Bilateral     Nephrolithiasis     Choroid Melanoma     Type 2 diabetes mellitus without complication, without long-term current use of insulin (H)     Hyperlipemia     Primary Osteoarthritis Of The Lumbar Vertebrae     Benign Essential Hypertension     Allergic rhinitis     Malignant Melanoma Of The Eye     Anemia     BPH (benign prostatic hyperplasia)     Osteoarthritis of knees, bilateral     S/P total knee arthroplasty     Abdominal pain     Calculus of ureter     Leukocytosis, unspecified type     Hemorrhage of rectum and anus     Superior mesenteric artery stenosis (H)     Intestinal angina (H)     Abnormal LFTs     Obesity (BMI 35.0-39.9) with comorbidity (H)     Past Medical History:   Diagnosis Date     Arthritis      Cancer (H)     Eye     Diabetes mellitus (H)      History of transfusion      Hyperlipidemia      Hypertension      Kidney disease      Kidney stones      Peptic ulceration 2011    see EGD that year     Pure hypercholesterolemia       Past Surgical History:   Procedure Laterality Date     EYE SURGERY       CT KNEE SCOPE,DIAGNOSTIC      Description: Arthroscopy Knee Left;  Recorded: 03/17/2008;  Comments: cartilage surgery     CT RECONSTR TOTAL SHOULDER IMPLANT      Description: Shoulder Arthroplasty Total Shoulder Replacement;  Recorded: 05/06/2008;     CT TOTAL KNEE ARTHROPLASTY Left 3/26/2018    Procedure: LEFT  "TOTAL KNEE ARTHROPLASTY;  Surgeon: Darrell Wu MD;  Location: [facility] Main OR;  Service: Orthopedics     TX TYMPANOPLASTY      Description: Tympanoplasty;  Recorded: 03/17/2008;     TONSILLECTOMY        Family History   Problem Relation Age of Onset     Diabetes Father       Social History     Socioeconomic History     Marital status:      Spouse name: Not on file     Number of children: Not on file     Years of education: Not on file     Highest education level: Not on file   Occupational History     Occupation: retired    Social Needs     Financial resource strain: Not on file     Food insecurity     Worry: Not on file     Inability: Not on file     Transportation needs     Medical: Not on file     Non-medical: Not on file   Tobacco Use     Smoking status: Former Smoker     Types: Cigarettes     Smokeless tobacco: Former User     Quit date: 12/8/1967   Substance and Sexual Activity     Alcohol use: Yes     Alcohol/week: 4.0 standard drinks     Types: 2 Standard drinks or equivalent, 2 Cans of beer per week     Drug use: No     Sexual activity: Yes     Partners: Female     Birth control/protection: None   Lifestyle     Physical activity     Days per week: Not on file     Minutes per session: Not on file     Stress: Not on file   Relationships     Social connections     Talks on phone: Not on file     Gets together: Not on file     Attends L[employer]-day service: Not on file     Active member of club or organization: Not on file     Attends meetings of clubs or organizations: Not on file     Relationship status: Not on file     Intimate partner violence     Fear of current or ex partner: Not on file     Emotionally abused: Not on file     Physically abused: Not on file     Forced sexual activity: Not on file   Other Topics Concern     Not on file   Social History Narrative     5 grown kids. 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       Current Outpatient Medications   Medication Sig " "Dispense Refill     acetaminophen (TYLENOL) 500 MG tablet Take 500 mg by mouth every 6 (six) hours as needed for pain.       amLODIPine (NORVASC) 10 MG tablet Take 1 tablet (10 mg total) by mouth daily. 90 tablet 1     aspirin 81 MG EC tablet Take 81 mg by mouth daily.       blood glucose test (ONETOUCH ULTRA BLUE TEST STRIP) strips USE TO TEST ONCE DAILY 100 strip 3     cetirizine (ZYRTEC) 10 mg cap Take 10 mg by mouth daily as needed.        cholecalciferol, vitamin D3, 50 mcg (2,000 unit) Tab Take 2,000 Units by mouth daily.       generic lancets Use 1 each As Directed daily. Dispense brand per patient's insurance at pharmacy discretion.(E11.9) 100 each 1     lisinopriL (PRINIVIL,ZESTRIL) 2.5 MG tablet Take 1 tablet (2.5 mg total) by mouth daily. 90 tablet 3     metFORMIN (GLUCOPHAGE) 500 MG tablet TAKE 1 TABLET BY MOUTH TWICE A DAY WITH MEALS 180 tablet 2     pravastatin (PRAVACHOL) 40 MG tablet Take 1 tablet (40 mg total) by mouth at bedtime. 90 tablet 2     psyllium (METAMUCIL) 3.4 gram packet Take 1 packet by mouth daily.        vit C/E/zinc ox/jadiel/lut/zeax (ICAPS AREDS2 ORAL) Take 1 capsule by mouth 2 (two) times a day.       No current facility-administered medications for this visit.       Objective:   Vital Signs:   Visit Vitals  /60 (Patient Site: Left Arm, Patient Position: Sitting, Cuff Size: Adult Large)   Pulse 72   Resp 20   Ht 5' 4.95\" (1.65 m)   Wt (!) 224 lb 6.4 oz (101.8 kg)   BMI 37.40 kg/m       Wt Readings from Last 3 Encounters:   05/18/21 (!) 224 lb 6.4 oz (101.8 kg)   12/14/20 (!) 224 lb 12.8 oz (102 kg)   11/30/20 (!) 236 lb 3.2 oz (107.1 kg)        VisionScreening:  No exam data present     PHYSICAL EXAM  /60 (Patient Site: Left Arm, Patient Position: Sitting, Cuff Size: Adult Large)   Pulse 72   Resp 20   Ht 5' 4.95\" (1.65 m)   Wt (!) 224 lb 6.4 oz (101.8 kg)   BMI 37.40 kg/m      General Appearance:    Alert, cooperative, no distress, appears stated age   Head:    " Normocephalic, without obvious abnormality, atraumatic   Eyes:    PERRL, conjunctiva/corneas clear, EOM's intact, fundi     benign, both eyes        Ears:    Normal TM's and external ear canals, both ears   Nose:   Nares normal, septum midline, mucosa normal, no drainage    or sinus tenderness   Throat:   Lips, mucosa, and tongue normal; teeth and gums normal   Neck:   Supple, symmetrical, trachea midline, no adenopathy;        thyroid:  No enlargement/tenderness/nodules; no carotid    bruit or JVD   Back:     Symmetric, no curvature, ROM normal, no CVA tenderness   Lungs:     Clear to auscultation bilaterally, respirations unlabored   Chest wall:    No tenderness or deformity   Heart:    Regular rate and rhythm, S1 and S2 normal, no murmur, rub    or gallop   Abdomen:     Soft, non-tender, bowel sounds active all four quadrants,     no masses, no organomegaly   Genitalia:    Not examined   Rectal:    Not examined   Extremities:   Extremities normal, atraumatic, no cyanosis or edema       Skin:   Skin color, texture, turgor normal, no rashes or lesions   Lymph nodes:   Cervical and supraclavicular nodes normal       FEET:  MF TESTING: NL              SKIN EXAM:  NL              VASCULAR:   R DP  PULSE: -                                      L DP  PULSE: -                                      R PT  PULSE: +                                      L PT  PULSE: +    Vitamin D, Total (25-Hydroxy)   Date Value Ref Range Status   10/13/2011 53.0 30.0 - 80.0 ng/mL Final     Comment:                    Deficiency              <10.0 ng/mL               Insufficiency       10.0-29.9 ng/mL               Sufficiency         30.0-80.0 ng/mL               Toxicity (possible)    >100.0 ng/mL      Results for orders placed or performed during the hospital encounter of 11/26/20   Comprehensive Metabolic Panel   Result Value Ref Range    Sodium 140 136 - 145 mmol/L    Potassium 4.0 3.5 - 5.0 mmol/L    Chloride 109 (H) 98 - 107 mmol/L     CO2 25 22 - 31 mmol/L    Anion Gap, Calculation 6 5 - 18 mmol/L    Glucose 135 (H) 70 - 125 mg/dL    BUN 14 8 - 28 mg/dL    Creatinine 0.87 0.70 - 1.30 mg/dL    GFR MDRD Af Amer >60 >60 mL/min/1.73m2    GFR MDRD Non Af Amer >60 >60 mL/min/1.73m2    Bilirubin, Total 3.2 (H) 0.0 - 1.0 mg/dL    Calcium 8.9 8.5 - 10.5 mg/dL    Protein, Total 5.7 (L) 6.0 - 8.0 g/dL    Albumin 2.6 (L) 3.5 - 5.0 g/dL    Alkaline Phosphatase 333 (H) 45 - 120 U/L    AST 64 (H) 0 - 40 U/L     (H) 0 - 45 U/L     Lab Results   Component Value Date    CHOL 149 08/04/2020    CHOL 189 12/23/2019    CHOL 160 07/17/2019     Lab Results   Component Value Date    HDL 47 08/04/2020    HDL 45 12/23/2019    HDL 49 07/17/2019     Lab Results   Component Value Date    LDLCALC 77 08/04/2020    LDLCALC 112 12/23/2019    LDLCALC 81 07/17/2019     Lab Results   Component Value Date    TRIG 124 08/04/2020    TRIG 162 (H) 12/23/2019    TRIG 148 07/17/2019     No components found for: CHOLHDL    Vitamin D, Total (25-Hydroxy)   Date Value Ref Range Status   10/13/2011 53.0 30.0 - 80.0 ng/mL Final     Comment:                    Deficiency              <10.0 ng/mL               Insufficiency       10.0-29.9 ng/mL               Sufficiency         30.0-80.0 ng/mL               Toxicity (possible)    >100.0 ng/mL      Lab Results   Component Value Date    MICROALBUR 3.61 (H) 08/04/2020     Lab Results   Component Value Date    HGBA1C 6.9 (H) 11/26/2020       Assessment Results 5/18/2021   Activities of Daily Living No help needed   Instrumental Activities of Daily Living No help needed   Mini Cog Total Score 4   Some recent data might be hidden     A Mini-Cog score of 0-2 suggests the possibility of dementia, score of 3-5 suggests no dementia    Identified Health Risks:     The patient was counseled and encouraged to consider modifying their diet and eating habits. He was provided with information on recommended healthy diet options.  The patient was provided  with written information regarding signs of hearing loss.  He is at risk for falling and has been provided with information to reduce the risk of falling at home.  Information regarding advance directives (living [place]), including where he can download the appropriate form, was provided to the patient via the AVS.

## 2021-06-17 NOTE — PROGRESS NOTES
Assessment:  1.  Hypertension with adequate control for now.  2.  Non-insulin-dependent diabetes mellitus with adequate control for now.  3.  Status post recent left total knee replacement.  4.  Anemia post surgical blood loss for the above.    Plan: Continue current medication regimen except that I recommend he take his amlodipine in the evening instead of the morning so he is not taking all 3 of his blood pressure medicines at the same time.  Continue careful diet.  Follow through with his regular exercise for the therapy for the leg.  He sees orthopedics next week regarding recheck.  I reassured him I did not see any sign of infection in the incision.  It did not look worthwhile to recheck hemoglobin again this soon after the readings in the hospital.  I explained that at a minimum we should have him follow-up in mid to late June for his next diabetic recheck but if any difficulties whatsoever to feel free to follow-up sooner, especially in 1 month or so as needed.  He will keep up the aspirin 325 mg twice daily for 1 month but then he can revert to use of the aspirin 81 mg once a day for the longer term for prophylaxis.  He has had a history of touchy stomach and if he is having any problems beforehand he can let us know.    Subjective: 82-year-old male presenting for follow-up after recent left total knee replacement.  Here with his wife.  He notes that they told him to take his amlodipine on the morning before he left home to go for the surgery as well as his other blood pressure medicines and he is wondering now how I recommend that he take it for down the line.  See the preop discussion.  He is not having any dizziness.  He notes some soreness at the top of the incision above the left knee.  No fever nausea or vomiting.  His blood sugars been in the 120 type range.  His wife thinks he has been making very good progress.  Past Medical History:   Diagnosis Date     Cancer     Eye     Diabetes mellitus       Hyperlipidemia      Hypertension      Kidney disease      Kidney stones      Peptic ulceration 2011    see EGD that year     Pure hypercholesterolemia      No Known Allergies  Current Outpatient Prescriptions   Medication Sig Dispense Refill     acetaminophen (TYLENOL) 500 MG tablet Take 2 tablets (1,000 mg total) by mouth 3 (three) times a day.  0     amLODIPine (NORVASC) 10 MG tablet Take 1 tablet (10 mg total) by mouth daily. 90 tablet 2     aspirin 325 MG tablet Take 1 tablet (325 mg total) by mouth 2 (two) times a day with meals. 60 tablet 0     blood glucose test (ONETOUCH ULTRA TEST) strips Use 1 each As Directed daily. Dispense brand per patient's insurance at pharmacy discretion.(E11.9) 100 strip 1     cholecalciferol, vitamin D3, (VITAMIN D3) 1,000 unit capsule Take 1,000 Units by mouth daily.       folic acid (FOLVITE) 400 MCG tablet Take 400 mcg by mouth daily.       generic lancets Use 1 each As Directed daily. Dispense brand per patient's insurance at pharmacy discretion.(E11.9) 100 each 1     hydroCHLOROthiazide (HYDRODIURIL) 12.5 MG tablet Take 12.5 mg by mouth daily.       lisinopril (PRINIVIL,ZESTRIL) 2.5 MG tablet Take 1 tablet (2.5 mg total) by mouth daily. 90 tablet 3     loratadine (CLARITIN) 10 mg tablet Take 10 mg by mouth daily.       metFORMIN (GLUCOPHAGE) 500 MG tablet Take 500 mg by mouth 2 (two) times a day with meals.       oxyCODONE (ROXICODONE) 5 MG immediate release tablet Take 0.5-1 tablets (2.5-5 mg total) by mouth every 4 (four) hours as needed. 60 tablet 0     pravastatin (PRAVACHOL) 80 MG tablet Take 40 mg by mouth at bedtime. TAKES 1/2 OF 80 MG = 40 MG       docusate sodium (COLACE) 100 MG capsule Take 1 capsule (100 mg total) by mouth 2 (two) times a day.  0     omeprazole (PRILOSEC) 20 MG capsule Take 20 mg by mouth daily as needed.       vitamin A-vitamin C-vit E-min (OCUVITE) Tab tablet Take 1 tablet by mouth daily.        No current facility-administered medications for  this visit.      He notes he is only taken 3/2 pills of the narcotic medication.  He notes he did get very sleepy on the oxycodone.    Objective:/60  Pulse 68  Wt (!) 244 lb (110.7 kg)  BMI 39.38 kg/m2  HEENT shows no acute change.  Lungs clear.  Heart regular rate and rhythm.  Left knee shows no significant erythema in the incision.  There is a mild prominence at the superior aspect of the incision.  No fussiness.  He is using his walker.  He is alert with clear speech.

## 2021-06-18 NOTE — PROGRESS NOTES
"[facility] Clinic Note  Patient Name: Preston Fortune  Patient Age: 82 y.o.  YOB: 1935  MRN: [id number]  ?  Date of Visit: 6/6/2018  Reason for Office Visit:   Chief Complaint   Patient presents with     Cough     Pt stated hes been coughing for a week. Voice is hoarse.Sore throat as well.        HPI: Preston Fortune 82 y.o. male who presents to clinic for cough x 1 week.  \"I have had a lot of allergies and this has been a bad spring so far\". Itchy, sore throat, dry cough, postnasal, itchy watery eyes, sneezing. No shortness of breath, centeno, fever/chills chest pain. No facial pain or nasal congestion. Has tried OTC claritin, zyrtec without much relief. Has not tried any nasal sprays.        Review of Systems: As noted in HPI     Current Scheduled Meds:  Outpatient Encounter Prescriptions as of 6/6/2018   Medication Sig Dispense Refill     acetaminophen (TYLENOL) 500 MG tablet Take 2 tablets (1,000 mg total) by mouth 3 (three) times a day.  0     amLODIPine (NORVASC) 10 MG tablet Take 1 tablet (10 mg total) by mouth daily. 90 tablet 2     aspirin 325 MG tablet Take 1 tablet (325 mg total) by mouth 2 (two) times a day with meals. 60 tablet 0     blood glucose test (ONETOUCH ULTRA TEST) strips Use 1 each As Directed daily. Dispense brand per patient's insurance at pharmacy discretion.(E11.9) 100 strip 1     cholecalciferol, vitamin D3, (VITAMIN D3) 1,000 unit capsule Take 1,000 Units by mouth daily.       docusate sodium (COLACE) 100 MG capsule Take 1 capsule (100 mg total) by mouth 2 (two) times a day.  0     folic acid (FOLVITE) 400 MCG tablet Take 400 mcg by mouth daily.       generic lancets Use 1 each As Directed daily. Dispense brand per patient's insurance at pharmacy discretion.(E11.9) 100 each 1     hydroCHLOROthiazide (HYDRODIURIL) 12.5 MG tablet Take 1 tablet (12.5 mg total) by mouth daily. (Patient taking differently: Take 12.5 mg by mouth daily. ) 90 tablet 1     lisinopril " (PRINIVIL,ZESTRIL) 2.5 MG tablet Take 1 tablet (2.5 mg total) by mouth daily. 90 tablet 3     loratadine (CLARITIN) 10 mg tablet Take 10 mg by mouth daily.       metFORMIN (GLUCOPHAGE) 500 MG tablet Take 500 mg by mouth 2 (two) times a day with meals.       omeprazole (PRILOSEC) 20 MG capsule Take 20 mg by mouth daily as needed.       oxyCODONE (ROXICODONE) 5 MG immediate release tablet Take 0.5-1 tablets (2.5-5 mg total) by mouth every 4 (four) hours as needed. 60 tablet 0     pravastatin (PRAVACHOL) 80 MG tablet Take 40 mg by mouth at bedtime. TAKES 1/2 OF 80 MG = 40 MG       pravastatin (PRAVACHOL) 80 MG tablet TAKE ONE-HALF TABLET BY MOUTH EVERY EVENING 45 tablet 0     vitamin A-vitamin C-vit E-min (OCUVITE) Tab tablet Take 1 tablet by mouth daily.        fluticasone (FLONASE) 50 mcg/actuation nasal spray 2 sprays into each nostril daily as needed for rhinitis or allergies (post nasal drip). 16 g 1     No facility-administered encounter medications on file as of 6/6/2018.        Objective / Physical Examination:  /72 (Patient Site: Right Arm, Patient Position: Sitting, Cuff Size: Adult Regular)  Pulse 80  Temp 97.8  F (36.6  C) (Oral)   SpO2 96%  Wt Readings from Last 3 Encounters:   04/04/18 (!) 244 lb (110.7 kg)   03/26/18 (!) 240 lb (108.9 kg)   03/19/18 (!) 242 lb (109.8 kg)     There is no height or weight on file to calculate BMI. (>25?)    General Appearance: Alert and oriented in no acute distress  Eyes: Conjunctivae clear   Nose: Septum midline, nares patent, mucosa moist and without drainage  Throat: Lips and mucosa moist. pharynx without erythema or exudate  Neck: Supple, trachea midline. No cervical adenopathy.   Lungs: Clear to auscultation bilaterally. Normal inspiratory and expiratory effort. No w/r/r  Cardiovascular: RRR   Integumentary: Warm and dry.  Neuro: Alert and oriented, follows commands appropriately.     Assessment / Plan / Medical Decision Making:      Encounter Diagnoses    Name Primary?     Allergic rhinitis Yes     Cough         1. Allergic rhinitis  2. Cough    Likely 2/2 to allergies and rhinitis.   Will try flonase prn  Follow up with PCP if not improving     - fluticasone (FLONASE) 50 mcg/actuation nasal spray; 2 sprays into each nostril daily as needed for rhinitis or allergies (post nasal drip).  Dispense: 16 g; Refill: 1    Per Walls MD  [facility]

## 2021-06-18 NOTE — PROGRESS NOTES
Assessment and Plan:   Annual Wellness Visit  1. Medicare annual wellness visit, subsequent     2. Diabetes mellitus type 2, noninsulin dependent (H)  Glycosylated Hemoglobin A1c    Basic Metabolic Panel    Microalbumin, Random Urine   3. Benign Essential Hypertension  Basic Metabolic Panel   4. Hyperlipemia  Lipid Cascade    Hepatic Profile   5. Anemia  HM2(CBC w/o Differential)   6. Leg swelling  Basic Metabolic Panel    Hepatic Profile    HM2(CBC w/o Differential)       Recommend continued efforts at weight reduction through reducing portion sizes and regular physical activity.  Explained that weight reduction can help with the leg swelling as well as the diabetic control as well as his energy level and helping to protect the joints.  Continue current medication.  Refills as needed.  Follow-up on diabetes in 4 months.  Explained that his hemoglobin preoperatively in March was borderline.  He is otherwise up-to-date on preventive care measures.      The patient's current medical problems were reviewed.    I have had an Advance Directives discussion with the patient.  The following health maintenance schedule was reviewed with the patient and provided in printed form in the after visit summary:   Health Maintenance   Topic Date Due     DIABETES URINE MICROALBUMIN  07/06/2010     DIABETES HEMOGLOBIN A1C  09/19/2018     DIABETES FOLLOW-UP  12/21/2018     DIABETES OPHTHALMOLOGY EXAM  06/11/2019     DIABETES FOOT EXAM  06/21/2019     FALL RISK ASSESSMENT  06/21/2019     TD 18+ HE  06/12/2022     ADVANCE DIRECTIVES DISCUSSED WITH PATIENT  03/29/2023     COLONOSCOPY  01/18/2027     PNEUMOCOCCAL POLYSACCHARIDE VACCINE AGE 65 AND OVER  Completed     INFLUENZA VACCINE RULE BASED  Completed     PNEUMOCOCCAL CONJUGATE VACCINE FOR ADULTS (PCV13 OR PREVNAR)  Completed     ZOSTER VACCINE  Completed        Subjective:   Chief Complaint: Preston Fortune is an 82 y.o. male here for an Annual Wellness visit.   HPI:  Left knee  replaced in March. Making good progress- saw ortho yesterday. Regarding diabetes, checks blood sugars daily, brings in readings from the last several months and many readings are in the mid 100 range.  Takes his medication as listed.  Has been able to be a little more active now with the healing of the left knee.  Regarding hypertension no headaches or dizziness but he has had the leg swelling about the same in the last several years.  Regarding lipids no diarrhea or constipation or muscle aching or muscle weakness.  He does note that he can have an active cough or gag reflex if he has comes in the back of his mouth.  When he had the postnasal drainage and thought he had some sinus difficulty over the winter it made the coughing frequent.  He does not have any significant coughing now or postnasal drainage at this time.  He thinks the current swelling is about the same as the last year.  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    Review of Systems:  Please see above.  The rest of the review of systems are negative for all systems.    Patient Care Team:  Andrew Doty MD as PCP - General     Patient Active Problem List   Diagnosis     (Lower) Leg Localized Swelling Bilateral     Nephrolithiasis     Choroid Melanoma     Diabetes mellitus type 2, noninsulin dependent (H)     Hyperlipemia     Primary Osteoarthritis Of The Lumbar Vertebrae     Benign Essential Hypertension     Allergic rhinitis     Malignant Melanoma Of The Eye     Anemia     BPH (benign prostatic hyperplasia)     Osteoarthritis of knees, bilateral     S/P total knee arthroplasty     Past Medical History:   Diagnosis Date     Cancer (H)     Eye     Diabetes mellitus (H)      Hyperlipidemia      Hypertension      Kidney disease      Kidney stones      Peptic ulceration 2011    see EGD that year     Pure hypercholesterolemia       Past Surgical History:   Procedure Laterality Date      EYE SURGERY       RI KNEE SCOPE,DIAGNOSTIC      Description: Arthroscopy Knee Left;  Recorded: 03/17/2008;  Comments: cartilage surgery     RI RECONSTR TOTAL SHOULDER IMPLANT      Description: Shoulder Arthroplasty Total Shoulder Replacement;  Recorded: 05/06/2008;     RI TOTAL KNEE ARTHROPLASTY Left 3/26/2018    Procedure: LEFT TOTAL KNEE ARTHROPLASTY;  Surgeon: Darrell Wu MD;  Location: [facility];  Service: Orthopedics     RI TYMPANOPLASTY      Description: Tympanoplasty;  Recorded: 03/17/2008;      Family History   Problem Relation Age of Onset     Diabetes Father       Social History     Social History     Marital status:      Spouse name: N/A     Number of children: N/A     Years of education: N/A     Occupational History     Not on file.     Social History Main Topics     Smoking status: Former Smoker     Types: Cigarettes     Smokeless tobacco: Former User     Quit date: 12/8/1967     Alcohol use Not on file     Drug use: No     Sexual activity: Not on file     Other Topics Concern     Not on file     Social History Narrative      Current Outpatient Prescriptions   Medication Sig Dispense Refill     acetaminophen (TYLENOL) 500 MG tablet Take 2 tablets (1,000 mg total) by mouth 3 (three) times a day.  0     amLODIPine (NORVASC) 10 MG tablet Take 1 tablet (10 mg total) by mouth daily. 90 tablet 2     aspirin 325 MG tablet Take 1 tablet (325 mg total) by mouth 2 (two) times a day with meals. 60 tablet 0     blood glucose test (ONETOUCH ULTRA TEST) strips Use 1 each As Directed daily. Dispense brand per patient's insurance at pharmacy discretion.(E11.9) 100 strip 1     docusate sodium (COLACE) 100 MG capsule Take 1 capsule (100 mg total) by mouth 2 (two) times a day.  0     folic acid (FOLVITE) 400 MCG tablet Take 400 mcg by mouth daily.       generic lancets Use 1 each As Directed daily. Dispense brand per patient's insurance at pharmacy discretion.(E11.9) 100 each 1      "hydroCHLOROthiazide (HYDRODIURIL) 12.5 MG tablet Take 1 tablet (12.5 mg total) by mouth daily. (Patient taking differently: Take 12.5 mg by mouth daily. ) 90 tablet 1     lisinopril (PRINIVIL,ZESTRIL) 2.5 MG tablet Take 1 tablet (2.5 mg total) by mouth daily. 90 tablet 3     loratadine (CLARITIN) 10 mg tablet Take 10 mg by mouth daily.       metFORMIN (GLUCOPHAGE) 500 MG tablet Take 500 mg by mouth 2 (two) times a day with meals.       omeprazole (PRILOSEC) 20 MG capsule Take 20 mg by mouth daily as needed.       pravastatin (PRAVACHOL) 80 MG tablet TAKE ONE-HALF TABLET BY MOUTH EVERY EVENING 45 tablet 0     vitamin A-vitamin C-vit E-min (OCUVITE) Tab tablet Take 1 tablet by mouth daily.        cholecalciferol, vitamin D3, (VITAMIN D3) 1,000 unit capsule Take 1,000 Units by mouth daily.       fluticasone (FLONASE) 50 mcg/actuation nasal spray SHAKE LIQUID AND USE 2 SPRAYS IN EACH NOSTRIL DAILY AS NEEDED FOR RHINITIS OR ALLERGIES(POST NASAL DRIP) 48 g 1     No current facility-administered medications for this visit.       Objective:   Vital Signs:   Visit Vitals     /74     Pulse 63     Temp 97.7  F (36.5  C) (Oral)     Resp 24     Ht 5' 7.5\" (1.715 m)     Wt (!) 241 lb (109.3 kg)     SpO2 96%     BMI 37.19 kg/m2        VisionScreening:  No exam data present     PHYSICAL EXAM  Alert male.  External ears and TMs normal.  Eyes show pupils equal round and reactive to light and accommodation.  Nose not remarkable.  Oropharynx negative.  Neck is supple without adenopathy or thyromegaly.  Lungs are clear to auscultation.  Heart regular rate and rhythm without murmur.  Abdomen shows no masses tenderness or hepatosplenomegaly.  No rash underneath the panniculus.  Genitalia show normal testes with no hernia.  Rectal examination has mild smooth enlargement of the prostate with no focal nodules.  He does have trace to 1+ edema at the ankles.  Peripheral pulses are okay.  There is no skin ulceration or dermatitis.  He is " alert with clear speech.    Assessment Results 6/21/2018   Activities of Daily Living No help needed   Instrumental Activities of Daily Living No help needed   Mini Cog Total Score 4   Some recent data might be hidden     A Mini-Cog score of 0-2 suggests the possibility of dementia, score of 3-5 suggests no dementia    Identified Health Risks:     The patient was provided with suggestions to help him develop a healthy lifestyle.   The patient was counseled and encouraged to consider modifying their diet and eating habits. He was provided with information on recommended healthy diet options.  The patient was provided with written information regarding signs of hearing loss.  Information regarding advance directives (living smith), including where he can download the appropriate form, was provided to the patient via the AVS.

## 2021-06-18 NOTE — PATIENT INSTRUCTIONS - HE
Patient Instructions by Luis Angel Parra MD at 5/18/2021  9:10 AM     Author: Luis Angel Parra MD Service: -- Author Type: Physician    Filed: 5/18/2021  9:32 AM Encounter Date: 5/18/2021 Status: Addendum    : Luis Angel Parra MD (Physician)    Related Notes: Original Note by Luis Angel Parra MD (Physician) filed at 5/18/2021  9:01 AM       Recommend Pneumovax 23 booster    Fasting labs    The following high BMI interventions were performed this visit: encouragement to exercise and dietary management education, guidance, and counseling      Patient Education   Understanding USDA MyPlate  The USDA (US Department of Agriculture) has guidelines to help you make healthy food choices. These are called MyPlate. MyPlate shows the food groups that make up healthy meals using the image of a place setting. Before you eat, think about the healthiest choices for what to put onto your plate or into your cup or bowl. To learn more about building a healthy plate, visit www.choosemyplate.gov.       The Food Groups    Fruits: Any fruit or 100% fruit juice counts as part of the Fruit Group. Fruits may be fresh, canned, frozen, or dried, and may be whole, cut-up, or pureed. Make half your plate fruits and vegetables.    Vegetables: Any vegetable or 100% vegetable juice counts as a member of the Vegetable Group. Vegetables may be fresh, frozen, canned, or dried. They can be served raw or cooked and may be whole, cut-up, or mashed. Make half your plate fruits and vegetables.     Grains: All foods made from grains are part of the Grains Group. These include wheat, rice, oats, cornmeal, and barley such as bread, pasta, oatmeal, cereal, tortillas, and grits. Grains should be no more than a quarter of your plate. At least half of your grains should be whole grains.    Protein: This group includes meat, poultry, seafood, beans and peas, eggs, processed soy products (like tofu), nuts (including nut butters),  and seeds. Make protein choices no more than a quarter of your plate. Meat and poultry choices should be lean or low fat.    Dairy: All fluid milk products and foods made from milk that contain calcium, like yogurt and cheese are part of the Dairy Group. (Foods that have little calcium, such as cream, butter, and cream cheese, are not part of the group.) Most dairy choices should be low-fat or fat-free.    Oils: These are fats that are liquid at room temperature. They include canola, corn, olive, soybean, and sunflower oil. Foods that are mainly oil include mayonnaise, certain salad dressings, and soft margarines. You should have only 5 to 7 teaspoons of oils a day. You probably already get this much from the food you eat.  Use [employer] to Help Build Your Meals  The SuperTracker can help you plan and track your meals and activity. You can look up individual foods to see or compare their nutritional value. You can get guidelines for what and how much you should eat. You can compare your food choices. And you can assess personal physical activities and see ways you can improve. Go to www.[employer].gov/supertracker/.    6992-9804 The [employer]. [address] [place] [address], [place] [address]. All rights reserved. This information is not intended as a substitute for professional medical care. Always follow your healthcare professional's instructions.           Patient Education   Signs of Hearing Loss  Hearing loss is a problem shared by many people. In fact, it is one of the most common health conditions, particularly as people age. Most people over age 65 have some hearing loss, and by age 80, almost everyone does. Because hearing loss usually occurs slowly over the years, you may not realize your hearing ability has gotten worse.       Have your hearing checked  Contact your [facility] care provider if you:    Have to strain to hear normal conversation.    Have to watch other peoples faces very carefully to  follow what theyre saying.    Need to ask people to repeat what theyve said.    Often misunderstand what people are saying.    Turn the volume of the television or radio up so high that others complain.    Feel that people are mumbling when theyre talking to you.    Find that the effort to hear leaves you feeling tired and irritated.    Notice, when using the phone, that you hear better with 1 ear than the other.    3797-5351 The [employer]. [address] [place] [address]. All rights reserved. This information is not intended as a substitute for professional medical care. Always follow your healthcare professional's instructions.         Patient Education   Preventing Falls in the Home  As you get older, falls are more likely. Thats because your reaction time slows. Your muscles and joints may also get stiffer, making them less flexible. Illness, medications, and vision changes can also affect your balance. A fall could leave you unable to live on your own. To make your home safer, follow these tips:    Floors    Put nonskid pads under area rugs.    Remove throw rugs.    Replace worn floor coverings.    Tack carpets firmly to each step on carpeted stairs. Put nonskid strips on the edges of uncarpeted stairs.    Keep floors and stairs free of clutter and cords.    Arrange furniture so there are clear pathways.    Clean up any spills right away.    Bathrooms    Install grab bars in the tub or shower.    Apply nonskid strips or put a nonskid rubber mat in the tub or shower.    Sit on a bath chair to bathe.    Use bathmats with nonskid backing.    Lighting    Keep a flashlight in each room.    Put a nightlight along the pathway between the bedroom and the bathroom.    3641-8043 [employer]. [address] [place] [address]. All rights reserved. This information is not intended as a substitute for professional medical care. Always follow your healthcare professional's  instructions.         Patient Education   Understanding Advance Care Planning  Advance care planning is the process of deciding ones own future medical care. It helps ensure that if you cant speak for yourself, your wishes can still be carried out. The plan is a series of legal documents that note a persons wishes. The documents vary by state. Advance care planning may be done when a person has a serious illness that is expected to get worse. It may be done before major surgery. And it can help you and your family be prepared in case of a major illness or injury. Advance care planning helps with making decisions at these times.       A health care proxy is a person who acts as the voice of a patient when the patient cant speak for himself or herself. The name of this role varies by state. It may be called a Durable Medical Power of  or Durable Power of  for Healthcare. It may be called an agent, surrogate, or advocate. Or it may be called a representative or decision maker. It is an official duty that is identified by a legal document. The document also varies by state.    Why Is Advance Care Planning Important?  If a person communicates their healthcare wishes:    They will be given medical care that matches their values and goals.    Their family members will not be forced to make decisions in a crisis with no guidance.  Creating a Plan  Making an advance care plan is often done in 3 steps:    Thinking about ones wishes. To create an advance care plan, you should think about what kind of medical treatment you would want if you lose the ability to communicate. Are there any situations in which you would refuse or stop treatment? Are there therapies you would want or not want? And whom do you want to make decisions for you? There are many places to learn more about how to plan for your care. Ask your doctor or  for resources.    Picking a health care proxy. This means choosing a trusted  person to speak for you only when you cant speak for yourself. When you cannot make medical decisions, your proxy makes sure the instructions in your advance care plan are followed. A proxy does not make decisions based on his or her own opinions. They must put aside those opinions and values if needed, and carry out your wishes.    Filling out the legal documents. There are several kinds of legal documents for advance care planning. Each one tells health care providers your wishes. The documents may vary by state. They must be signed and may need to be witnessed or notarized. You can cancel or change them whenever you wish. Depending on your state, the documents may include a Healthcare Proxy form, Living Will, Durable Medical Power of , Advance Directive, or others.  The Familys Role  The best help a family can give is to support their loved ones wishes. Open and honest communication is vital. Family should express any concerns they have about the patients choices while the patient can still make decisions.    9527-9664 The [employer]. [address] [place] [address]. All rights reserved. This information is not intended as a substitute for professional medical care. Always follow your healthcare professional's instructions.         Also, [facility] offers a free, downloadable health care directive that allows you to share your treatment choices and personal preferences if you cannot communicate your wishes. It also allows you to appoint another person (called a health care agent) to make health care decisions if you are unable to do so. You can download an advance directive by going here: [email or web address]     Patient Education   Personalized Prevention Plan  You are due for the preventive services outlined below.  Your care team is available to assist you in scheduling these services.  If you have already completed any of these items,  please share that information with your care team to update in your medical record.  Health Maintenance Due   Topic Date Due   ? DIABETIC FOOT EXAM  07/17/2020   ? A1C  05/26/2021

## 2021-06-19 NOTE — PROGRESS NOTES
Care guide spoke to patient today about [facility] enrollment. He states he has company over and now is not a good time to talk about it. He would like a phone call back to discuss at a later date.

## 2021-06-20 NOTE — LETTER
Letter by Luis Angel Parra MD at      Author: Luis Angel Parra MD Service: -- Author Type: --    Filed:  Encounter Date: 8/6/2020 Status: (Other)         Preston Fortune  [facility] [facility] [address]             August 6, 2020         Dear Mr. Fortune,    Below are the results from your recent visit:    Resulted Orders   Glycosylated Hemoglobin A1c   Result Value Ref Range    Hemoglobin A1c 6.7 (H) <=5.6 %      Comment:      Normal <5.7% Prediabete 5.7-6.4% Diabletes 6.5% or higher - adopted from ADA consensus guidelines   Comprehensive Metabolic Panel   Result Value Ref Range    Sodium 141 136 - 145 mmol/L    Potassium 4.6 3.5 - 5.0 mmol/L    Chloride 105 98 - 107 mmol/L    CO2 24 22 - 31 mmol/L    Anion Gap, Calculation 12 5 - 18 mmol/L    Glucose 122 70 - 125 mg/dL    BUN 24 8 - 28 mg/dL    Creatinine 1.05 0.70 - 1.30 mg/dL    GFR MDRD Af Amer >60 >60 mL/min/1.73m2    GFR MDRD Non Af Amer >60 >60 mL/min/1.73m2    Bilirubin, Total 0.5 0.0 - 1.0 mg/dL    Calcium 9.5 8.5 - 10.5 mg/dL    Protein, Total 6.6 6.0 - 8.0 g/dL    Albumin 3.9 3.5 - 5.0 g/dL    Alkaline Phosphatase 90 45 - 120 U/L    AST 15 0 - 40 U/L    ALT <9 0 - 45 U/L    Narrative    Fasting Glucose reference range is 70-99 mg/dL per  American Diabetes Association (ADA) guidelines.   Lipid [place] FASTING   Result Value Ref Range    Cholesterol 149 <=199 mg/dL    Triglycerides 124 <=149 mg/dL    HDL Cholesterol 47 >=40 mg/dL    LDL Calculated 77 <=129 mg/dL    Patient Fasting > 8hrs? Yes    HM2(CBC w/o Differential)   Result Value Ref Range    WBC 9.9 4.0 - 11.0 thou/uL    RBC 4.49 4.40 - 6.20 mill/uL    Hemoglobin 13.2 (L) 14.0 - 18.0 g/dL    Hematocrit 40.1 40.0 - 54.0 %    MCV 89 80 - 100 fL    MCH 29.3 27.0 - 34.0 pg    MCHC 32.8 32.0 - 36.0 g/dL    RDW 13.0 11.0 - 14.5 %    Platelets 241 140 - 440 thou/uL    MPV 8.6 7.0 - 10.0 fL   Microalbumin, Random Urine   Result Value Ref Range    Microalbumin, Random Urine 3.61 (H) 0.00 -  1.99 mg/dL    Creatinine, Urine 142.5 mg/dL    Microalbumin/Creatinine Ratio Random Urine 25.3 (H) <=19.9 mg/g    Narrative    Microalbumin, Random Urine  <2.0 mg/dL . . . . . . . . Normal  3.0-30.0 mg/dL . . . . . . Microalbuminuria  >30.0 mg/dL . . . . . .  . Clinical Proteinuria    Microalbumin/Creatinine Ratio, Random Urine  <20 mg/g . . . . .. . . . Normal   mg/g . . . . . . . Microalbuminuria  >300 mg/g . . . . . . . . Clinical Proteinuria           Hi Isaiah:  Your diabetes remains well controlled.  There is a trace of protein in the urine but your kidney function remains NORMAL.  You are on lisinopril which also helps to protect kidney function.  The cholesterol panel is excellent.  Your hemoglobin is slightly below normal but is stable at your baseline.  The remaining labs are NORMAL.  It was a pleasure to meet and see you in clinic recently.    Please call with questions or contact us using [employer].    Sincerely,        Electronically signed by Luis Angel Parra MD

## 2021-06-21 NOTE — LETTER
Letter by Luis Angel Parra MD at      Author: Luis Angel Parra MD Service: -- Author Type: --    Filed:  Encounter Date: 5/19/2021 Status: (Other)         Preston Fortune  [address] [facility] [address]             May 19, 2021         Dear Mr. Fortune,    Below are the results from your recent visit:    Resulted Orders   Lipid [place] FASTING   Result Value Ref Range    Cholesterol 159 <=199 mg/dL    Triglycerides 124 <=149 mg/dL    HDL Cholesterol 41 >=40 mg/dL    LDL Calculated 93 <=129 mg/dL    Patient Fasting > 8hrs? Yes    Comprehensive Metabolic Panel   Result Value Ref Range    Sodium 141 136 - 145 mmol/L    Potassium 4.8 3.5 - 5.0 mmol/L    Chloride 106 98 - 107 mmol/L    CO2 24 22 - 31 mmol/L    Anion Gap, Calculation 11 5 - 18 mmol/L    Glucose 136 (H) 70 - 125 mg/dL    BUN 27 8 - 28 mg/dL    Creatinine 1.10 0.70 - 1.30 mg/dL    GFR MDRD Af Amer >60 >60 mL/min/1.73m2    GFR MDRD Non Af Amer >60 >60 mL/min/1.73m2    Bilirubin, Total 0.5 0.0 - 1.0 mg/dL    Calcium 9.1 8.5 - 10.5 mg/dL    Protein, Total 6.2 6.0 - 8.0 g/dL    Albumin 3.7 3.5 - 5.0 g/dL    Alkaline Phosphatase 91 45 - 120 U/L    AST 13 0 - 40 U/L    ALT 10 0 - 45 U/L    Narrative    Fasting Glucose reference range is 70-99 mg/dL per  American Diabetes Association (ADA) guidelines.   Glycosylated Hemoglobin A1c   Result Value Ref Range    Hemoglobin A1c 6.8 (H) <=5.6 %       Hi Isaiah:  Your cholesterol panel is excellent.  The diabetic control is at goal of  A1C<7.  The remaining labs are NORMAL.  It was a pleasure seeing you in clinic recently.    Please call with questions or contact us using [employer].    Sincerely,        Electronically signed by Luis Angel Parra MD

## 2021-06-22 NOTE — TELEPHONE ENCOUNTER
Refill Approved    Rx renewed per Medication Renewal Policy. Medication was last renewed on 7/5/18.    Last office visit 12/26/18      Osmar Abdi, [facility] Triage/Med Refill 1/3/2019     Requested Prescriptions   Pending Prescriptions Disp Refills     metFORMIN (GLUCOPHAGE) 500 MG tablet [Pharmacy Med Name: METFORMIN 500MG TABLETS] 60 tablet 0     Sig: TAKE 1 TABLET BY MOUTH TWICE DAILY WITH MEALS.    Metformin Refill Protocol Passed - 1/2/2019  1:04 PM       Passed - Blood pressure in last 12 months    BP Readings from Last 1 Encounters:   12/26/18 134/64            Passed - LFT or AST or ALT in last 12 months    Albumin   Date Value Ref Range Status   12/26/2018 3.9 3.5 - 5.0 g/dL Final     Bilirubin, Total   Date Value Ref Range Status   12/26/2018 0.6 0.0 - 1.0 mg/dL Final     Bilirubin, Direct   Date Value Ref Range Status   12/26/2018 0.2 <=0.5 mg/dL Final     Alkaline Phosphatase   Date Value Ref Range Status   12/26/2018 99 45 - 120 U/L Final     AST   Date Value Ref Range Status   12/26/2018 14 0 - 40 U/L Final     ALT   Date Value Ref Range Status   12/26/2018 11 0 - 45 U/L Final     Protein, Total   Date Value Ref Range Status   12/26/2018 6.7 6.0 - 8.0 g/dL Final               Passed - GFR or Serum Creatinine in last 6 months    GFR MDRD Non Af Amer   Date Value Ref Range Status   12/26/2018 >60 >60 mL/min/1.73m2 Final     GFR MDRD Af Amer   Date Value Ref Range Status   12/26/2018 >60 >60 mL/min/1.73m2 Final            Passed - Visit with PCP or prescribing provider visit in last 6 months or next 3 months    Last office visit with prescriber/PCP: 12/26/2018 OR same dept: 12/26/2018 Andrew Doty MD OR same specialty: 12/26/2018 Andrew Doty MD Last physical: Visit date not found Last MTM visit: Visit date not found         Next appt within 3 mo: Visit date not found  Next physical within 3 mo: Visit date not found  Prescriber OR PCP: Andrew Doty MD  Last diagnosis  associated with med order: 1. Diabetes mellitus type 2, noninsulin dependent (H)  - metFORMIN (GLUCOPHAGE) 500 MG tablet [Pharmacy Med Name: METFORMIN 500MG TABLETS]; TAKE 1 TABLET BY MOUTH TWICE DAILY WITH MEALS.  Dispense: 60 tablet; Refill: 0     If protocol passes may refill for 12 months if within 3 months of last provider visit (or a total of 15 months).          Passed - A1C in last 6 months    Hemoglobin A1c   Date Value Ref Range Status   12/26/2018 7.1 (H) 3.5 - 6.0 % Final              Passed - Microalbumin in last year     Microalbumin, Random Urine   Date Value Ref Range Status   06/21/2018 1.43 0.00 - 1.99 mg/dL Final

## 2021-06-22 NOTE — PROGRESS NOTES
Assessment:  1.  Non-insulin-dependent diabetes mellitus, checking status.  2.  Hypertension controlled.  3.  Hyperlipidemia checking status.  4.  Lumbar and knee osteoarthritis.    Plan: Check fasting A1c, lipid hepatic and basic profiles.  Continue current medication.  Continue his exercise and continue to work a gradual weight reduction by trying to further reduce portion sizes.  If lab okay follow-up in 4 months.  Okay to try resuming Tylenol as needed and be unusual for that to cause stomach pain.    Subjective: 83-year-old male presenting for follow-up on the above.  Regarding diabetes, he does check blood sugars daily and the readings vary mostly from 140-160 with some 120 and 130 range.  He is exercising on his stationary cycle often 45 minutes/day.  He does try to watch the extras in his meals and avoid snacking.  He thinks he gained some weight from [place] yesterday.  Regarding hypertension no headaches or dizziness or leg swelling.  Regarding lipids no diarrhea constipation muscle aching or muscle weakness.  He had felt that he had stomach pains from Tylenol and stopped taking it and the symptoms did clear, that was 6-8 weeks ago and he has not tried to be taking it.  He does have the daily pain from his arthritis.  Past Medical History:   Diagnosis Date     Cancer (H)     Eye     Diabetes mellitus (H)      Hyperlipidemia      Hypertension      Kidney disease      Kidney stones      Peptic ulceration 2011    see EGD that year     Pure hypercholesterolemia      No Known Allergies  Current Outpatient Medications   Medication Sig Dispense Refill     amLODIPine (NORVASC) 10 MG tablet TAKE ONE TABLET BY MOUTH EVERY DAY 90 tablet 2     aspirin 325 MG tablet Take 1 tablet (325 mg total) by mouth 2 (two) times a day with meals. 60 tablet 0     blood glucose test (ONETOUCH ULTRA TEST) strips Use 1 each As Directed daily Dispense brand per patient's insurance at pharmacy discretion.(E11.9). 100 strip 2      cetirizine (ZYRTEC) 10 mg cap Take by mouth.       generic lancets Use 1 each As Directed daily. Dispense brand per patient's insurance at pharmacy discretion.(E11.9) 100 each 1     hydroCHLOROthiazide (MICROZIDE) 12.5 mg capsule TAKE ONE CAPSULE BY MOUTH EVERY DAY 90 capsule 2     lisinopril (PRINIVIL,ZESTRIL) 2.5 MG tablet Take 1 tablet (2.5 mg total) by mouth daily. 90 tablet 3     metFORMIN (GLUCOPHAGE) 500 MG tablet Take 1 tablet (500 mg total) by mouth 2 (two) times a day with meals. 180 tablet 1     omeprazole (PRILOSEC) 20 MG capsule Take 20 mg by mouth daily as needed.       pravastatin (PRAVACHOL) 80 MG tablet TAKE ONE-HALF TABLET BY MOUTH EVERY EVENING 45 tablet 3     psyllium with dextrose (METAMUCIL JAR) powder Take by mouth 3 (three) times a day.       vitamin A-vitamin C-vit E-min (OCUVITE) Tab tablet Take 1 tablet by mouth daily.        cholecalciferol, vitamin D3, (VITAMIN D3) 1,000 unit capsule Take 1,000 Units by mouth daily.       No current facility-administered medications for this visit.      All other review of systems are negative.    Objective:/64 (Patient Site: Left Arm, Patient Position: Sitting, Cuff Size: Adult Large)   Pulse 69   Resp 24   Wt (!) 239 lb (108.4 kg)   SpO2 98%   BMI 36.88 kg/m    HEENT exam shows no acute change.  Neck supple without adenopathy or thyromegaly.  Lungs clear.  Heart regular rate and rhythm without murmur.  Abdomen shows no masses tenderness or hepatosplenomegaly.  No pedal edema.  He is alert with clear speech.

## 2021-07-01 ENCOUNTER — COMMUNICATION - HEALTHEAST (OUTPATIENT)
Dept: FAMILY MEDICINE | Facility: CLINIC | Age: 86
End: 2021-07-01

## 2021-07-01 LAB — RETINOPATHY: NORMAL

## 2021-07-02 ENCOUNTER — AMBULATORY - HEALTHEAST (OUTPATIENT)
Dept: LAB | Facility: CLINIC | Age: 86
End: 2021-07-02

## 2021-07-02 DIAGNOSIS — R19.7 DIARRHEA, UNSPECIFIED TYPE: ICD-10-CM

## 2021-07-04 NOTE — TELEPHONE ENCOUNTER
Telephone Encounter by Marisa La MD at 7/1/2021  5:28 PM     Author: Marisa La MD Service: -- Author Type: Physician    Filed: 7/1/2021  5:29 PM Encounter Date: 7/1/2021 Status: Signed    : Marisa La MD (Physician)       Spoke with patient regarding today's lab results including elevated potassium of 5.4.  He is currently taking lisinopril for hypertension only.  For this reason, recommend he start lisinopril 2.5 mg daily.  He should return to clinic in approximately 1 week to recheck blood pressure and potassium labs.  Please assist patient in scheduling appointment with PCP or other provider.

## 2021-07-04 NOTE — TELEPHONE ENCOUNTER
Telephone Encounter by Karen Lo RT (R) at 7/1/2021  5:53 PM     Author: Karen Lo RT (R) Service: -- Author Type: Radiologic Technologist    Filed: 7/1/2021  5:53 PM Encounter Date: 7/1/2021 Status: Signed    : Karen Lo RT (R) (Radiologic Technologist)       Contacted and scheduled 7/8 with Fidel

## 2021-07-05 ENCOUNTER — AMBULATORY - HEALTHEAST (OUTPATIENT)
Dept: FAMILY MEDICINE | Facility: CLINIC | Age: 86
End: 2021-07-05

## 2021-07-05 DIAGNOSIS — R19.7 DIARRHEA, UNSPECIFIED TYPE: ICD-10-CM

## 2021-07-05 LAB
G LAMBLIA AG STL QL IA: NORMAL
O+P STL MICRO: NORMAL

## 2021-07-09 ENCOUNTER — COMMUNICATION - HEALTHEAST (OUTPATIENT)
Dept: FAMILY MEDICINE | Facility: CLINIC | Age: 86
End: 2021-07-09

## 2021-07-22 NOTE — LETTER
Letter by Luis Angel Parra MD at      Author: Luis Angel Parra MD Service: -- Author Type: --    Filed:  Encounter Date: 7/9/2021 Status: (Other)         Preston Fortune  [facility] [facility] [address]             July 9, 2021         Dear Mr. Fortune,    Below are the results from your recent visit:    Resulted Orders   Basic Metabolic Panel   Result Value Ref Range    Sodium 143 136 - 145 mmol/L    Potassium 5.1 (H) 3.5 - 5.0 mmol/L    Chloride 106 98 - 107 mmol/L    CO2 24 22 - 31 mmol/L    Anion Gap, Calculation 13 5 - 18 mmol/L    Glucose 144 (H) 70 - 125 mg/dL    Calcium 9.0 8.5 - 10.5 mg/dL    BUN 20 8 - 28 mg/dL    Creatinine 1.02 0.70 - 1.30 mg/dL    GFR MDRD Af Amer >60 >60 mL/min/1.73m2    GFR MDRD Non Af Amer >60 >60 mL/min/1.73m2    Narrative    Fasting Glucose reference range is 70-99 mg/dL per  American Diabetes Association (ADA) guidelines.       Hi Isaiah:  Your potassium looks better.   It is now just a hair above normal.  It may be rechecked with your next routine lab draw.  Your kidney function was normal.  The blood sugar was consistent with known diabetes.      Please call with questions or contact us using [employer].    Sincerely,        Electronically signed by Luis Angel Parra MD

## 2021-07-30 DIAGNOSIS — E11.9 DIABETES MELLITUS TYPE 2, NONINSULIN DEPENDENT (H): ICD-10-CM

## 2021-08-02 NOTE — TELEPHONE ENCOUNTER
Incoming call from patient wanting to follow up on refill request. Patient stated he est care with Dr. Parra and he's not sure if this rx went to [place] or to . Patient would like provider to refill rx.

## 2021-08-27 DIAGNOSIS — E11.9 DIABETES MELLITUS TYPE 2, NONINSULIN DEPENDENT (H): ICD-10-CM

## 2021-08-28 RX ORDER — BLOOD SUGAR DIAGNOSTIC
STRIP MISCELLANEOUS
Qty: 100 STRIP | Refills: 3 | Status: SHIPPED | OUTPATIENT
Start: 2021-08-28 | End: 2022-09-27

## 2021-09-03 DIAGNOSIS — I10 HTN (HYPERTENSION): ICD-10-CM

## 2021-09-03 RX ORDER — AMLODIPINE BESYLATE 10 MG/1
TABLET ORAL
Qty: 90 TABLET | Refills: 1 | Status: SHIPPED | OUTPATIENT
Start: 2021-09-03 | End: 2022-03-09

## 2021-09-04 NOTE — TELEPHONE ENCOUNTER
"Last Written Prescription Date:  2/22/21  Last Fill Quantity: 90,  # refills: 1   Last office visit provider:  7/8/21     Requested Prescriptions   Pending Prescriptions Disp Refills     amLODIPine (NORVASC) 10 MG tablet [Pharmacy Med Name: AMLODIPINE BESYLATE 10 MG TAB] 90 tablet 1     Sig: TAKE 1 TABLET BY MOUTH EVERY DAY       Calcium Channel Blockers Protocol  Passed - 9/3/2021  8:09 AM        Passed - Blood pressure under 140/90 in past 12 months     BP Readings from Last 3 Encounters:   07/08/21 132/67   07/01/21 137/70   05/18/21 127/60                 Passed - Recent (12 mo) or future (30 days) visit within the authorizing provider's specialty     Patient has had an office visit with the authorizing provider or a provider within the authorizing providers department within the previous 12 mos or has a future within next 30 days. See \"Patient Info\" tab in inbasket, or \"Choose Columns\" in Meds & Orders section of the refill encounter.              Passed - Medication is active on med list        Passed - Patient is age 18 or older        Passed - Normal serum creatinine on file in past 12 months     Recent Labs   Lab Test 07/08/21  1536   CR 1.02       Ok to refill medication if creatinine is low               Mag Pedro RN 09/03/21 9:07 PM  "

## 2021-09-06 ENCOUNTER — TRANSFERRED RECORDS (OUTPATIENT)
Dept: HEALTH INFORMATION MANAGEMENT | Facility: CLINIC | Age: 86
End: 2021-09-06

## 2021-09-10 ENCOUNTER — TELEPHONE (OUTPATIENT)
Dept: FAMILY MEDICINE | Facility: CLINIC | Age: 86
End: 2021-09-10

## 2021-09-10 DIAGNOSIS — D64.9 ANEMIA, UNSPECIFIED TYPE: ICD-10-CM

## 2021-09-10 DIAGNOSIS — E11.9 TYPE 2 DIABETES MELLITUS WITHOUT COMPLICATION, WITHOUT LONG-TERM CURRENT USE OF INSULIN (H): Primary | ICD-10-CM

## 2021-09-10 DIAGNOSIS — E78.5 HYPERLIPIDEMIA, UNSPECIFIED HYPERLIPIDEMIA TYPE: ICD-10-CM

## 2021-09-10 NOTE — TELEPHONE ENCOUNTER
Incoming call from patient stating that his feet is swelling up. Patient has an appt with Dr. Parra at the end of this month for a med check, but he would like to get some lab work done beforehand. Patient is wondering if provider can order the labs and if we can reach back out to him to help him schedule. Communicated to pt I will send a message to the provider's team.

## 2021-09-14 ENCOUNTER — TRANSFERRED RECORDS (OUTPATIENT)
Dept: HEALTH INFORMATION MANAGEMENT | Facility: CLINIC | Age: 86
End: 2021-09-14

## 2021-09-20 ENCOUNTER — LAB (OUTPATIENT)
Dept: LAB | Facility: CLINIC | Age: 86
End: 2021-09-20
Payer: COMMERCIAL

## 2021-09-20 DIAGNOSIS — D64.9 ANEMIA, UNSPECIFIED TYPE: ICD-10-CM

## 2021-09-20 DIAGNOSIS — E11.9 TYPE 2 DIABETES MELLITUS WITHOUT COMPLICATION, WITHOUT LONG-TERM CURRENT USE OF INSULIN (H): ICD-10-CM

## 2021-09-20 DIAGNOSIS — E78.5 HYPERLIPIDEMIA, UNSPECIFIED HYPERLIPIDEMIA TYPE: ICD-10-CM

## 2021-09-20 LAB
ALBUMIN SERPL-MCNC: 3.6 G/DL (ref 3.5–5)
ALP SERPL-CCNC: 87 U/L (ref 45–120)
ALT SERPL W P-5'-P-CCNC: <9 U/L (ref 0–45)
ANION GAP SERPL CALCULATED.3IONS-SCNC: 12 MMOL/L (ref 5–18)
AST SERPL W P-5'-P-CCNC: 12 U/L (ref 0–40)
BILIRUB SERPL-MCNC: 0.5 MG/DL (ref 0–1)
BUN SERPL-MCNC: 14 MG/DL (ref 8–28)
CALCIUM SERPL-MCNC: 9.6 MG/DL (ref 8.5–10.5)
CHLORIDE BLD-SCNC: 104 MMOL/L (ref 98–107)
CHOLEST SERPL-MCNC: 136 MG/DL
CO2 SERPL-SCNC: 25 MMOL/L (ref 22–31)
CREAT SERPL-MCNC: 0.99 MG/DL (ref 0.7–1.3)
ERYTHROCYTE [DISTWIDTH] IN BLOOD BY AUTOMATED COUNT: 13.3 % (ref 10–15)
FASTING STATUS PATIENT QL REPORTED: YES
GFR SERPL CREATININE-BSD FRML MDRD: 69 ML/MIN/1.73M2
GLUCOSE BLD-MCNC: 108 MG/DL (ref 70–125)
HBA1C MFR BLD: 6.4 % (ref 0–5.6)
HCT VFR BLD AUTO: 40.3 % (ref 40–53)
HDLC SERPL-MCNC: 45 MG/DL
HGB BLD-MCNC: 12.7 G/DL (ref 13.3–17.7)
LDLC SERPL CALC-MCNC: 67 MG/DL
MCH RBC QN AUTO: 28.2 PG (ref 26.5–33)
MCHC RBC AUTO-ENTMCNC: 31.5 G/DL (ref 31.5–36.5)
MCV RBC AUTO: 90 FL (ref 78–100)
PLATELET # BLD AUTO: 280 10E3/UL (ref 150–450)
POTASSIUM BLD-SCNC: 4.5 MMOL/L (ref 3.5–5)
PROT SERPL-MCNC: 6.4 G/DL (ref 6–8)
RBC # BLD AUTO: 4.5 10E6/UL (ref 4.4–5.9)
SODIUM SERPL-SCNC: 141 MMOL/L (ref 136–145)
TRIGL SERPL-MCNC: 118 MG/DL
WBC # BLD AUTO: 9.5 10E3/UL (ref 4–11)

## 2021-09-20 PROCEDURE — 36415 COLL VENOUS BLD VENIPUNCTURE: CPT

## 2021-09-20 PROCEDURE — 80061 LIPID PANEL: CPT

## 2021-09-20 PROCEDURE — 80053 COMPREHEN METABOLIC PANEL: CPT

## 2021-09-20 PROCEDURE — 83036 HEMOGLOBIN GLYCOSYLATED A1C: CPT

## 2021-09-20 PROCEDURE — 85027 COMPLETE CBC AUTOMATED: CPT

## 2021-09-25 ENCOUNTER — HEALTH MAINTENANCE LETTER (OUTPATIENT)
Age: 86
End: 2021-09-25

## 2021-09-29 ENCOUNTER — OFFICE VISIT (OUTPATIENT)
Dept: FAMILY MEDICINE | Facility: CLINIC | Age: 86
End: 2021-09-29
Payer: COMMERCIAL

## 2021-09-29 VITALS
RESPIRATION RATE: 20 BRPM | DIASTOLIC BLOOD PRESSURE: 56 MMHG | SYSTOLIC BLOOD PRESSURE: 134 MMHG | WEIGHT: 221.8 LBS | BODY MASS INDEX: 36.97 KG/M2 | HEART RATE: 66 BPM

## 2021-09-29 DIAGNOSIS — E11.9 TYPE 2 DIABETES MELLITUS WITHOUT COMPLICATION, WITHOUT LONG-TERM CURRENT USE OF INSULIN (H): ICD-10-CM

## 2021-09-29 DIAGNOSIS — R19.5 LOOSE STOOLS: ICD-10-CM

## 2021-09-29 DIAGNOSIS — Z23 NEED FOR PROPHYLACTIC VACCINATION AND INOCULATION AGAINST INFLUENZA: ICD-10-CM

## 2021-09-29 DIAGNOSIS — I10 ESSENTIAL HYPERTENSION: ICD-10-CM

## 2021-09-29 PROCEDURE — 99214 OFFICE O/P EST MOD 30 MIN: CPT | Mod: 25 | Performed by: FAMILY MEDICINE

## 2021-09-29 PROCEDURE — 90662 IIV NO PRSV INCREASED AG IM: CPT | Performed by: FAMILY MEDICINE

## 2021-09-29 PROCEDURE — G0008 ADMIN INFLUENZA VIRUS VAC: HCPCS | Performed by: FAMILY MEDICINE

## 2021-09-29 NOTE — PATIENT INSTRUCTIONS
Stop metformin on a trial basis.  If sxs resolve then the plan will be to stay off metformin and to follow A1C's closely.     Stay off lisinopril.  Monitor Bps as they appear to be controlled on amlodipine.     High dose flu vaccine

## 2021-09-29 NOTE — PROGRESS NOTES
DIAGNOSIS:  Encounter Diagnoses   Name Primary?     Loose stools, ? Secondary to metformin      Need for prophylactic vaccination and inoculation against influenza      Type 2 diabetes mellitus without complication, without long-term current use of insulin (H), good control      Essential hypertension well controlled           PLAN:     Stop metformin on a trial basis.  If sxs resolve then the plan will be to stay off metformin and to follow A1C's closely.     Stay off lisinopril.  Monitor Bps as they appear to be controlled on amlodipine.      High dose flu vaccine      HPI:    Loose stools persist.   Takes a couple imodium a day which has been going on a couple months.  Had stool for bacteria/virus check in July and is negative.   Has been on metformin for years.           Current Outpatient Medications   Medication     acetaminophen (TYLENOL) 500 MG tablet     amLODIPine (NORVASC) 10 MG tablet     aspirin 81 MG EC tablet     cetirizine (ZYRTEC) 10 mg cap     cholecalciferol, vitamin D3, 50 mcg (2,000 unit) Tab     generic lancets     ONETOUCH ULTRA test strip     pravastatin (PRAVACHOL) 40 MG tablet     psyllium (METAMUCIL) 3.4 gram packet     vit C/E/zinc ox/jadiel/lut/zeax (ICAPS AREDS2 ORAL)     generic lancets     No current facility-administered medications for this visit.       Pmh: reviewed  Psh: reviewed  Allergy:  reviewed      EXAM:    /56 (BP Location: Left arm, Patient Position: Sitting, Cuff Size: Adult Regular)   Pulse 66   Resp 20   Wt 100.6 kg (221 lb 12.8 oz)   BMI 36.97 kg/m    GEN:   ALERT, NAD, ORIENTED TIMES THREE  LUNGS: CTA  COR: RRR WITHOUT MURMUR  EXT: WITHOUT EDEMA/SWELLING    Last Comprehensive Metabolic Panel:  Sodium   Date Value Ref Range Status   09/20/2021 141 136 - 145 mmol/L Final     Potassium   Date Value Ref Range Status   09/20/2021 4.5 3.5 - 5.0 mmol/L Final     Chloride   Date Value Ref Range Status   09/20/2021 104 98 - 107 mmol/L Final     Carbon Dioxide (CO2)    Date Value Ref Range Status   09/20/2021 25 22 - 31 mmol/L Final     Anion Gap   Date Value Ref Range Status   09/20/2021 12 5 - 18 mmol/L Final     Glucose   Date Value Ref Range Status   09/20/2021 108 70 - 125 mg/dL Final     Urea Nitrogen   Date Value Ref Range Status   09/20/2021 14 8 - 28 mg/dL Final     Creatinine   Date Value Ref Range Status   09/20/2021 0.99 0.70 - 1.30 mg/dL Final     GFR Estimate   Date Value Ref Range Status   09/20/2021 69 >60 mL/min/1.73m2 Final     Comment:     As of July 11, 2021, eGFR is calculated by the CKD-EPI creatinine equation, without race adjustment. eGFR can be influenced by muscle mass, exercise, and diet. The reported eGFR is an estimation only and is only applicable if the renal function is stable.   07/08/2021 >60 >60 mL/min/1.73m2 Final     Calcium   Date Value Ref Range Status   09/20/2021 9.6 8.5 - 10.5 mg/dL Final     Bilirubin Total   Date Value Ref Range Status   09/20/2021 0.5 0.0 - 1.0 mg/dL Final     Alkaline Phosphatase   Date Value Ref Range Status   09/20/2021 87 45 - 120 U/L Final     ALT   Date Value Ref Range Status   09/20/2021 <9 0 - 45 U/L Final     AST   Date Value Ref Range Status   09/20/2021 12 0 - 40 U/L Final     CBC RESULTS: Recent Labs   Lab Test 09/20/21  0925   WBC 9.5   RBC 4.50   HGB 12.7*   HCT 40.3   MCV 90   MCH 28.2   MCHC 31.5   RDW 13.3          Lab Results   Component Value Date    A1C 6.4 09/20/2021    A1C 6.8 05/18/2021    A1C 6.9 11/26/2020    A1C 6.7 08/04/2020    A1C 7.2 12/12/2019

## 2021-10-31 DIAGNOSIS — E78.5 HYPERLIPIDEMIA: ICD-10-CM

## 2021-11-01 RX ORDER — PRAVASTATIN SODIUM 40 MG
TABLET ORAL
Qty: 90 TABLET | Refills: 3 | Status: SHIPPED | OUTPATIENT
Start: 2021-11-01 | End: 2022-08-15

## 2021-11-01 NOTE — TELEPHONE ENCOUNTER
"Last Written Prescription Date:  12/2/20  Last Fill Quantity: 90,  # refills: 2   Last office visit provider:  9/29/21     Requested Prescriptions   Pending Prescriptions Disp Refills     pravastatin (PRAVACHOL) 40 MG tablet [Pharmacy Med Name: PRAVASTATIN SODIUM 40 MG TAB] 90 tablet 2     Sig: TAKE 1 TABLET BY MOUTH EVERYDAY AT BEDTIME       Statins Protocol Passed - 10/31/2021 10:47 AM        Passed - LDL on file in past 12 months     Recent Labs   Lab Test 09/20/21  0925   LDL 67             Passed - No abnormal creatine kinase in past 12 months     No lab results found.             Passed - Recent (12 mo) or future (30 days) visit within the authorizing provider's specialty     Patient has had an office visit with the authorizing provider or a provider within the authorizing providers department within the previous 12 mos or has a future within next 30 days. See \"Patient Info\" tab in inbasket, or \"Choose Columns\" in Meds & Orders section of the refill encounter.              Passed - Medication is active on med list        Passed - Patient is age 18 or older             Luis Angel Abreu RN 11/01/21 12:45 PM  "

## 2022-01-28 ENCOUNTER — OFFICE VISIT (OUTPATIENT)
Dept: FAMILY MEDICINE | Facility: CLINIC | Age: 87
End: 2022-01-28
Payer: COMMERCIAL

## 2022-01-28 VITALS
RESPIRATION RATE: 18 BRPM | SYSTOLIC BLOOD PRESSURE: 126 MMHG | BODY MASS INDEX: 37.83 KG/M2 | HEART RATE: 60 BPM | DIASTOLIC BLOOD PRESSURE: 63 MMHG | WEIGHT: 227 LBS

## 2022-01-28 DIAGNOSIS — E66.01 MORBID OBESITY (H): ICD-10-CM

## 2022-01-28 DIAGNOSIS — E78.5 HYPERLIPIDEMIA, UNSPECIFIED HYPERLIPIDEMIA TYPE: ICD-10-CM

## 2022-01-28 DIAGNOSIS — I10 ESSENTIAL HYPERTENSION: ICD-10-CM

## 2022-01-28 DIAGNOSIS — E11.9 TYPE 2 DIABETES MELLITUS WITHOUT COMPLICATION, WITHOUT LONG-TERM CURRENT USE OF INSULIN (H): ICD-10-CM

## 2022-01-28 LAB — HBA1C MFR BLD: 7.1 % (ref 0–5.6)

## 2022-01-28 PROCEDURE — 99213 OFFICE O/P EST LOW 20 MIN: CPT | Mod: 25 | Performed by: FAMILY MEDICINE

## 2022-01-28 PROCEDURE — 83036 HEMOGLOBIN GLYCOSYLATED A1C: CPT | Performed by: FAMILY MEDICINE

## 2022-01-28 PROCEDURE — 36415 COLL VENOUS BLD VENIPUNCTURE: CPT | Performed by: FAMILY MEDICINE

## 2022-01-28 PROCEDURE — 99397 PER PM REEVAL EST PAT 65+ YR: CPT | Performed by: FAMILY MEDICINE

## 2022-01-28 NOTE — PROGRESS NOTES
DIAGNOSIS:  Encounter Diagnoses   Name Primary?     Type 2 diabetes mellitus without complication, without long-term current use of insulin (H), well controlled      Essential hypertension, controlled      Hyperlipidemia, unspecified hyperlipidemia type, at goal      Obesity (BMI 35.0-39.9) with comorbidity (H), work on weight reduction         PLAN:     Td due in June 2022    The following high BMI interventions were performed this visit: encouragement to exercise   Work on a 10# weight loss    Check A1C in 3 months.             HPI:  Blood sugar this am 107.  Highest 130  Rides an exercise bike every day.  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       Pmh: reviewed  Psh: reviewed  Allergy:  reviewed      EXAM:    /63   Pulse 60   Resp 18   Wt 103 kg (227 lb)   BMI 37.83 kg/m     Wt Readings from Last 4 Encounters:   01/28/22 103 kg (227 lb)   09/29/21 100.6 kg (221 lb 12.8 oz)   07/08/21 101.5 kg (223 lb 12.8 oz)   07/01/21 102 kg (224 lb 12.8 oz)     GEN:   ALERT, NAD, ORIENTED TIMES THREE  LUNGS: CTA  COR: RRR WITHOUT MURMUR  EXT: WITHOUT EDEMA/SWELLING    Foot exam May 2021    Lab Results   Component Value Date    A1C 7.1 01/28/2022    A1C 6.4 09/20/2021    A1C 6.8 05/18/2021    A1C 6.9 11/26/2020    A1C 6.7 08/04/2020     Lab Results   Component Value Date    CHOL 136 09/20/2021     Lab Results   Component Value Date    HDL 45 09/20/2021      Lab Results   Component Value Date    LDL 67 09/20/2021     Lab Results   Component Value Date    TRIG 118 09/20/2021       No results found for: CHOLHDLRATIO

## 2022-01-28 NOTE — PATIENT INSTRUCTIONS
Td due in June 2022    The following high BMI interventions were performed this visit: encouragement to exercise   Work on a 10# weight loss    Check A1C in 3 months.

## 2022-03-09 DIAGNOSIS — I10 HTN (HYPERTENSION): ICD-10-CM

## 2022-03-09 RX ORDER — AMLODIPINE BESYLATE 10 MG/1
TABLET ORAL
Qty: 90 TABLET | Refills: 1 | Status: SHIPPED | OUTPATIENT
Start: 2022-03-09 | End: 2022-08-12

## 2022-03-10 NOTE — TELEPHONE ENCOUNTER
"Last Written Prescription Date:  9/3/2021  Last Fill Quantity: 90,  # refills: 1   Last office visit provider:  1/28/2022     Requested Prescriptions   Pending Prescriptions Disp Refills     amLODIPine (NORVASC) 10 MG tablet [Pharmacy Med Name: AMLODIPINE BESYLATE 10 MG TAB] 90 tablet 1     Sig: TAKE 1 TABLET BY MOUTH EVERY DAY       Calcium Channel Blockers Protocol  Passed - 3/9/2022 10:11 AM        Passed - Blood pressure under 140/90 in past 12 months     BP Readings from Last 3 Encounters:   01/28/22 126/63   09/29/21 134/56   07/08/21 132/67                 Passed - Recent (12 mo) or future (30 days) visit within the authorizing provider's specialty     Patient has had an office visit with the authorizing provider or a provider within the authorizing providers department within the previous 12 mos or has a future within next 30 days. See \"Patient Info\" tab in inbasket, or \"Choose Columns\" in Meds & Orders section of the refill encounter.              Passed - Medication is active on med list        Passed - Patient is age 18 or older        Passed - Normal serum creatinine on file in past 12 months     Recent Labs   Lab Test 09/20/21  0925   CR 0.99       Ok to refill medication if creatinine is low               Rhonda Ziegler RN 03/09/22 11:39 PM  "

## 2022-06-27 ENCOUNTER — OFFICE VISIT (OUTPATIENT)
Dept: FAMILY MEDICINE | Facility: CLINIC | Age: 87
End: 2022-06-27
Payer: COMMERCIAL

## 2022-06-27 VITALS
SYSTOLIC BLOOD PRESSURE: 128 MMHG | HEIGHT: 65 IN | BODY MASS INDEX: 37.82 KG/M2 | RESPIRATION RATE: 16 BRPM | WEIGHT: 227 LBS | DIASTOLIC BLOOD PRESSURE: 65 MMHG | HEART RATE: 61 BPM

## 2022-06-27 DIAGNOSIS — Z23 NEED FOR TD VACCINE: ICD-10-CM

## 2022-06-27 DIAGNOSIS — Z00.00 HEALTHCARE MAINTENANCE: ICD-10-CM

## 2022-06-27 DIAGNOSIS — E11.9 TYPE 2 DIABETES MELLITUS WITHOUT COMPLICATION, WITHOUT LONG-TERM CURRENT USE OF INSULIN (H): ICD-10-CM

## 2022-06-27 DIAGNOSIS — I10 ESSENTIAL HYPERTENSION: ICD-10-CM

## 2022-06-27 DIAGNOSIS — E78.5 HYPERLIPIDEMIA, UNSPECIFIED HYPERLIPIDEMIA TYPE: ICD-10-CM

## 2022-06-27 LAB
ALBUMIN SERPL-MCNC: 3.7 G/DL (ref 3.5–5)
ALP SERPL-CCNC: 97 U/L (ref 45–120)
ALT SERPL W P-5'-P-CCNC: 9 U/L (ref 0–45)
ANION GAP SERPL CALCULATED.3IONS-SCNC: 9 MMOL/L (ref 5–18)
AST SERPL W P-5'-P-CCNC: 13 U/L (ref 0–40)
BILIRUB SERPL-MCNC: 0.7 MG/DL (ref 0–1)
BUN SERPL-MCNC: 17 MG/DL (ref 8–28)
CALCIUM SERPL-MCNC: 9.5 MG/DL (ref 8.5–10.5)
CHLORIDE BLD-SCNC: 105 MMOL/L (ref 98–107)
CHOLEST SERPL-MCNC: 143 MG/DL
CO2 SERPL-SCNC: 28 MMOL/L (ref 22–31)
CREAT SERPL-MCNC: 0.9 MG/DL (ref 0.7–1.3)
ERYTHROCYTE [DISTWIDTH] IN BLOOD BY AUTOMATED COUNT: 13.4 % (ref 10–15)
FASTING STATUS PATIENT QL REPORTED: YES
GFR SERPL CREATININE-BSD FRML MDRD: 83 ML/MIN/1.73M2
GLUCOSE BLD-MCNC: 127 MG/DL (ref 70–125)
HBA1C MFR BLD: 7.1 % (ref 0–5.6)
HCT VFR BLD AUTO: 40.6 % (ref 40–53)
HDLC SERPL-MCNC: 40 MG/DL
HGB BLD-MCNC: 13.1 G/DL (ref 13.3–17.7)
HOLD SPECIMEN: NORMAL
LDLC SERPL CALC-MCNC: 70 MG/DL
MCH RBC QN AUTO: 28.6 PG (ref 26.5–33)
MCHC RBC AUTO-ENTMCNC: 32.3 G/DL (ref 31.5–36.5)
MCV RBC AUTO: 89 FL (ref 78–100)
PLATELET # BLD AUTO: 253 10E3/UL (ref 150–450)
POTASSIUM BLD-SCNC: 4.4 MMOL/L (ref 3.5–5)
PROT SERPL-MCNC: 6.5 G/DL (ref 6–8)
RBC # BLD AUTO: 4.58 10E6/UL (ref 4.4–5.9)
SODIUM SERPL-SCNC: 142 MMOL/L (ref 136–145)
TRIGL SERPL-MCNC: 164 MG/DL
WBC # BLD AUTO: 7.6 10E3/UL (ref 4–11)

## 2022-06-27 PROCEDURE — 85027 COMPLETE CBC AUTOMATED: CPT | Performed by: FAMILY MEDICINE

## 2022-06-27 PROCEDURE — 90714 TD VACC NO PRESV 7 YRS+ IM: CPT | Performed by: FAMILY MEDICINE

## 2022-06-27 PROCEDURE — 99214 OFFICE O/P EST MOD 30 MIN: CPT | Mod: 25 | Performed by: FAMILY MEDICINE

## 2022-06-27 PROCEDURE — 36415 COLL VENOUS BLD VENIPUNCTURE: CPT | Performed by: FAMILY MEDICINE

## 2022-06-27 PROCEDURE — 80053 COMPREHEN METABOLIC PANEL: CPT | Performed by: FAMILY MEDICINE

## 2022-06-27 PROCEDURE — 90471 IMMUNIZATION ADMIN: CPT | Performed by: FAMILY MEDICINE

## 2022-06-27 PROCEDURE — 80061 LIPID PANEL: CPT | Performed by: FAMILY MEDICINE

## 2022-06-27 PROCEDURE — 83036 HEMOGLOBIN GLYCOSYLATED A1C: CPT | Performed by: FAMILY MEDICINE

## 2022-06-27 NOTE — PROGRESS NOTES
"      Mallory Colon is a 87 year old presenting for the following health issues:  Diabetes (Fasting labs ) and Hypertension (Medication check )      History of Present Illness       Diabetes:   He presents for follow up of diabetes.  He is checking home blood glucose one time daily. He checks blood glucose before meals.  Blood glucose is never over 200 and never under 70. When his blood glucose is low, the patient is asymptomatic for confusion, blurred vision, lethargy and reports not feeling dizzy, shaky, or weak.  He has no concerns regarding his diabetes at this time.  He is having numbness in feet and weight gain. The patient has had a diabetic eye exam in the last 12 months. Eye exam performed on Last summer. Location of last eye exam [facility].        Hypertension: He presents for follow up of hypertension.  He does not check blood pressure  regularly outside of the clinic. Outside blood pressures have been over 140/90. He does not follow a low salt diet.       DID NOT TAKE BP MEDS THIS AM  No calf pain with walking        Objective    /65 (BP Location: Left arm, Patient Position: Sitting, Cuff Size: Adult Large)   Pulse 61   Resp 16   Ht 1.638 m (5' 4.5\")   Wt 103 kg (227 lb)   BMI 38.36 kg/m    Body mass index is 38.36 kg/m .  Physical Exam   GENERAL: healthy, alert and no distress  RESP: lungs clear to auscultation - no rales, rhonchi or wheezes  CV: regular rate and rhythm, normal S1 S2, no S3 or S4, no murmur, click or rub, no peripheral edema and peripheral pulses strong  MS: no gross musculoskeletal defects noted, no edema    FEET:  MF TESTING:  DIMINISHED SENSATION              SKIN EXAM:  NL              VASCULAR:   R DP  PULSE: -                                      L DP  PULSE: -                                      R PT  PULSE: -                                      L PT  PULSE: -    Lab Results   Component Value Date    CHOL 143 06/27/2022     Lab Results   Component Value Date    " HDL 40 06/27/2022     Lab Results   Component Value Date    LDL 70 06/27/2022     Lab Results   Component Value Date    TRIG 164 06/27/2022     No results found for: CHOLHDLRATIO    Lab Results   Component Value Date    A1C 7.1 06/27/2022    A1C 7.1 01/28/2022    A1C 6.4 09/20/2021    A1C 6.8 05/18/2021    A1C 6.9 11/26/2020     Encounter Diagnoses   Name Primary?     Type 2 diabetes mellitus without complication, without long-term current use of insulin (H), good control      Need for Td vaccine      Hyperlipidemia, unspecified hyperlipidemia type, stable on statin      Healthcare maintenance      Essential hypertension, controlled       .   PLAN:   Update the Td    Check fasting labs (lipids, CMP, and CBC)    ANNUAL EYE EXAMS    Recheck the A1Cs every 3 months   ..

## 2022-06-27 NOTE — LETTER
June 29, 2022      Isaiah Fortune  [address] [facility] [address]        Dear ,  We are writing to inform you of your test results.    Volodymyr Colon:  Your diabetic control remains actually quite good for being diet controlled.  We certainly could consider a low dose of metformin OR we would recheck the A1C in another 3 months to ensure that it remains stable. Let me know your preference.  Your hemoglobin is very slightly below normal but remains stable.  The cholesterol panel is great and the remaining labs are normal.    Resulted Orders   Hemoglobin A1c   Result Value Ref Range    Hemoglobin A1C 7.1 (H) 0.0 - 5.6 %      Comment:      Normal <5.7%   Prediabetes 5.7-6.4%    Diabetes 6.5% or higher     Note: Adopted from ADA consensus guidelines.   CBC with platelets   Result Value Ref Range    WBC Count 7.6 4.0 - 11.0 10e3/uL    RBC Count 4.58 4.40 - 5.90 10e6/uL    Hemoglobin 13.1 (L) 13.3 - 17.7 g/dL    Hematocrit 40.6 40.0 - 53.0 %    MCV 89 78 - 100 fL    MCH 28.6 26.5 - 33.0 pg    MCHC 32.3 31.5 - 36.5 g/dL    RDW 13.4 10.0 - 15.0 %    Platelet Count 253 150 - 450 10e3/uL   Comprehensive metabolic panel (BMP + Alb, Alk Phos, ALT, AST, Total. Bili, TP)   Result Value Ref Range    Sodium 142 136 - 145 mmol/L    Potassium 4.4 3.5 - 5.0 mmol/L    Chloride 105 98 - 107 mmol/L    Carbon Dioxide (CO2) 28 22 - 31 mmol/L    Anion Gap 9 5 - 18 mmol/L    Urea Nitrogen 17 8 - 28 mg/dL    Creatinine 0.90 0.70 - 1.30 mg/dL    Calcium 9.5 8.5 - 10.5 mg/dL    Glucose 127 (H) 70 - 125 mg/dL    Alkaline Phosphatase 97 45 - 120 U/L    AST 13 0 - 40 U/L    ALT 9 0 - 45 U/L    Protein Total 6.5 6.0 - 8.0 g/dL    Albumin 3.7 3.5 - 5.0 g/dL    Bilirubin Total 0.7 0.0 - 1.0 mg/dL    GFR Estimate 83 >60 mL/min/1.73m2      Comment:      Effective December 21, 2021 eGFRcr in adults is calculated using the 2021 CKD-EPI creatinine equation which includes age and gender (Gurinder et al., NEJM, DOI: 10.1056/NILGjr0349132)   Lipid panel  reflex to direct LDL Fasting   Result Value Ref Range    Cholesterol 143 <=199 mg/dL    Triglycerides 164 (H) <=149 mg/dL    Direct Measure HDL 40 >=40 mg/dL      Comment:      HDL Cholesterol Reference Range:     0-2 years:   No reference ranges established for patients under 2 years old  at [facility] for lipid analytes.    2-8 years:  Greater than 45 mg/dL     18 years and older:   Female: Greater than or equal to 50 mg/dL   Male:   Greater than or equal to 40 mg/dL    LDL Cholesterol Calculated 70 <=129 mg/dL    Patient Fasting > 8hrs? Yes        If you have any questions or concerns, please call the clinic at the number listed above.       Sincerely,      Luis Angel Parra MD

## 2022-06-27 NOTE — PATIENT INSTRUCTIONS
Update the Td    Check fasting labs (lipids, CMP, and CBC)    ANNUAL EYE EXAMS    Recheck the A1Cs every 3 months

## 2022-06-30 ENCOUNTER — TELEPHONE (OUTPATIENT)
Dept: FAMILY MEDICINE | Facility: CLINIC | Age: 87
End: 2022-06-30

## 2022-06-30 NOTE — TELEPHONE ENCOUNTER
----- Message from Luis Angel Parra MD sent at 6/29/2022  7:28 PM CDT -----  Hi Isaiah:  Your diabetic control remains actually quite good for being diet controlled.  We certainly could consider a low dose of metformin OR we would recheck the A1C in another 3 months to ensure that it remains stable. Let me know luis enrique preference.  Your hemoglobin is very slightly below normal but remains stable.  The cholesterol panel is great and the remaining labs are normal.

## 2022-06-30 NOTE — TELEPHONE ENCOUNTER
Relayed doctors message.  Patient verbalized understanding with no questions.  Will just check A1C in 3 months

## 2022-07-01 ENCOUNTER — TRANSFERRED RECORDS (OUTPATIENT)
Dept: MULTI SPECIALTY CLINIC | Facility: CLINIC | Age: 87
End: 2022-07-01

## 2022-08-11 DIAGNOSIS — I10 HTN (HYPERTENSION): ICD-10-CM

## 2022-08-12 RX ORDER — AMLODIPINE BESYLATE 10 MG/1
10 TABLET ORAL DAILY
Qty: 90 TABLET | Refills: 3 | Status: SHIPPED | OUTPATIENT
Start: 2022-08-12 | End: 2023-08-21

## 2022-08-12 NOTE — TELEPHONE ENCOUNTER
"Routing refill request to provider for review/approval because:  Early refill requested.    Last Written Prescription Date:  3/9/22  Last Fill Quantity: 90,  # refills: 1   Last office visit provider:  6/27/22     Requested Prescriptions   Pending Prescriptions Disp Refills     amLODIPine (NORVASC) 10 MG tablet [Pharmacy Med Name: AMLODIPINE BESYLATE 10 MG TAB] 90 tablet 1     Sig: TAKE 1 TABLET BY MOUTH EVERY DAY       Calcium Channel Blockers Protocol  Passed - 8/12/2022  2:33 PM        Passed - Blood pressure under 140/90 in past 12 months     BP Readings from Last 3 Encounters:   06/27/22 128/65   01/28/22 126/63   09/29/21 134/56                 Passed - Recent (12 mo) or future (30 days) visit within the authorizing provider's specialty     Patient has had an office visit with the authorizing provider or a provider within the authorizing providers department within the previous 12 mos or has a future within next 30 days. See \"Patient Info\" tab in inbasket, or \"Choose Columns\" in Meds & Orders section of the refill encounter.              Passed - Medication is active on med list        Passed - Patient is age 18 or older        Passed - Normal serum creatinine on file in past 12 months     Recent Labs   Lab Test 06/27/22  0913   CR 0.90       Ok to refill medication if creatinine is low               Luis Angel Abreu RN 08/12/22 2:33 PM  "

## 2022-08-15 DIAGNOSIS — E78.5 HYPERLIPIDEMIA: ICD-10-CM

## 2022-08-15 RX ORDER — PRAVASTATIN SODIUM 40 MG
TABLET ORAL
Qty: 90 TABLET | Refills: 3 | Status: SHIPPED | OUTPATIENT
Start: 2022-08-15 | End: 2023-08-23

## 2022-08-15 NOTE — TELEPHONE ENCOUNTER
Medication Question or Refill        What medication are you calling about (include dose and sig)?: pravastatin 40mg    Controlled Substance Agreement on file:   CSA -- Patient Level:    CSA: None found at the patient level.       Who prescribed the medication?: Fidel    Do you need a refill? Yes:     When did you use the medication last? yesterday    Patient offered an appointment? No    Do you have any questions or concerns?  Yes: Pt is in [place] and forgot medication, needs asap as he has none    Preferred Pharmacy:       [facility]/pharmacy #[id number] [place] [id number] [address] [address]  Phone: [phone] Fax: [phone]      Could we send this information to you in Health system or would you prefer to receive a phone call?:   Patient would prefer a phone call   Okay to leave a detailed message?: Yes at Cell number on file:    Telephone Information:   Mobile [phone]

## 2022-09-27 DIAGNOSIS — E11.9 DIABETES MELLITUS TYPE 2, NONINSULIN DEPENDENT (H): ICD-10-CM

## 2022-09-28 RX ORDER — BLOOD SUGAR DIAGNOSTIC
STRIP MISCELLANEOUS
Qty: 100 STRIP | Refills: 0 | Status: SHIPPED | OUTPATIENT
Start: 2022-09-28 | End: 2023-01-18

## 2022-09-28 NOTE — TELEPHONE ENCOUNTER
"Last Written Prescription Date:  8/28/21  Last Fill Quantity: 100,  # refills: 3   Last office visit provider:  6/27/22     Requested Prescriptions   Pending Prescriptions Disp Refills     blood glucose (ONETOUCH ULTRA) test strip 100 strip 3     Sig: Use to test blood sugar 1 times daily or as directed.       Diabetic Supplies Protocol Passed - 9/28/2022 10:10 AM        Passed - Medication is active on med list        Passed - Patient is 18 years of age or older        Passed - Recent (6 mo) or future (30 days) visit within the authorizing provider's specialty     Patient had office visit in the last 6 months or has a visit in the next 30 days with authorizing provider.  See \"Patient Info\" tab in inbasket, or \"Choose Columns\" in Meds & Orders section of the refill encounter.                 Luis Angel Abreu RN 09/28/22 10:10 AM  "

## 2022-10-18 ENCOUNTER — TRANSFERRED RECORDS (OUTPATIENT)
Dept: HEALTH INFORMATION MANAGEMENT | Facility: CLINIC | Age: 87
End: 2022-10-18

## 2022-10-18 LAB — RETINOPATHY: NEGATIVE

## 2022-12-26 ENCOUNTER — HEALTH MAINTENANCE LETTER (OUTPATIENT)
Age: 87
End: 2022-12-26

## 2023-01-18 ENCOUNTER — OFFICE VISIT (OUTPATIENT)
Dept: FAMILY MEDICINE | Facility: CLINIC | Age: 88
End: 2023-01-18
Payer: COMMERCIAL

## 2023-01-18 VITALS
DIASTOLIC BLOOD PRESSURE: 78 MMHG | BODY MASS INDEX: 37.36 KG/M2 | OXYGEN SATURATION: 97 % | HEART RATE: 72 BPM | RESPIRATION RATE: 16 BRPM | WEIGHT: 224.2 LBS | SYSTOLIC BLOOD PRESSURE: 157 MMHG | HEIGHT: 65 IN

## 2023-01-18 DIAGNOSIS — E11.9 TYPE 2 DIABETES MELLITUS WITHOUT COMPLICATION, WITHOUT LONG-TERM CURRENT USE OF INSULIN (H): ICD-10-CM

## 2023-01-18 DIAGNOSIS — E11.9 DIABETES MELLITUS TYPE 2, NONINSULIN DEPENDENT (H): ICD-10-CM

## 2023-01-18 DIAGNOSIS — Z00.00 ENCOUNTER FOR MEDICARE ANNUAL WELLNESS EXAM: ICD-10-CM

## 2023-01-18 DIAGNOSIS — E78.5 HYPERLIPIDEMIA, UNSPECIFIED HYPERLIPIDEMIA TYPE: ICD-10-CM

## 2023-01-18 DIAGNOSIS — I10 ESSENTIAL HYPERTENSION, BENIGN: ICD-10-CM

## 2023-01-18 LAB
ALBUMIN SERPL BCG-MCNC: 4.3 G/DL (ref 3.5–5.2)
ALP SERPL-CCNC: 98 U/L (ref 40–129)
ALT SERPL W P-5'-P-CCNC: 12 U/L (ref 10–50)
ANION GAP SERPL CALCULATED.3IONS-SCNC: 18 MMOL/L (ref 7–15)
AST SERPL W P-5'-P-CCNC: 21 U/L (ref 10–50)
BILIRUB SERPL-MCNC: 0.5 MG/DL
BUN SERPL-MCNC: 22.1 MG/DL (ref 8–23)
CALCIUM SERPL-MCNC: 10 MG/DL (ref 8.8–10.2)
CHLORIDE SERPL-SCNC: 103 MMOL/L (ref 98–107)
CHOLEST SERPL-MCNC: 155 MG/DL
CREAT SERPL-MCNC: 1.14 MG/DL (ref 0.67–1.17)
CREAT UR-MCNC: 176 MG/DL
DEPRECATED HCO3 PLAS-SCNC: 22 MMOL/L (ref 22–29)
ERYTHROCYTE [DISTWIDTH] IN BLOOD BY AUTOMATED COUNT: 13.6 % (ref 10–15)
GFR SERPL CREATININE-BSD FRML MDRD: 63 ML/MIN/1.73M2
GLUCOSE SERPL-MCNC: 158 MG/DL (ref 70–99)
HBA1C MFR BLD: 7.4 % (ref 0–5.6)
HCT VFR BLD AUTO: 43.2 % (ref 40–53)
HDLC SERPL-MCNC: 50 MG/DL
HGB BLD-MCNC: 13.7 G/DL (ref 13.3–17.7)
HOLD SPECIMEN: NORMAL
LDLC SERPL CALC-MCNC: 76 MG/DL
MCH RBC QN AUTO: 27.9 PG (ref 26.5–33)
MCHC RBC AUTO-ENTMCNC: 31.7 G/DL (ref 31.5–36.5)
MCV RBC AUTO: 88 FL (ref 78–100)
MICROALBUMIN UR-MCNC: 608 MG/L
MICROALBUMIN/CREAT UR: 345.45 MG/G CR (ref 0–17)
NONHDLC SERPL-MCNC: 105 MG/DL
PLATELET # BLD AUTO: 250 10E3/UL (ref 150–450)
POTASSIUM SERPL-SCNC: 4.5 MMOL/L (ref 3.4–5.3)
PROT SERPL-MCNC: 7.1 G/DL (ref 6.4–8.3)
RBC # BLD AUTO: 4.91 10E6/UL (ref 4.4–5.9)
SODIUM SERPL-SCNC: 143 MMOL/L (ref 136–145)
TRIGL SERPL-MCNC: 146 MG/DL
WBC # BLD AUTO: 7.9 10E3/UL (ref 4–11)

## 2023-01-18 PROCEDURE — 85027 COMPLETE CBC AUTOMATED: CPT | Performed by: FAMILY MEDICINE

## 2023-01-18 PROCEDURE — 82043 UR ALBUMIN QUANTITATIVE: CPT | Performed by: FAMILY MEDICINE

## 2023-01-18 PROCEDURE — 80061 LIPID PANEL: CPT | Performed by: FAMILY MEDICINE

## 2023-01-18 PROCEDURE — G0439 PPPS, SUBSEQ VISIT: HCPCS | Performed by: FAMILY MEDICINE

## 2023-01-18 PROCEDURE — 36415 COLL VENOUS BLD VENIPUNCTURE: CPT | Performed by: FAMILY MEDICINE

## 2023-01-18 PROCEDURE — 80053 COMPREHEN METABOLIC PANEL: CPT | Performed by: FAMILY MEDICINE

## 2023-01-18 PROCEDURE — 99214 OFFICE O/P EST MOD 30 MIN: CPT | Mod: 25 | Performed by: FAMILY MEDICINE

## 2023-01-18 PROCEDURE — 82570 ASSAY OF URINE CREATININE: CPT | Performed by: FAMILY MEDICINE

## 2023-01-18 PROCEDURE — 83036 HEMOGLOBIN GLYCOSYLATED A1C: CPT | Performed by: FAMILY MEDICINE

## 2023-01-18 RX ORDER — BLOOD SUGAR DIAGNOSTIC
STRIP MISCELLANEOUS
Qty: 100 STRIP | Refills: 0 | Status: SHIPPED | OUTPATIENT
Start: 2023-01-18 | End: 2023-05-28

## 2023-01-18 ASSESSMENT — ENCOUNTER SYMPTOMS
MYALGIAS: 0
FEVER: 0
HEMATOCHEZIA: 0
COUGH: 0
FREQUENCY: 1
SHORTNESS OF BREATH: 0
JOINT SWELLING: 0
DYSURIA: 0
SORE THROAT: 0
DIARRHEA: 0
HEARTBURN: 0
DIZZINESS: 0
ARTHRALGIAS: 0
PALPITATIONS: 0
CONSTIPATION: 0
WEAKNESS: 0
NERVOUS/ANXIOUS: 0
HEMATURIA: 0
HEADACHES: 0
PARESTHESIAS: 0
ABDOMINAL PAIN: 0
EYE PAIN: 0
NAUSEA: 0
CHILLS: 0

## 2023-01-18 ASSESSMENT — ACTIVITIES OF DAILY LIVING (ADL): CURRENT_FUNCTION: NO ASSISTANCE NEEDED

## 2023-01-18 NOTE — PATIENT INSTRUCTIONS
Check another A1C in 3 months.    Fasting labs today    Home BP monitoring and follow up if > 140/90

## 2023-01-18 NOTE — LETTER
January 21, 2023      Isaiah Fortune  [address] [facility] [address]        Dear ,  We are writing to inform you of your test results.    Volodymyr Colon:  Your diabetic control is fair and the plan is to recheck the A1C in 3 months.  In the meantime continue the diabetic diet and work on exercise and weight loss.    The cholesterol panel is excellent.    There is a large amount of protein in your urine.  It would be a good idea for you to start a BP medication called LISINOPRIL.  This will help with your blood pressure control as well as help protect the kidneys.  If you are interested in starting this let me know and I will call it in.  We would then recheck a potassium and kidney function in 3 weeks.  You would need to watch for any swelling of the lips (occurs infrequently) or a tickly cough (which occurs more often).    The remaining labs are normal.    Resulted Orders   HEMOGLOBIN A1C   Result Value Ref Range    Hemoglobin A1C 7.4 (H) 0.0 - 5.6 %      Comment:      Normal <5.7%   Prediabetes 5.7-6.4%    Diabetes 6.5% or higher     Note: Adopted from ADA consensus guidelines.   Comprehensive metabolic panel (BMP + Alb, Alk Phos, ALT, AST, Total. Bili, TP)   Result Value Ref Range    Sodium 143 136 - 145 mmol/L    Potassium 4.5 3.4 - 5.3 mmol/L    Chloride 103 98 - 107 mmol/L    Carbon Dioxide (CO2) 22 22 - 29 mmol/L    Anion Gap 18 (H) 7 - 15 mmol/L    Urea Nitrogen 22.1 8.0 - 23.0 mg/dL    Creatinine 1.14 0.67 - 1.17 mg/dL    Calcium 10.0 8.8 - 10.2 mg/dL    Glucose 158 (H) 70 - 99 mg/dL    Alkaline Phosphatase 98 40 - 129 U/L    AST 21 10 - 50 U/L    ALT 12 10 - 50 U/L    Protein Total 7.1 6.4 - 8.3 g/dL    Albumin 4.3 3.5 - 5.2 g/dL    Bilirubin Total 0.5 <=1.2 mg/dL    GFR Estimate 63 >60 mL/min/1.73m2      Comment:      Effective December 21, 2021 eGFRcr in adults is calculated using the 2021 CKD-EPI creatinine equation which includes age and gender (Gurinder et al., NEJM, DOI: 10.1056/[id number])    Lipid panel   Result Value Ref Range    Cholesterol 155 <200 mg/dL    Triglycerides 146 <150 mg/dL    Direct Measure HDL 50 >=40 mg/dL    LDL Cholesterol Calculated 76 <=100 mg/dL    Non HDL Cholesterol 105 <130 mg/dL    Narrative    Cholesterol  Desirable:  <200 mg/dL    Triglycerides  Normal:  Less than 150 mg/dL  Borderline High:  150-199 mg/dL  High:  200-499 mg/dL  Very High:  Greater than or equal to 500 mg/dL    Direct Measure HDL  Female:  Greater than or equal to 50 mg/dL   Male:  Greater than or equal to 40 mg/dL    LDL Cholesterol  Desirable:  <100mg/dL  Above Desirable:  100-129 mg/dL   Borderline High:  130-159 mg/dL   High:  160-189 mg/dL   Very High:  >= 190 mg/dL    Non HDL Cholesterol  Desirable:  130 mg/dL  Above Desirable:  130-159 mg/dL  Borderline High:  160-189 mg/dL  High:  190-219 mg/dL  Very High:  Greater than or equal to 220 mg/dL   CBC with platelets   Result Value Ref Range    WBC Count 7.9 4.0 - 11.0 10e3/uL    RBC Count 4.91 4.40 - 5.90 10e6/uL    Hemoglobin 13.7 13.3 - 17.7 g/dL    Hematocrit 43.2 40.0 - 53.0 %    MCV 88 78 - 100 fL    MCH 27.9 26.5 - 33.0 pg    MCHC 31.7 31.5 - 36.5 g/dL    RDW 13.6 10.0 - 15.0 %    Platelet Count 250 150 - 450 10e3/uL   Albumin Random Urine Quantitative with Creat Ratio   Result Value Ref Range    Albumin Urine mg/L 608.0 mg/L      Comment:      The reference ranges have not been established in urine albumin. The results should be integrated into the clinical context for interpretation.    Albumin Urine mg/g Cr 345.45 (H) 0.00 - 17.00 mg/g Cr      Comment:      Microalbuminuria is defined as an albumin:creatinine ratio of 17 to 299 for males and 25 to 299 for females. A ratio of albumin:creatinine of 300 or higher is indicative of overt proteinuria.  Due to biologic variability, positive results should be confirmed by a second, first-morning random or 24-hour timed urine specimen. If there is discrepancy, a third specimen is recommended. When 2  out of 3 results are in the microalbuminuria range, this is evidence for incipient nephropathy and warrants increased efforts at glucose control, blood pressure control, and institution of therapy with an angiotensin-converting-enzyme (ACE) inhibitor (if the patient can tolerate it).      Creatinine Urine mg/dL 176.0 mg/dL      Comment:      The reference ranges have not been established in urine creatinine. The results should be integrated into the clinical context for interpretation.       If you have any questions or concerns, please call the clinic at the number listed above.       Sincerely,      Luis Angel Parra MD

## 2023-01-18 NOTE — PROGRESS NOTES
"SUBJECTIVE:   Isaiah is a 87 year old who presents for Preventive Visit.  Patient has been advised of split billing requirements and indicates understanding: Yes  Are you in the first 12 months of your Medicare coverage?  No    Healthy Habits:     In general, how would you rate your overall health?  Good    Frequency of exercise:  6-7 days/week    Duration of exercise:  45-60 minutes    Do you usually eat at least 4 servings of fruit and vegetables a day, include whole grains    & fiber and avoid regularly eating high fat or \"junk\" foods?  No    Taking medications regularly:  Yes    Barriers to taking medications:  None    Medication side effects:  Not applicable    Ability to successfully perform activities of daily living:  No assistance needed    Home Safety:  No safety concerns identified    Hearing Impairment:  Difficulty following a conversation in a noisy restaurant or crowded room and difficulty understanding soft or whispered speech    In the past 6 months, have you been bothered by leaking of urine?  No    In general, how would you rate your overall mental or emotional health?  Good      PHQ-2 Total Score: 0    Additional concerns today:  No      Have you ever done Advance Care Planning? (For example, a Health Directive, POLST, or a discussion with a medical provider or your loved ones about your wishes): Yes, patient states has an Advance Care Planning document and will bring a copy to the clinic.       Fall risk  Fallen 2 or more times in the past year?: No  Any fall with injury in the past year?: No    Cognitive Screening   1) Repeat 3 items (Leader, Season, Table)    2) Clock draw: NORMAL  3) 3 item recall: Recalls 3 objects  Results: 3 items recalled: COGNITIVE IMPAIRMENT LESS LIKELY    Mini-CogTM Copyright [employer]. Licensed by the author for use in [facility]; reprinted with permission ([email or web address]@.[email or web address]). All rights reserved.      Do you have sleep apnea, excessive snoring or daytime " drowsiness?: no    Reviewed and updated as needed this visit by clinical staff   Tobacco  Allergies  Meds              Reviewed and updated as needed this visit by Provider                 Social History     Tobacco Use     Smoking status: Former     Types: Cigarettes     Smokeless tobacco: Former     Quit date: 12/8/1967   Substance Use Topics     Alcohol use: Yes     Alcohol/week: 4.0 standard drinks     If you drink alcohol do you typically have >3 drinks per day or >7 drinks per week? No    Alcohol Use 1/18/2023   Prescreen: >3 drinks/day or >7 drinks/week? No   Prescreen: >3 drinks/day or >7 drinks/week? -   No flowsheet data found.      Current providers sharing in care for this patient include:   Patient Care Team:  Luis Angel Parra MD as PCP - General (Family Practice)  Linnea Parish PharmD as Pharmacist (Pharmacist)  Luis Angel Parra MD as Assigned PCP    The following health maintenance items are reviewed in Epic and correct as of today:  Health Maintenance   Topic Date Due     DIABETIC FOOT EXAM  Never done     ANNUAL REVIEW OF HM ORDERS  Never done     MICROALBUMIN  08/04/2021     EYE EXAM  07/01/2022     MEDICARE ANNUAL WELLNESS VISIT  01/28/2023     BMP  06/27/2023     LIPID  06/27/2023     A1C  07/18/2023     FALL RISK ASSESSMENT  01/18/2024     COLORECTAL CANCER SCREENING  01/18/2027     ADVANCE CARE PLANNING  01/18/2028     DTAP/TDAP/TD IMMUNIZATION (3 - Td or Tdap) 06/27/2032     PHQ-2 (once per calendar year)  Completed     INFLUENZA VACCINE  Completed     Pneumococcal Vaccine: 65+ Years  Completed     ZOSTER IMMUNIZATION  Completed     COVID-19 Vaccine  Completed     IPV IMMUNIZATION  Aged Out     MENINGITIS IMMUNIZATION  Aged Out       Review of Systems   Constitutional: Negative for chills and fever.   HENT: Positive for hearing loss. Negative for congestion, ear pain and sore throat.    Eyes: Negative for pain and visual disturbance.   Respiratory: Negative for cough and  "shortness of breath.    Cardiovascular: Negative for chest pain, palpitations and peripheral edema.   Gastrointestinal: Negative for abdominal pain, constipation, diarrhea, heartburn, hematochezia and nausea.   Genitourinary: Positive for frequency. Negative for dysuria, genital sores, hematuria and urgency.   Musculoskeletal: Negative for arthralgias, joint swelling and myalgias.   Skin: Negative for rash.   Neurological: Negative for dizziness, weakness, headaches and paresthesias.   Psychiatric/Behavioral: Negative for mood changes. The patient is not nervous/anxious.          OBJECTIVE:   BP (!) 157/78   Pulse 72   Resp 16   Ht 1.638 m (5' 4.5\")   Wt 101.7 kg (224 lb 3.2 oz)   SpO2 97%   BMI 37.89 kg/m   Estimated body mass index is 37.89 kg/m  as calculated from the following:    Height as of this encounter: 1.638 m (5' 4.5\").    Weight as of this encounter: 101.7 kg (224 lb 3.2 oz).     Wt Readings from Last 4 Encounters:   01/18/23 101.7 kg (224 lb 3.2 oz)   06/27/22 103 kg (227 lb)   01/28/22 103 kg (227 lb)   09/29/21 100.6 kg (221 lb 12.8 oz)     Physical Exam  GENERAL: healthy, alert and no distress  EYES: Eyes grossly normal to inspection, PERRL and conjunctivae and sclerae normal  HENT: ear canals and TM's normal, nose and mouth without ulcers or lesions  NECK: no adenopathy, no asymmetry, masses, or scars and thyroid normal to palpation  RESP: lungs clear to auscultation - no rales, rhonchi or wheezes  CV: regular rate and rhythm, normal S1 S2, no S3 or S4, no murmur, click or rub, no peripheral edema and peripheral pulses strong  ABDOMEN: soft, nontender, no hepatosplenomegaly, no masses and bowel sounds normal  RECTAL: deferred  MS: no gross musculoskeletal defects noted, no edema  SKIN: no suspicious lesions or rashes  NEURO: Normal strength and tone, mentation intact and speech normal  PSYCH: mentation appears normal, affect normal/bright    Lab Results   Component Value Date    A1C 7.4 " "01/18/2023    A1C 7.1 06/27/2022    A1C 7.1 01/28/2022    A1C 6.4 09/20/2021    A1C 6.8 05/18/2021         ASSESSMENT / PLAN:     Encounter Diagnoses   Name Primary?     Encounter for Medicare annual wellness exam      Diabetes mellitus type 2, noninsulin dependent (H), well controlled      Type 2 diabetes mellitus without complication, without long-term current use of insulin (H)      Benign Essential Hypertension, slightly elevated, follow home BPs      Hyperlipidemia, unspecified hyperlipidemia type, stable on statin, check lipids and ALT         PLAN:   Check another A1C in 3 months.    Fasting labs today     Home BP monitoring and follow up if > 140/90    Patient has been advised of split billing requirements and indicates understanding: Yes      COUNSELING:  Reviewed preventive health counseling, as reflected in patient instructions       Regular exercise       Healthy diet/nutrition      BMI:   Estimated body mass index is 37.89 kg/m  as calculated from the following:    Height as of this encounter: 1.638 m (5' 4.5\").    Weight as of this encounter: 101.7 kg (224 lb 3.2 oz).   Weight management plan: Discussed healthy diet and exercise guidelines      He reports that he has quit smoking. His smoking use included cigarettes. He quit smokeless tobacco use about 55 years ago.      Appropriate preventive services were discussed with this patient, including applicable screening as appropriate for cardiovascular disease, diabetes, osteopenia/osteoporosis, and glaucoma.  As appropriate for age/gender, discussed screening for colorectal cancer, prostate cancer, breast cancer, and cervical cancer. Checklist reviewing preventive services available has been given to the patient.    Reviewed patients plan of care and provided an AVS. The Basic Care Plan (routine screening as documented in Health Maintenance) for Preston meets the Care Plan requirement. This Care Plan has been established and reviewed with the " Patient.      Luis Angel Parra MD  [facility]    Identified Health Risks:

## 2023-01-21 ENCOUNTER — MYC MEDICAL ADVICE (OUTPATIENT)
Dept: FAMILY MEDICINE | Facility: CLINIC | Age: 88
End: 2023-01-21
Payer: COMMERCIAL

## 2023-01-21 DIAGNOSIS — R80.9 PROTEINURIA, UNSPECIFIED TYPE: Primary | ICD-10-CM

## 2023-01-21 DIAGNOSIS — I10 ESSENTIAL HYPERTENSION, BENIGN: ICD-10-CM

## 2023-01-23 RX ORDER — LISINOPRIL 5 MG/1
5 TABLET ORAL DAILY
Qty: 30 TABLET | Refills: 1 | Status: SHIPPED | OUTPATIENT
Start: 2023-01-23 | End: 2023-02-16

## 2023-01-23 NOTE — TELEPHONE ENCOUNTER
Informed patient of Dr. Parra's message below. Patient voiced understanding. He will call back to schedule follow up with Dr. Parra.

## 2023-01-23 NOTE — TELEPHONE ENCOUNTER
Begin lisinopril 5 mg daily.  I will call the new rx.  See me in 3 weeks for a BP check and follow up labs.

## 2023-02-10 ENCOUNTER — OFFICE VISIT (OUTPATIENT)
Dept: FAMILY MEDICINE | Facility: CLINIC | Age: 88
End: 2023-02-10
Payer: COMMERCIAL

## 2023-02-10 VITALS
SYSTOLIC BLOOD PRESSURE: 134 MMHG | RESPIRATION RATE: 24 BRPM | BODY MASS INDEX: 37.4 KG/M2 | OXYGEN SATURATION: 99 % | HEART RATE: 60 BPM | DIASTOLIC BLOOD PRESSURE: 62 MMHG | TEMPERATURE: 97.8 F | HEIGHT: 65 IN | WEIGHT: 224.5 LBS

## 2023-02-10 DIAGNOSIS — I10 ESSENTIAL HYPERTENSION, BENIGN: ICD-10-CM

## 2023-02-10 DIAGNOSIS — R80.9 PROTEINURIA, UNSPECIFIED TYPE: ICD-10-CM

## 2023-02-10 LAB
ANION GAP SERPL CALCULATED.3IONS-SCNC: 11 MMOL/L (ref 7–15)
BUN SERPL-MCNC: 26.4 MG/DL (ref 8–23)
CALCIUM SERPL-MCNC: 9.2 MG/DL (ref 8.8–10.2)
CHLORIDE SERPL-SCNC: 105 MMOL/L (ref 98–107)
CREAT SERPL-MCNC: 1.1 MG/DL (ref 0.67–1.17)
DEPRECATED HCO3 PLAS-SCNC: 25 MMOL/L (ref 22–29)
GFR SERPL CREATININE-BSD FRML MDRD: 65 ML/MIN/1.73M2
GLUCOSE SERPL-MCNC: 145 MG/DL (ref 70–99)
POTASSIUM SERPL-SCNC: 4.7 MMOL/L (ref 3.4–5.3)
SODIUM SERPL-SCNC: 141 MMOL/L (ref 136–145)

## 2023-02-10 PROCEDURE — 99213 OFFICE O/P EST LOW 20 MIN: CPT | Performed by: FAMILY MEDICINE

## 2023-02-10 PROCEDURE — 36415 COLL VENOUS BLD VENIPUNCTURE: CPT | Performed by: FAMILY MEDICINE

## 2023-02-10 PROCEDURE — 80048 BASIC METABOLIC PNL TOTAL CA: CPT | Performed by: FAMILY MEDICINE

## 2023-02-10 NOTE — PROGRESS NOTES
"  Assessment & Plan     Proteinuria, unspecified type, on ACE (18 days)    - Basic metabolic panel  (Ca, Cl, CO2, Creat, Gluc, K, Na, BUN); Future    Benign Essential Hypertension  controlled      No follow-ups on file.    Luis Angel Parra MD  [facility] CEASAR Colon is a 87 year old presenting for the following health issues:  Diabetes      HPI     Started lisinopril 18 days ago.  No lightheadedness.  No tickly cough.  Home BP was recently 144/64      Objective    /62   Pulse 60   Temp 97.8  F (36.6  C) (Oral)   Resp 24   Ht 1.638 m (5' 4.5\")   Wt 101.8 kg (224 lb 8 oz)   SpO2 99%   BMI 37.94 kg/m    Body mass index is 37.94 kg/m .     BP Readings from Last 6 Encounters:   02/10/23 134/62   01/18/23 (!) 157/78   06/27/22 128/65   01/28/22 126/63   09/29/21 134/56   07/08/21 132/67     Physical Exam   GENERAL: healthy, alert and no distress  RESP: lungs clear to auscultation - no rales, rhonchi or wheezes  CV: regular rate and rhythm, normal S1 S2, no S3 or S4, no murmur, click or rub, no peripheral edema and peripheral pulses strong  MS: no gross musculoskeletal defects noted, no edema    Lab Results   Component Value Date    A1C 7.4 01/18/2023    A1C 7.1 06/27/2022    A1C 7.1 01/28/2022    A1C 6.4 09/20/2021    A1C 6.8 05/18/2021       Encounter Diagnoses   Name Primary?     Proteinuria, unspecified type, on ACE      Benign Essential Hypertension, controlled       PLAN:   Recheck BMP today         "

## 2023-02-10 NOTE — LETTER
February 10, 2023      Isaiah Fortune  [facility] [facility] [address]        Dear ,  We are writing to inform you of your test results.    Hi Isaiah:  Your potassium and kidney function are normal.  The blood sugar is good in light of your diabetic state.  The remaining labs are normal.    Resulted Orders   Basic metabolic panel  (Ca, Cl, CO2, Creat, Gluc, K, Na, BUN)   Result Value Ref Range    Sodium 141 136 - 145 mmol/L    Potassium 4.7 3.4 - 5.3 mmol/L    Chloride 105 98 - 107 mmol/L    Carbon Dioxide (CO2) 25 22 - 29 mmol/L    Anion Gap 11 7 - 15 mmol/L    Urea Nitrogen 26.4 (H) 8.0 - 23.0 mg/dL    Creatinine 1.10 0.67 - 1.17 mg/dL    Calcium 9.2 8.8 - 10.2 mg/dL    Glucose 145 (H) 70 - 99 mg/dL    GFR Estimate 65 >60 mL/min/1.73m2      Comment:      eGFR calculated using 2021 CKD-EPI equation.       If you have any questions or concerns, please call the clinic at the number listed above.       Sincerely,      Luis Angel Parra MD

## 2023-02-14 DIAGNOSIS — I10 ESSENTIAL HYPERTENSION, BENIGN: ICD-10-CM

## 2023-02-14 DIAGNOSIS — R80.9 PROTEINURIA, UNSPECIFIED TYPE: ICD-10-CM

## 2023-02-16 RX ORDER — LISINOPRIL 5 MG/1
TABLET ORAL
Qty: 30 TABLET | Refills: 11 | Status: SHIPPED | OUTPATIENT
Start: 2023-02-16 | End: 2024-02-09

## 2023-02-16 NOTE — TELEPHONE ENCOUNTER
"Last Written Prescription Date:  1/23/23  Last Fill Quantity: 30,  # refills: 1   Last office visit provider:  2/10/23     Requested Prescriptions   Pending Prescriptions Disp Refills     lisinopril (ZESTRIL) 5 MG tablet [Pharmacy Med Name: LISINOPRIL 5 MG TABLET] 30 tablet 1     Sig: TAKE 1 TABLET BY MOUTH EVERY DAY       ACE Inhibitors (Including Combos) Protocol Passed - 2/14/2023  9:32 AM        Passed - Blood pressure under 140/90 in past 12 months     BP Readings from Last 3 Encounters:   02/10/23 134/62   01/18/23 (!) 157/78   06/27/22 128/65                 Passed - Recent (12 mo) or future (30 days) visit within the authorizing provider's specialty     Patient has had an office visit with the authorizing provider or a provider within the authorizing providers department within the previous 12 mos or has a future within next 30 days. See \"Patient Info\" tab in inbasket, or \"Choose Columns\" in Meds & Orders section of the refill encounter.              Passed - Medication is active on med list        Passed - Patient is age 18 or older        Passed - Normal serum creatinine on file in past 12 months     Recent Labs   Lab Test 02/10/23  0852   CR 1.10       Ok to refill medication if creatinine is low          Passed - Normal serum potassium on file in past 12 months     Recent Labs   Lab Test 02/10/23  0852   POTASSIUM 4.7                  Pia Monge, RN 02/16/23 10:03 AM  "

## 2023-04-20 ENCOUNTER — LAB (OUTPATIENT)
Dept: LAB | Facility: CLINIC | Age: 88
End: 2023-04-20
Payer: COMMERCIAL

## 2023-04-20 DIAGNOSIS — E11.9 TYPE 2 DIABETES MELLITUS WITHOUT COMPLICATION, WITHOUT LONG-TERM CURRENT USE OF INSULIN (H): ICD-10-CM

## 2023-04-20 LAB — HBA1C MFR BLD: 7.6 % (ref 0–5.6)

## 2023-04-20 PROCEDURE — 83036 HEMOGLOBIN GLYCOSYLATED A1C: CPT

## 2023-04-20 PROCEDURE — 36415 COLL VENOUS BLD VENIPUNCTURE: CPT

## 2023-05-28 DIAGNOSIS — E11.9 DIABETES MELLITUS TYPE 2, NONINSULIN DEPENDENT (H): ICD-10-CM

## 2023-05-28 RX ORDER — BLOOD SUGAR DIAGNOSTIC
STRIP MISCELLANEOUS
Qty: 100 STRIP | Refills: 0 | Status: SHIPPED | OUTPATIENT
Start: 2023-05-28 | End: 2023-10-10

## 2023-05-29 NOTE — TELEPHONE ENCOUNTER
"Last Written Prescription Date:  1/18/2023  Last Fill Quantity: 100,  # refills: 0   Last office visit provider:  2/10/2023     Requested Prescriptions   Pending Prescriptions Disp Refills     ONETOUCH ULTRA test strip [Pharmacy Med Name: ONE TOUCH ULTRA BLUE TEST STRP] 100 strip 0     Sig: USE TO TEST ONCE DAILY       Diabetic Supplies Protocol Passed - 5/28/2023  1:51 PM        Passed - Medication is active on med list        Passed - Patient is 18 years of age or older        Passed - Recent (6 mo) or future (30 days) visit within the authorizing provider's specialty     Patient had office visit in the last 6 months or has a visit in the next 30 days with authorizing provider.  See \"Patient Info\" tab in inbasket, or \"Choose Columns\" in Meds & Orders section of the refill encounter.                 Rhonda Ziegler RN 05/28/23 11:05 PM  "

## 2023-08-21 DIAGNOSIS — I10 HTN (HYPERTENSION): ICD-10-CM

## 2023-08-21 RX ORDER — AMLODIPINE BESYLATE 10 MG/1
10 TABLET ORAL DAILY
Qty: 90 TABLET | Refills: 1 | Status: SHIPPED | OUTPATIENT
Start: 2023-08-21 | End: 2024-02-09

## 2023-08-21 NOTE — TELEPHONE ENCOUNTER
"Last Written Prescription Date:  8/12/22  Last Fill Quantity: 90,  # refills: 3   Last office visit provider:   2/10/23 with mak    Requested Prescriptions   Pending Prescriptions Disp Refills    amLODIPine (NORVASC) 10 MG tablet [Pharmacy Med Name: AMLODIPINE BESYLATE 10 MG TAB] 90 tablet 1     Sig: TAKE 1 TABLET (10 MG) BY MOUTH DAILY.       Calcium Channel Blockers Protocol  Passed - 8/21/2023 12:27 PM        Passed - Blood pressure under 140/90 in past 12 months     BP Readings from Last 3 Encounters:   02/10/23 134/62   01/18/23 (!) 157/78   06/27/22 128/65                 Passed - Recent (12 mo) or future (30 days) visit within the authorizing provider's specialty     Patient has had an office visit with the authorizing provider or a provider within the authorizing providers department within the previous 12 mos or has a future within next 30 days. See \"Patient Info\" tab in inbasket, or \"Choose Columns\" in Meds & Orders section of the refill encounter.              Passed - Medication is active on med list        Passed - Patient is age 18 or older        Passed - Normal serum creatinine on file in past 12 months     Recent Labs   Lab Test 02/10/23  0852   CR 1.10       Ok to refill medication if creatinine is low               BRYAN YAN RN 08/21/23 12:27 PM  "

## 2023-08-23 DIAGNOSIS — E78.5 HYPERLIPIDEMIA: ICD-10-CM

## 2023-08-23 RX ORDER — PRAVASTATIN SODIUM 40 MG
TABLET ORAL
Qty: 90 TABLET | Refills: 2 | Status: SHIPPED | OUTPATIENT
Start: 2023-08-23 | End: 2024-03-12

## 2023-08-23 NOTE — TELEPHONE ENCOUNTER
"Last Written Prescription Date:  8/15/22  Last Fill Quantity: 90,  # refills: 3   Last office visit provider:  2/10/23     Requested Prescriptions   Pending Prescriptions Disp Refills    pravastatin (PRAVACHOL) 40 MG tablet [Pharmacy Med Name: PRAVASTATIN SODIUM 40 MG TAB] 90 tablet 2     Sig: TAKE 1 TABLET BY MOUTH EVERYDAY AT BEDTIME       Statins Protocol Passed - 8/23/2023  8:56 AM        Passed - LDL on file in past 12 months     Recent Labs   Lab Test 01/18/23  1019   LDL 76             Passed - No abnormal creatine kinase in past 12 months     No lab results found.             Passed - Recent (12 mo) or future (30 days) visit within the authorizing provider's specialty     Patient has had an office visit with the authorizing provider or a provider within the authorizing providers department within the previous 12 mos or has a future within next 30 days. See \"Patient Info\" tab in inbasket, or \"Choose Columns\" in Meds & Orders section of the refill encounter.              Passed - Medication is active on med list        Passed - Patient is age 18 or older             Yandy Souza RN 08/23/23 12:55 PM  "

## 2023-10-09 DIAGNOSIS — E11.9 DIABETES MELLITUS TYPE 2, NONINSULIN DEPENDENT (H): ICD-10-CM

## 2023-10-10 RX ORDER — BLOOD SUGAR DIAGNOSTIC
STRIP MISCELLANEOUS
Qty: 100 STRIP | Refills: 0 | Status: SHIPPED | OUTPATIENT
Start: 2023-10-10

## 2023-11-26 ENCOUNTER — HEALTH MAINTENANCE LETTER (OUTPATIENT)
Age: 88
End: 2023-11-26

## 2023-11-29 ENCOUNTER — TRANSFERRED RECORDS (OUTPATIENT)
Dept: HEALTH INFORMATION MANAGEMENT | Facility: CLINIC | Age: 88
End: 2023-11-29
Payer: COMMERCIAL

## 2023-11-29 LAB — RETINOPATHY: NEGATIVE

## 2023-12-14 ENCOUNTER — NURSE TRIAGE (OUTPATIENT)
Dept: NURSING | Facility: CLINIC | Age: 88
End: 2023-12-14
Payer: COMMERCIAL

## 2023-12-15 ENCOUNTER — OFFICE VISIT (OUTPATIENT)
Dept: FAMILY MEDICINE | Facility: CLINIC | Age: 88
End: 2023-12-15
Payer: COMMERCIAL

## 2023-12-15 VITALS
DIASTOLIC BLOOD PRESSURE: 83 MMHG | HEART RATE: 72 BPM | SYSTOLIC BLOOD PRESSURE: 141 MMHG | RESPIRATION RATE: 20 BRPM | TEMPERATURE: 98.1 F | OXYGEN SATURATION: 97 % | BODY MASS INDEX: 38.96 KG/M2 | HEIGHT: 64 IN | WEIGHT: 228.2 LBS

## 2023-12-15 DIAGNOSIS — E11.9 TYPE 2 DIABETES MELLITUS WITHOUT COMPLICATION, WITHOUT LONG-TERM CURRENT USE OF INSULIN (H): ICD-10-CM

## 2023-12-15 DIAGNOSIS — D64.9 ANEMIA, UNSPECIFIED TYPE: Primary | ICD-10-CM

## 2023-12-15 DIAGNOSIS — Z00.00 ENCOUNTER FOR MEDICARE ANNUAL WELLNESS EXAM: ICD-10-CM

## 2023-12-15 DIAGNOSIS — I10 ESSENTIAL HYPERTENSION, BENIGN: ICD-10-CM

## 2023-12-15 DIAGNOSIS — E66.01 MORBID OBESITY (H): ICD-10-CM

## 2023-12-15 DIAGNOSIS — E78.5 HYPERLIPIDEMIA, UNSPECIFIED HYPERLIPIDEMIA TYPE: ICD-10-CM

## 2023-12-15 LAB
ALBUMIN SERPL BCG-MCNC: 4.2 G/DL (ref 3.5–5.2)
ALP SERPL-CCNC: 95 U/L (ref 40–150)
ALT SERPL W P-5'-P-CCNC: 11 U/L (ref 0–70)
ANION GAP SERPL CALCULATED.3IONS-SCNC: 11 MMOL/L (ref 7–15)
AST SERPL W P-5'-P-CCNC: 17 U/L (ref 0–45)
BILIRUB SERPL-MCNC: 0.3 MG/DL
BUN SERPL-MCNC: 30 MG/DL (ref 8–23)
CALCIUM SERPL-MCNC: 9.5 MG/DL (ref 8.8–10.2)
CHLORIDE SERPL-SCNC: 105 MMOL/L (ref 98–107)
CHOLEST SERPL-MCNC: 136 MG/DL
CREAT SERPL-MCNC: 1.29 MG/DL (ref 0.67–1.17)
DEPRECATED HCO3 PLAS-SCNC: 25 MMOL/L (ref 22–29)
EGFRCR SERPLBLD CKD-EPI 2021: 54 ML/MIN/1.73M2
ERYTHROCYTE [DISTWIDTH] IN BLOOD BY AUTOMATED COUNT: 13.7 % (ref 10–15)
FASTING STATUS PATIENT QL REPORTED: YES
GLUCOSE SERPL-MCNC: 139 MG/DL (ref 70–99)
HBA1C MFR BLD: 6.8 % (ref 0–5.6)
HCT VFR BLD AUTO: 37.7 % (ref 40–53)
HDLC SERPL-MCNC: 48 MG/DL
HGB BLD-MCNC: 11.9 G/DL (ref 13.3–17.7)
LDLC SERPL CALC-MCNC: 68 MG/DL
MCH RBC QN AUTO: 28.7 PG (ref 26.5–33)
MCHC RBC AUTO-ENTMCNC: 31.6 G/DL (ref 31.5–36.5)
MCV RBC AUTO: 91 FL (ref 78–100)
NONHDLC SERPL-MCNC: 88 MG/DL
PLATELET # BLD AUTO: 261 10E3/UL (ref 150–450)
POTASSIUM SERPL-SCNC: 5.1 MMOL/L (ref 3.4–5.3)
PROT SERPL-MCNC: 7 G/DL (ref 6.4–8.3)
RBC # BLD AUTO: 4.15 10E6/UL (ref 4.4–5.9)
SODIUM SERPL-SCNC: 141 MMOL/L (ref 135–145)
TRIGL SERPL-MCNC: 98 MG/DL
WBC # BLD AUTO: 8.5 10E3/UL (ref 4–11)

## 2023-12-15 PROCEDURE — 83540 ASSAY OF IRON: CPT | Performed by: FAMILY MEDICINE

## 2023-12-15 PROCEDURE — 85027 COMPLETE CBC AUTOMATED: CPT | Performed by: FAMILY MEDICINE

## 2023-12-15 PROCEDURE — 80061 LIPID PANEL: CPT | Performed by: FAMILY MEDICINE

## 2023-12-15 PROCEDURE — 99214 OFFICE O/P EST MOD 30 MIN: CPT | Mod: 25 | Performed by: FAMILY MEDICINE

## 2023-12-15 PROCEDURE — 36415 COLL VENOUS BLD VENIPUNCTURE: CPT | Performed by: FAMILY MEDICINE

## 2023-12-15 PROCEDURE — 80053 COMPREHEN METABOLIC PANEL: CPT | Performed by: FAMILY MEDICINE

## 2023-12-15 PROCEDURE — G0439 PPPS, SUBSEQ VISIT: HCPCS | Performed by: FAMILY MEDICINE

## 2023-12-15 PROCEDURE — 83036 HEMOGLOBIN GLYCOSYLATED A1C: CPT | Performed by: FAMILY MEDICINE

## 2023-12-15 PROCEDURE — 82728 ASSAY OF FERRITIN: CPT | Performed by: FAMILY MEDICINE

## 2023-12-15 RX ORDER — RESPIRATORY SYNCYTIAL VIRUS VACCINE 120MCG/0.5
0.5 KIT INTRAMUSCULAR ONCE
Qty: 1 EACH | Refills: 0 | Status: CANCELLED | OUTPATIENT
Start: 2023-12-15 | End: 2023-12-15

## 2023-12-15 ASSESSMENT — ENCOUNTER SYMPTOMS
NAUSEA: 0
CHILLS: 0
CONSTIPATION: 0
HEADACHES: 0
WEAKNESS: 0
DIARRHEA: 0
FREQUENCY: 0
HEMATOCHEZIA: 0
MYALGIAS: 0
COUGH: 0
PARESTHESIAS: 0
HEMATURIA: 0
JOINT SWELLING: 0
DYSURIA: 0
EYE PAIN: 0
DIZZINESS: 0
HEARTBURN: 0
SORE THROAT: 0
NERVOUS/ANXIOUS: 0
PALPITATIONS: 0
SHORTNESS OF BREATH: 0
ABDOMINAL PAIN: 0

## 2023-12-15 ASSESSMENT — ACTIVITIES OF DAILY LIVING (ADL): CURRENT_FUNCTION: NO ASSISTANCE NEEDED

## 2023-12-15 NOTE — LETTER
"December 19, 2023      Isaiah Fortune  [address] [address] [facility] [address]        Dear ,  We are writing to inform you of your test results.    Hi Isaiah:  Your iron storage is normal.  Your iron level is just slightly low.  Try to enrich your diet with foods rich in iron.  You may Google \"iron rich foods\" for a complete list.  I would also recheck a hemoglobin in 1-2 months for which I have placed an order.    Resulted Orders   HEMOGLOBIN A1C   Result Value Ref Range    Hemoglobin A1C 6.8 (H) 0.0 - 5.6 %      Comment:      Normal <5.7%   Prediabetes 5.7-6.4%    Diabetes 6.5% or higher     Note: Adopted from ADA consensus guidelines.   CBC with platelets   Result Value Ref Range    WBC Count 8.5 4.0 - 11.0 10e3/uL    RBC Count 4.15 (L) 4.40 - 5.90 10e6/uL    Hemoglobin 11.9 (L) 13.3 - 17.7 g/dL    Hematocrit 37.7 (L) 40.0 - 53.0 %    MCV 91 78 - 100 fL    MCH 28.7 26.5 - 33.0 pg    MCHC 31.6 31.5 - 36.5 g/dL    RDW 13.7 10.0 - 15.0 %    Platelet Count 261 150 - 450 10e3/uL   Comprehensive metabolic panel (BMP + Alb, Alk Phos, ALT, AST, Total. Bili, TP)   Result Value Ref Range    Sodium 141 135 - 145 mmol/L      Comment:      Reference intervals for this test were updated on 09/26/2023 to more accurately reflect our healthy population. There may be differences in the flagging of prior results with similar values performed with this method. Interpretation of those prior results can be made in the context of the updated reference intervals.     Potassium 5.1 3.4 - 5.3 mmol/L    Carbon Dioxide (CO2) 25 22 - 29 mmol/L    Anion Gap 11 7 - 15 mmol/L    Urea Nitrogen 30.0 (H) 8.0 - 23.0 mg/dL    Creatinine 1.29 (H) 0.67 - 1.17 mg/dL    GFR Estimate 54 (L) >60 mL/min/1.73m2    Calcium 9.5 8.8 - 10.2 mg/dL    Chloride 105 98 - 107 mmol/L    Glucose 139 (H) 70 - 99 mg/dL    Alkaline Phosphatase 95 40 - 150 U/L      Comment:      Reference intervals for this test were updated on 11/14/2023 to more accurately reflect our " healthy population. There may be differences in the flagging of prior results with similar values performed with this method. Interpretation of those prior results can be made in the context of the updated reference intervals.    AST 17 0 - 45 U/L      Comment:      Reference intervals for this test were updated on 6/12/2023 to more accurately reflect our healthy population. There may be differences in the flagging of prior results with similar values performed with this method. Interpretation of those prior results can be made in the context of the updated reference intervals.    ALT 11 0 - 70 U/L      Comment:      Reference intervals for this test were updated on 6/12/2023 to more accurately reflect our healthy population. There may be differences in the flagging of prior results with similar values performed with this method. Interpretation of those prior results can be made in the context of the updated reference intervals.      Protein Total 7.0 6.4 - 8.3 g/dL    Albumin 4.2 3.5 - 5.2 g/dL    Bilirubin Total 0.3 <=1.2 mg/dL   Lipid Profile (Chol, Trig, HDL, LDL calc)   Result Value Ref Range    Cholesterol 136 <200 mg/dL    Triglycerides 98 <150 mg/dL    Direct Measure HDL 48 >=40 mg/dL    LDL Cholesterol Calculated 68 <=100 mg/dL    Non HDL Cholesterol 88 <130 mg/dL    Patient Fasting > 8hrs? Yes     Narrative    Cholesterol  Desirable:  <200 mg/dL    Triglycerides  Normal:  Less than 150 mg/dL  Borderline High:  150-199 mg/dL  High:  200-499 mg/dL  Very High:  Greater than or equal to 500 mg/dL    Direct Measure HDL  Female:  Greater than or equal to 50 mg/dL   Male:  Greater than or equal to 40 mg/dL    LDL Cholesterol  Desirable:  <100mg/dL  Above Desirable:  100-129 mg/dL   Borderline High:  130-159 mg/dL   High:  160-189 mg/dL   Very High:  >= 190 mg/dL    Non HDL Cholesterol  Desirable:  130 mg/dL  Above Desirable:  130-159 mg/dL  Borderline High:  160-189 mg/dL  High:  190-219 mg/dL  Very High:   Greater than or equal to 220 mg/dL   Ferritin   Result Value Ref Range    Ferritin 113 31 - 409 ng/mL   Iron   Result Value Ref Range    Iron 47 (L) 61 - 157 ug/dL       If you have any questions or concerns, please call the clinic at the number listed above.       Sincerely,      Luis Angel Parra MD

## 2023-12-15 NOTE — TELEPHONE ENCOUNTER
Patient exposed to covid with doctors appointment tomorrow asking if he should cancel. Patient advised to wear a mask to his appointment as precaution.     Reason for Disposition   General information question, no triage required and triager able to answer question    Protocols used: Information Only Call - No Triage-A-

## 2023-12-15 NOTE — LETTER
December 18, 2023      Isaiah Fortune  [address] [facility] [address]        Dear ,  We are writing to inform you of your test results.    Volodymyr Colon:  Your diabetes is under good control.  You are slightly anemic and I have added an iron and ferritin (iron storage) level to further look into this.  Your kidney function is just a touch diminished and this may be due to being low on fluids/volume at the time of the blood draw.  This can be rechecked at your next blood draw in a well hydrated state.  The cholesterol panel is excellent and the remaining labs are normal.        Resulted Orders   HEMOGLOBIN A1C   Result Value Ref Range    Hemoglobin A1C 6.8 (H) 0.0 - 5.6 %      Comment:      Normal <5.7%   Prediabetes 5.7-6.4%    Diabetes 6.5% or higher     Note: Adopted from ADA consensus guidelines.   CBC with platelets   Result Value Ref Range    WBC Count 8.5 4.0 - 11.0 10e3/uL    RBC Count 4.15 (L) 4.40 - 5.90 10e6/uL    Hemoglobin 11.9 (L) 13.3 - 17.7 g/dL    Hematocrit 37.7 (L) 40.0 - 53.0 %    MCV 91 78 - 100 fL    MCH 28.7 26.5 - 33.0 pg    MCHC 31.6 31.5 - 36.5 g/dL    RDW 13.7 10.0 - 15.0 %    Platelet Count 261 150 - 450 10e3/uL   Comprehensive metabolic panel (BMP + Alb, Alk Phos, ALT, AST, Total. Bili, TP)   Result Value Ref Range    Sodium 141 135 - 145 mmol/L      Comment:      Reference intervals for this test were updated on 09/26/2023 to more accurately reflect our healthy population. There may be differences in the flagging of prior results with similar values performed with this method. Interpretation of those prior results can be made in the context of the updated reference intervals.     Potassium 5.1 3.4 - 5.3 mmol/L    Carbon Dioxide (CO2) 25 22 - 29 mmol/L    Anion Gap 11 7 - 15 mmol/L    Urea Nitrogen 30.0 (H) 8.0 - 23.0 mg/dL    Creatinine 1.29 (H) 0.67 - 1.17 mg/dL    GFR Estimate 54 (L) >60 mL/min/1.73m2    Calcium 9.5 8.8 - 10.2 mg/dL    Chloride 105 98 - 107 mmol/L    Glucose 139 (H)  70 - 99 mg/dL    Alkaline Phosphatase 95 40 - 150 U/L      Comment:      Reference intervals for this test were updated on 11/14/2023 to more accurately reflect our healthy population. There may be differences in the flagging of prior results with similar values performed with this method. Interpretation of those prior results can be made in the context of the updated reference intervals.    AST 17 0 - 45 U/L      Comment:      Reference intervals for this test were updated on 6/12/2023 to more accurately reflect our healthy population. There may be differences in the flagging of prior results with similar values performed with this method. Interpretation of those prior results can be made in the context of the updated reference intervals.    ALT 11 0 - 70 U/L      Comment:      Reference intervals for this test were updated on 6/12/2023 to more accurately reflect our healthy population. There may be differences in the flagging of prior results with similar values performed with this method. Interpretation of those prior results can be made in the context of the updated reference intervals.      Protein Total 7.0 6.4 - 8.3 g/dL    Albumin 4.2 3.5 - 5.2 g/dL    Bilirubin Total 0.3 <=1.2 mg/dL   Lipid Profile (Chol, Trig, HDL, LDL calc)   Result Value Ref Range    Cholesterol 136 <200 mg/dL    Triglycerides 98 <150 mg/dL    Direct Measure HDL 48 >=40 mg/dL    LDL Cholesterol Calculated 68 <=100 mg/dL    Non HDL Cholesterol 88 <130 mg/dL    Patient Fasting > 8hrs? Yes     Narrative    Cholesterol  Desirable:  <200 mg/dL    Triglycerides  Normal:  Less than 150 mg/dL  Borderline High:  150-199 mg/dL  High:  200-499 mg/dL  Very High:  Greater than or equal to 500 mg/dL    Direct Measure HDL  Female:  Greater than or equal to 50 mg/dL   Male:  Greater than or equal to 40 mg/dL    LDL Cholesterol  Desirable:  <100mg/dL  Above Desirable:  100-129 mg/dL   Borderline High:  130-159 mg/dL   High:  160-189 mg/dL   Very High:   >= 190 mg/dL    Non HDL Cholesterol  Desirable:  130 mg/dL  Above Desirable:  130-159 mg/dL  Borderline High:  160-189 mg/dL  High:  190-219 mg/dL  Very High:  Greater than or equal to 220 mg/dL       If you have any questions or concerns, please call the clinic at the number listed above.       Sincerely,      Luis Angel Parra MD

## 2023-12-15 NOTE — PROGRESS NOTES
"SUBJECTIVE:   Isaiah is a 88 year old, presenting for the followin/15/2023     9:08 AM   Additional Questions   Roomed by Asiya ANAYA CMA       Are you in the first 12 months of your Medicare coverage?  No    Healthy Habits:     In general, how would you rate your overall health?  Fair    Frequency of exercise:  6-7 days/week    Duration of exercise:  30-45 minutes    Do you usually eat at least 4 servings of fruit and vegetables a day, include whole grains    & fiber and avoid regularly eating high fat or \"junk\" foods?  No    Taking medications regularly:  Yes    Medication side effects:  None    Ability to successfully perform activities of daily living:  No assistance needed    Home Safety:  No safety concerns identified    Hearing Impairment:  Difficulty following a conversation in a noisy restaurant or crowded room, need to ask people to speak up or repeat themselves and difficulty understanding soft or whispered speech    In the past 6 months, have you been bothered by leaking of urine?  No    In general, how would you rate your overall mental or emotional health?  Good      Today's PHQ-2 Score:       12/15/2023     9:03 AM   PHQ-2 ( 1999 [employer])   Q1: Little interest or pleasure in doing things 0   Q2: Feeling down, depressed or hopeless 0   PHQ-2 Score 0   Q1: Little interest or pleasure in doing things Not at all   Q2: Feeling down, depressed or hopeless Not at all   PHQ-2 Score 0           Have you ever done Advance Care Planning? (For example, a Health Directive, POLST, or a discussion with a medical provider or your loved ones about your wishes): Yes, patient states has an Advance Care Planning document and will bring a copy to the clinic.      Fall risk  Fallen 2 or more times in the past year?: No  Any fall with injury in the past year?: No  click delete button to remove this line now  Cognitive Screening   1) Repeat 3 items (Leader, Season, Table)    2) Clock draw: NORMAL  3) 3 item recall: " Recalls 2 objects   Results: NORMAL clock, 1-2 items recalled: COGNITIVE IMPAIRMENT LESS LIKELY    Mini-CogTM Copyright BU[employer]. Licensed by the author for use in [facility]; reprinted with permission (michael@.[email or web address]). All rights reserved.      Do you have sleep apnea, excessive snoring or daytime drowsiness? : no    Reviewed and updated as needed this visit by clinical staff   Tobacco  Allergies               Reviewed and updated as needed this visit by Provider                 Social History     Tobacco Use    Smoking status: Former     Types: Cigarettes    Smokeless tobacco: Former     Quit date: 12/8/1967   Substance Use Topics    Alcohol use: Yes     Alcohol/week: 4.0 standard drinks of alcohol     Alcohol: little        12/15/2023     9:02 AM   Alcohol Use   Prescreen: >3 drinks/day or >7 drinks/week? No          No data to display              Do you have a current opioid prescription? No  Do you use any other controlled substances or medications that are not prescribed by a provider? None          Current providers sharing in care for this patient include:   Patient Care Team:  Luis Angel Parra MD as PCP - General (Family Practice)  Linnea Horner PharmD as Pharmacist (Pharmacist)  Luis Angel Parra MD as Assigned PCP    The following health maintenance items are reviewed in Epic and correct as of today:  Health Maintenance   Topic Date Due    DIABETIC FOOT EXAM  Never done    A1C  10/20/2023    MEDICARE ANNUAL WELLNESS VISIT  01/18/2024    LIPID  01/18/2024    MICROALBUMIN  01/18/2024    BMP  02/10/2024    EYE EXAM  11/29/2024    ANNUAL REVIEW OF HM ORDERS  12/15/2024    FALL RISK ASSESSMENT  12/15/2024    COLORECTAL CANCER SCREENING  01/18/2027    ADVANCE CARE PLANNING  12/15/2028    DTAP/TDAP/TD IMMUNIZATION (3 - Td or Tdap) 06/27/2032    PHQ-2 (once per calendar year)  Completed    INFLUENZA VACCINE  Completed    Pneumococcal Vaccine: 65+ Years  Completed    ZOSTER IMMUNIZATION  Completed  "   RSV VACCINE (Pregnancy & 60+)  Completed    COVID-19 Vaccine  Completed    IPV IMMUNIZATION  Aged Out    HPV IMMUNIZATION  Aged Out    MENINGITIS IMMUNIZATION  Aged Out    RSV MONOCLONAL ANTIBODY  Aged Out           Review of Systems   Constitutional:  Negative for chills.   HENT:  Positive for hearing loss. Negative for congestion, ear pain and sore throat.    Eyes:  Negative for pain and visual disturbance.   Respiratory:  Negative for cough and shortness of breath.    Cardiovascular:  Positive for peripheral edema. Negative for chest pain and palpitations.   Gastrointestinal:  Negative for abdominal pain, constipation, diarrhea, heartburn, hematochezia and nausea.   Genitourinary:  Negative for dysuria, frequency, genital sores, hematuria, impotence and urgency.   Musculoskeletal:  Negative for joint swelling and myalgias.   Skin:  Negative for rash.   Neurological:  Negative for dizziness, weakness, headaches and paresthesias.   Psychiatric/Behavioral:  Negative for mood changes. The patient is not nervous/anxious.        OBJECTIVE:   BP (!) 141/83   Pulse 72   Temp 98.1  F (36.7  C) (Oral)   Resp 20   Ht 1.632 m (5' 4.25\")   Wt 103.5 kg (228 lb 3.2 oz)   SpO2 97%   BMI 38.87 kg/m   Estimated body mass index is 38.87 kg/m  as calculated from the following:    Height as of this encounter: 1.632 m (5' 4.25\").    Weight as of this encounter: 103.5 kg (228 lb 3.2 oz).    Wt Readings from Last 4 Encounters:   12/15/23 103.5 kg (228 lb 3.2 oz)   02/10/23 101.8 kg (224 lb 8 oz)   01/18/23 101.7 kg (224 lb 3.2 oz)   06/27/22 103 kg (227 lb)      Physical Exam  GENERAL: healthy, alert and no distress  EYES: Eyes grossly normal to inspection, PERRL and conjunctivae and sclerae normal  HENT: ear canals and TM's normal, nose and mouth without ulcers or lesions  NECK: no adenopathy, no asymmetry, masses, or scars and thyroid normal to palpation  RESP: lungs clear to auscultation - no rales, rhonchi or wheezes  CV: " regular rate and rhythm, normal S1 S2, no S3 or S4, no murmur, click or rub, no peripheral edema and peripheral pulses strong  ABDOMEN: soft, nontender, no hepatosplenomegaly, no masses and bowel sounds normal  RECTAL: deferred  MS: no gross musculoskeletal defects noted, no edema  SKIN: no suspicious lesions or rashes  NEURO: Normal strength and tone, mentation intact and speech normal  PSYCH: mentation appears normal, affect normal/bright    Lab Results   Component Value Date    A1C 6.8 12/15/2023    A1C 7.6 04/20/2023    A1C 7.4 01/18/2023    A1C 7.1 06/27/2022    A1C 7.1 01/28/2022       Last Comprehensive Metabolic Panel:  Sodium   Date Value Ref Range Status   02/10/2023 141 136 - 145 mmol/L Final     Potassium   Date Value Ref Range Status   02/10/2023 4.7 3.4 - 5.3 mmol/L Final   06/27/2022 4.4 3.5 - 5.0 mmol/L Final     Chloride   Date Value Ref Range Status   02/10/2023 105 98 - 107 mmol/L Final   06/27/2022 105 98 - 107 mmol/L Final     Carbon Dioxide (CO2)   Date Value Ref Range Status   02/10/2023 25 22 - 29 mmol/L Final   06/27/2022 28 22 - 31 mmol/L Final     Anion Gap   Date Value Ref Range Status   02/10/2023 11 7 - 15 mmol/L Final   06/27/2022 9 5 - 18 mmol/L Final     Glucose   Date Value Ref Range Status   02/10/2023 145 (H) 70 - 99 mg/dL Final   06/27/2022 127 (H) 70 - 125 mg/dL Final     Urea Nitrogen   Date Value Ref Range Status   02/10/2023 26.4 (H) 8.0 - 23.0 mg/dL Final   06/27/2022 17 8 - 28 mg/dL Final     Creatinine   Date Value Ref Range Status   02/10/2023 1.10 0.67 - 1.17 mg/dL Final     GFR Estimate   Date Value Ref Range Status   02/10/2023 65 >60 mL/min/1.73m2 Final     Comment:     eGFR calculated using 2021 CKD-EPI equation.   07/08/2021 >60 >60 mL/min/1.73m2 Final     Calcium   Date Value Ref Range Status   02/10/2023 9.2 8.8 - 10.2 mg/dL Final     Bilirubin Total   Date Value Ref Range Status   01/18/2023 0.5 <=1.2 mg/dL Final     Alkaline Phosphatase   Date Value Ref Range  "Status   01/18/2023 98 40 - 129 U/L Final     ALT   Date Value Ref Range Status   01/18/2023 12 10 - 50 U/L Final     AST   Date Value Ref Range Status   01/18/2023 21 10 - 50 U/L Final                    ASSESSMENT / PLAN:       ICD-10-CM    1. Encounter for Medicare annual wellness exam  Z00.00 REVIEW OF HEALTH MAINTENANCE PROTOCOL ORDERS     CBC with platelets      2. Type 2 diabetes mellitus without complication, without long-term current use of insulin (H), CONTROLLED E11.9 HEMOGLOBIN A1C      3. Benign Essential Hypertension, STABLE I10       4. Hyperlipidemia, unspecified hyperlipidemia type, CHECK LIPIDS, STABLE ON STASTIN E78.5 Comprehensive metabolic panel (BMP + Alb, Alk Phos, ALT, AST, Total. Bili, TP)     Lipid Profile (Chol, Trig, HDL, LDL calc)      5. Obesity (BMI 35.0-39.9) with comorbidity (H)  E66.01           PLAN:  Vaccinations are up to date    Fasting labs        COUNSELING:  Reviewed preventive health counseling, as reflected in patient instructions       Regular exercise       Healthy diet/nutrition      BMI:   Estimated body mass index is 38.87 kg/m  as calculated from the following:    Height as of this encounter: 1.632 m (5' 4.25\").    Weight as of this encounter: 103.5 kg (228 lb 3.2 oz).   Weight management plan: Discussed healthy diet and exercise guidelines      He reports that he has quit smoking. His smoking use included cigarettes. He quit smokeless tobacco use about 56 years ago.      Appropriate preventive services were discussed with this patient, including applicable screening as appropriate for fall prevention, nutrition, physical activity, Tobacco-use cessation, weight loss and cognition.  Checklist reviewing preventive services available has been given to the patient.    Reviewed patients plan of care and provided an AVS. The Basic Care Plan (routine screening as documented in Health Maintenance) for Preston meets the Care Plan requirement. This Care Plan has been established " and reviewed with the Patient.      Luis Angel Parra MD  [facility]    Identified Health Risks:

## 2023-12-18 LAB
FERRITIN SERPL-MCNC: 113 NG/ML (ref 31–409)
IRON SERPL-MCNC: 47 UG/DL (ref 61–157)

## 2023-12-19 ENCOUNTER — TELEPHONE (OUTPATIENT)
Dept: FAMILY MEDICINE | Facility: CLINIC | Age: 88
End: 2023-12-19
Payer: COMMERCIAL

## 2023-12-19 NOTE — TELEPHONE ENCOUNTER
"----- Message from Luis Angel Parra MD sent at 12/19/2023 11:35 AM CST -----  Please verify  result letter seen by by  Volodymyr Colon:  Your iron storage is normal.  Your iron level is just slightly low.  Try to enrich your diet with foods rich in iron.  You may Google \"iron rich foods\" for a complete list.  I would also recheck a hemoglobin in 1-2 months for which I have placed an order.  "

## 2023-12-19 NOTE — TELEPHONE ENCOUNTER
Left message to call back for: Results  Information to relay to patient: LMTCB, please see message below.

## 2023-12-21 NOTE — TELEPHONE ENCOUNTER
Patient Returning Call    Reason for call:  Returning the clinic's call     Information relayed to patient:  Read Dr. Parra's message, voiced understanding and had no further questions or concerns.     Patient has additional questions:  No

## 2024-02-09 DIAGNOSIS — R80.9 PROTEINURIA, UNSPECIFIED TYPE: ICD-10-CM

## 2024-02-09 DIAGNOSIS — I10 HTN (HYPERTENSION): ICD-10-CM

## 2024-02-09 DIAGNOSIS — I10 ESSENTIAL HYPERTENSION, BENIGN: ICD-10-CM

## 2024-02-09 RX ORDER — LISINOPRIL 5 MG/1
5 TABLET ORAL DAILY
Qty: 90 TABLET | Refills: 0 | Status: SHIPPED | OUTPATIENT
Start: 2024-02-09 | End: 2024-03-12

## 2024-02-09 RX ORDER — AMLODIPINE BESYLATE 10 MG/1
10 TABLET ORAL DAILY
Qty: 90 TABLET | Refills: 0 | Status: SHIPPED | OUTPATIENT
Start: 2024-02-09 | End: 2024-03-12

## 2024-02-09 NOTE — TELEPHONE ENCOUNTER
S-(situation): patient calling to request refills for amlodipine and lisinopril.     B-(background): Patient PCP was Dr. Parra who recently retired. Currently has appointment scheduled with Dr. Tomlinson to set up care in March.   Appointments in Next Year      Mar 12, 2024  1:30 PM  (Arrive by 1:10 PM)  Provider Visit with Cornell Tomlinson MD  [facility] ([facility] [place] ) [phone]           A-(assessment): Will be out of medication in a week. Confirmed pharmacy with patient     R-(recommendations): Will route to [facility] and Dr. Tomlinson for review.       Katerina GAINES

## 2024-03-12 ENCOUNTER — MYC MEDICAL ADVICE (OUTPATIENT)
Dept: FAMILY MEDICINE | Facility: CLINIC | Age: 89
End: 2024-03-12

## 2024-03-12 ENCOUNTER — OFFICE VISIT (OUTPATIENT)
Dept: FAMILY MEDICINE | Facility: CLINIC | Age: 89
End: 2024-03-12
Payer: COMMERCIAL

## 2024-03-12 VITALS
DIASTOLIC BLOOD PRESSURE: 72 MMHG | TEMPERATURE: 97.1 F | HEIGHT: 64 IN | SYSTOLIC BLOOD PRESSURE: 160 MMHG | HEART RATE: 93 BPM | OXYGEN SATURATION: 95 % | WEIGHT: 226 LBS | BODY MASS INDEX: 38.58 KG/M2 | RESPIRATION RATE: 18 BRPM

## 2024-03-12 DIAGNOSIS — E11.9 TYPE 2 DIABETES MELLITUS WITHOUT COMPLICATION, WITHOUT LONG-TERM CURRENT USE OF INSULIN (H): Primary | ICD-10-CM

## 2024-03-12 DIAGNOSIS — I10 HYPERTENSION GOAL BP (BLOOD PRESSURE) < 140/90: ICD-10-CM

## 2024-03-12 DIAGNOSIS — C69.32 MALIGNANT NEOPLASM OF LEFT CHOROID (H): ICD-10-CM

## 2024-03-12 DIAGNOSIS — E11.9 TYPE 2 DIABETES, HBA1C GOAL < 7% (H): ICD-10-CM

## 2024-03-12 DIAGNOSIS — R80.9 PROTEINURIA, UNSPECIFIED TYPE: ICD-10-CM

## 2024-03-12 DIAGNOSIS — E78.5 HYPERLIPIDEMIA LDL GOAL <70: ICD-10-CM

## 2024-03-12 DIAGNOSIS — Z51.81 MEDICATION MONITORING ENCOUNTER: ICD-10-CM

## 2024-03-12 DIAGNOSIS — M15.0 PRIMARY OSTEOARTHRITIS INVOLVING MULTIPLE JOINTS: ICD-10-CM

## 2024-03-12 DIAGNOSIS — C69.92: ICD-10-CM

## 2024-03-12 DIAGNOSIS — N20.0 CALCULUS OF KIDNEY: ICD-10-CM

## 2024-03-12 DIAGNOSIS — Z91.09 ENVIRONMENTAL ALLERGIES: ICD-10-CM

## 2024-03-12 DIAGNOSIS — N18.2 CKD (CHRONIC KIDNEY DISEASE) STAGE 2, GFR 60-89 ML/MIN: ICD-10-CM

## 2024-03-12 DIAGNOSIS — N28.9 KIDNEY DISEASE: ICD-10-CM

## 2024-03-12 DIAGNOSIS — D64.9 ANEMIA, UNSPECIFIED TYPE: ICD-10-CM

## 2024-03-12 DIAGNOSIS — K55.1 SUPERIOR MESENTERIC ARTERY STENOSIS (H): ICD-10-CM

## 2024-03-12 DIAGNOSIS — E66.01 MORBID OBESITY (H): ICD-10-CM

## 2024-03-12 PROBLEM — M19.90 OSTEOARTHRITIS: Status: ACTIVE | Noted: 2024-03-12

## 2024-03-12 PROCEDURE — 99215 OFFICE O/P EST HI 40 MIN: CPT | Performed by: FAMILY MEDICINE

## 2024-03-12 RX ORDER — LISINOPRIL 5 MG/1
5 TABLET ORAL DAILY
Qty: 90 TABLET | Refills: 3 | Status: SHIPPED | OUTPATIENT
Start: 2024-03-12

## 2024-03-12 RX ORDER — AMLODIPINE BESYLATE 10 MG/1
10 TABLET ORAL DAILY
Qty: 90 TABLET | Refills: 3 | Status: SHIPPED | OUTPATIENT
Start: 2024-03-12

## 2024-03-12 RX ORDER — PRAVASTATIN SODIUM 40 MG
TABLET ORAL
Qty: 90 TABLET | Refills: 3 | Status: SHIPPED | OUTPATIENT
Start: 2024-03-12

## 2024-03-12 NOTE — PROGRESS NOTES
Assessment & Plan     Type 2 diabetes mellitus without complication, without long-term current use of insulin (H)    - lisinopril (ZESTRIL) 5 MG tablet  Dispense: 90 tablet; Refill: 3  - pravastatin (PRAVACHOL) 40 MG tablet  Dispense: 90 tablet; Refill: 3  - Comprehensive metabolic panel  - Lipid panel reflex to direct LDL Fasting  - UA Macroscopic with reflex to Microscopic and Culture  - Albumin Random Urine Quantitative with Creat Ratio  - TSH with free T4 reflex  - Hemoglobin A1c  - PRIMARY CARE FOLLOW-UP SCHEDULING    Type 2 diabetes, HbA1c goal < 7% (H)    - lisinopril (ZESTRIL) 5 MG tablet  Dispense: 90 tablet; Refill: 3  - pravastatin (PRAVACHOL) 40 MG tablet  Dispense: 90 tablet; Refill: 3  - Comprehensive metabolic panel  - Lipid panel reflex to direct LDL Fasting  - UA Macroscopic with reflex to Microscopic and Culture  - Albumin Random Urine Quantitative with Creat Ratio  - TSH with free T4 reflex  - Hemoglobin A1c  - PRIMARY CARE FOLLOW-UP SCHEDULING    CKD (chronic kidney disease) stage 2, GFR 60-89 ml/min    - amLODIPine (NORVASC) 10 MG tablet  Dispense: 90 tablet; Refill: 3  - Comprehensive metabolic panel  - CBC with platelets  - UA Macroscopic with reflex to Microscopic and Culture  - Albumin Random Urine Quantitative with Creat Ratio  - TSH with free T4 reflex  - Hemoglobin A1c  - PRIMARY CARE FOLLOW-UP SCHEDULING    Kidney disease    - amLODIPine (NORVASC) 10 MG tablet  Dispense: 90 tablet; Refill: 3  - Comprehensive metabolic panel  - CBC with platelets  - UA Macroscopic with reflex to Microscopic and Culture  - Albumin Random Urine Quantitative with Creat Ratio  - Hemoglobin A1c  - PRIMARY CARE FOLLOW-UP SCHEDULING    Hypertension goal BP (blood pressure) < 140/90    - amLODIPine (NORVASC) 10 MG tablet  Dispense: 90 tablet; Refill: 3  - Comprehensive metabolic panel  - CBC with platelets  - UA Macroscopic with reflex to Microscopic and Culture  - Albumin Random Urine Quantitative with Creat  Ratio  - TSH with free T4 reflex  - PRIMARY CARE FOLLOW-UP SCHEDULING    Hyperlipidemia LDL goal <70    - Comprehensive metabolic panel  - CBC with platelets  - CK total  - PRIMARY CARE FOLLOW-UP SCHEDULING    Proteinuria, unspecified type    - lisinopril (ZESTRIL) 5 MG tablet  Dispense: 90 tablet; Refill: 3  - Albumin Random Urine Quantitative with Creat Ratio  - PRIMARY CARE FOLLOW-UP SCHEDULING    Malignant neoplasm of left choroid (H)    - Comprehensive metabolic panel  - CBC with platelets  - PRIMARY CARE FOLLOW-UP SCHEDULING    Malignant neoplasm of left eye (H)    - Comprehensive metabolic panel  - CBC with platelets  - PRIMARY CARE FOLLOW-UP SCHEDULING    Anemia, unspecified type    - CBC with platelets  - Iron and iron binding capacity  - Ferritin  - PRIMARY CARE FOLLOW-UP SCHEDULING    Primary osteoarthritis involving multiple joints    - PRIMARY CARE FOLLOW-UP SCHEDULING    Nephrolithiasis    - UA Macroscopic with reflex to Microscopic and Culture  - PRIMARY CARE FOLLOW-UP SCHEDULING    Superior mesenteric artery stenosis (H24)    - PRIMARY CARE FOLLOW-UP SCHEDULING    Environmental allergies    - PRIMARY CARE FOLLOW-UP SCHEDULING    Morbid obesity (H)    - PRIMARY CARE FOLLOW-UP SCHEDULING    Medication monitoring encounter    - Comprehensive metabolic panel  - Lipid panel reflex to direct LDL Fasting  - CBC with platelets  - CK total  - UA Macroscopic with reflex to Microscopic and Culture  - Albumin Random Urine Quantitative with Creat Ratio  - TSH with free T4 reflex  - Hemoglobin A1c  - Iron and iron binding capacity  - Ferritin  - PRIMARY CARE FOLLOW-UP SCHEDULING      Prescription drug management    40 minutes spent by me on the date of the encounter doing chart review, history and exam, documentation and further activities per the note    Work on weight loss  Regular exercise    Plan:    1) Medications: refilled, consider adjustment    2) Labs: reviewed / ordered    3) Immunizations: consider  prevnar 20, hepatitis B    4) Imaging/Diagnostics: NA    5) Consults: consider sleep consult    6) watch swelling - amlodipine    7) Ophthalmology    8) watch BP      Subjective   Isaiah is a 88 year old, presenting for the following health issues:  [facility]        3/12/2024     1:08 PM   Additional Questions   Roomed by Rebecca FRIEDMAN     History of Present Illness       Reason for visit:  Need new primary care MD    He eats 2-3 servings of fruits and vegetables daily.He consumes 0 sweetened beverage(s) daily.He exercises with enough effort to increase his heart rate 30 to 60 minutes per day.  He exercises with enough effort to increase his heart rate 7 days per week.   He is taking medications regularly.       Diabetes Follow-up- has some swelling in both ankles- few months.  Sleeps a lot, sometimes son states in the middle of a conversation as well.  Patient's wife is asking if he needs all his medications. Discuss bowel issues, maybe to much psyllium ?     How often are you checking your blood sugar? A few times a month  What time of day are you checking your blood sugars (select all that apply)?  Before meals  Have you had any blood sugars above 200?  No  Have you had any blood sugars below 70?  No  What symptoms do you notice when your blood sugar is low?  None  What concerns do you have today about your diabetes? None   Do you have any of these symptoms? (Select all that apply)  No numbness or tingling in feet.  No redness, sores or blisters on feet.  No complaints of excessive thirst.  No reports of blurry vision.  No significant changes to weight.    Lab Results   Component Value Date    A1C 6.8 12/15/2023    A1C 7.6 04/20/2023    A1C 7.4 01/18/2023    A1C 7.1 06/27/2022    A1C 7.1 01/28/2022         Hyperlipidemia Follow-Up    Are you regularly taking any medication or supplement to lower your cholesterol?   Yes- Pravastatin  Are you having muscle aches or other side effects that you think could be caused by  "your cholesterol lowering medication?  No    Recent Labs   Lab Test 12/15/23  0942 01/18/23  1019   CHOL 136 155   HDL 48 50   LDL 68 76   TRIG 98 146     CKD 2 / Hypertension Follow-up    Do you check your blood pressure regularly outside of the clinic? No   Are you following a low salt diet? No  Are your blood pressures ever more than 140 on the top number (systolic) OR more   than 90 on the bottom number (diastolic), for example 140/90? No    BP Readings from Last 3 Encounters:   03/12/24 (!) 160/72   12/15/23 (!) 141/83   02/10/23 134/62     Creatinine   Date Value Ref Range Status   12/15/2023 1.29 (H) 0.67 - 1.17 mg/dL Final     GFR Estimate   Date Value Ref Range Status   12/15/2023 54 (L) >60 mL/min/1.73m2 Final   07/08/2021 >60 >60 mL/min/1.73m2 Final     Left eye Melanoma - followed by [facility]    Anemia    Hemoglobin   Date Value Ref Range Status   12/15/2023 11.9 (L) 13.3 - 17.7 g/dL Final   01/18/2023 13.7 13.3 - 17.7 g/dL Final     OA - stable    Environmental Allergies - dust - zyrtec      Review of Systems  CONSTITUTIONAL: NEGATIVE for fever, chills, change in weight  INTEGUMENTARY/SKIN: NEGATIVE for worrisome rashes, moles or lesions  EYES: NEGATIVE for vision changes or irritation  ENT/MOUTH: NEGATIVE for ear, mouth and throat problems  RESP: NEGATIVE for significant cough or SOB  CV: NEGATIVE for chest pain, palpitations or peripheral edema  GI: NEGATIVE for nausea, abdominal pain, heartburn, or change in bowel habits  : NEGATIVE for frequency, dysuria, or hematuria  MUSCULOSKELETAL: NEGATIVE for significant arthralgias or myalgia  NEURO: NEGATIVE for weakness, dizziness or paresthesias  ENDOCRINE: NEGATIVE for temperature intolerance, skin/hair changes  HEME: NEGATIVE for bleeding problems  PSYCHIATRIC: NEGATIVE for changes in mood or affect      Objective    BP (!) 160/72   Pulse 93   Temp 97.1  F (36.2  C)   Resp 18   Ht 1.632 m (5' 4.25\")   Wt 102.5 kg (226 lb)   SpO2 95%   " BMI 38.49 kg/m    Body mass index is 38.49 kg/m .    Physical Exam   GENERAL: alert and no distress  EYES: Eyes grossly normal to inspection, PERRL and conjunctivae and sclerae normal  HENT: ear canals and TM's normal, nose and mouth without ulcers or lesions  NECK: no adenopathy, no asymmetry, masses, or scars  RESP: lungs clear to auscultation - no rales, rhonchi or wheezes  CV: regular rate and rhythm, normal S1 S2, no S3 or S4, 2/6 systolic murmur, click or rub, no peripheral edema  ABDOMEN: soft, nontender, no hepatosplenomegaly, no masses and bowel sounds normal  MS: no gross musculoskeletal defects noted, mild edema  SKIN: no suspicious lesions or rashes  NEURO: Normal strength and tone, mentation intact and speech normal  PSYCH: mentation appears normal, affect normal/bright    Reviewed labs, labs ordered      Signed Electronically by:              Cornell Tomlinson MD, FAAFP     [facility]  [address] [place] [id number]  [email or web address]   Office: [phone]  Fax: [phone]

## 2024-03-15 ENCOUNTER — LAB (OUTPATIENT)
Dept: LAB | Facility: CLINIC | Age: 89
End: 2024-03-15
Payer: COMMERCIAL

## 2024-03-15 DIAGNOSIS — N28.9 KIDNEY DISEASE: ICD-10-CM

## 2024-03-15 DIAGNOSIS — N20.0 CALCULUS OF KIDNEY: ICD-10-CM

## 2024-03-15 DIAGNOSIS — E11.9 TYPE 2 DIABETES, HBA1C GOAL < 7% (H): ICD-10-CM

## 2024-03-15 DIAGNOSIS — C69.32 MALIGNANT NEOPLASM OF LEFT CHOROID (H): ICD-10-CM

## 2024-03-15 DIAGNOSIS — R80.9 PROTEINURIA, UNSPECIFIED TYPE: ICD-10-CM

## 2024-03-15 DIAGNOSIS — C69.92: ICD-10-CM

## 2024-03-15 DIAGNOSIS — I10 HYPERTENSION GOAL BP (BLOOD PRESSURE) < 140/90: ICD-10-CM

## 2024-03-15 DIAGNOSIS — D64.9 ANEMIA, UNSPECIFIED TYPE: ICD-10-CM

## 2024-03-15 DIAGNOSIS — N18.2 CKD (CHRONIC KIDNEY DISEASE) STAGE 2, GFR 60-89 ML/MIN: ICD-10-CM

## 2024-03-15 DIAGNOSIS — E11.9 TYPE 2 DIABETES MELLITUS WITHOUT COMPLICATION, WITHOUT LONG-TERM CURRENT USE OF INSULIN (H): ICD-10-CM

## 2024-03-15 DIAGNOSIS — E78.5 HYPERLIPIDEMIA LDL GOAL <70: ICD-10-CM

## 2024-03-15 DIAGNOSIS — Z51.81 MEDICATION MONITORING ENCOUNTER: ICD-10-CM

## 2024-03-15 LAB
ALBUMIN UR-MCNC: 30 MG/DL
APPEARANCE UR: CLEAR
BACTERIA #/AREA URNS HPF: ABNORMAL /HPF
BILIRUB UR QL STRIP: NEGATIVE
COLOR UR AUTO: YELLOW
ERYTHROCYTE [DISTWIDTH] IN BLOOD BY AUTOMATED COUNT: 13.9 % (ref 10–15)
GLUCOSE UR STRIP-MCNC: NEGATIVE MG/DL
HBA1C MFR BLD: 7 % (ref 0–5.6)
HCT VFR BLD AUTO: 35.1 % (ref 40–53)
HGB BLD-MCNC: 11.3 G/DL (ref 13.3–17.7)
HGB UR QL STRIP: NEGATIVE
KETONES UR STRIP-MCNC: NEGATIVE MG/DL
LEUKOCYTE ESTERASE UR QL STRIP: NEGATIVE
MCH RBC QN AUTO: 29.4 PG (ref 26.5–33)
MCHC RBC AUTO-ENTMCNC: 32.2 G/DL (ref 31.5–36.5)
MCV RBC AUTO: 91 FL (ref 78–100)
MUCOUS THREADS #/AREA URNS LPF: PRESENT /LPF
NITRATE UR QL: NEGATIVE
PH UR STRIP: 5.5 [PH] (ref 5–7)
PLATELET # BLD AUTO: 245 10E3/UL (ref 150–450)
RBC # BLD AUTO: 3.85 10E6/UL (ref 4.4–5.9)
RBC #/AREA URNS AUTO: ABNORMAL /HPF
SP GR UR STRIP: 1.02 (ref 1–1.03)
UROBILINOGEN UR STRIP-ACNC: 0.2 E.U./DL
WBC # BLD AUTO: 8.6 10E3/UL (ref 4–11)
WBC #/AREA URNS AUTO: ABNORMAL /HPF

## 2024-03-15 PROCEDURE — 36415 COLL VENOUS BLD VENIPUNCTURE: CPT

## 2024-03-15 PROCEDURE — 80061 LIPID PANEL: CPT

## 2024-03-15 PROCEDURE — 85027 COMPLETE CBC AUTOMATED: CPT

## 2024-03-15 PROCEDURE — 82570 ASSAY OF URINE CREATININE: CPT

## 2024-03-15 PROCEDURE — 83540 ASSAY OF IRON: CPT

## 2024-03-15 PROCEDURE — 82043 UR ALBUMIN QUANTITATIVE: CPT

## 2024-03-15 PROCEDURE — 83550 IRON BINDING TEST: CPT

## 2024-03-15 PROCEDURE — 80053 COMPREHEN METABOLIC PANEL: CPT

## 2024-03-15 PROCEDURE — 84443 ASSAY THYROID STIM HORMONE: CPT

## 2024-03-15 PROCEDURE — 81001 URINALYSIS AUTO W/SCOPE: CPT

## 2024-03-15 PROCEDURE — 82550 ASSAY OF CK (CPK): CPT

## 2024-03-15 PROCEDURE — 83036 HEMOGLOBIN GLYCOSYLATED A1C: CPT

## 2024-03-15 PROCEDURE — 82728 ASSAY OF FERRITIN: CPT

## 2024-03-16 LAB
ALBUMIN SERPL BCG-MCNC: 4.2 G/DL (ref 3.5–5.2)
ALP SERPL-CCNC: 90 U/L (ref 40–150)
ALT SERPL W P-5'-P-CCNC: 11 U/L (ref 0–70)
ANION GAP SERPL CALCULATED.3IONS-SCNC: 12 MMOL/L (ref 7–15)
AST SERPL W P-5'-P-CCNC: 18 U/L (ref 0–45)
BILIRUB SERPL-MCNC: 0.3 MG/DL
BUN SERPL-MCNC: 28.6 MG/DL (ref 8–23)
CALCIUM SERPL-MCNC: 9.6 MG/DL (ref 8.8–10.2)
CHLORIDE SERPL-SCNC: 106 MMOL/L (ref 98–107)
CHOLEST SERPL-MCNC: 129 MG/DL
CK SERPL-CCNC: 107 U/L (ref 39–308)
CREAT SERPL-MCNC: 1.27 MG/DL (ref 0.67–1.17)
CREAT UR-MCNC: 105 MG/DL
DEPRECATED HCO3 PLAS-SCNC: 25 MMOL/L (ref 22–29)
EGFRCR SERPLBLD CKD-EPI 2021: 55 ML/MIN/1.73M2
FASTING STATUS PATIENT QL REPORTED: YES
FERRITIN SERPL-MCNC: 98 NG/ML (ref 31–409)
GLUCOSE SERPL-MCNC: 126 MG/DL (ref 70–99)
HDLC SERPL-MCNC: 49 MG/DL
IRON BINDING CAPACITY (ROCHE): 244 UG/DL (ref 240–430)
IRON SATN MFR SERPL: 23 % (ref 15–46)
IRON SERPL-MCNC: 55 UG/DL (ref 61–157)
LDLC SERPL CALC-MCNC: 59 MG/DL
MICROALBUMIN UR-MCNC: 118 MG/L
MICROALBUMIN/CREAT UR: 112.38 MG/G CR (ref 0–17)
NONHDLC SERPL-MCNC: 80 MG/DL
POTASSIUM SERPL-SCNC: 5.4 MMOL/L (ref 3.4–5.3)
PROT SERPL-MCNC: 6.9 G/DL (ref 6.4–8.3)
SODIUM SERPL-SCNC: 143 MMOL/L (ref 135–145)
TRIGL SERPL-MCNC: 104 MG/DL
TSH SERPL DL<=0.005 MIU/L-ACNC: 1.37 UIU/ML (ref 0.3–4.2)

## 2024-03-24 DIAGNOSIS — N18.2 CKD (CHRONIC KIDNEY DISEASE) STAGE 2, GFR 60-89 ML/MIN: ICD-10-CM

## 2024-03-24 DIAGNOSIS — Z51.81 MEDICATION MONITORING ENCOUNTER: ICD-10-CM

## 2024-03-24 DIAGNOSIS — E87.6 HYPOKALEMIA: Primary | ICD-10-CM

## 2024-03-24 DIAGNOSIS — I10 HYPERTENSION GOAL BP (BLOOD PRESSURE) < 140/90: ICD-10-CM

## 2024-03-24 DIAGNOSIS — E11.9 TYPE 2 DIABETES, HBA1C GOAL < 7% (H): ICD-10-CM

## 2024-03-24 DIAGNOSIS — E11.9 TYPE 2 DIABETES MELLITUS WITHOUT COMPLICATION, WITHOUT LONG-TERM CURRENT USE OF INSULIN (H): ICD-10-CM

## 2024-04-01 ENCOUNTER — TELEPHONE (OUTPATIENT)
Dept: FAMILY MEDICINE | Facility: CLINIC | Age: 89
End: 2024-04-01
Payer: COMMERCIAL

## 2024-04-01 NOTE — TELEPHONE ENCOUNTER
Attempt #1  Called Phone # [phone]  Left a non detailed voicemail to please call back and ask for any available triage nurse @ [phone].     Minimally elevated potassium  Stable, but minimally decreased kidney function  Basic metabolic panel is  ordered- includes potassium and kidney function

## 2024-04-01 NOTE — TELEPHONE ENCOUNTER
Patient Returning Call    Reason for call:  PT NEEDS CLARIFICATION ON IF HE NEEDS TO SCHEDULE A POTASSIUM LAB OR THE BASIC METABOLIC. HE WAS SURE IT WAS JUST THE POTASSIUM BUT NO NOTES AND ORDERS FOR JUST THAT LAB. PLEASE CALL PT BACK TO CONFIRM.     Information relayed to patient:  WHEN ABLE    Patient has additional questions:  No      Could we send this information to you in [employer] or would you prefer to receive a phone call?:   Patient would prefer a phone call   Okay to leave a detailed message?: Yes at Cell number on file:    Telephone Information:   Mobile [phone]

## 2024-04-01 NOTE — TELEPHONE ENCOUNTER
Pt calling     Advised pt on the information below     Patient stated an understanding and agreed with plan.    Namita Fish RN, BSN  [facility]

## 2024-04-02 ENCOUNTER — LAB (OUTPATIENT)
Dept: LAB | Facility: CLINIC | Age: 89
End: 2024-04-02
Payer: COMMERCIAL

## 2024-04-02 DIAGNOSIS — I10 HYPERTENSION GOAL BP (BLOOD PRESSURE) < 140/90: ICD-10-CM

## 2024-04-02 DIAGNOSIS — E11.9 TYPE 2 DIABETES MELLITUS WITHOUT COMPLICATION, WITHOUT LONG-TERM CURRENT USE OF INSULIN (H): ICD-10-CM

## 2024-04-02 DIAGNOSIS — E87.6 HYPOKALEMIA: ICD-10-CM

## 2024-04-02 DIAGNOSIS — Z51.81 MEDICATION MONITORING ENCOUNTER: ICD-10-CM

## 2024-04-02 DIAGNOSIS — N18.2 CKD (CHRONIC KIDNEY DISEASE) STAGE 2, GFR 60-89 ML/MIN: ICD-10-CM

## 2024-04-02 DIAGNOSIS — E11.9 TYPE 2 DIABETES, HBA1C GOAL < 7% (H): ICD-10-CM

## 2024-04-02 LAB
ANION GAP SERPL CALCULATED.3IONS-SCNC: 11 MMOL/L (ref 7–15)
BUN SERPL-MCNC: 32.3 MG/DL (ref 8–23)
CALCIUM SERPL-MCNC: 9.7 MG/DL (ref 8.8–10.2)
CHLORIDE SERPL-SCNC: 106 MMOL/L (ref 98–107)
CREAT SERPL-MCNC: 1.21 MG/DL (ref 0.67–1.17)
DEPRECATED HCO3 PLAS-SCNC: 26 MMOL/L (ref 22–29)
EGFRCR SERPLBLD CKD-EPI 2021: 58 ML/MIN/1.73M2
GLUCOSE SERPL-MCNC: 172 MG/DL (ref 70–99)
POTASSIUM SERPL-SCNC: 5.4 MMOL/L (ref 3.4–5.3)
SODIUM SERPL-SCNC: 143 MMOL/L (ref 135–145)

## 2024-04-02 PROCEDURE — 36415 COLL VENOUS BLD VENIPUNCTURE: CPT

## 2024-04-02 PROCEDURE — 80048 BASIC METABOLIC PNL TOTAL CA: CPT

## 2024-04-05 ENCOUNTER — TRANSFERRED RECORDS (OUTPATIENT)
Dept: HEALTH INFORMATION MANAGEMENT | Facility: CLINIC | Age: 89
End: 2024-04-05
Payer: COMMERCIAL

## 2024-04-05 LAB — RETINOPATHY: NEGATIVE

## 2024-11-10 ENCOUNTER — HEALTH MAINTENANCE LETTER (OUTPATIENT)
Age: 89
End: 2024-11-10

## 2024-12-16 ENCOUNTER — OFFICE VISIT (OUTPATIENT)
Dept: FAMILY MEDICINE | Facility: CLINIC | Age: 89
End: 2024-12-16
Payer: COMMERCIAL

## 2024-12-16 VITALS
WEIGHT: 222.8 LBS | DIASTOLIC BLOOD PRESSURE: 60 MMHG | TEMPERATURE: 97.4 F | HEIGHT: 64 IN | OXYGEN SATURATION: 98 % | BODY MASS INDEX: 38.04 KG/M2 | HEART RATE: 75 BPM | SYSTOLIC BLOOD PRESSURE: 136 MMHG | RESPIRATION RATE: 16 BRPM

## 2024-12-16 DIAGNOSIS — R80.9 PROTEINURIA, UNSPECIFIED TYPE: ICD-10-CM

## 2024-12-16 DIAGNOSIS — Z91.09 ENVIRONMENTAL ALLERGIES: ICD-10-CM

## 2024-12-16 DIAGNOSIS — C69.92: ICD-10-CM

## 2024-12-16 DIAGNOSIS — I10 HYPERTENSION GOAL BP (BLOOD PRESSURE) < 140/90: ICD-10-CM

## 2024-12-16 DIAGNOSIS — E78.5 HYPERLIPIDEMIA LDL GOAL <70: ICD-10-CM

## 2024-12-16 DIAGNOSIS — D64.9 ANEMIA, UNSPECIFIED TYPE: ICD-10-CM

## 2024-12-16 DIAGNOSIS — M15.0 PRIMARY OSTEOARTHRITIS INVOLVING MULTIPLE JOINTS: ICD-10-CM

## 2024-12-16 DIAGNOSIS — E66.01 MORBID OBESITY (H): ICD-10-CM

## 2024-12-16 DIAGNOSIS — Z00.00 MEDICARE ANNUAL WELLNESS VISIT, SUBSEQUENT: ICD-10-CM

## 2024-12-16 DIAGNOSIS — Z51.81 MEDICATION MONITORING ENCOUNTER: ICD-10-CM

## 2024-12-16 DIAGNOSIS — E11.22 TYPE 2 DIABETES MELLITUS WITH STAGE 2 CHRONIC KIDNEY DISEASE, WITHOUT LONG-TERM CURRENT USE OF INSULIN (H): ICD-10-CM

## 2024-12-16 DIAGNOSIS — Z00.00 ROUTINE GENERAL MEDICAL EXAMINATION AT A HEALTH CARE FACILITY: Primary | ICD-10-CM

## 2024-12-16 DIAGNOSIS — E11.9 TYPE 2 DIABETES, HBA1C GOAL < 7% (H): ICD-10-CM

## 2024-12-16 DIAGNOSIS — N18.2 CKD (CHRONIC KIDNEY DISEASE) STAGE 2, GFR 60-89 ML/MIN: ICD-10-CM

## 2024-12-16 DIAGNOSIS — N20.0 CALCULUS OF KIDNEY: ICD-10-CM

## 2024-12-16 DIAGNOSIS — K59.00 CONSTIPATION, UNSPECIFIED CONSTIPATION TYPE: ICD-10-CM

## 2024-12-16 DIAGNOSIS — N18.2 TYPE 2 DIABETES MELLITUS WITH STAGE 2 CHRONIC KIDNEY DISEASE, WITHOUT LONG-TERM CURRENT USE OF INSULIN (H): ICD-10-CM

## 2024-12-16 PROBLEM — N28.9 KIDNEY DISEASE: Status: RESOLVED | Noted: 2024-03-12 | Resolved: 2024-12-16

## 2024-12-16 PROBLEM — R10.9 ABDOMINAL PAIN: Chronic | Status: RESOLVED | Noted: 2019-12-12 | Resolved: 2024-12-16

## 2024-12-16 PROBLEM — M17.0 OSTEOARTHRITIS OF KNEES, BILATERAL: Status: RESOLVED | Noted: 2018-03-07 | Resolved: 2024-12-16

## 2024-12-16 PROBLEM — R79.89 ABNORMAL LFTS: Status: RESOLVED | Noted: 2020-11-28 | Resolved: 2024-12-16

## 2024-12-16 PROBLEM — M19.90 OSTEOARTHRITIS: Status: RESOLVED | Noted: 2024-03-12 | Resolved: 2024-12-16

## 2024-12-16 LAB
ALBUMIN SERPL BCG-MCNC: 4.1 G/DL (ref 3.5–5.2)
ALBUMIN UR-MCNC: 30 MG/DL
ALP SERPL-CCNC: 101 U/L (ref 40–150)
ALT SERPL W P-5'-P-CCNC: 10 U/L (ref 0–70)
ANION GAP SERPL CALCULATED.3IONS-SCNC: 12 MMOL/L (ref 7–15)
APPEARANCE UR: CLEAR
AST SERPL W P-5'-P-CCNC: 20 U/L (ref 0–45)
BACTERIA #/AREA URNS HPF: ABNORMAL /HPF
BILIRUB SERPL-MCNC: 0.5 MG/DL
BILIRUB UR QL STRIP: NEGATIVE
BUN SERPL-MCNC: 32.7 MG/DL (ref 8–23)
CALCIUM SERPL-MCNC: 9.5 MG/DL (ref 8.8–10.4)
CHLORIDE SERPL-SCNC: 108 MMOL/L (ref 98–107)
CHOLEST SERPL-MCNC: 113 MG/DL
CK SERPL-CCNC: 68 U/L (ref 39–308)
COLOR UR AUTO: YELLOW
CREAT SERPL-MCNC: 1.22 MG/DL (ref 0.67–1.17)
CREAT UR-MCNC: 98.8 MG/DL
EGFRCR SERPLBLD CKD-EPI 2021: 57 ML/MIN/1.73M2
ERYTHROCYTE [DISTWIDTH] IN BLOOD BY AUTOMATED COUNT: 13.8 % (ref 10–15)
EST. AVERAGE GLUCOSE BLD GHB EST-MCNC: 143 MG/DL
FASTING STATUS PATIENT QL REPORTED: YES
FASTING STATUS PATIENT QL REPORTED: YES
GLUCOSE SERPL-MCNC: 117 MG/DL (ref 70–99)
GLUCOSE UR STRIP-MCNC: NEGATIVE MG/DL
HBA1C MFR BLD: 6.6 % (ref 0–5.6)
HCO3 SERPL-SCNC: 24 MMOL/L (ref 22–29)
HCT VFR BLD AUTO: 36.2 % (ref 40–53)
HDLC SERPL-MCNC: 50 MG/DL
HGB BLD-MCNC: 11.3 G/DL (ref 13.3–17.7)
HGB UR QL STRIP: NEGATIVE
KETONES UR STRIP-MCNC: NEGATIVE MG/DL
LDLC SERPL CALC-MCNC: 47 MG/DL
LEUKOCYTE ESTERASE UR QL STRIP: NEGATIVE
MCH RBC QN AUTO: 28.8 PG (ref 26.5–33)
MCHC RBC AUTO-ENTMCNC: 31.2 G/DL (ref 31.5–36.5)
MCV RBC AUTO: 92 FL (ref 78–100)
MICROALBUMIN UR-MCNC: 123 MG/L
MICROALBUMIN/CREAT UR: 124.49 MG/G CR (ref 0–17)
NITRATE UR QL: NEGATIVE
NONHDLC SERPL-MCNC: 63 MG/DL
PH UR STRIP: 5.5 [PH] (ref 5–7)
PLATELET # BLD AUTO: 237 10E3/UL (ref 150–450)
POTASSIUM SERPL-SCNC: 4.8 MMOL/L (ref 3.4–5.3)
PROT SERPL-MCNC: 6.9 G/DL (ref 6.4–8.3)
RBC # BLD AUTO: 3.93 10E6/UL (ref 4.4–5.9)
RBC #/AREA URNS AUTO: ABNORMAL /HPF
SODIUM SERPL-SCNC: 144 MMOL/L (ref 135–145)
SP GR UR STRIP: 1.02 (ref 1–1.03)
SQUAMOUS #/AREA URNS AUTO: ABNORMAL /LPF
TRIGL SERPL-MCNC: 78 MG/DL
TSH SERPL DL<=0.005 MIU/L-ACNC: 1.45 UIU/ML (ref 0.3–4.2)
UROBILINOGEN UR STRIP-ACNC: 0.2 E.U./DL
WBC # BLD AUTO: 7.3 10E3/UL (ref 4–11)
WBC #/AREA URNS AUTO: ABNORMAL /HPF

## 2024-12-16 PROCEDURE — 80053 COMPREHEN METABOLIC PANEL: CPT | Performed by: FAMILY MEDICINE

## 2024-12-16 PROCEDURE — 82043 UR ALBUMIN QUANTITATIVE: CPT | Performed by: FAMILY MEDICINE

## 2024-12-16 PROCEDURE — G0439 PPPS, SUBSEQ VISIT: HCPCS | Performed by: FAMILY MEDICINE

## 2024-12-16 PROCEDURE — 36415 COLL VENOUS BLD VENIPUNCTURE: CPT | Performed by: FAMILY MEDICINE

## 2024-12-16 PROCEDURE — 82550 ASSAY OF CK (CPK): CPT | Performed by: FAMILY MEDICINE

## 2024-12-16 PROCEDURE — 85027 COMPLETE CBC AUTOMATED: CPT | Performed by: FAMILY MEDICINE

## 2024-12-16 PROCEDURE — 83036 HEMOGLOBIN GLYCOSYLATED A1C: CPT | Performed by: FAMILY MEDICINE

## 2024-12-16 PROCEDURE — 99214 OFFICE O/P EST MOD 30 MIN: CPT | Mod: 25 | Performed by: FAMILY MEDICINE

## 2024-12-16 PROCEDURE — 84443 ASSAY THYROID STIM HORMONE: CPT | Performed by: FAMILY MEDICINE

## 2024-12-16 PROCEDURE — 81001 URINALYSIS AUTO W/SCOPE: CPT | Performed by: FAMILY MEDICINE

## 2024-12-16 PROCEDURE — 82570 ASSAY OF URINE CREATININE: CPT | Performed by: FAMILY MEDICINE

## 2024-12-16 PROCEDURE — 80061 LIPID PANEL: CPT | Performed by: FAMILY MEDICINE

## 2024-12-16 RX ORDER — LISINOPRIL 5 MG/1
5 TABLET ORAL DAILY
Qty: 90 TABLET | Refills: 3 | Status: SHIPPED | OUTPATIENT
Start: 2024-12-16

## 2024-12-16 RX ORDER — PRAVASTATIN SODIUM 40 MG
TABLET ORAL
Qty: 90 TABLET | Refills: 3 | Status: SHIPPED | OUTPATIENT
Start: 2024-12-16

## 2024-12-16 RX ORDER — AMLODIPINE BESYLATE 10 MG/1
10 TABLET ORAL DAILY
Qty: 90 TABLET | Refills: 3 | Status: SHIPPED | OUTPATIENT
Start: 2024-12-16

## 2024-12-16 SDOH — HEALTH STABILITY: PHYSICAL HEALTH: ON AVERAGE, HOW MANY DAYS PER WEEK DO YOU ENGAGE IN MODERATE TO STRENUOUS EXERCISE (LIKE A BRISK WALK)?: 7 DAYS

## 2024-12-16 SDOH — HEALTH STABILITY: PHYSICAL HEALTH: ON AVERAGE, HOW MANY MINUTES DO YOU ENGAGE IN EXERCISE AT THIS LEVEL?: 60 MIN

## 2024-12-16 ASSESSMENT — SOCIAL DETERMINANTS OF HEALTH (SDOH): HOW OFTEN DO YOU GET TOGETHER WITH FRIENDS OR RELATIVES?: TWICE A WEEK

## 2024-12-16 NOTE — PROGRESS NOTES
Preventive Care Visit  [facility] [place]  Cornell Tomlinson MD, Family Medicine  Dec 16, 2024      Assessment & Plan     Routine general medical examination at a health care facility    - Hemoglobin A1c  - Comprehensive metabolic panel  - Lipid panel reflex to direct LDL Fasting  - CBC with platelets  - CK total  - UA Macroscopic with reflex to Microscopic and Culture  - Albumin Random Urine Quantitative with Creat Ratio  - TSH with free T4 reflex  - REVIEW OF HEALTH MAINTENANCE PROTOCOL ORDERS  - Hemoglobin A1c  - Comprehensive metabolic panel  - Lipid panel reflex to direct LDL Fasting  - CBC with platelets  - CK total  - UA Macroscopic with reflex to Microscopic and Culture  - Albumin Random Urine Quantitative with Creat Ratio  - TSH with free T4 reflex  - UA Microscopic with Reflex to Culture  - PRIMARY CARE FOLLOW-UP SCHEDULING    Medicare annual wellness visit, subsequent    - Hemoglobin A1c  - Comprehensive metabolic panel  - Lipid panel reflex to direct LDL Fasting  - CBC with platelets  - CK total  - UA Macroscopic with reflex to Microscopic and Culture  - Albumin Random Urine Quantitative with Creat Ratio  - TSH with free T4 reflex  - REVIEW OF HEALTH MAINTENANCE PROTOCOL ORDERS  - Hemoglobin A1c  - Comprehensive metabolic panel  - Lipid panel reflex to direct LDL Fasting  - CBC with platelets  - CK total  - UA Macroscopic with reflex to Microscopic and Culture  - Albumin Random Urine Quantitative with Creat Ratio  - TSH with free T4 reflex  - UA Microscopic with Reflex to Culture  - PRIMARY CARE FOLLOW-UP SCHEDULING    Type 2 diabetes mellitus with stage 2 chronic kidney disease, without long-term current use of insulin (H)    - Hemoglobin A1c  - Comprehensive metabolic panel  - Lipid panel reflex to direct LDL Fasting  - UA Macroscopic with reflex to Microscopic and Culture  - Albumin Random Urine Quantitative with Creat Ratio  - REVIEW OF HEALTH MAINTENANCE PROTOCOL ORDERS  - PRIMARY CARE  FOLLOW-UP SCHEDULING  - lisinopril (ZESTRIL) 5 MG tablet  Dispense: 90 tablet; Refill: 3  - pravastatin (PRAVACHOL) 40 MG tablet  Dispense: 90 tablet; Refill: 3  - FOOT EXAM    Type 2 diabetes, HbA1c goal < 7% (H)    - Hemoglobin A1c  - Comprehensive metabolic panel  - Lipid panel reflex to direct LDL Fasting  - UA Macroscopic with reflex to Microscopic and Culture  - Albumin Random Urine Quantitative with Creat Ratio  - TSH with free T4 reflex  - REVIEW OF HEALTH MAINTENANCE PROTOCOL ORDERS  - Hemoglobin A1c  - Comprehensive metabolic panel  - Lipid panel reflex to direct LDL Fasting  - UA Macroscopic with reflex to Microscopic and Culture  - Albumin Random Urine Quantitative with Creat Ratio  - UA Microscopic with Reflex to Culture  - PRIMARY CARE FOLLOW-UP SCHEDULING  - lisinopril (ZESTRIL) 5 MG tablet  Dispense: 90 tablet; Refill: 3  - pravastatin (PRAVACHOL) 40 MG tablet  Dispense: 90 tablet; Refill: 3  - FOOT EXAM    CKD (chronic kidney disease) stage 2, GFR 60-89 ml/min    - Comprehensive metabolic panel  - CBC with platelets  - UA Macroscopic with reflex to Microscopic and Culture  - Albumin Random Urine Quantitative with Creat Ratio  - TSH with free T4 reflex  - REVIEW OF HEALTH MAINTENANCE PROTOCOL ORDERS  - Comprehensive metabolic panel  - CBC with platelets  - UA Macroscopic with reflex to Microscopic and Culture  - Albumin Random Urine Quantitative with Creat Ratio  - UA Microscopic with Reflex to Culture  - PRIMARY CARE FOLLOW-UP SCHEDULING  - amLODIPine (NORVASC) 10 MG tablet  Dispense: 90 tablet; Refill: 3    Proteinuria, unspecified type    - lisinopril (ZESTRIL) 5 MG tablet  Dispense: 90 tablet; Refill: 3    Hypertension goal BP (blood pressure) < 140/90    - Comprehensive metabolic panel  - CBC with platelets  - UA Macroscopic with reflex to Microscopic and Culture  - Albumin Random Urine Quantitative with Creat Ratio  - TSH with free T4 reflex  - REVIEW OF HEALTH MAINTENANCE PROTOCOL ORDERS  -  Comprehensive metabolic panel  - CBC with platelets  - UA Macroscopic with reflex to Microscopic and Culture  - Albumin Random Urine Quantitative with Creat Ratio  - UA Microscopic with Reflex to Culture  - PRIMARY CARE FOLLOW-UP SCHEDULING  - amLODIPine (NORVASC) 10 MG tablet  Dispense: 90 tablet; Refill: 3    Hyperlipidemia LDL goal <70    - Comprehensive metabolic panel  - Lipid panel reflex to direct LDL Fasting  - CK total  - REVIEW OF HEALTH MAINTENANCE PROTOCOL ORDERS  - Comprehensive metabolic panel  - Lipid panel reflex to direct LDL Fasting  - CK total  - PRIMARY CARE FOLLOW-UP SCHEDULING    Anemia, unspecified type    - CBC with platelets  - REVIEW OF HEALTH MAINTENANCE PROTOCOL ORDERS  - CBC with platelets  - PRIMARY CARE FOLLOW-UP SCHEDULING    Malignant neoplasm of left eye (H)    - Comprehensive metabolic panel  - CBC with platelets  - REVIEW OF HEALTH MAINTENANCE PROTOCOL ORDERS  - Comprehensive metabolic panel  - CBC with platelets  - PRIMARY CARE FOLLOW-UP SCHEDULING    Environmental allergies    - REVIEW OF HEALTH MAINTENANCE PROTOCOL ORDERS  - PRIMARY CARE FOLLOW-UP SCHEDULING    Constipation, unspecified constipation type    - psyllium (METAMUCIL/KONSYL) capsule    Nephrolithiasis    - UA Macroscopic with reflex to Microscopic and Culture  - Albumin Random Urine Quantitative with Creat Ratio  - REVIEW OF HEALTH MAINTENANCE PROTOCOL ORDERS  - UA Macroscopic with reflex to Microscopic and Culture  - Albumin Random Urine Quantitative with Creat Ratio  - UA Microscopic with Reflex to Culture  - PRIMARY CARE FOLLOW-UP SCHEDULING    Primary osteoarthritis involving multiple joints    - REVIEW OF HEALTH MAINTENANCE PROTOCOL ORDERS  - PRIMARY CARE FOLLOW-UP SCHEDULING    Morbid obesity (H)    - TSH with free T4 reflex  - REVIEW OF HEALTH MAINTENANCE PROTOCOL ORDERS  - PRIMARY CARE FOLLOW-UP SCHEDULING    Medication monitoring encounter    - Hemoglobin A1c  - Comprehensive metabolic panel  - Lipid panel  "reflex to direct LDL Fasting  - CBC with platelets  - CK total  - UA Macroscopic with reflex to Microscopic and Culture  - Albumin Random Urine Quantitative with Creat Ratio  - TSH with free T4 reflex  - REVIEW OF HEALTH MAINTENANCE PROTOCOL ORDERS  - Hemoglobin A1c  - Comprehensive metabolic panel  - Lipid panel reflex to direct LDL Fasting  - CBC with platelets  - CK total  - UA Macroscopic with reflex to Microscopic and Culture  - Albumin Random Urine Quantitative with Creat Ratio  - TSH with free T4 reflex  - UA Microscopic with Reflex to Culture  - PRIMARY CARE FOLLOW-UP SCHEDULING      Patient has been advised of split billing requirements and indicates understanding: Yes    BMI  Estimated body mass index is 37.95 kg/m  as calculated from the following:    Height as of this encounter: 1.632 m (5' 4.25\").    Weight as of this encounter: 101.1 kg (222 lb 12.8 oz).   Weight management plan: diet and exercise, consider weight loss.    Counseling  Appropriate preventive services were addressed with this patient via screening, questionnaire, or discussion as appropriate for fall prevention, nutrition, physical activity, Tobacco-use cessation, social engagement, weight loss and cognition.  Checklist reviewing preventive services available has been given to the patient.  Reviewed patient's diet, addressing concerns and/or questions.   The patient was provided with written information regarding signs of hearing loss.     Work on weight loss  Regular exercise    Plan:    1) Medications: reviewed, refilled, consider decrease amlodipine, increase lisinopril, consider changing to rosuvastatin from pravastatin    2) Labs: reviewed / pending    3) Immunizations: advised prevnar 20    4) Imaging/Diagnostics: NA    5) Consults: Ophth    Return in about 6 months (around 6/16/2025) for Medication Recheck Visit, Follow Up Chronic.    44 minutes spent by myself on the date of the encounter doing chart review, history and exam, " "documentation and further activities per the note.    The longitudinal plan of care for the diagnosis(es)/condition(s) as documented were addressed during this visit. Due to the added complexity in care, I will continue to support Pat in the subsequent management and with ongoing continuity of care.    Mallory Colon is a 89 year old, presenting for the following:  Medicare Visit        12/16/2024    10:10 AM   Additional Questions   Roomed by Nhi CASTREJON         Patient is fasting.    Diabetes    Range 88 to 130  Average 120    Lab Results   Component Value Date    A1C 6.6 12/16/2024    A1C 7.0 03/15/2024    A1C 6.8 12/15/2023    A1C 7.6 04/20/2023    A1C 7.4 01/18/2023     Hyperlipidemia Follow-Up    Are you regularly taking any medication or supplement to lower your cholesterol?   Yes- pravastatin.  Are you having muscle aches or other side effects that you think could be caused by your cholesterol lowering medication?  No    Recent Labs   Lab Test 03/15/24  1058 12/15/23  0942   CHOL 129 136   HDL 49 48   LDL 59 68   TRIG 104 98     CKD 2 / Hypertension Follow-up    Do you check your blood pressure regularly outside of the clinic? No   Are you following a low salt diet? No does not add extra salt  Are your blood pressures ever more than 140 on the top number (systolic) OR more   than 90 on the bottom number (diastolic), for example 140/90? No    BP Readings from Last 3 Encounters:   12/16/24 136/60   03/12/24 (!) 160/72   12/15/23 (!) 141/83     Creatinine   Date Value Ref Range Status   04/02/2024 1.21 (H) 0.67 - 1.17 mg/dL Final     GFR Estimate   Date Value Ref Range Status   04/02/2024 58 (L) >60 mL/min/1.73m2 Final   07/08/2021 >60 >60 mL/min/1.73m2 Final     Anemia    Hemoglobin   Date Value Ref Range Status   12/16/2024 11.3 (L) 13.3 - 17.7 g/dL Final   03/15/2024 11.3 (L) 13.3 - 17.7 g/dL Final     Malignant Melanoma - left eye - 2000 - treated, closely observed    Environmental allergies - \"not to " "bad\"    Nephrolithiasis - no issues    OA - using tylenol - primarily wrist pain    Health Care Directive  Patient has a Health Care Directive on file  Advance care planning document is on file and is current.        12/16/2024   General Health   How would you rate your overall physical health? (!) FAIR   Feel stress (tense, anxious, or unable to sleep) Not at all            12/16/2024   Nutrition   Diet: Diabetic            12/16/2024   Exercise   Days per week of moderate/strenous exercise 7 days   Average minutes spent exercising at this level 60 min            12/16/2024   Social Factors   Frequency of gathering with friends or relatives Twice a week   Worry food won't last until get money to buy more No   Food not last or not have enough money for food? No   Do you have housing? (Housing is defined as stable permanent housing and does not include staying ouside in a car, in a tent, in an abandoned building, in an overnight shelter, or couch-surfing.) Yes   Are you worried about losing your housing? No   Lack of transportation? No   Unable to get utilities (heat,electricity)? No            12/16/2024   Fall Risk   Fallen 2 or more times in the past year? No     No    Trouble with walking or balance? Yes     Yes    Gait Speed Test (Document in seconds) 4.2   Gait Speed Test Interpretation Less than or equal to 5.00 seconds - PASS       Patient-reported    Multiple values from one day are sorted in reverse-chronological order          12/16/2024   Activities of Daily Living- Home Safety   Needs help with the following daily activites None of the above   Safety concerns in the home None of the above            12/16/2024   Dental   Dentist two times every year? Yes            12/16/2024   Hearing Screening   Hearing concerns? (!) I NEED TO ASK PEOPLE TO SPEAK UP OR REPEAT THEMSELVES.            12/16/2024   Driving Risk Screening   Patient/family members have concerns about driving No            12/16/2024 "   General Alertness/Fatigue Screening   Have you been more tired than usual lately? No            12/16/2024   Urinary Incontinence Screening   Bothered by leaking urine in past 6 months No            12/16/2024   TB Screening   Were you born outside of the [place]? No            Today's PHQ-2 Score:       12/16/2024    10:11 AM   PHQ-2 ( 1999 [employer])   Q1: Little interest or pleasure in doing things 0    Q2: Feeling down, depressed or hopeless 0    PHQ-2 Score 0    Q1: Little interest or pleasure in doing things Not at all   Q2: Feeling down, depressed or hopeless Not at all   PHQ-2 Score 0       Patient-reported           12/16/2024   Substance Use   Alcohol more than 3/day or more than 7/wk No   Do you have a current opioid prescription? No   How severe/bad is pain from 1 to 10? 3/10   Do you use any other substances recreationally? No        Social History     Tobacco Use    Smoking status: Former     Types: Cigarettes    Smokeless tobacco: Former     Quit date: 12/8/1967    Tobacco comments:     Quit at age 28, smoked 1 PPD x 17 yrs     Quit chew at age 51, only months   Vaping Use    Vaping status: Never Used   Substance Use Topics    Alcohol use: Not Currently     Alcohol/week: 0.0 - 2.0 standard drinks of alcohol     Comment: rare    Drug use: No             Reviewed and updated as needed this visit by Provider                  Current providers sharing in care for this patient include:  Patient Care Team:  Cornell Tomlinson MD as PCP - General (Family Medicine)  Linnea Horner, PharmD as Pharmacist (Pharmacist)  Cornell Tomlinson MD as Assigned PCP    The following health maintenance items are reviewed in Epic and correct as of today:  Health Maintenance   Topic Date Due    LIPID  03/15/2025    MICROALBUMIN  03/15/2025    BMP  04/02/2025    EYE EXAM  04/05/2025    A1C  06/16/2025    MEDICARE ANNUAL WELLNESS VISIT  12/16/2025    DIABETIC FOOT EXAM  12/16/2025    ANNUAL REVIEW OF HM ORDERS  12/16/2025    FALL RISK  "ASSESSMENT  12/16/2025    COLORECTAL CANCER SCREENING  01/18/2027    ADVANCE CARE PLANNING  12/16/2029    DTAP/TDAP/TD IMMUNIZATION (3 - Td or Tdap) 06/27/2032    PHQ-2 (once per calendar year)  Completed    INFLUENZA VACCINE  Completed    Pneumococcal Vaccine: 65+ Years  Completed    URINALYSIS  Completed    ZOSTER IMMUNIZATION  Completed    RSV VACCINE  Completed    COVID-19 Vaccine  Completed    HPV IMMUNIZATION  Aged Out    MENINGITIS IMMUNIZATION  Aged Out    RSV MONOCLONAL ANTIBODY  Aged Out       Review of Systems  CONSTITUTIONAL: NEGATIVE for fever, chills, change in weight  INTEGUMENTARY/SKIN: NEGATIVE for worrisome rashes, moles or lesions  EYES: NEGATIVE for vision changes or irritation  ENT/MOUTH: NEGATIVE for ear, mouth and throat problems  RESP: NEGATIVE for significant cough or SOB  CV: NEGATIVE for chest pain, palpitations or peripheral edema  GI: NEGATIVE for nausea, abdominal pain, heartburn, or change in bowel habits  : NEGATIVE for frequency, dysuria, or hematuria  MUSCULOSKELETAL: NEGATIVE for significant arthralgias or myalgia  NEURO: NEGATIVE for weakness, dizziness or paresthesias  ENDOCRINE: NEGATIVE for temperature intolerance, skin/hair changes  HEME: NEGATIVE for bleeding problems  PSYCHIATRIC: NEGATIVE for changes in mood or affect     Objective    Exam  /60   Pulse 75   Temp 97.4  F (36.3  C) (Tympanic)   Resp 16   Ht 1.632 m (5' 4.25\")   Wt 101.1 kg (222 lb 12.8 oz)   SpO2 98%   BMI 37.95 kg/m     Estimated body mass index is 37.95 kg/m  as calculated from the following:    Height as of this encounter: 1.632 m (5' 4.25\").    Weight as of this encounter: 101.1 kg (222 lb 12.8 oz).    Physical Exam  GENERAL: alert and no distress  EYES: Eyes grossly normal to inspection, PERRL and conjunctivae and sclerae normal  HENT: ear canals and TM's normal, nose and mouth without ulcers or lesions  NECK: no adenopathy, no asymmetry, masses, or scars  RESP: lungs clear to auscultation " - no rales, rhonchi or wheezes  CV: regular rate and rhythm, normal S1 S2, no S3 or S4, no murmur, click or rub, no peripheral edema  ABDOMEN: soft, nontender, no hepatosplenomegaly, no masses and bowel sounds normal   (male): pt declines  RECTAL: pt declines  MS: no gross musculoskeletal defects noted, no edema  SKIN: no suspicious lesions or rashes  NEURO: Normal strength and tone, mentation intact and speech normal  PSYCH: mentation appears normal, affect normal/bright        12/16/2024   Mini Cog   Clock Draw Score 2 Normal   3 Item Recall 1 object recalled   Mini Cog Total Score 3             Signed Electronically by:            Cornell Tomlinson MD, FAAFP     [facility]  [address] [place] [address] [id number] [place], [place] [email or web address]   Office: [phone]  Fax: [phone]

## 2025-02-19 ENCOUNTER — NURSE TRIAGE (OUTPATIENT)
Dept: FAMILY MEDICINE | Facility: CLINIC | Age: 89
End: 2025-02-19

## 2025-02-19 ENCOUNTER — OFFICE VISIT (OUTPATIENT)
Dept: FAMILY MEDICINE | Facility: CLINIC | Age: 89
End: 2025-02-19
Payer: COMMERCIAL

## 2025-02-19 VITALS
BODY MASS INDEX: 37.9 KG/M2 | HEIGHT: 64 IN | WEIGHT: 222 LBS | RESPIRATION RATE: 18 BRPM | HEART RATE: 114 BPM | SYSTOLIC BLOOD PRESSURE: 144 MMHG | DIASTOLIC BLOOD PRESSURE: 70 MMHG | TEMPERATURE: 96.5 F | OXYGEN SATURATION: 97 %

## 2025-02-19 DIAGNOSIS — D64.9 ANEMIA, UNSPECIFIED TYPE: ICD-10-CM

## 2025-02-19 DIAGNOSIS — E66.01 MORBID OBESITY (H): ICD-10-CM

## 2025-02-19 DIAGNOSIS — R80.9 PROTEINURIA, UNSPECIFIED TYPE: ICD-10-CM

## 2025-02-19 DIAGNOSIS — C69.92: ICD-10-CM

## 2025-02-19 DIAGNOSIS — I10 HYPERTENSION GOAL BP (BLOOD PRESSURE) < 140/90: ICD-10-CM

## 2025-02-19 DIAGNOSIS — R60.9 EDEMA, UNSPECIFIED TYPE: Primary | ICD-10-CM

## 2025-02-19 DIAGNOSIS — N18.31 CHRONIC KIDNEY DISEASE, STAGE 3A (H): ICD-10-CM

## 2025-02-19 DIAGNOSIS — E11.22 TYPE 2 DIABETES MELLITUS WITH STAGE 3A CHRONIC KIDNEY DISEASE, WITHOUT LONG-TERM CURRENT USE OF INSULIN (H): ICD-10-CM

## 2025-02-19 DIAGNOSIS — E11.9 TYPE 2 DIABETES, HBA1C GOAL < 7% (H): ICD-10-CM

## 2025-02-19 DIAGNOSIS — N18.31 TYPE 2 DIABETES MELLITUS WITH STAGE 3A CHRONIC KIDNEY DISEASE, WITHOUT LONG-TERM CURRENT USE OF INSULIN (H): ICD-10-CM

## 2025-02-19 DIAGNOSIS — Z51.81 MEDICATION MONITORING ENCOUNTER: ICD-10-CM

## 2025-02-19 DIAGNOSIS — E78.5 HYPERLIPIDEMIA LDL GOAL <70: ICD-10-CM

## 2025-02-19 DIAGNOSIS — R06.89 OTHER ABNORMALITIES OF BREATHING: ICD-10-CM

## 2025-02-19 LAB
ALBUMIN SERPL BCG-MCNC: 4.5 G/DL (ref 3.5–5.2)
ALP SERPL-CCNC: 119 U/L (ref 40–150)
ALT SERPL W P-5'-P-CCNC: 12 U/L (ref 0–70)
ANION GAP SERPL CALCULATED.3IONS-SCNC: 15 MMOL/L (ref 7–15)
AST SERPL W P-5'-P-CCNC: 21 U/L (ref 0–45)
BASOPHILS # BLD AUTO: 0 10E3/UL (ref 0–0.2)
BASOPHILS NFR BLD AUTO: 1 %
BILIRUB SERPL-MCNC: 0.5 MG/DL
BUN SERPL-MCNC: 25.7 MG/DL (ref 8–23)
CALCIUM SERPL-MCNC: 9.8 MG/DL (ref 8.8–10.4)
CHLORIDE SERPL-SCNC: 104 MMOL/L (ref 98–107)
CREAT SERPL-MCNC: 1.14 MG/DL (ref 0.67–1.17)
CRP SERPL-MCNC: <3 MG/L
EGFRCR SERPLBLD CKD-EPI 2021: 62 ML/MIN/1.73M2
EOSINOPHIL # BLD AUTO: 0.5 10E3/UL (ref 0–0.7)
EOSINOPHIL NFR BLD AUTO: 6 %
ERYTHROCYTE [DISTWIDTH] IN BLOOD BY AUTOMATED COUNT: 14.2 % (ref 10–15)
ERYTHROCYTE [SEDIMENTATION RATE] IN BLOOD BY WESTERGREN METHOD: 10 MM/HR (ref 0–20)
GLUCOSE SERPL-MCNC: 133 MG/DL (ref 70–99)
HCO3 SERPL-SCNC: 22 MMOL/L (ref 22–29)
HCT VFR BLD AUTO: 40.7 % (ref 40–53)
HGB BLD-MCNC: 12.7 G/DL (ref 13.3–17.7)
IMM GRANULOCYTES # BLD: 0 10E3/UL
IMM GRANULOCYTES NFR BLD: 0 %
LYMPHOCYTES # BLD AUTO: 1.4 10E3/UL (ref 0.8–5.3)
LYMPHOCYTES NFR BLD AUTO: 16 %
MCH RBC QN AUTO: 28.3 PG (ref 26.5–33)
MCHC RBC AUTO-ENTMCNC: 31.2 G/DL (ref 31.5–36.5)
MCV RBC AUTO: 91 FL (ref 78–100)
MONOCYTES # BLD AUTO: 0.8 10E3/UL (ref 0–1.3)
MONOCYTES NFR BLD AUTO: 9 %
NEUTROPHILS # BLD AUTO: 6 10E3/UL (ref 1.6–8.3)
NEUTROPHILS NFR BLD AUTO: 69 %
NT-PROBNP SERPL-MCNC: 3287 PG/ML (ref 0–1800)
PLATELET # BLD AUTO: 226 10E3/UL (ref 150–450)
POTASSIUM SERPL-SCNC: 5.1 MMOL/L (ref 3.4–5.3)
PROT SERPL-MCNC: 7.6 G/DL (ref 6.4–8.3)
RBC # BLD AUTO: 4.48 10E6/UL (ref 4.4–5.9)
SODIUM SERPL-SCNC: 141 MMOL/L (ref 135–145)
WBC # BLD AUTO: 8.7 10E3/UL (ref 4–11)

## 2025-02-19 PROCEDURE — 36415 COLL VENOUS BLD VENIPUNCTURE: CPT | Performed by: FAMILY MEDICINE

## 2025-02-19 PROCEDURE — G2211 COMPLEX E/M VISIT ADD ON: HCPCS | Performed by: FAMILY MEDICINE

## 2025-02-19 PROCEDURE — 99214 OFFICE O/P EST MOD 30 MIN: CPT | Performed by: FAMILY MEDICINE

## 2025-02-19 PROCEDURE — 80053 COMPREHEN METABOLIC PANEL: CPT | Performed by: FAMILY MEDICINE

## 2025-02-19 PROCEDURE — 85025 COMPLETE CBC W/AUTO DIFF WBC: CPT | Performed by: FAMILY MEDICINE

## 2025-02-19 PROCEDURE — 83880 ASSAY OF NATRIURETIC PEPTIDE: CPT | Performed by: FAMILY MEDICINE

## 2025-02-19 PROCEDURE — 86140 C-REACTIVE PROTEIN: CPT | Performed by: FAMILY MEDICINE

## 2025-02-19 PROCEDURE — 85652 RBC SED RATE AUTOMATED: CPT | Performed by: FAMILY MEDICINE

## 2025-02-19 RX ORDER — AMLODIPINE BESYLATE 10 MG/1
5 TABLET ORAL DAILY
Qty: 45 TABLET | Refills: 3 | Status: SHIPPED | OUTPATIENT
Start: 2025-02-19

## 2025-02-19 RX ORDER — FUROSEMIDE 20 MG/1
20 TABLET ORAL DAILY
Qty: 30 TABLET | Refills: 3 | Status: SHIPPED | OUTPATIENT
Start: 2025-02-19

## 2025-02-19 NOTE — TELEPHONE ENCOUNTER
"Priority nurse call warm transferred to writer.    Patient's spouse, Sharon called regarding patient's BLE edema.  Consent to communicate with Sharon on file.    Per Sharon:  BLE edema-severe  Thinks patient should be evaluated  Weeping  \"Hard as rocks\"  Some red areas on legs  Duration: a couple of months per patient's report  Sharon noticed yesterday while applying lotion to patient's feet  Patient does not want Sharon to call clinic  Patient does not wish to speak with writer regarding symptoms  Afebrile   Able to walk and stand  Patient does not appear to be having any trouble breathing and not aware of any chest pain    Writer does not find diuretic on active medication list.    Writer offered appt today at 0940 with Dr. Tomlinson, to which Sharon agreed. Visit date, time and location confirmed with Sharon.    Sharon stated if patient develops chest pain, she will call 911.    HAKEEM Hauser, RN-[facility]        Reason for Disposition   SEVERE swelling (e.g., swelling extends above knee, entire leg is swollen, weeping fluid)    Additional Information   Negative: Sounds like a life-threatening emergency to the triager   Negative: Chest pain   Negative: Followed an insect bite and has localized swelling (e.g., small area of puffy or swollen skin)   Negative: Followed a knee injury   Negative: Ankle injury   Negative: Pregnant with leg swelling or edema   Negative: Difficulty breathing at rest   Negative: Entire foot is cool or blue in comparison to other side   Negative: Cast on leg or ankle and has increasing pain   Negative: Can't walk or can barely stand (new-onset)   Negative: Fever and red area (or area very tender to touch)   Negative: Patient sounds very sick or weak to the triager    Protocols used: Leg Swelling and Edema-A-OH    "

## 2025-02-19 NOTE — PROGRESS NOTES
Assessment & Plan     Edema, unspecified type    - Comprehensive metabolic panel (BMP + Alb, Alk Phos, ALT, AST, Total. Bili, TP)  - CBC with platelets and differential  - ESR: Erythrocyte sedimentation rate  - CRP, inflammation  - BNP-N terminal pro  - PRIMARY CARE FOLLOW-UP SCHEDULING  - Echocardiogram Complete  - furosemide (LASIX) 20 MG tablet  Dispense: 30 tablet; Refill: 3    Type 2 diabetes mellitus with stage 3a chronic kidney disease, without long-term current use of insulin (H)    - Comprehensive metabolic panel (BMP + Alb, Alk Phos, ALT, AST, Total. Bili, TP)  - PRIMARY CARE FOLLOW-UP SCHEDULING  - Echocardiogram Complete    Type 2 diabetes, HbA1c goal < 7% (H)    - Comprehensive metabolic panel (BMP + Alb, Alk Phos, ALT, AST, Total. Bili, TP)  - PRIMARY CARE FOLLOW-UP SCHEDULING  - Echocardiogram Complete    Chronic kidney disease, stage 3a (H)    - Comprehensive metabolic panel (BMP + Alb, Alk Phos, ALT, AST, Total. Bili, TP)  - CBC with platelets and differential  - PRIMARY CARE FOLLOW-UP SCHEDULING  - Echocardiogram Complete  - amLODIPine (NORVASC) 10 MG tablet  Dispense: 45 tablet; Refill: 3    Proteinuria, unspecified type    - Comprehensive metabolic panel (BMP + Alb, Alk Phos, ALT, AST, Total. Bili, TP)  - PRIMARY CARE FOLLOW-UP SCHEDULING    Hypertension goal BP (blood pressure) < 140/90    - Comprehensive metabolic panel (BMP + Alb, Alk Phos, ALT, AST, Total. Bili, TP)  - PRIMARY CARE FOLLOW-UP SCHEDULING  - Echocardiogram Complete  - furosemide (LASIX) 20 MG tablet  Dispense: 30 tablet; Refill: 3  - amLODIPine (NORVASC) 10 MG tablet  Dispense: 45 tablet; Refill: 3    Hyperlipidemia LDL goal <70    - Comprehensive metabolic panel (BMP + Alb, Alk Phos, ALT, AST, Total. Bili, TP)  - PRIMARY CARE FOLLOW-UP SCHEDULING    Anemia, unspecified type    - CBC with platelets and differential  - PRIMARY CARE FOLLOW-UP SCHEDULING    Malignant neoplasm of left eye (H)    - PRIMARY CARE FOLLOW-UP  "SCHEDULING    Morbid obesity (H)    - PRIMARY CARE FOLLOW-UP SCHEDULING    Medication monitoring encounter    - Comprehensive metabolic panel (BMP + Alb, Alk Phos, ALT, AST, Total. Bili, TP)  - CBC with platelets and differential  - ESR: Erythrocyte sedimentation rate  - CRP, inflammation  - BNP-N terminal pro  - PRIMARY CARE FOLLOW-UP SCHEDULING    Other abnormalities of breathing    - BNP-N terminal pro      Work on weight loss  Regular exercise    Plan:    1) Medications: decrease amlodipine to 5 mg, add lasix 20 mg daily, compression socks, elevation, low salt diet    2) Labs: pending    3) Immunizations: NA    4) Imaging/Diagnostics: ECHO    5) Consults: no change    Return in about 1 week (around 2/26/2025) for Follow Up Acute, Follow Up Chronic, Medication Recheck Visit.    34 minutes spent by myself on the date of the encounter doing chart review, history and exam, documentation and further activities per the note.    The longitudinal plan of care for the diagnosis(es)/condition(s) as documented were addressed during this visit. Due to the added complexity in care, I will continue to support Pat in the subsequent management and with ongoing continuity of care.    Shared Decision making completed.  Subjective   Isaiah is a 89 year old, presenting for the following health issues:  Edema        2/19/2025     9:46 AM   Additional Questions   Roomed by Velia CASTREJON   Accompanied by self     History of Present Illness       Reason for visit:  Leg pain   He is taking medications regularly.     Patient's spouse, Sharon called regarding patient's BLE edema.  Consent to communicate with Sharon on file.     Per Sharon:  BLE edema-severe  Thinks patient should be evaluated  Weeping  \"Hard as rocks\"  Some red areas on legs  Duration: a couple of months per patient's report  Sharon noticed yesterday while applying lotion to patient's feet  Patient does not want Sharon to call clinic  Patient does not wish to speak with " writer regarding symptoms  Afebrile   Able to walk and stand  Patient does not appear to be having any trouble breathing and not aware of any chest pain     Writer does not find diuretic on active medication list.    Increasing swelling in lower legs, last few months, worse in evening, better in am, some redness R>L, some tenderness, especially at night, no f/c/s, S/P Lt TKA - 20 yrs ago, no cp, no sob, no PND, sleeping ok, no changes in diet / activity, rides bike routinely 40-50 minutes, no use of compression socks - no cardiology consults recently    DM range 129 to 150    Lab Results   Component Value Date    A1C 6.6 12/16/2024    A1C 7.0 03/15/2024    A1C 6.8 12/15/2023    A1C 7.6 04/20/2023    A1C 7.4 01/18/2023          Patient Active Problem List   Diagnosis    Nephrolithiasis    Choroid Melanoma    Hyperlipidemia LDL goal <70    Primary Osteoarthritis Of The Lumbar Vertebrae    Hypertension goal BP (blood pressure) < 140/90    Allergic rhinitis    Malignant Melanoma Of The Eye    Anemia    BPH (benign prostatic hyperplasia)    S/P total knee arthroplasty    Hemorrhage of rectum and anus    Superior mesenteric artery stenosis    Intestinal angina    Obesity (BMI 35.0-39.9) with comorbidity (H)    Type 2 diabetes, HbA1c goal < 7% (H)    CKD (chronic kidney disease) stage 2, GFR 60-89 ml/min    Environmental allergies    Type 2 diabetes mellitus with stage 2 chronic kidney disease, without long-term current use of insulin (H)    Chronic kidney disease, stage 3a (H)    Proteinuria, unspecified type       Past Medical History:   Diagnosis Date    Cancer (H) 2000    Left Eye - treated with radiactive substance - vision ok    CKD (chronic kidney disease) stage 2, GFR 60-89 ml/min     Environmental allergies     History of transfusion     Hyperlipidemia LDL goal <70     Hypertension goal BP (blood pressure) < 140/90     Kidney disease     minimal change disease    Kidney stones     Osteoarthritis     Peptic  ulceration 2011    see EGD that year    Type 2 diabetes, HbA1c goal < 7% (H) 2000       Past Surgical History:   Procedure Laterality Date    EYE SURGERY  2000    HC KNEE SCOPE, DIAGNOSTIC Left 2008    Description: Arthroscopy Knee Left;  Recorded: 03/17/2008;  Comments: cartilage surgery    OR TYMPANOPLASTY Left 1990    Tympanoplasty    TONSILLECTOMY  1945    ZZC RECONSTR TOTAL SHOULDER IMPLANT Right 1995    Description: Shoulder Arthroplasty Total Shoulder Replacement;  Recorded: 05/06/2008;    ZZC TOTAL KNEE ARTHROPLASTY Left 03/26/2018    Procedure: LEFT TOTAL KNEE ARTHROPLASTY;  Surgeon: Darrell Wu MD;  Location: [facility];  Service: Orthopedics       Current Outpatient Medications   Medication Sig Dispense Refill    amLODIPine (NORVASC) 10 MG tablet Take 0.5 tablets (5 mg) by mouth daily. 45 tablet 3    aspirin 81 MG EC tablet [ASPIRIN 81 MG EC TABLET] Take 81 mg by mouth daily.      cholecalciferol, vitamin D3, 50 mcg (2,000 unit) Tab [CHOLECALCIFEROL, VITAMIN D3, 50 MCG (2,000 UNIT) TAB] Take 2,000 Units by mouth daily.      furosemide (LASIX) 20 MG tablet Take 1 tablet (20 mg) by mouth daily. 30 tablet 3    lisinopril (ZESTRIL) 5 MG tablet Take 1 tablet (5 mg) by mouth daily. 90 tablet 3    pravastatin (PRAVACHOL) 40 MG tablet TAKE 1 TABLET BY MOUTH EVERYDAY AT BEDTIME 90 tablet 3    acetaminophen (TYLENOL) 500 MG tablet [ACETAMINOPHEN (TYLENOL) 500 MG TABLET] Take 500 mg by mouth every 6 (six) hours as needed for pain.      cetirizine (ZYRTEC) 10 mg cap [CETIRIZINE (ZYRTEC) 10 MG CAP] Take 10 mg by mouth daily as needed.       generic lancets [GENERIC LANCETS] Use 1 each As Directed daily. Dispense brand per patient's insurance at pharmacy discretion.(E11.9) 100 each 1    ONETOUCH ULTRA test strip USE TO TEST ONCE DAILY 100 strip 0    psyllium (METAMUCIL/KONSYL) capsule Take 3 capsules by mouth 3 times daily.         No Known Allergies    Family History   Problem Relation Age of Onset     "Diabetes Father     Diabetes Sister        Social History     Socioeconomic History    Marital status:      Spouse name: Sharon    Number of children: 5    Years of education: 12    Highest education level: None   Tobacco Use    Smoking status: Former     Types: Cigarettes    Smokeless tobacco: Former     Quit date: 12/8/1967    Tobacco comments:     Quit at age 28, smoked 1 PPD x 17 yrs     Quit chew at age 51, only months   Vaping Use    Vaping status: Never Used   Substance and Sexual Activity    Alcohol use: Not Currently     Alcohol/week: 0.0 - 2.0 standard drinks of alcohol     Comment: rare    Drug use: No    Sexual activity: Yes     Partners: Female     Birth control/protection: None   Social History Narrative     5 grown kids. 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      Social Drivers of Health     Financial Resource Strain: Low Risk  (12/16/2024)    Financial Resource Strain     Within the past 12 months, have you or your family members you live with been unable to get utilities (heat, electricity) when it was really needed?: No   Food Insecurity: Low Risk  (12/16/2024)    Food Insecurity     Within the past 12 months, did you worry that your food would run out before you got money to buy more?: No     Within the past 12 months, did the food you bought just not last and you didn t have money to get more?: No   Transportation Needs: Low Risk  (12/16/2024)    Transportation Needs     Within the past 12 months, has lack of transportation kept you from medical appointments, getting your medicines, non-medical meetings or appointments, work, or from getting things that you need?: No   Physical Activity: Sufficiently Active (12/16/2024)    Exercise Vital Sign     Days of Exercise per Week: 7 days     Minutes of Exercise per Session: 60 min   Stress: No Stress Concern Present (12/16/2024)    [employer] - Occupational Stress Questionnaire     Feeling of Stress : Not at " "all   Social Connections: Unknown (12/16/2024)    Social Connection and Isolation Panel [NHANES]     Frequency of Social Gatherings with Friends and Family: Twice a week   Interpersonal Safety: Low Risk  (12/16/2024)    Interpersonal Safety     Do you feel physically and emotionally safe where you currently live?: Yes     Within the past 12 months, have you been hit, slapped, kicked or otherwise physically hurt by someone?: No     Within the past 12 months, have you been humiliated or emotionally abused in other ways by your partner or ex-partner?: No   Housing Stability: Low Risk  (12/16/2024)    Housing Stability     Do you have housing? : Yes     Are you worried about losing your housing?: No         Review of Systems  CONSTITUTIONAL: NEGATIVE for fever, chills, change in weight  INTEGUMENTARY/SKIN: NEGATIVE for worrisome rashes, moles or lesions  EYES: NEGATIVE for vision changes or irritation  ENT/MOUTH: NEGATIVE for ear, mouth and throat problems  RESP: NEGATIVE for significant cough or SOB  CV: NEGATIVE for chest pain, palpitations  GI: NEGATIVE for nausea, abdominal pain, heartburn, or change in bowel habits  : NEGATIVE for frequency, dysuria, or hematuria  MUSCULOSKELETAL: NEGATIVE for significant arthralgias or myalgia  NEURO: NEGATIVE for weakness, dizziness or paresthesias  ENDOCRINE: NEGATIVE for temperature intolerance, skin/hair changes  HEME: NEGATIVE for bleeding problems  PSYCHIATRIC: NEGATIVE for changes in mood or affect      Objective    BP (!) 144/70   Pulse 114   Temp (!) 96.5  F (35.8  C) (Tympanic)   Resp 18   Ht 1.632 m (5' 4.25\")   Wt 100.7 kg (222 lb)   SpO2 97%   BMI 37.81 kg/m    Body mass index is 37.81 kg/m .    Physical Exam   GENERAL: alert and no distress  EYES: Eyes grossly normal to inspection, PERRL and conjunctivae and sclerae normal  HENT: ear canals and TM's normal, nose and mouth without ulcers or lesions  NECK: no adenopathy, no asymmetry, masses, or scars  RESP: " lungs clear to auscultation - no rales, rhonchi or wheezes  CV: regular rate and rhythm, normal S1 S2, no S3 or S4, no murmur, click or rub, no peripheral edema  ABDOMEN: soft, nontender, no hepatosplenomegaly, no masses and bowel sounds normal  MS: no gross musculoskeletal defects noted, moderate edema - see below  SKIN: no suspicious lesions or rashes  NEURO: Normal strength and tone, mentation intact and speech normal  PSYCH: mentation appears normal, affect normal/bright    Labs reviewed, pending        Signed Electronically by:              Cornell Tomlinson MD, FAAFP     [facility]  [address] [place] [id number]  [email or web address]   Office: [phone]  Fax: [phone]

## 2025-02-26 ENCOUNTER — OFFICE VISIT (OUTPATIENT)
Dept: FAMILY MEDICINE | Facility: CLINIC | Age: 89
End: 2025-02-26
Payer: COMMERCIAL

## 2025-02-26 VITALS
RESPIRATION RATE: 18 BRPM | SYSTOLIC BLOOD PRESSURE: 144 MMHG | HEART RATE: 64 BPM | BODY MASS INDEX: 36.81 KG/M2 | DIASTOLIC BLOOD PRESSURE: 70 MMHG | WEIGHT: 215.6 LBS | OXYGEN SATURATION: 98 % | HEIGHT: 64 IN | TEMPERATURE: 97 F

## 2025-02-26 DIAGNOSIS — E11.22 TYPE 2 DIABETES MELLITUS WITH STAGE 3A CHRONIC KIDNEY DISEASE, WITHOUT LONG-TERM CURRENT USE OF INSULIN (H): ICD-10-CM

## 2025-02-26 DIAGNOSIS — N18.31 CHRONIC KIDNEY DISEASE, STAGE 3A (H): ICD-10-CM

## 2025-02-26 DIAGNOSIS — Z51.81 MEDICATION MONITORING ENCOUNTER: ICD-10-CM

## 2025-02-26 DIAGNOSIS — R60.9 EDEMA, UNSPECIFIED TYPE: Primary | ICD-10-CM

## 2025-02-26 DIAGNOSIS — R06.89 OTHER ABNORMALITIES OF BREATHING: ICD-10-CM

## 2025-02-26 DIAGNOSIS — C69.92: ICD-10-CM

## 2025-02-26 DIAGNOSIS — D64.9 ANEMIA, UNSPECIFIED TYPE: ICD-10-CM

## 2025-02-26 DIAGNOSIS — E78.5 HYPERLIPIDEMIA LDL GOAL <70: ICD-10-CM

## 2025-02-26 DIAGNOSIS — I10 HYPERTENSION GOAL BP (BLOOD PRESSURE) < 140/90: ICD-10-CM

## 2025-02-26 DIAGNOSIS — E66.01 MORBID OBESITY (H): ICD-10-CM

## 2025-02-26 DIAGNOSIS — R80.9 PROTEINURIA, UNSPECIFIED TYPE: ICD-10-CM

## 2025-02-26 DIAGNOSIS — N18.31 TYPE 2 DIABETES MELLITUS WITH STAGE 3A CHRONIC KIDNEY DISEASE, WITHOUT LONG-TERM CURRENT USE OF INSULIN (H): ICD-10-CM

## 2025-02-26 DIAGNOSIS — E11.9 TYPE 2 DIABETES, HBA1C GOAL < 7% (H): ICD-10-CM

## 2025-02-26 DIAGNOSIS — Z91.09 ENVIRONMENTAL ALLERGIES: ICD-10-CM

## 2025-02-26 DIAGNOSIS — M15.0 PRIMARY OSTEOARTHRITIS INVOLVING MULTIPLE JOINTS: ICD-10-CM

## 2025-02-26 LAB
ANION GAP SERPL CALCULATED.3IONS-SCNC: 10 MMOL/L (ref 7–15)
BUN SERPL-MCNC: 30 MG/DL (ref 8–23)
CALCIUM SERPL-MCNC: 9.7 MG/DL (ref 8.8–10.4)
CHLORIDE SERPL-SCNC: 105 MMOL/L (ref 98–107)
CREAT SERPL-MCNC: 1.16 MG/DL (ref 0.67–1.17)
EGFRCR SERPLBLD CKD-EPI 2021: 61 ML/MIN/1.73M2
GLUCOSE SERPL-MCNC: 131 MG/DL (ref 70–99)
HCO3 SERPL-SCNC: 28 MMOL/L (ref 22–29)
NT-PROBNP SERPL-MCNC: 3179 PG/ML (ref 0–1800)
POTASSIUM SERPL-SCNC: 5.2 MMOL/L (ref 3.4–5.3)
SODIUM SERPL-SCNC: 143 MMOL/L (ref 135–145)

## 2025-02-26 NOTE — PROGRESS NOTES
Assessment & Plan     Edema, unspecified type    - Basic metabolic panel  (Ca, Cl, CO2, Creat, Gluc, K, Na, BUN)  - BNP-N terminal pro  - PRIMARY CARE FOLLOW-UP SCHEDULING    Type 2 diabetes mellitus with stage 3a chronic kidney disease, without long-term current use of insulin (H)    - PRIMARY CARE FOLLOW-UP SCHEDULING    Type 2 diabetes, HbA1c goal < 7% (H)    - PRIMARY CARE FOLLOW-UP SCHEDULING    Chronic kidney disease, stage 3a (H)    - Basic metabolic panel  (Ca, Cl, CO2, Creat, Gluc, K, Na, BUN)  - BNP-N terminal pro  - PRIMARY CARE FOLLOW-UP SCHEDULING    Proteinuria, unspecified type    - Basic metabolic panel  (Ca, Cl, CO2, Creat, Gluc, K, Na, BUN)  - BNP-N terminal pro  - PRIMARY CARE FOLLOW-UP SCHEDULING    Hypertension goal BP (blood pressure) < 140/90    - Basic metabolic panel  (Ca, Cl, CO2, Creat, Gluc, K, Na, BUN)  - PRIMARY CARE FOLLOW-UP SCHEDULING    Hyperlipidemia LDL goal <70    - PRIMARY CARE FOLLOW-UP SCHEDULING    Anemia, unspecified type    - PRIMARY CARE FOLLOW-UP SCHEDULING    Malignant neoplasm of left eye (H)    - PRIMARY CARE FOLLOW-UP SCHEDULING    Primary osteoarthritis involving multiple joints    - PRIMARY CARE FOLLOW-UP SCHEDULING    Environmental allergies    - PRIMARY CARE FOLLOW-UP SCHEDULING    Morbid obesity (H)    - PRIMARY CARE FOLLOW-UP SCHEDULING    Medication monitoring encounter    - Basic metabolic panel  (Ca, Cl, CO2, Creat, Gluc, K, Na, BUN)  - BNP-N terminal pro  - PRIMARY CARE FOLLOW-UP SCHEDULING    Other abnormalities of breathing    - BNP-N terminal pro      Work on weight loss  Regular exercise    Plan:    1) Medications: no changes    2) Labs: pending    3) Immunizations: reviewed    4) Imaging/Diagnostics: ECHO pending    5) Consults: continue same, consider cardiology    Return in about 3 weeks (around 3/19/2025) for Medication Recheck Visit, Follow Up Chronic.    28 minutes spent by myself on the date of the encounter doing chart review, history and exam,  documentation and further activities per the note.    The longitudinal plan of care for the diagnosis(es)/condition(s) as documented were addressed during this visit. Due to the added complexity in care, I will continue to support Pat in the subsequent management and with ongoing continuity of care.    Shared Decision making completed.    Mallory Colon is a 89 year old, presenting for the following health issues:  Follow Up        2/26/2025     9:51 AM   Additional Questions   Roomed by Nhi JANE CMA   Accompanied by Jamila spouse     History of Present Illness       Reason for visit:  Leg pain   He is taking medications regularly.     2/26/2025    Has lost 6-7 pounds.  Legs are doing better. No weeping. No cp, no sob, swelling better, less pain at night and to touch, elevating, using compression socks, low salt diet    Wt Readings from Last 4 Encounters:   02/26/25 97.8 kg (215 lb 9.6 oz)   02/19/25 100.7 kg (222 lb)   12/16/24 101.1 kg (222 lb 12.8 oz)   03/12/24 102.5 kg (226 lb)     DM    Lab Results   Component Value Date    A1C 6.6 12/16/2024    A1C 7.0 03/15/2024    A1C 6.8 12/15/2023    A1C 7.6 04/20/2023    A1C 7.4 01/18/2023     CKD 3a / Htn / Proteinuria    BP Readings from Last 3 Encounters:   02/26/25 (!) 144/70   02/19/25 (!) 144/70   12/16/24 136/60     Creatinine   Date Value Ref Range Status   02/19/2025 1.14 0.67 - 1.17 mg/dL Final     GFR Estimate   Date Value Ref Range Status   02/19/2025 62 >60 mL/min/1.73m2 Final     Comment:     eGFR calculated using 2021 CKD-EPI equation.   07/08/2021 >60 >60 mL/min/1.73m2 Final     Anemia    Hemoglobin   Date Value Ref Range Status   02/19/2025 12.7 (L) 13.3 - 17.7 g/dL Final   12/16/2024 11.3 (L) 13.3 - 17.7 g/dL Final     2/19/2025    Increasing swelling in lower legs, last few months, worse in evening, better in am, some redness R>L, some tenderness, especially at night, no f/c/s, S/P Lt TKA - 20 yrs ago, no cp, no sob, no PND, sleeping ok, no changes in  diet / activity, rides bike routinely 40-50 minutes, no use of compression socks - no cardiology consults recently     DM range 129 to 150    Patient Active Problem List   Diagnosis    Nephrolithiasis    Choroid Melanoma    Hyperlipidemia LDL goal <70    Primary Osteoarthritis Of The Lumbar Vertebrae    Hypertension goal BP (blood pressure) < 140/90    Allergic rhinitis    Malignant Melanoma Of The Eye    Anemia    BPH (benign prostatic hyperplasia)    S/P total knee arthroplasty    Hemorrhage of rectum and anus    Superior mesenteric artery stenosis    Intestinal angina    Obesity (BMI 35.0-39.9) with comorbidity (H)    Type 2 diabetes, HbA1c goal < 7% (H)    CKD (chronic kidney disease) stage 2, GFR 60-89 ml/min    Environmental allergies    Type 2 diabetes mellitus with stage 2 chronic kidney disease, without long-term current use of insulin (H)    Chronic kidney disease, stage 3a (H)    Proteinuria, unspecified type       Past Medical History:   Diagnosis Date    Cancer (H) 2000    Left Eye - treated with radiactive substance - vision ok    CKD (chronic kidney disease) stage 2, GFR 60-89 ml/min     Environmental allergies     History of transfusion     Hyperlipidemia LDL goal <70     Hypertension goal BP (blood pressure) < 140/90     Kidney disease     minimal change disease    Kidney stones     Osteoarthritis     Peptic ulceration 2011    see EGD that year    Type 2 diabetes, HbA1c goal < 7% (H) 2000       Past Surgical History:   Procedure Laterality Date    EYE SURGERY  2000    HC KNEE SCOPE, DIAGNOSTIC Left 2008    Description: Arthroscopy Knee Left;  Recorded: 03/17/2008;  Comments: cartilage surgery    IA TYMPANOPLASTY Left 1990    Tympanoplasty    TONSILLECTOMY  1945    [facility] RECONSTR TOTAL SHOULDER IMPLANT Right 1995    Description: Shoulder Arthroplasty Total Shoulder Replacement;  Recorded: 05/06/2008;    [facility] TOTAL KNEE ARTHROPLASTY Left 03/26/2018    Procedure: LEFT TOTAL KNEE ARTHROPLASTY;  Surgeon:  Darrell Wu MD;  Location: [facility] OR;  Service: Orthopedics       Current Outpatient Medications   Medication Sig Dispense Refill    acetaminophen (TYLENOL) 500 MG tablet [ACETAMINOPHEN (TYLENOL) 500 MG TABLET] Take 500 mg by mouth every 6 (six) hours as needed for pain.      amLODIPine (NORVASC) 10 MG tablet Take 0.5 tablets (5 mg) by mouth daily. 45 tablet 3    aspirin 81 MG EC tablet [ASPIRIN 81 MG EC TABLET] Take 81 mg by mouth daily.      cetirizine (ZYRTEC) 10 mg cap [CETIRIZINE (ZYRTEC) 10 MG CAP] Take 10 mg by mouth daily as needed.       cholecalciferol, vitamin D3, 50 mcg (2,000 unit) Tab [CHOLECALCIFEROL, VITAMIN D3, 50 MCG (2,000 UNIT) TAB] Take 2,000 Units by mouth daily.      furosemide (LASIX) 20 MG tablet Take 1 tablet (20 mg) by mouth daily. 30 tablet 3    generic lancets [GENERIC LANCETS] Use 1 each As Directed daily. Dispense brand per patient's insurance at pharmacy discretion.(E11.9) 100 each 1    lisinopril (ZESTRIL) 5 MG tablet Take 1 tablet (5 mg) by mouth daily. 90 tablet 3    ONETOUCH ULTRA test strip USE TO TEST ONCE DAILY 100 strip 0    pravastatin (PRAVACHOL) 40 MG tablet TAKE 1 TABLET BY MOUTH EVERYDAY AT BEDTIME 90 tablet 3    psyllium (METAMUCIL/KONSYL) capsule Take 3 capsules by mouth 3 times daily.         No Known Allergies    Family History   Problem Relation Age of Onset    Diabetes Father     Diabetes Sister        Social History     Socioeconomic History    Marital status:      Spouse name: Sharon    Number of children: 5    Years of education: 12    Highest education level: None   Tobacco Use    Smoking status: Former     Types: Cigarettes    Smokeless tobacco: Former     Quit date: 12/8/1967    Tobacco comments:     Quit at age 28, smoked 1 PPD x 17 yrs     Quit chew at age 51, only months   Vaping Use    Vaping status: Never Used   Substance and Sexual Activity    Alcohol use: Not Currently     Alcohol/week: 0.0 - 2.0 standard drinks of alcohol      "Comment: rare    Drug use: No    Sexual activity: Yes     Partners: Female     Birth control/protection: None   Social History Narrative     5 grown kids. 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      Social Drivers of Health     Financial Resource Strain: Low Risk  (12/16/2024)    Financial Resource Strain     Within the past 12 months, have you or your family members you live with been unable to get utilities (heat, electricity) when it was really needed?: No   Food Insecurity: Low Risk  (12/16/2024)    Food Insecurity     Within the past 12 months, did you worry that your food would run out before you got money to buy more?: No     Within the past 12 months, did the food you bought just not last and you didn t have money to get more?: No   Transportation Needs: Low Risk  (12/16/2024)    Transportation Needs     Within the past 12 months, has lack of transportation kept you from medical appointments, getting your medicines, non-medical meetings or appointments, work, or from getting things that you need?: No   Physical Activity: Sufficiently Active (12/16/2024)    Exercise Vital Sign     Days of Exercise per Week: 7 days     Minutes of Exercise per Session: 60 min   Stress: No Stress Concern Present (12/16/2024)    [employer] - Occupational Stress Questionnaire     Feeling of Stress : Not at all   Social Connections: Unknown (12/16/2024)    Social Connection and Isolation Panel [NHANES]     Frequency of Social Gatherings with Friends and Family: Twice a week   Interpersonal Safety: Low Risk  (12/16/2024)    Interpersonal Safety     Do you feel physically and emotionally safe where you currently live?: Yes     Within the past 12 months, have you been hit, slapped, kicked or otherwise physically hurt by someone?: No     Within the past 12 months, have you been humiliated or emotionally abused in other ways by your partner or ex-partner?: No   Housing Stability: Low Risk  " "(12/16/2024)    Housing Stability     Do you have housing? : Yes     Are you worried about losing your housing?: No       Review of Systems  CONSTITUTIONAL: NEGATIVE for fever, chills, change in weight  INTEGUMENTARY/SKIN: NEGATIVE for worrisome rashes, moles or lesions  EYES: NEGATIVE for vision changes or irritation  ENT/MOUTH: NEGATIVE for ear, mouth and throat problems  RESP: NEGATIVE for significant cough or SOB  CV: NEGATIVE for chest pain, palpitations or peripheral edema  GI: NEGATIVE for nausea, abdominal pain, heartburn, or change in bowel habits  : NEGATIVE for frequency, dysuria, or hematuria  MUSCULOSKELETAL: NEGATIVE for significant arthralgias or myalgia  NEURO: NEGATIVE for weakness, dizziness or paresthesias  ENDOCRINE: NEGATIVE for temperature intolerance, skin/hair changes  HEME: NEGATIVE for bleeding problems  PSYCHIATRIC: NEGATIVE for changes in mood or affect      Objective    BP (!) 144/70   Pulse 64   Temp 97  F (36.1  C) (Tympanic)   Resp 18   Ht 1.632 m (5' 4.25\")   Wt 97.8 kg (215 lb 9.6 oz)   SpO2 98%   BMI 36.72 kg/m    Body mass index is 36.72 kg/m .    Physical Exam   GENERAL: alert and no distress  EYES: Eyes grossly normal to inspection, PERRL and conjunctivae and sclerae normal  HENT: ear canals and TM's normal, nose and mouth without ulcers or lesions  NECK: no adenopathy, no asymmetry, masses, or scars  RESP: lungs clear to auscultation - no rales, rhonchi or wheezes  CV: regular rate and rhythm, normal S1 S2, no S3 or S4, no murmur, click or rub, no peripheral edema  ABDOMEN: soft, nontender, no hepatosplenomegaly, no masses and bowel sounds normal  MS: no gross musculoskeletal defects noted, no edema  SKIN: no suspicious lesions or rashes  NEURO: Normal strength and tone, mentation intact and speech normal  PSYCH: mentation appears normal, affect normal/bright    Labs reviewed, pending  ECHO pending            Signed Electronically by:            Cornell Tomlinson MD, " THUY     [facility]  [address] [place] [id number]  [email or web address]   Office: [phone]  Fax: [phone]

## 2025-03-03 ENCOUNTER — PATIENT OUTREACH (OUTPATIENT)
Dept: CARE COORDINATION | Facility: CLINIC | Age: 89
End: 2025-03-03
Payer: COMMERCIAL

## 2025-03-11 ENCOUNTER — HOSPITAL ENCOUNTER (OUTPATIENT)
Dept: CARDIOLOGY | Facility: CLINIC | Age: 89
Discharge: HOME | End: 2025-03-11
Attending: FAMILY MEDICINE | Admitting: FAMILY MEDICINE
Payer: COMMERCIAL

## 2025-03-11 ENCOUNTER — ORDERS ONLY (AUTO-RELEASED) (OUTPATIENT)
Dept: FAMILY MEDICINE | Facility: CLINIC | Age: 89
End: 2025-03-11

## 2025-03-11 DIAGNOSIS — R60.9 EDEMA, UNSPECIFIED TYPE: Primary | ICD-10-CM

## 2025-03-11 DIAGNOSIS — E11.22 TYPE 2 DIABETES MELLITUS WITH STAGE 2 CHRONIC KIDNEY DISEASE, WITHOUT LONG-TERM CURRENT USE OF INSULIN (H): ICD-10-CM

## 2025-03-11 DIAGNOSIS — I10 HYPERTENSION GOAL BP (BLOOD PRESSURE) < 140/90: ICD-10-CM

## 2025-03-11 DIAGNOSIS — E78.5 HYPERLIPIDEMIA LDL GOAL <70: ICD-10-CM

## 2025-03-11 DIAGNOSIS — E11.9 TYPE 2 DIABETES, HBA1C GOAL < 7% (H): ICD-10-CM

## 2025-03-11 DIAGNOSIS — N18.31 CHRONIC KIDNEY DISEASE, STAGE 3A (H): ICD-10-CM

## 2025-03-11 DIAGNOSIS — E66.01 MORBID OBESITY (H): ICD-10-CM

## 2025-03-11 DIAGNOSIS — N18.31 TYPE 2 DIABETES MELLITUS WITH STAGE 3A CHRONIC KIDNEY DISEASE, WITHOUT LONG-TERM CURRENT USE OF INSULIN (H): ICD-10-CM

## 2025-03-11 DIAGNOSIS — N18.2 TYPE 2 DIABETES MELLITUS WITH STAGE 2 CHRONIC KIDNEY DISEASE, WITHOUT LONG-TERM CURRENT USE OF INSULIN (H): ICD-10-CM

## 2025-03-11 DIAGNOSIS — E11.22 TYPE 2 DIABETES MELLITUS WITH STAGE 3A CHRONIC KIDNEY DISEASE, WITHOUT LONG-TERM CURRENT USE OF INSULIN (H): ICD-10-CM

## 2025-03-11 DIAGNOSIS — E11.59 TYPE 2 DIABETES MELLITUS WITH OTHER CIRCULATORY COMPLICATION, WITHOUT LONG-TERM CURRENT USE OF INSULIN (H): ICD-10-CM

## 2025-03-11 DIAGNOSIS — N18.2 CKD (CHRONIC KIDNEY DISEASE) STAGE 2, GFR 60-89 ML/MIN: ICD-10-CM

## 2025-03-11 DIAGNOSIS — R60.9 EDEMA, UNSPECIFIED TYPE: ICD-10-CM

## 2025-03-11 DIAGNOSIS — I27.20 PULMONARY HYPERTENSION (H): ICD-10-CM

## 2025-03-11 LAB — LVEF ECHO: NORMAL

## 2025-03-11 PROCEDURE — 999N000208 ECHOCARDIOGRAM COMPLETE

## 2025-03-11 PROCEDURE — 255N000002 HC RX 255 OP 636: Performed by: FAMILY MEDICINE

## 2025-03-11 PROCEDURE — C8929 TTE W OR WO FOL WCON,DOPPLER: HCPCS

## 2025-03-11 PROCEDURE — 93306 TTE W/DOPPLER COMPLETE: CPT | Mod: 26 | Performed by: INTERNAL MEDICINE

## 2025-03-11 RX ADMIN — HUMAN ALBUMIN MICROSPHERES AND PERFLUTREN 2 ML: 10; .22 INJECTION, SOLUTION INTRAVENOUS at 15:47

## 2025-03-12 ENCOUNTER — TELEPHONE (OUTPATIENT)
Dept: CARDIOLOGY | Facility: CLINIC | Age: 89
End: 2025-03-12
Payer: COMMERCIAL

## 2025-03-12 ENCOUNTER — TELEPHONE (OUTPATIENT)
Dept: FAMILY MEDICINE | Facility: CLINIC | Age: 89
End: 2025-03-12
Payer: COMMERCIAL

## 2025-03-12 NOTE — TELEPHONE ENCOUNTER
[facility] Call Center    Phone Message    May a detailed message be left on voicemail: yes    Reason for Call: Patient called to schedule a NEW HF appt. Please call back to further coordinate.    Thank you!  [facility]

## 2025-03-12 NOTE — TELEPHONE ENCOUNTER
Situations   Message from [place] nurse states: patient called stating he received a message to move up his appointment with Dr. Wilde if able to get in earlier.  Dr. Wilde has no openings, can you follow up with patient after asking Dr. Wilde if he can add him on somewhere?    Background    2/26/25 office note, edema, type 2 diabetes :   Return in about 3 weeks (around 3/19/2025) for Medication Recheck Visit, Follow Up Chronic.  Order from 3/11: zio patch mail out   3/11 echo completed    Follow up appointment is scheduled 3/19 with Dr. Widle -    Assessment   Called patient and wife    explained to keep appointment with Dr. Wilde on 3/19  Explained  location, date arrival and apt time.   Dr. Wilde does not have any openings earlier than 3/19      Echo Result note from 3/11 has not yet been reviewed with patient- called patient and wife and explained results    The rhythm was atrial fibrillation.  The left atrium is severely dilated.  The right atrium is mildly dilated.  There is mild to moderate mitral stenosis.  Mild (35-45mmHg) pulmonary hypertension is present.  There is mild concentric left ventricular hypertrophy.  The visual ejection fraction is 55-60%.     We advise:  Check current symptoms / issues- denied any symptoms  or concerns.   Follow weights daily- denied weight gain, explained daily and parameters  Cardiology follow up ASAP- [phone]- explained 3-5 days . Wife will call today, explained to talk with triage nurse there if scheduling can not be done in time frame. Wife states she does not understand why he has to see the cardiology- explained reasons on referral, recent echo/symptoms.     Zio patch- explained will be mailed, explained what it does and how he can monitor symptoms when he has it in place.   Explained pamphlet,  application and return process, results will be called to them by Dr wilde's team- also helpful information for the cardiologist   Close follow up in clinic, move up if  able    Denied any chest pain, SOB, dizziness, lightheaded, fast and or irregular heart beat, New/increases swelling in lower extremities, consistent daily/weekly weight gain, uncontrolled blood pressure persistent > 130/80     Recommendations   Explained Weight daily  Explained how to monitor BP at home.- at least a couple hours after blood pressure medication, sit for at least 10 minutes, take daily or a few times a week and if experiencing  above symptoms or any other.     Reviewed symptoms above and call triage nurse if experiencing in alone or in any combination.    Red flag symptoms reviewed- [id number]/911

## 2025-03-19 ENCOUNTER — OFFICE VISIT (OUTPATIENT)
Dept: FAMILY MEDICINE | Facility: CLINIC | Age: 89
End: 2025-03-19
Payer: COMMERCIAL

## 2025-03-19 VITALS
OXYGEN SATURATION: 97 % | HEART RATE: 52 BPM | WEIGHT: 210.3 LBS | SYSTOLIC BLOOD PRESSURE: 130 MMHG | RESPIRATION RATE: 16 BRPM | DIASTOLIC BLOOD PRESSURE: 72 MMHG | TEMPERATURE: 97.3 F | BODY MASS INDEX: 35.9 KG/M2 | HEIGHT: 64 IN

## 2025-03-19 DIAGNOSIS — E11.59 TYPE 2 DIABETES MELLITUS WITH OTHER CIRCULATORY COMPLICATION, WITHOUT LONG-TERM CURRENT USE OF INSULIN (H): ICD-10-CM

## 2025-03-19 DIAGNOSIS — N18.31 CHRONIC KIDNEY DISEASE, STAGE 3A (H): ICD-10-CM

## 2025-03-19 DIAGNOSIS — E11.22 TYPE 2 DIABETES MELLITUS WITH STAGE 3A CHRONIC KIDNEY DISEASE, WITHOUT LONG-TERM CURRENT USE OF INSULIN (H): ICD-10-CM

## 2025-03-19 DIAGNOSIS — E11.9 TYPE 2 DIABETES, HBA1C GOAL < 7% (H): ICD-10-CM

## 2025-03-19 DIAGNOSIS — Z51.81 MEDICATION MONITORING ENCOUNTER: ICD-10-CM

## 2025-03-19 DIAGNOSIS — I10 HYPERTENSION GOAL BP (BLOOD PRESSURE) < 140/90: ICD-10-CM

## 2025-03-19 DIAGNOSIS — N18.31 TYPE 2 DIABETES MELLITUS WITH STAGE 3A CHRONIC KIDNEY DISEASE, WITHOUT LONG-TERM CURRENT USE OF INSULIN (H): ICD-10-CM

## 2025-03-19 DIAGNOSIS — E78.5 HYPERLIPIDEMIA LDL GOAL <70: ICD-10-CM

## 2025-03-19 DIAGNOSIS — R06.89 OTHER ABNORMALITIES OF BREATHING: ICD-10-CM

## 2025-03-19 DIAGNOSIS — I27.20 PULMONARY HYPERTENSION (H): ICD-10-CM

## 2025-03-19 DIAGNOSIS — R60.9 EDEMA, UNSPECIFIED TYPE: Primary | ICD-10-CM

## 2025-03-19 DIAGNOSIS — I48.91 ATRIAL FIBRILLATION, UNSPECIFIED TYPE (H): ICD-10-CM

## 2025-03-19 PROCEDURE — 93000 ELECTROCARDIOGRAM COMPLETE: CPT | Performed by: FAMILY MEDICINE

## 2025-03-19 PROCEDURE — 99214 OFFICE O/P EST MOD 30 MIN: CPT | Performed by: FAMILY MEDICINE

## 2025-03-19 PROCEDURE — G2211 COMPLEX E/M VISIT ADD ON: HCPCS | Performed by: FAMILY MEDICINE

## 2025-03-19 PROCEDURE — 3078F DIAST BP <80 MM HG: CPT | Performed by: FAMILY MEDICINE

## 2025-03-19 PROCEDURE — 3075F SYST BP GE 130 - 139MM HG: CPT | Performed by: FAMILY MEDICINE

## 2025-03-19 NOTE — PROGRESS NOTES
Assessment & Plan     Edema, unspecified type    - Basic metabolic panel  (Ca, Cl, CO2, Creat, Gluc, K, Na, BUN)  - BNP-N terminal pro  - EKG 12-lead complete w/read - Clinics  - PRIMARY CARE FOLLOW-UP SCHEDULING    Atrial fibrillation, unspecified type (H)    - Basic metabolic panel  (Ca, Cl, CO2, Creat, Gluc, K, Na, BUN)  - BNP-N terminal pro  - apixaban ANTICOAGULANT (ELIQUIS ANTICOAGULANT) 5 MG tablet  Dispense: 60 tablet; Refill: 1  - EKG 12-lead complete w/read - Clinics  - PRIMARY CARE FOLLOW-UP SCHEDULING    Pulmonary hypertension (H)    - Basic metabolic panel  (Ca, Cl, CO2, Creat, Gluc, K, Na, BUN)  - EKG 12-lead complete w/read - Clinics  - PRIMARY CARE FOLLOW-UP SCHEDULING    Type 2 diabetes mellitus with stage 3a chronic kidney disease, without long-term current use of insulin (H)    - Basic metabolic panel  (Ca, Cl, CO2, Creat, Gluc, K, Na, BUN)  - EKG 12-lead complete w/read - Clinics  - PRIMARY CARE FOLLOW-UP SCHEDULING    Type 2 diabetes mellitus with other circulatory complication, without long-term current use of insulin (H)    - Basic metabolic panel  (Ca, Cl, CO2, Creat, Gluc, K, Na, BUN)  - EKG 12-lead complete w/read - Clinics  - PRIMARY CARE FOLLOW-UP SCHEDULING    Type 2 diabetes, HbA1c goal < 7% (H)    - Basic metabolic panel  (Ca, Cl, CO2, Creat, Gluc, K, Na, BUN)  - EKG 12-lead complete w/read - Clinics  - PRIMARY CARE FOLLOW-UP SCHEDULING    Chronic kidney disease, stage 3a (H)    - Basic metabolic panel  (Ca, Cl, CO2, Creat, Gluc, K, Na, BUN)  - EKG 12-lead complete w/read - Clinics  - PRIMARY CARE FOLLOW-UP SCHEDULING    Hypertension goal BP (blood pressure) < 140/90    - Basic metabolic panel  (Ca, Cl, CO2, Creat, Gluc, K, Na, BUN)  - BNP-N terminal pro  - EKG 12-lead complete w/read - Clinics  - PRIMARY CARE FOLLOW-UP SCHEDULING    Hyperlipidemia LDL goal <70    - Basic metabolic panel  (Ca, Cl, CO2, Creat, Gluc, K, Na, BUN)  - PRIMARY CARE FOLLOW-UP SCHEDULING    Medication  monitoring encounter    - Basic metabolic panel  (Ca, Cl, CO2, Creat, Gluc, K, Na, BUN)  - PRIMARY CARE FOLLOW-UP SCHEDULING    Other abnormalities of breathing    - Basic metabolic panel  (Ca, Cl, CO2, Creat, Gluc, K, Na, BUN)  - BNP-N terminal pro  - PRIMARY CARE FOLLOW-UP SCHEDULING    Work on weight loss  Regular exercise    Plan:    1) Medications: reviewed, started apixaban    2) Labs: reviewed, pending    3) Immunizations: reviewed    4) Imaging/Diagnostics: reviewed, ECG notes A Fib with low voltage    5) Consults: Dr Ervin 3/25/2025, [facility]    Return in 1 month (on 4/19/2025) for Medication Recheck Visit, Follow Up Chronic.    32 minutes spent by myself on the date of the encounter doing chart review, history and exam, documentation and further activities per the note.    The longitudinal plan of care for the diagnosis(es)/condition(s) as documented were addressed during this visit. Due to the added complexity in care, I will continue to support Pat in the subsequent management and with ongoing continuity of care.    Shared Decision making completed.    Mallory Colon is a 89 year old, presenting for the following health issues:  Follow Up        3/19/2025    11:48 AM   Additional Questions   Roomed by Nhi JANE CMA   Accompanied by spouse     History of Present Illness       Heart Failure:  He presents for follow up of heart failure. He is not experiencing shortness of breath at night, with rest or with activity  He is experiencing lower extremity edema which is better than usual.   He denies orthopenea and is not coughing at night. Patient is checking weight daily. He has recently had a weight decrease.  He has no side effects from medications.  He has had no other medical visits for heart failure since the last visit.    He eats 0-1 servings of fruits and vegetables daily.He consumes 0 sweetened beverage(s) daily.He exercises with enough effort to increase his heart rate 30 to 60  minutes per day.  He exercises with enough effort to increase his heart rate 7 days per week.   He is taking medications regularly.      3/19/2025    Follow up ECHO, edema continues to improve, ECHO notes A Fib, ECG confirms, uncertain onset, no cp, no sob, no LH, no dizziness    Last Echo:     The rhythm was atrial fibrillation.  The left atrium is severely dilated.  The right atrium is mildly dilated.  There is mild to moderate mitral stenosis.  Mild (35-45mmHg) pulmonary hypertension is present.  There is mild concentric left ventricular hypertrophy.  The visual ejection fraction is 55-60%.    Recent start of furosemide 20 mg, decrease amlodipine to 5 mg    New diagnosis A fib rate controlled - during ECHO    Creatinine   Date Value Ref Range Status   02/26/2025 1.16 0.67 - 1.17 mg/dL Final     2/26/2025     Has lost 6-7 pounds.  Legs are doing better. No weeping. No cp, no sob, swelling better, less pain at night and to touch, elevating, using compression socks, low salt diet    DM    Lab Results   Component Value Date    A1C 6.6 12/16/2024    A1C 7.0 03/15/2024    A1C 6.8 12/15/2023    A1C 7.6 04/20/2023    A1C 7.4 01/18/2023     CKD 3a / Htn    BP Readings from Last 3 Encounters:   03/19/25 130/72   02/26/25 (!) 144/70   02/19/25 (!) 144/70     Creatinine   Date Value Ref Range Status   02/26/2025 1.16 0.67 - 1.17 mg/dL Final     GFR Estimate   Date Value Ref Range Status   02/26/2025 61 >60 mL/min/1.73m2 Final     Comment:     eGFR calculated using 2021 CKD-EPI equation.   07/08/2021 >60 >60 mL/min/1.73m2 Final     Lipids    Recent Labs   Lab Test 12/16/24  1031 03/15/24  1058   CHOL 113 129   HDL 50 49   LDL 47 59   TRIG 78 104     2/19/2025     Increasing swelling in lower legs, last few months, worse in evening, better in am, some redness R>L, some tenderness, especially at night, no f/c/s, S/P Lt TKA - 20 yrs ago, no cp, no sob, no PND, sleeping ok, no changes in diet / activity, rides bike routinely  40-50 minutes, no use of compression socks - no cardiology consults recently     DM range 129 to 150        Patient Active Problem List   Diagnosis    Nephrolithiasis    Choroid Melanoma    Hyperlipidemia LDL goal <70    Primary Osteoarthritis Of The Lumbar Vertebrae    Hypertension goal BP (blood pressure) < 140/90    Allergic rhinitis    Malignant Melanoma Of The Eye    Anemia    BPH (benign prostatic hyperplasia)    S/P total knee arthroplasty    Hemorrhage of rectum and anus    Superior mesenteric artery stenosis    Intestinal angina    Obesity (BMI 35.0-39.9) with comorbidity (H)    Type 2 diabetes, HbA1c goal < 7% (H)    CKD (chronic kidney disease) stage 2, GFR 60-89 ml/min    Environmental allergies    Type 2 diabetes mellitus with stage 2 chronic kidney disease, without long-term current use of insulin (H)    Chronic kidney disease, stage 3a (H)    Proteinuria, unspecified type    Type 2 diabetes mellitus with stage 3a chronic kidney disease, without long-term current use of insulin (H)    Pulmonary hypertension (H)       Past Medical History:   Diagnosis Date    Cancer (H) 2000    Left Eye - treated with radiactive substance - vision ok    CKD (chronic kidney disease) stage 2, GFR 60-89 ml/min     Environmental allergies     History of transfusion     Hyperlipidemia LDL goal <70     Hypertension goal BP (blood pressure) < 140/90     Kidney disease     minimal change disease    Kidney stones     Osteoarthritis     Peptic ulceration 2011    see EGD that year    Pulmonary hypertension (H) 03/2025    mild    Type 2 diabetes, HbA1c goal < 7% (H) 2000       Past Surgical History:   Procedure Laterality Date    EYE SURGERY  2000    HC KNEE SCOPE, DIAGNOSTIC Left 2008    Description: Arthroscopy Knee Left;  Recorded: 03/17/2008;  Comments: cartilage surgery    ME TYMPANOPLASTY Left 1990    Tympanoplasty    TONSILLECTOMY  1945    ZZC RECONSTR TOTAL SHOULDER IMPLANT Right 1995    Description: Shoulder Arthroplasty  Total Shoulder Replacement;  Recorded: 05/06/2008;    ZZC TOTAL KNEE ARTHROPLASTY Left 03/26/2018    Procedure: LEFT TOTAL KNEE ARTHROPLASTY;  Surgeon: Darrell Wu MD;  Location: [facility];  Service: Orthopedics       Current Outpatient Medications   Medication Sig Dispense Refill    acetaminophen (TYLENOL) 500 MG tablet [ACETAMINOPHEN (TYLENOL) 500 MG TABLET] Take 500 mg by mouth every 6 (six) hours as needed for pain.      amLODIPine (NORVASC) 10 MG tablet Take 0.5 tablets (5 mg) by mouth daily. 45 tablet 3    apixaban ANTICOAGULANT (ELIQUIS ANTICOAGULANT) 5 MG tablet Take 1 tablet (5 mg) by mouth 2 times daily. 60 tablet 1    aspirin 81 MG EC tablet [ASPIRIN 81 MG EC TABLET] Take 81 mg by mouth daily.      cetirizine (ZYRTEC) 10 mg cap [CETIRIZINE (ZYRTEC) 10 MG CAP] Take 10 mg by mouth daily as needed.       cholecalciferol, vitamin D3, 50 mcg (2,000 unit) Tab [CHOLECALCIFEROL, VITAMIN D3, 50 MCG (2,000 UNIT) TAB] Take 2,000 Units by mouth daily.      furosemide (LASIX) 20 MG tablet Take 1 tablet (20 mg) by mouth daily. 30 tablet 3    generic lancets [GENERIC LANCETS] Use 1 each As Directed daily. Dispense brand per patient's insurance at pharmacy discretion.(E11.9) 100 each 1    lisinopril (ZESTRIL) 5 MG tablet Take 1 tablet (5 mg) by mouth daily. 90 tablet 3    ONETOUCH ULTRA test strip USE TO TEST ONCE DAILY 100 strip 0    pravastatin (PRAVACHOL) 40 MG tablet TAKE 1 TABLET BY MOUTH EVERYDAY AT BEDTIME 90 tablet 3    psyllium (METAMUCIL/KONSYL) capsule Take 3 capsules by mouth 3 times daily.         No Known Allergies    Family History   Problem Relation Age of Onset    Diabetes Father     Diabetes Sister        Social History     Socioeconomic History    Marital status:      Spouse name: Sharon    Number of children: 5    Years of education: 12    Highest education level: None   Tobacco Use    Smoking status: Former     Types: Cigarettes    Smokeless tobacco: Former     Quit date:  "12/8/1967    Tobacco comments:     Quit at age 28, smoked 1 PPD x 17 yrs     Quit chew at age 51, only months   Vaping Use    Vaping status: Never Used   Substance and Sexual Activity    Alcohol use: Not Currently     Alcohol/week: 0.0 - 2.0 standard drinks of alcohol     Comment: rare    Drug use: No    Sexual activity: Yes     Partners: Female     Birth control/protection: None   Social History Narrative     5 grown kids. 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      Social Drivers of Health     Financial Resource Strain: Low Risk  (12/16/2024)    Financial Resource Strain     Within the past 12 months, have you or your family members you live with been unable to get utilities (heat, electricity) when it was really needed?: No   Food Insecurity: Low Risk  (12/16/2024)    Food Insecurity     Within the past 12 months, did you worry that your food would run out before you got money to buy more?: No     Within the past 12 months, did the food you bought just not last and you didn t have money to get more?: No   Transportation Needs: Low Risk  (12/16/2024)    Transportation Needs     Within the past 12 months, has lack of transportation kept you from medical appointments, getting your medicines, non-medical meetings or appointments, work, or from getting things that you need?: No   Physical Activity: Sufficiently Active (12/16/2024)    Exercise Vital Sign     Days of Exercise per Week: 7 days     Minutes of Exercise per Session: 60 min   Stress: No Stress Concern Present (12/16/2024)    [place] [employer] - Occupational Stress Questionnaire     Feeling of Stress : Not at all   Social Connections: Unknown (12/16/2024)    Social Connection and Isolation Panel [NHANES]     Frequency of Social Gatherings with Friends and Family: Twice a week   Interpersonal Safety: Low Risk  (12/16/2024)    Interpersonal Safety     Do you feel physically and emotionally safe where you currently live?: Yes     " "Within the past 12 months, have you been hit, slapped, kicked or otherwise physically hurt by someone?: No     Within the past 12 months, have you been humiliated or emotionally abused in other ways by your partner or ex-partner?: No   Housing Stability: Low Risk  (12/16/2024)    Housing Stability     Do you have housing? : Yes     Are you worried about losing your housing?: No         Review of Systems  CONSTITUTIONAL: NEGATIVE for fever, chills, change in weight  INTEGUMENTARY/SKIN: NEGATIVE for worrisome rashes, moles or lesions  EYES: NEGATIVE for vision changes or irritation  ENT/MOUTH: NEGATIVE for ear, mouth and throat problems  RESP: NEGATIVE for significant cough or SOB  CV: NEGATIVE for chest pain, palpitations or peripheral edema  GI: NEGATIVE for nausea, abdominal pain, heartburn, or change in bowel habits  : NEGATIVE for frequency, dysuria, or hematuria  MUSCULOSKELETAL: NEGATIVE for significant arthralgias or myalgia  NEURO: NEGATIVE for weakness, dizziness or paresthesias  ENDOCRINE: NEGATIVE for temperature intolerance, skin/hair changes  HEME: NEGATIVE for bleeding problems  PSYCHIATRIC: NEGATIVE for changes in mood or affect      Objective    /72   Pulse 52   Temp 97.3  F (36.3  C) (Tympanic)   Resp 16   Ht 1.632 m (5' 4.25\")   Wt 95.4 kg (210 lb 4.8 oz)   SpO2 97%   BMI 35.82 kg/m    Body mass index is 35.82 kg/m .    Physical Exam   GENERAL: alert and no distress  EYES: Eyes grossly normal to inspection, PERRL and conjunctivae and sclerae normal  HENT: ear canals and TM's normal, nose and mouth without ulcers or lesions  NECK: no adenopathy, no asymmetry, masses, or scars  RESP: lungs clear to auscultation - no rales, rhonchi or wheezes  CV: regular rate and rhythm, normal S1 S2, no S3 or S4, no murmur, click or rub, no peripheral edema  ABDOMEN: soft, nontender, no hepatosplenomegaly, no masses and bowel sounds normal  MS: no gross musculoskeletal defects noted, much improved mild " edema  SKIN: no suspicious lesions or rashes  NEURO: Normal strength and tone, mentation intact and speech normal  PSYCH: mentation appears normal, affect normal/bright    ECG - A fib with low voltage        Signed Electronically by:              Cornell Tomlinson MD, FAAFP     [facility]  [address] [place] [address]  [email or web address]   Office: [phone]  Fax: [phone]

## 2025-03-23 PROBLEM — N18.31 TYPE 2 DIABETES MELLITUS WITH STAGE 3A CHRONIC KIDNEY DISEASE, WITHOUT LONG-TERM CURRENT USE OF INSULIN (H): Status: ACTIVE | Noted: 2025-03-11

## 2025-03-23 PROBLEM — E11.22 TYPE 2 DIABETES MELLITUS WITH STAGE 3A CHRONIC KIDNEY DISEASE, WITHOUT LONG-TERM CURRENT USE OF INSULIN (H): Status: ACTIVE | Noted: 2025-03-11

## 2025-03-25 ENCOUNTER — HOSPITAL ENCOUNTER (OUTPATIENT)
Dept: CARDIOLOGY | Facility: CLINIC | Age: 89
Discharge: HOME | End: 2025-03-25
Attending: INTERNAL MEDICINE | Admitting: INTERNAL MEDICINE
Payer: COMMERCIAL

## 2025-03-25 ENCOUNTER — OFFICE VISIT (OUTPATIENT)
Dept: CARDIOLOGY | Facility: CLINIC | Age: 89
End: 2025-03-25
Attending: FAMILY MEDICINE
Payer: COMMERCIAL

## 2025-03-25 VITALS
OXYGEN SATURATION: 98 % | SYSTOLIC BLOOD PRESSURE: 122 MMHG | BODY MASS INDEX: 36.37 KG/M2 | HEART RATE: 75 BPM | HEIGHT: 64 IN | DIASTOLIC BLOOD PRESSURE: 72 MMHG | WEIGHT: 213 LBS

## 2025-03-25 DIAGNOSIS — I48.19 PERSISTENT ATRIAL FIBRILLATION (H): ICD-10-CM

## 2025-03-25 DIAGNOSIS — I10 HYPERTENSION GOAL BP (BLOOD PRESSURE) < 140/90: ICD-10-CM

## 2025-03-25 DIAGNOSIS — I27.20 PULMONARY HYPERTENSION (H): ICD-10-CM

## 2025-03-25 DIAGNOSIS — E11.9 TYPE 2 DIABETES, HBA1C GOAL < 7% (H): ICD-10-CM

## 2025-03-25 DIAGNOSIS — N18.2 TYPE 2 DIABETES MELLITUS WITH STAGE 2 CHRONIC KIDNEY DISEASE, WITHOUT LONG-TERM CURRENT USE OF INSULIN (H): ICD-10-CM

## 2025-03-25 DIAGNOSIS — E66.01 MORBID OBESITY (H): ICD-10-CM

## 2025-03-25 DIAGNOSIS — R60.9 EDEMA, UNSPECIFIED TYPE: ICD-10-CM

## 2025-03-25 DIAGNOSIS — E11.22 TYPE 2 DIABETES MELLITUS WITH STAGE 2 CHRONIC KIDNEY DISEASE, WITHOUT LONG-TERM CURRENT USE OF INSULIN (H): ICD-10-CM

## 2025-03-25 DIAGNOSIS — N18.2 CKD (CHRONIC KIDNEY DISEASE) STAGE 2, GFR 60-89 ML/MIN: ICD-10-CM

## 2025-03-25 DIAGNOSIS — I48.19 PERSISTENT ATRIAL FIBRILLATION (H): Primary | ICD-10-CM

## 2025-03-25 DIAGNOSIS — E11.59 TYPE 2 DIABETES MELLITUS WITH OTHER CIRCULATORY COMPLICATION, WITHOUT LONG-TERM CURRENT USE OF INSULIN (H): ICD-10-CM

## 2025-03-25 DIAGNOSIS — E78.5 HYPERLIPIDEMIA LDL GOAL <70: ICD-10-CM

## 2025-03-25 PROCEDURE — 93225 XTRNL ECG REC<48 HRS REC: CPT

## 2025-03-25 PROCEDURE — 3078F DIAST BP <80 MM HG: CPT | Performed by: INTERNAL MEDICINE

## 2025-03-25 PROCEDURE — 99204 OFFICE O/P NEW MOD 45 MIN: CPT | Performed by: INTERNAL MEDICINE

## 2025-03-25 PROCEDURE — 3074F SYST BP LT 130 MM HG: CPT | Performed by: INTERNAL MEDICINE

## 2025-03-25 RX ORDER — FERROUS SULFATE 325(65) MG
325 TABLET, DELAYED RELEASE (ENTERIC COATED) ORAL EVERY OTHER DAY
COMMUNITY

## 2025-03-25 NOTE — LETTER
3/25/2025    Cornell Tomlinson MD  [address] [facility] [address]    RE: Preston Fortune       Dear Colleague,     I had the pleasure of seeing Preston Fortune in the [facility].  HISTORY OF PRESENT ILLNESS:  Preston Fortune a 89 year old male with    69 years 5 kid 8 gk 2 ggk  retired [employer] bike 7 days 40 to 1 hours day walks 20 to 30 minutes weights     Alcohol very rare no smoking for over 50 years HTN type 2 diabetes No MI no valve disease no thyroid problems no stroke has had weight gain swelling in legs 2 months.     Sp right shoulder left knee eye cancer     Orders this Visit:  No orders of the defined types were placed in this encounter.    Orders Placed This Encounter   Medications     ferrous sulfate (FE TABS) 325 (65 Fe) MG EC tablet     Sig: Take 325 mg by mouth every other day.     Multiple Vitamins-Minerals (PRESERVISION AREDS 2 PO)     Sig: Take 2 tablets by mouth.     There are no discontinued medications.    Encounter Diagnoses   Name Primary?     Edema, unspecified type      Hypertension goal BP (blood pressure) < 140/90      Morbid obesity (H)      CKD (chronic kidney disease) stage 2, GFR 60-89 ml/min      Type 2 diabetes mellitus with stage 2 chronic kidney disease, without long-term current use of insulin (H)      Type 2 diabetes mellitus with other circulatory complication, without long-term current use of insulin (H)      Type 2 diabetes, HbA1c goal < 7% (H)      Hyperlipidemia LDL goal <70      Pulmonary hypertension (H)        CURRENT MEDICATIONS:  Current Outpatient Medications   Medication Sig Dispense Refill     acetaminophen (TYLENOL) 500 MG tablet [ACETAMINOPHEN (TYLENOL) 500 MG TABLET] Take 500 mg by mouth every 6 (six) hours as needed for pain.       amLODIPine (NORVASC) 10 MG tablet Take 0.5 tablets (5 mg) by mouth daily. 45 tablet 3     aspirin 81 MG EC tablet [ASPIRIN 81 MG EC TABLET] Take 81 mg by mouth daily.       cetirizine  "(ZYRTEC) 10 mg cap [CETIRIZINE (ZYRTEC) 10 MG CAP] Take 10 mg by mouth daily as needed.        cholecalciferol, vitamin D3, 50 mcg (2,000 unit) Tab [CHOLECALCIFEROL, VITAMIN D3, 50 MCG (2,000 UNIT) TAB] Take 2,000 Units by mouth daily.       ferrous sulfate (FE TABS) 325 (65 Fe) MG EC tablet Take 325 mg by mouth every other day.       furosemide (LASIX) 20 MG tablet Take 1 tablet (20 mg) by mouth daily. 30 tablet 3     generic lancets [GENERIC LANCETS] Use 1 each As Directed daily. Dispense brand per patient's insurance at pharmacy discretion.(E11.9) 100 each 1     lisinopril (ZESTRIL) 5 MG tablet Take 1 tablet (5 mg) by mouth daily. 90 tablet 3     Multiple Vitamins-Minerals (PRESERVISION AREDS 2 PO) Take 2 tablets by mouth.       ONETOUCH ULTRA test strip USE TO TEST ONCE DAILY 100 strip 0     pravastatin (PRAVACHOL) 40 MG tablet TAKE 1 TABLET BY MOUTH EVERYDAY AT BEDTIME 90 tablet 3     psyllium (METAMUCIL/KONSYL) capsule Take 3 capsules by mouth 3 times daily.       apixaban ANTICOAGULANT (ELIQUIS ANTICOAGULANT) 5 MG tablet Take 1 tablet (5 mg) by mouth 2 times daily. (Patient not taking: Reported on 3/25/2025) 60 tablet 1       ALLERGIES   No Known Allergies    PAST MEDICAL, SURGICAL, FAMILY, SOCIAL HISTORY:  History was reviewed and updated as needed, see medical record.        Review of Systems:  A 12-point review of systems was completed, see medical record for detailed review of systems information.    Physical Exam:  Vitals: /72 (BP Location: Right arm, Patient Position: Sitting, Cuff Size: Adult Large)   Pulse 75   Ht 1.632 m (5' 4.25\")   Wt 96.6 kg (213 lb)   SpO2 98%   BMI 36.28 kg/m      Constitutional: No apparent distress    HEENT: pupils equal round reactive to light, thyroid normal size, JVP is normal    Chest: Clear to percussion and auscultation    Cardiac: Normal S1, normal S2 no S3, no murmur or click    Abdomen: Scaphoid.  Liver percusses to 6 cm spleen is not palpable aorta is not " tender or enlarged    Extremitiies: no edema.    Neurological:  Cranial nerves II through XII are intact, strength equal and symmetrical, displays normal insight and judgment        ASSESSMENT: Preston Fortune is a 89 year old male with          We reviewed the benefits of a regular exercise program, a Mediterranean-style weight loss diet, and contacting us for any changes in between now and the time of her next visit.     RECOMMENDATIONS:   1)  24 hour Holter monitor to assess HR  2) stop aspirin, continue apixaban  3) continue other medications  4) consider empagliflozin          Recent Lab Results:  LIPID RESULTS:  Lab Results   Component Value Date    CHOL 113 12/16/2024    HDL 50 12/16/2024    LDL 47 12/16/2024    TRIG 78 12/16/2024       LIVER ENZYME RESULTS:  Lab Results   Component Value Date    AST 21 02/19/2025    ALT 12 02/19/2025       CBC RESULTS:  Lab Results   Component Value Date    WBC 8.7 02/19/2025    RBC 4.48 02/19/2025    HGB 12.7 (L) 02/19/2025    HCT 40.7 02/19/2025    MCV 91 02/19/2025    MCH 28.3 02/19/2025    MCHC 31.2 (L) 02/19/2025    RDW 14.2 02/19/2025     02/19/2025       BMP RESULTS:  Lab Results   Component Value Date     02/26/2025    POTASSIUM 5.2 02/26/2025    POTASSIUM 4.4 06/27/2022    CHLORIDE 105 02/26/2025    CHLORIDE 105 06/27/2022    CO2 28 02/26/2025    CO2 28 06/27/2022    ANIONGAP 10 02/26/2025    ANIONGAP 9 06/27/2022     (H) 02/26/2025     (H) 06/27/2022    BUN 30.0 (H) 02/26/2025    BUN 17 06/27/2022    CR 1.16 02/26/2025    GFRESTIMATED 61 02/26/2025    GFRESTIMATED >60 07/08/2021    GFRESTBLACK >60 07/08/2021    ANG 9.7 02/26/2025        A1C RESULTS:  Lab Results   Component Value Date    A1C 6.6 (H) 12/16/2024       INR RESULTS:  Lab Results   Component Value Date    INR 1.01 11/26/2020    INR 1.07 03/28/2018       We greatly appreciate the opportunity to be involved in the care of your patient, Preston Fortune.    Sincerely,  Osmar Torres  MD [place]      CC  Cornell Tomlinson MD  [address] [place] [address]                                                                       Thank you for allowing me to participate in the care of your patient.      Sincerely,     Osmar Ervin MD     [facility]  cc:   Cornell Tomlinson MD  [address] [place] [id number]

## 2025-03-26 ENCOUNTER — PATIENT OUTREACH (OUTPATIENT)
Dept: CARE COORDINATION | Facility: CLINIC | Age: 89
End: 2025-03-26
Payer: COMMERCIAL

## 2025-04-23 ENCOUNTER — OFFICE VISIT (OUTPATIENT)
Dept: FAMILY MEDICINE | Facility: CLINIC | Age: 89
End: 2025-04-23
Attending: FAMILY MEDICINE
Payer: COMMERCIAL

## 2025-04-23 VITALS
OXYGEN SATURATION: 97 % | BODY MASS INDEX: 36.54 KG/M2 | WEIGHT: 214 LBS | HEIGHT: 64 IN | SYSTOLIC BLOOD PRESSURE: 130 MMHG | RESPIRATION RATE: 15 BRPM | DIASTOLIC BLOOD PRESSURE: 60 MMHG | TEMPERATURE: 97.5 F | HEART RATE: 84 BPM

## 2025-04-23 DIAGNOSIS — E78.5 HYPERLIPIDEMIA LDL GOAL <70: ICD-10-CM

## 2025-04-23 DIAGNOSIS — R60.9 EDEMA, UNSPECIFIED TYPE: ICD-10-CM

## 2025-04-23 DIAGNOSIS — E11.9 TYPE 2 DIABETES, HBA1C GOAL < 7% (H): ICD-10-CM

## 2025-04-23 DIAGNOSIS — N18.31 TYPE 2 DIABETES MELLITUS WITH STAGE 3A CHRONIC KIDNEY DISEASE, WITHOUT LONG-TERM CURRENT USE OF INSULIN (H): ICD-10-CM

## 2025-04-23 DIAGNOSIS — I27.20 PULMONARY HYPERTENSION (H): ICD-10-CM

## 2025-04-23 DIAGNOSIS — I48.91 ATRIAL FIBRILLATION, UNSPECIFIED TYPE (H): ICD-10-CM

## 2025-04-23 DIAGNOSIS — E11.59 TYPE 2 DIABETES MELLITUS WITH OTHER CIRCULATORY COMPLICATION, WITHOUT LONG-TERM CURRENT USE OF INSULIN (H): ICD-10-CM

## 2025-04-23 DIAGNOSIS — N18.31 CHRONIC KIDNEY DISEASE, STAGE 3A (H): ICD-10-CM

## 2025-04-23 DIAGNOSIS — Z51.81 MEDICATION MONITORING ENCOUNTER: ICD-10-CM

## 2025-04-23 DIAGNOSIS — E11.22 TYPE 2 DIABETES MELLITUS WITH STAGE 3A CHRONIC KIDNEY DISEASE, WITHOUT LONG-TERM CURRENT USE OF INSULIN (H): ICD-10-CM

## 2025-04-23 DIAGNOSIS — R06.89 OTHER ABNORMALITIES OF BREATHING: ICD-10-CM

## 2025-04-23 DIAGNOSIS — I10 HYPERTENSION GOAL BP (BLOOD PRESSURE) < 140/90: ICD-10-CM

## 2025-04-23 LAB
ERYTHROCYTE [DISTWIDTH] IN BLOOD BY AUTOMATED COUNT: 14.6 % (ref 10–15)
EST. AVERAGE GLUCOSE BLD GHB EST-MCNC: 160 MG/DL
HBA1C MFR BLD: 7.2 % (ref 0–5.6)
HCT VFR BLD AUTO: 41.4 % (ref 40–53)
HGB BLD-MCNC: 13.1 G/DL (ref 13.3–17.7)
MCH RBC QN AUTO: 28.2 PG (ref 26.5–33)
MCHC RBC AUTO-ENTMCNC: 31.6 G/DL (ref 31.5–36.5)
MCV RBC AUTO: 89 FL (ref 78–100)
PLATELET # BLD AUTO: 220 10E3/UL (ref 150–450)
RBC # BLD AUTO: 4.65 10E6/UL (ref 4.4–5.9)
WBC # BLD AUTO: 8.3 10E3/UL (ref 4–11)

## 2025-04-23 PROCEDURE — 99214 OFFICE O/P EST MOD 30 MIN: CPT | Performed by: FAMILY MEDICINE

## 2025-04-23 PROCEDURE — 3075F SYST BP GE 130 - 139MM HG: CPT | Performed by: FAMILY MEDICINE

## 2025-04-23 PROCEDURE — 3078F DIAST BP <80 MM HG: CPT | Performed by: FAMILY MEDICINE

## 2025-04-23 PROCEDURE — 85027 COMPLETE CBC AUTOMATED: CPT | Performed by: FAMILY MEDICINE

## 2025-04-23 PROCEDURE — G2211 COMPLEX E/M VISIT ADD ON: HCPCS | Performed by: FAMILY MEDICINE

## 2025-04-23 PROCEDURE — 83036 HEMOGLOBIN GLYCOSYLATED A1C: CPT | Performed by: FAMILY MEDICINE

## 2025-04-23 PROCEDURE — 36415 COLL VENOUS BLD VENIPUNCTURE: CPT | Performed by: FAMILY MEDICINE

## 2025-04-23 NOTE — PROGRESS NOTES
Assessment & Plan     Edema, unspecified type    - PRIMARY CARE FOLLOW-UP SCHEDULING  - Comprehensive metabolic panel (BMP + Alb, Alk Phos, ALT, AST, Total. Bili, TP)  - BNP-N terminal pro  - PRIMARY CARE FOLLOW-UP SCHEDULING    Atrial fibrillation, unspecified type (H)    - PRIMARY CARE FOLLOW-UP SCHEDULING  - Comprehensive metabolic panel (BMP + Alb, Alk Phos, ALT, AST, Total. Bili, TP)  - CBC with platelets  - BNP-N terminal pro  - PRIMARY CARE FOLLOW-UP SCHEDULING    Pulmonary hypertension (H)    - PRIMARY CARE FOLLOW-UP SCHEDULING  - Comprehensive metabolic panel (BMP + Alb, Alk Phos, ALT, AST, Total. Bili, TP)  - CBC with platelets  - BNP-N terminal pro  - PRIMARY CARE FOLLOW-UP SCHEDULING    Type 2 diabetes mellitus with stage 3a chronic kidney disease, without long-term current use of insulin (H)    - PRIMARY CARE FOLLOW-UP SCHEDULING  - Comprehensive metabolic panel (BMP + Alb, Alk Phos, ALT, AST, Total. Bili, TP)  - BNP-N terminal pro  - Hemoglobin A1c  - PRIMARY CARE FOLLOW-UP SCHEDULING    Type 2 diabetes mellitus with other circulatory complication, without long-term current use of insulin (H)    - PRIMARY CARE FOLLOW-UP SCHEDULING  - Comprehensive metabolic panel (BMP + Alb, Alk Phos, ALT, AST, Total. Bili, TP)  - BNP-N terminal pro  - Hemoglobin A1c  - PRIMARY CARE FOLLOW-UP SCHEDULING    Type 2 diabetes, HbA1c goal < 7% (H)    - PRIMARY CARE FOLLOW-UP SCHEDULING  - Comprehensive metabolic panel (BMP + Alb, Alk Phos, ALT, AST, Total. Bili, TP)  - BNP-N terminal pro  - Hemoglobin A1c  - PRIMARY CARE FOLLOW-UP SCHEDULING    Chronic kidney disease, stage 3a (H)    - PRIMARY CARE FOLLOW-UP SCHEDULING  - Comprehensive metabolic panel (BMP + Alb, Alk Phos, ALT, AST, Total. Bili, TP)  - CBC with platelets  - Hemoglobin A1c  - PRIMARY CARE FOLLOW-UP SCHEDULING    Hypertension goal BP (blood pressure) < 140/90    - PRIMARY CARE FOLLOW-UP SCHEDULING  - Comprehensive metabolic panel (BMP + Alb, Alk Phos, ALT,  AST, Total. Bili, TP)  - PRIMARY CARE FOLLOW-UP SCHEDULING    Hyperlipidemia LDL goal <70    - PRIMARY CARE FOLLOW-UP SCHEDULING  - Comprehensive metabolic panel (BMP + Alb, Alk Phos, ALT, AST, Total. Bili, TP)  - PRIMARY CARE FOLLOW-UP SCHEDULING    Medication monitoring encounter    - PRIMARY CARE FOLLOW-UP SCHEDULING  - Comprehensive metabolic panel (BMP + Alb, Alk Phos, ALT, AST, Total. Bili, TP)  - CBC with platelets  - BNP-N terminal pro  - Hemoglobin A1c  - PRIMARY CARE FOLLOW-UP SCHEDULING    Other abnormalities of breathing    - PRIMARY CARE FOLLOW-UP SCHEDULING  - BNP-N terminal pro  - PRIMARY CARE FOLLOW-UP SCHEDULING    Plan:    1) Medications: continue same, consider     2) Labs: reviewed, pending    3) Immunizations: reviewed    4) Imaging/Diagnostics: reviewed     5) Consults: Cardiology prn    6) left temple seb k - observe    Return in about 3 months (around 7/23/2025) for Medication Recheck Visit, Follow Up Chronic.    28 minutes spent by myself on the date of the encounter doing chart review, history and exam, documentation and further activities per the note.    The longitudinal plan of care for the diagnosis(es)/condition(s) as documented were addressed during this visit. Due to the added complexity in care, I will continue to support Pat in the subsequent management and with ongoing continuity of care.    Shared Decision making completed.    Mallory Colon is a 89 year old, presenting for the following health issues:  Recheck Medication        4/23/2025     2:44 PM   Additional Questions   Roomed by Afshan BROWN CMA     Via the Health Maintenance questionnaire, the patient has reported the following services have been completed -Eye Exam: [facility] 2024-05-17, this information has been sent to the abstraction team.    History of Present Illness       Heart Failure:  He presents for follow up of heart failure. He is not experiencing shortness of breath at night, with rest or with activity  He is  not experiencing any lower extremity edema.   He denies orthopenea and is not coughing at night. Patient is checking weight daily. He has recently had a weight increase.  He has no side effects from medications.  He has has a medical visit for heart failure 1 time since the last visit.    Vascular Disease:  He presents for follow up of vascular disease.     He never takes nitroglycerin. He is not taking daily aspirin.    He eats 0-1 servings of fruits and vegetables daily.He consumes 0 sweetened beverage(s) daily.He exercises with enough effort to increase his heart rate 30 to 60 minutes per day.  He exercises with enough effort to increase his heart rate 7 days per week.   He is taking medications regularly.      Last Echo:   Echo result w/o MOPS: Interpretation Summary The rhythm was atrial fibrillation.The left atrium is severely dilated.The right atrium is mildly dilated.There is mild to moderate mitral stenosis.Mild (35-45mmHg) pulmonary hypertension is present.There is mild concentric left ventricular hypertrophy.The visual ejection fraction is 55-60%.        Diabetes Follow-up    How often are you checking your blood sugar? A few times a week  What time of day are you checking your blood sugars (select all that apply)?   In the morning  Have you had any blood sugars above 200?  No  Have you had any blood sugars below 70?  No  What symptoms do you notice when your blood sugar is low?  None  What concerns do you have today about your diabetes? None   Do you have any of these symptoms? (Select all that apply)  No numbness or tingling in feet.  No redness, sores or blisters on feet.  No complaints of excessive thirst.  No reports of blurry vision.  No significant changes to weight.      BP Readings from Last 2 Encounters:   04/23/25 130/60   03/25/25 122/72     Hemoglobin A1C (%)   Date Value   12/16/2024 6.6 (H)   03/15/2024 7.0 (H)     LDL Cholesterol Calculated (mg/dL)   Date Value   12/16/2024 47   03/15/2024  59             DM - range 120 to 140    Lab Results   Component Value Date    A1C 6.6 12/16/2024    A1C 7.0 03/15/2024    A1C 6.8 12/15/2023    A1C 7.6 04/20/2023    A1C 7.4 01/18/2023     Pulm Htn / CKD 3a / Hypertension Follow-up    Do you check your blood pressure regularly outside of the clinic? Yes   Are you following a low salt diet? Yes  Are your blood pressures ever more than 140 on the top number (systolic) OR more   than 90 on the bottom number (diastolic), for example 140/90? No    BP Readings from Last 3 Encounters:   04/23/25 130/60   03/25/25 122/72   03/19/25 130/72     Creatinine   Date Value Ref Range Status   02/26/2025 1.16 0.67 - 1.17 mg/dL Final     GFR Estimate   Date Value Ref Range Status   02/26/2025 61 >60 mL/min/1.73m2 Final     Comment:     eGFR calculated using 2021 CKD-EPI equation.   07/08/2021 >60 >60 mL/min/1.73m2 Final     Lipids    Recent Labs   Lab Test 12/16/24  1031 03/15/24  1058   CHOL 113 129   HDL 50 49   LDL 47 59   TRIG 78 104       3/25/2025 - Dr Ervin    ASSESSMENT:     The patient has newly diagnosed persistent atrial fibrillation with a [id number] score-5.  Current American College of cardiology guidelines favor chronic anticoagulation for prevention of stroke and I agree with Dr. Tomlinson's recommendation for apixaban 5 mg twice daily.  I have advised the patient to stop aspirin, which in this setting will only increase bleeding risk without conferring ischemic benefit.  His ventricular response rate appears to be well-controlled at rest, but a Holter monitor may be helpful in monitoring his heart rate during activity I determining whether he might benefit from better rate control.  With the patient's lack of symptoms, severe biatrial enlargement, and probable chronicity of his rhythm disturbance, I do not believe the risks of cardioversion exceed the benefit.  I suspect he will very likely to revert back to atrial fibrillation even if he were to have successful  cardioversion.     The patient has responded nicely to diuretic therapy.  We could consider the addition of an SGL 2 transporter inhibitor in view of his diabetes mellitus 12 reduce the future risk of adverse renal and vascular events.  I would defer this decision to his excellent primary care physician.     Finally, I am uncertain whether the patient would benefit from continued follow-up in our clinic but we would be happy to see him again at his primary care physician or he requested.  I have tentatively arranged a visit for follow-up in about 1 year, but he can cancel this if he feels it is unnecessary.     RECOMMENDATIONS:   1)  24 hour Holter monitor to assess HR  2) stop aspirin, continue apixaban  3) continue other medications  4) consider empagliflozin    3/19/2025     Follow up ECHO, edema continues to improve, ECHO notes A Fib, ECG confirms, uncertain onset, no cp, no sob, no LH, no dizziness     Last Echo:      The rhythm was atrial fibrillation.  The left atrium is severely dilated.  The right atrium is mildly dilated.  There is mild to moderate mitral stenosis.  Mild (35-45mmHg) pulmonary hypertension is present.  There is mild concentric left ventricular hypertrophy.  The visual ejection fraction is 55-60%.     Recent start of furosemide 20 mg, decrease amlodipine to 5 mg     New diagnosis A fib rate controlled - during ECHO           Creatinine   Date Value Ref Range Status   02/26/2025 1.16 0.67 - 1.17 mg/dL Final      2/26/2025     Has lost 6-7 pounds.  Legs are doing better. No weeping. No cp, no sob, swelling better, less pain at night and to touch, elevating, using compression socks, low salt diet     DM           Lab Results   Component Value Date     A1C 6.6 12/16/2024     A1C 7.0 03/15/2024     A1C 6.8 12/15/2023     A1C 7.6 04/20/2023     A1C 7.4 01/18/2023      CKD 3a / Htn         BP Readings from Last 3 Encounters:   03/19/25 130/72   02/26/25 (!) 144/70   02/19/25 (!) 144/70             Creatinine   Date Value Ref Range Status   02/26/2025 1.16 0.67 - 1.17 mg/dL Final              GFR Estimate   Date Value Ref Range Status   02/26/2025 61 >60 mL/min/1.73m2 Final       Comment:       eGFR calculated using 2021 CKD-EPI equation.   07/08/2021 >60 >60 mL/min/1.73m2 Final      Lipids          Recent Labs   Lab Test 12/16/24  1031 03/15/24  1058   CHOL 113 129   HDL 50 49   LDL 47 59   TRIG 78 104      2/19/2025     Increasing swelling in lower legs, last few months, worse in evening, better in am, some redness R>L, some tenderness, especially at night, no f/c/s, S/P Lt TKA - 20 yrs ago, no cp, no sob, no PND, sleeping ok, no changes in diet / activity, rides bike routinely 40-50 minutes, no use of compression socks - no cardiology consults recently     DM range 129 to 150    Patient Active Problem List   Diagnosis    Nephrolithiasis    Choroid Melanoma    Hyperlipidemia LDL goal <70    Primary Osteoarthritis Of The Lumbar Vertebrae    Hypertension goal BP (blood pressure) < 140/90    Allergic rhinitis    Malignant Melanoma Of The Eye    Anemia    BPH (benign prostatic hyperplasia)    S/P total knee arthroplasty    Hemorrhage of rectum and anus    Superior mesenteric artery stenosis    Intestinal angina    Obesity (BMI 35.0-39.9) with comorbidity (H)    Type 2 diabetes, HbA1c goal < 7% (H)    CKD (chronic kidney disease) stage 2, GFR 60-89 ml/min    Environmental allergies    Type 2 diabetes mellitus with stage 2 chronic kidney disease, without long-term current use of insulin (H)    Chronic kidney disease, stage 3a (H)    Proteinuria, unspecified type    Type 2 diabetes mellitus with stage 3a chronic kidney disease, without long-term current use of insulin (H)    Pulmonary hypertension (H)       Past Medical History:   Diagnosis Date    Cancer (H) 2000    Left Eye - treated with radiactive substance - vision ok    CKD (chronic kidney disease) stage 2, GFR 60-89 ml/min     Environmental allergies      History of transfusion     Hyperlipidemia LDL goal <70     Hypertension goal BP (blood pressure) < 140/90     Kidney disease     minimal change disease    Kidney stones     Osteoarthritis     Peptic ulceration 2011    see EGD that year    Pulmonary hypertension (H) 03/2025    mild    Type 2 diabetes, HbA1c goal < 7% (H) 2000       Past Surgical History:   Procedure Laterality Date    EYE SURGERY  2000    HC KNEE SCOPE, DIAGNOSTIC Left 2008    Description: Arthroscopy Knee Left;  Recorded: 03/17/2008;  Comments: cartilage surgery    KS TYMPANOPLASTY Left 1990    Tympanoplasty    TONSILLECTOMY  1945    ZZC RECONSTR TOTAL SHOULDER IMPLANT Right 1995    Description: Shoulder Arthroplasty Total Shoulder Replacement;  Recorded: 05/06/2008;    ZZC TOTAL KNEE ARTHROPLASTY Left 03/26/2018    Procedure: LEFT TOTAL KNEE ARTHROPLASTY;  Surgeon: Darrell Wu MD;  Location: [facility];  Service: Orthopedics       Current Outpatient Medications   Medication Sig Dispense Refill    acetaminophen (TYLENOL) 500 MG tablet [ACETAMINOPHEN (TYLENOL) 500 MG TABLET] Take 500 mg by mouth every 6 (six) hours as needed for pain.      amLODIPine (NORVASC) 10 MG tablet Take 0.5 tablets (5 mg) by mouth daily. 45 tablet 3    cetirizine (ZYRTEC) 10 mg cap [CETIRIZINE (ZYRTEC) 10 MG CAP] Take 10 mg by mouth daily as needed.       cholecalciferol, vitamin D3, 50 mcg (2,000 unit) Tab [CHOLECALCIFEROL, VITAMIN D3, 50 MCG (2,000 UNIT) TAB] Take 2,000 Units by mouth daily.      ferrous sulfate (FE TABS) 325 (65 Fe) MG EC tablet Take 325 mg by mouth every other day.      furosemide (LASIX) 20 MG tablet Take 1 tablet (20 mg) by mouth daily. 30 tablet 3    generic lancets [GENERIC LANCETS] Use 1 each As Directed daily. Dispense brand per patient's insurance at pharmacy discretion.(E11.9) 100 each 1    lisinopril (ZESTRIL) 5 MG tablet Take 1 tablet (5 mg) by mouth daily. 90 tablet 3    Multiple Vitamins-Minerals (PRESERVISION AREDS 2 PO) Take 2  "tablets by mouth.      ONETOUCH ULTRA test strip USE TO TEST ONCE DAILY 100 strip 0    pravastatin (PRAVACHOL) 40 MG tablet TAKE 1 TABLET BY MOUTH EVERYDAY AT BEDTIME 90 tablet 3    psyllium (METAMUCIL/KONSYL) capsule Take 3 capsules by mouth 3 times daily.         No Known Allergies    Family History   Problem Relation Age of Onset    Diabetes Father     Diabetes Sister        Social History     Socioeconomic History    Marital status:      Spouse name: Sharon    Number of children: 5    Years of education: 12    Highest education level: None   Tobacco Use    Smoking status: Former     Types: Cigarettes    Smokeless tobacco: Former     Quit date: 12/8/1967    Tobacco comments:     Quit at age 28, smoked 1 PPD x 17 yrs     Quit chew at age 51, only months   Vaping Use    Vaping status: Never Used   Substance and Sexual Activity    Alcohol use: Not Currently     Alcohol/week: 0.0 - 2.0 standard drinks of alcohol     Comment: rare    Drug use: No    Sexual activity: Yes     Partners: Female     Birth control/protection: None   Social History Narrative     5 grown kids. 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      Social Drivers of Health     Financial Resource Strain: Low Risk  (12/16/2024)    Financial Resource Strain     Within the past 12 months, have you or your family members you live with been unable to get utilities (heat, electricity) when it was really needed?: No   Food Insecurity: Low Risk  (12/16/2024)    Food Insecurity     Within the past 12 months, did you worry that your food would run out before you got money to buy more?: No     Within the past 12 months, did the food you bought just not last and you didn t have money to get more?: No   Transportation Needs: Low Risk  (12/16/2024)    Transportation Needs     Within the past 12 months, has lack of transportation kept you from medical appointments, getting your medicines, non-medical meetings or appointments, work, or from getting things " "that you need?: No   Physical Activity: Sufficiently Active (12/16/2024)    Exercise Vital Sign     Days of Exercise per Week: 7 days     Minutes of Exercise per Session: 60 min   Stress: No Stress Concern Present (12/16/2024)    [place] [employer] - Occupational Stress Questionnaire     Feeling of Stress : Not at all   Social Connections: Unknown (12/16/2024)    Social Connection and Isolation Panel [NHANES]     Frequency of Social Gatherings with Friends and Family: Twice a week   Interpersonal Safety: Low Risk  (12/16/2024)    Interpersonal Safety     Do you feel physically and emotionally safe where you currently live?: Yes     Within the past 12 months, have you been hit, slapped, kicked or otherwise physically hurt by someone?: No     Within the past 12 months, have you been humiliated or emotionally abused in other ways by your partner or ex-partner?: No   Housing Stability: Low Risk  (12/16/2024)    Housing Stability     Do you have housing? : Yes     Are you worried about losing your housing?: No       Review of Systems  CONSTITUTIONAL: NEGATIVE for fever, chills, change in weight  INTEGUMENTARY/SKIN: NEGATIVE for worrisome rashes, moles or lesions  EYES: NEGATIVE for vision changes or irritation  ENT/MOUTH: NEGATIVE for ear, mouth and throat problems  RESP: NEGATIVE for significant cough or SOB  CV: NEGATIVE for chest pain, palpitations or peripheral edema  GI: NEGATIVE for nausea, abdominal pain, heartburn, or change in bowel habits  : NEGATIVE for frequency, dysuria, or hematuria  MUSCULOSKELETAL: NEGATIVE for significant arthralgias or myalgia  NEURO: NEGATIVE for weakness, dizziness or paresthesias  ENDOCRINE: NEGATIVE for temperature intolerance, skin/hair changes  HEME: NEGATIVE for bleeding problems  PSYCHIATRIC: NEGATIVE for changes in mood or affect      Objective    /60   Pulse 84   Temp 97.5  F (36.4  C) (Tympanic)   Resp 15   Ht 1.632 m (5' 4.25\")   Wt 97.1 kg " (214 lb)   SpO2 97%   BMI 36.45 kg/m    Body mass index is 36.45 kg/m .    Physical Exam   GENERAL: alert and no distress  EYES: Eyes grossly normal to inspection, PERRL and conjunctivae and sclerae normal  HENT: ear canals and TM's normal, nose and mouth without ulcers or lesions  NECK: no adenopathy, no asymmetry, masses, or scars  RESP: lungs clear to auscultation - no rales, rhonchi or wheezes  CV: regular rate and rhythm, normal S1 S2, no S3 or S4, no murmur, click or rub, no peripheral edema  ABDOMEN: soft, nontender, no hepatosplenomegaly, no masses and bowel sounds normal  MS: no gross musculoskeletal defects noted, no edema  SKIN: no suspicious lesions or rashes  NEURO: Normal strength and tone, mentation intact and speech normal  PSYCH: mentation appears normal, affect normal/bright    Labs reviewed / pending      Signed Electronically by:            Cornell Tomlinson MD, FAAFP, D[facility]  [facility]  [address] [place] [id number]  [email or web address]   Office: [phone]  Fax: [phone]

## 2025-04-24 LAB
ALBUMIN SERPL BCG-MCNC: 4.2 G/DL (ref 3.5–5.2)
ALP SERPL-CCNC: 102 U/L (ref 40–150)
ALT SERPL W P-5'-P-CCNC: 12 U/L (ref 0–70)
ANION GAP SERPL CALCULATED.3IONS-SCNC: 10 MMOL/L (ref 7–15)
AST SERPL W P-5'-P-CCNC: 21 U/L (ref 0–45)
BILIRUB SERPL-MCNC: 0.4 MG/DL
BUN SERPL-MCNC: 35.7 MG/DL (ref 8–23)
CALCIUM SERPL-MCNC: 9.6 MG/DL (ref 8.8–10.4)
CHLORIDE SERPL-SCNC: 105 MMOL/L (ref 98–107)
CREAT SERPL-MCNC: 1.32 MG/DL (ref 0.67–1.17)
EGFRCR SERPLBLD CKD-EPI 2021: 52 ML/MIN/1.73M2
GLUCOSE SERPL-MCNC: 155 MG/DL (ref 70–99)
HCO3 SERPL-SCNC: 27 MMOL/L (ref 22–29)
NT-PROBNP SERPL-MCNC: 3195 PG/ML (ref 0–1800)
POTASSIUM SERPL-SCNC: 5.2 MMOL/L (ref 3.4–5.3)
PROT SERPL-MCNC: 6.8 G/DL (ref 6.4–8.3)
SODIUM SERPL-SCNC: 142 MMOL/L (ref 135–145)

## 2025-05-13 ENCOUNTER — VIRTUAL VISIT (OUTPATIENT)
Dept: FAMILY MEDICINE | Facility: CLINIC | Age: 89
End: 2025-05-13
Payer: COMMERCIAL

## 2025-05-13 DIAGNOSIS — Z51.81 MEDICATION MONITORING ENCOUNTER: ICD-10-CM

## 2025-05-13 DIAGNOSIS — E11.9 TYPE 2 DIABETES, HBA1C GOAL < 7% (H): ICD-10-CM

## 2025-05-13 DIAGNOSIS — I27.20 PULMONARY HYPERTENSION (H): ICD-10-CM

## 2025-05-13 DIAGNOSIS — N18.31 TYPE 2 DIABETES MELLITUS WITH STAGE 3A CHRONIC KIDNEY DISEASE, WITHOUT LONG-TERM CURRENT USE OF INSULIN (H): Primary | ICD-10-CM

## 2025-05-13 DIAGNOSIS — R60.9 EDEMA, UNSPECIFIED TYPE: ICD-10-CM

## 2025-05-13 DIAGNOSIS — I10 HYPERTENSION GOAL BP (BLOOD PRESSURE) < 140/90: ICD-10-CM

## 2025-05-13 DIAGNOSIS — E11.22 TYPE 2 DIABETES MELLITUS WITH STAGE 3A CHRONIC KIDNEY DISEASE, WITHOUT LONG-TERM CURRENT USE OF INSULIN (H): Primary | ICD-10-CM

## 2025-05-13 DIAGNOSIS — N18.31 CHRONIC KIDNEY DISEASE, STAGE 3A (H): ICD-10-CM

## 2025-05-13 DIAGNOSIS — E78.5 HYPERLIPIDEMIA LDL GOAL <70: ICD-10-CM

## 2025-05-13 DIAGNOSIS — I48.91 ATRIAL FIBRILLATION, UNSPECIFIED TYPE (H): ICD-10-CM

## 2025-05-13 DIAGNOSIS — R80.9 PROTEINURIA, UNSPECIFIED TYPE: ICD-10-CM

## 2025-05-13 DIAGNOSIS — E11.59 TYPE 2 DIABETES MELLITUS WITH OTHER CIRCULATORY COMPLICATION, WITHOUT LONG-TERM CURRENT USE OF INSULIN (H): ICD-10-CM

## 2025-05-13 PROCEDURE — 98006 SYNCH AUDIO-VIDEO EST MOD 30: CPT | Performed by: FAMILY MEDICINE

## 2025-05-13 RX ORDER — METFORMIN HYDROCHLORIDE 500 MG/1
500 TABLET, EXTENDED RELEASE ORAL
Qty: 90 TABLET | Refills: 3 | Status: SHIPPED | OUTPATIENT
Start: 2025-05-13

## 2025-05-13 NOTE — PROGRESS NOTES
Isaiah is a 89 year old who is being evaluated via a billable video visit.    What phone number would you like to be contacted at? [phone]  How would you like to obtain your AVS? MyChart      Assessment & Plan     Type 2 diabetes mellitus with stage 3a chronic kidney disease, without long-term current use of insulin (H)    - metFORMIN (GLUCOPHAGE XR) 500 MG 24 hr tablet  Dispense: 90 tablet; Refill: 3    Type 2 diabetes mellitus with other circulatory complication, without long-term current use of insulin (H)    - metFORMIN (GLUCOPHAGE XR) 500 MG 24 hr tablet  Dispense: 90 tablet; Refill: 3    Type 2 diabetes, HbA1c goal < 7% (H)    - metFORMIN (GLUCOPHAGE XR) 500 MG 24 hr tablet  Dispense: 90 tablet; Refill: 3    Chronic kidney disease, stage 3a (H)      Hypertension goal BP (blood pressure) < 140/90      Hyperlipidemia LDL goal <70      Atrial fibrillation, unspecified type (H)      Edema, unspecified type      Pulmonary hypertension (H)      Proteinuria, unspecified type      Medication monitoring encounter      Plan:    1) Medications: add metformin  mg daily with dinner    2) Labs: reviewed, recheck fasting in 6-8 weeks    3) Immunizations: reviewed    4) Imaging/Diagnostics: NA    5) Consults: Cardiology    Return in about 6 weeks (around 6/24/2025) for Medication Recheck Visit, Follow Up Chronic.    31 minutes spent by myself on the date of the encounter doing chart review, history and exam, documentation and further activities per the note.    The longitudinal plan of care for the diagnosis(es)/condition(s) as documented were addressed during this visit. Due to the added complexity in care, I will continue to support Pat in the subsequent management and with ongoing continuity of care.    Shared Decision making completed.    Subjective   Isaiah is a 89 year old, presenting for the following health issues:  Follow Up        5/13/2025     3:17 PM   Additional Questions   Roomed by Nhi JANE CMA   Accompanied  by daughter Kaila     Via the Health Maintenance questionnaire, the patient has reported the following services have been completed -Eye Exam: [facility] 2024-05-15, this information has been sent to the abstraction team.  Video Start Time: 4:05 PM    History of Present Illness       Hypertension: He presents for follow up of hypertension.  He does check blood pressure  regularly outside of the clinic. Outside blood pressures have been over 140/90. He does not follow a low salt diet.     He eats 0-1 servings of fruits and vegetables daily.He consumes 0 sweetened beverage(s) daily.He exercises with enough effort to increase his heart rate 30 to 60 minutes per day.  He exercises with enough effort to increase his heart rate 7 days per week.   He is taking medications regularly.      Currently no pain, no sob, edema controlled with compression socks, weight up 3 lbs    DM / CKD 3a / Htn / Lipids / A fib / Pulm Htn / Edema / Proteinuria - stable, labs reviewed    Wt Readings from Last 4 Encounters:   04/23/25 97.1 kg (214 lb)   03/25/25 96.6 kg (213 lb)   03/19/25 95.4 kg (210 lb 4.8 oz)   02/26/25 97.8 kg (215 lb 9.6 oz)     Follow up lab results.    Hemoglobin improving   Signs of heart failure stable   Kidney function was mildly decreased   Decreased diabetes control         Hemoglobin   Date Value Ref Range Status   04/23/2025 13.1 (L) 13.3 - 17.7 g/dL Final   02/19/2025 12.7 (L) 13.3 - 17.7 g/dL Final     Lab Results   Component Value Date    A1C 7.2 04/23/2025    A1C 6.6 12/16/2024    A1C 7.0 03/15/2024    A1C 6.8 12/15/2023    A1C 7.6 04/20/2023     BP Readings from Last 3 Encounters:   04/23/25 130/60   03/25/25 122/72   03/19/25 130/72     Creatinine   Date Value Ref Range Status   04/23/2025 1.32 (H) 0.67 - 1.17 mg/dL Final     GFR Estimate   Date Value Ref Range Status   04/23/2025 52 (L) >60 mL/min/1.73m2 Final     Comment:     eGFR calculated using 2021 CKD-EPI equation.   07/08/2021 >60 >60 mL/min/1.73m2  Final     BNP = 3195    3/2025 ECHO    The rhythm was atrial fibrillation.   The left atrium is severely dilated.   The right atrium is mildly dilated.   There is mild to moderate mitral stenosis.   Mild (35-45mmHg) pulmonary hypertension is present.   There is mild concentric left ventricular hypertrophy.   The visual ejection fraction is 55-60%.     3/2025    Reviewed Holter report - Dr Ervin - M[facility] Fib - HR = 71, range , 118 pauses > 2 seconds, 245 PVC's    Patient Active Problem List   Diagnosis    Nephrolithiasis    Choroid Melanoma    Hyperlipidemia LDL goal <70    Primary Osteoarthritis Of The Lumbar Vertebrae    Hypertension goal BP (blood pressure) < 140/90    Allergic rhinitis    Malignant Melanoma Of The Eye    Anemia    BPH (benign prostatic hyperplasia)    S/P total knee arthroplasty    Hemorrhage of rectum and anus    Superior mesenteric artery stenosis    Intestinal angina    Obesity (BMI 35.0-39.9) with comorbidity (H)    Type 2 diabetes, HbA1c goal < 7% (H)    CKD (chronic kidney disease) stage 2, GFR 60-89 ml/min    Environmental allergies    Type 2 diabetes mellitus with stage 2 chronic kidney disease, without long-term current use of insulin (H)    Chronic kidney disease, stage 3a (H)    Proteinuria, unspecified type    Type 2 diabetes mellitus with stage 3a chronic kidney disease, without long-term current use of insulin (H)    Pulmonary hypertension (H)       Past Medical History:   Diagnosis Date    Cancer (H) 2000    Left Eye - treated with radiactive substance - vision ok    CKD (chronic kidney disease) stage 2, GFR 60-89 ml/min     Environmental allergies     History of transfusion     Hyperlipidemia LDL goal <70     Hypertension goal BP (blood pressure) < 140/90     Kidney disease     minimal change disease    Kidney stones     Osteoarthritis     Peptic ulceration 2011    see EGD that year    Pulmonary hypertension (H) 03/2025    mild    Type 2 diabetes, HbA1c goal < 7% (H) 2000        Past Surgical History:   Procedure Laterality Date    EYE SURGERY  2000    HC KNEE SCOPE, DIAGNOSTIC Left 2008    Description: Arthroscopy Knee Left;  Recorded: 03/17/2008;  Comments: cartilage surgery    MT TYMPANOPLASTY Left 1990    Tympanoplasty    TONSILLECTOMY  1945    RUST RECONSTR TOTAL SHOULDER IMPLANT Right 1995    Description: Shoulder Arthroplasty Total Shoulder Replacement;  Recorded: 05/06/2008;    RUST TOTAL KNEE ARTHROPLASTY Left 03/26/2018    Procedure: LEFT TOTAL KNEE ARTHROPLASTY;  Surgeon: Darrell Wu MD;  Location: [facility];  Service: Orthopedics       Current Outpatient Medications   Medication Sig Dispense Refill    acetaminophen (TYLENOL) 500 MG tablet [ACETAMINOPHEN (TYLENOL) 500 MG TABLET] Take 500 mg by mouth every 6 (six) hours as needed for pain.      amLODIPine (NORVASC) 10 MG tablet Take 0.5 tablets (5 mg) by mouth daily. 45 tablet 3    cetirizine (ZYRTEC) 10 mg cap [CETIRIZINE (ZYRTEC) 10 MG CAP] Take 10 mg by mouth daily as needed.       cholecalciferol, vitamin D3, 50 mcg (2,000 unit) Tab [CHOLECALCIFEROL, VITAMIN D3, 50 MCG (2,000 UNIT) TAB] Take 2,000 Units by mouth daily.      ferrous sulfate (FE TABS) 325 (65 Fe) MG EC tablet Take 325 mg by mouth every other day.      furosemide (LASIX) 20 MG tablet Take 1 tablet (20 mg) by mouth daily. 30 tablet 3    generic lancets [GENERIC LANCETS] Use 1 each As Directed daily. Dispense brand per patient's insurance at pharmacy discretion.(E11.9) 100 each 1    lisinopril (ZESTRIL) 5 MG tablet Take 1 tablet (5 mg) by mouth daily. 90 tablet 3    metFORMIN (GLUCOPHAGE XR) 500 MG 24 hr tablet Take 1 tablet (500 mg) by mouth daily (with dinner). 90 tablet 3    Multiple Vitamins-Minerals (PRESERVISION AREDS 2 PO) Take 2 tablets by mouth.      ONETOUCH ULTRA test strip USE TO TEST ONCE DAILY 100 strip 0    pravastatin (PRAVACHOL) 40 MG tablet TAKE 1 TABLET BY MOUTH EVERYDAY AT BEDTIME 90 tablet 3    psyllium (METAMUCIL/KONSYL)  "capsule Take 3 capsules by mouth 3 times daily.         No Known Allergies    Family History   Problem Relation Age of Onset    Diabetes Father     Diabetes Sister        Social History     Socioeconomic History    Marital status:      Spouse name: Sharon    Number of children: 5    Years of education: 12    Highest education level: None   Tobacco Use    Smoking status: Former     Types: Cigarettes    Smokeless tobacco: Former     Quit date: 12/8/1967    Tobacco comments:     Quit at age 28, smoked 1 PPD x 17 yrs     Quit chew at age 51, only months   Vaping Use    Vaping status: Never Used   Substance and Sexual Activity    Alcohol use: Not Currently     Alcohol/week: 0.0 - 2.0 standard drinks of alcohol     Comment: rare    Drug use: No    Sexual activity: Yes     Partners: Female     Birth control/protection: None   Social History Narrative     5 grown kids. 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      Social Drivers of Health     Financial Resource Strain: Low Risk  (12/16/2024)    Financial Resource Strain     Within the past 12 months, have you or your family members you live with been unable to get utilities (heat, electricity) when it was really needed?: No   Food Insecurity: Low Risk  (12/16/2024)    Food Insecurity     Within the past 12 months, did you worry that your food would run out before you got money to buy more?: No     Within the past 12 months, did the food you bought just not last and you didn t have money to get more?: No   Transportation Needs: Low Risk  (12/16/2024)    Transportation Needs     Within the past 12 months, has lack of transportation kept you from medical appointments, getting your medicines, non-medical meetings or appointments, work, or from getting things that you need?: No   Physical Activity: Sufficiently Active (12/16/2024)    Exercise Vital Sign     Days of Exercise per Week: 7 days     Minutes of Exercise per Session: 60 min   Stress: No Stress Concern " Present (12/16/2024)    [employer] - Occupational Stress Questionnaire     Feeling of Stress : Not at all   Social Connections: Unknown (12/16/2024)    Social Connection and Isolation Panel [NHANES]     Frequency of Social Gatherings with Friends and Family: Twice a week   Interpersonal Safety: Low Risk  (12/16/2024)    Interpersonal Safety     Do you feel physically and emotionally safe where you currently live?: Yes     Within the past 12 months, have you been hit, slapped, kicked or otherwise physically hurt by someone?: No     Within the past 12 months, have you been humiliated or emotionally abused in other ways by your partner or ex-partner?: No   Housing Stability: Low Risk  (12/16/2024)    Housing Stability     Do you have housing? : Yes     Are you worried about losing your housing?: No         Review of Systems  Constitutional, HEENT, cardiovascular, pulmonary, GI, , musculoskeletal, neuro, skin, endocrine and psych systems are negative, except as otherwise noted.      Objective           Vitals:  No vitals were obtained today due to virtual visit.    Physical Exam   GENERAL: alert and no distress  EYES: Eyes grossly normal to inspection.  No discharge or erythema, or obvious scleral/conjunctival abnormalities.  RESP: No audible wheeze, cough, or visible cyanosis.    SKIN: Visible skin clear. No significant rash, abnormal pigmentation or lesions.  NEURO: Cranial nerves grossly intact.  Mentation and speech appropriate for age.  PSYCH: Appropriate affect, tone, and pace of words      Video-Visit Details    Type of service:  Video Visit   Video End Time:4:35 pm  Originating Location (pt. Location): Home    Distant Location (provider location):  On-site  Platform used for Video Visit: Niki    Signed Electronically by:           Cornell Tomlinson MD, FAAFP, [facility]  [facility]  [address] [place] [address]  [place] [id number]  [email or web address]  [email or web address]   Office: [phone]  Fax: [phone]

## 2025-05-18 DIAGNOSIS — I48.91 ATRIAL FIBRILLATION, UNSPECIFIED TYPE (H): ICD-10-CM

## 2025-05-19 RX ORDER — APIXABAN 5 MG/1
5 TABLET, FILM COATED ORAL 2 TIMES DAILY
Qty: 60 TABLET | Refills: 1 | Status: SHIPPED | OUTPATIENT
Start: 2025-05-19

## 2025-05-19 NOTE — TELEPHONE ENCOUNTER
Clinic RN: Please investigate patient's chart or contact patient if the information cannot be found because not on med list.    Iva PALACIO, RN, PHN

## 2025-05-19 NOTE — TELEPHONE ENCOUNTER
Called # [phone] and spoke with patient     Patient reports he is still taking Eliquis as prescribed, marked as .     Routing to provider to review and advise.     Deidre Heath RN on 2025 at 10:13 AM   [facility]

## 2025-06-12 ENCOUNTER — TRANSFERRED RECORDS (OUTPATIENT)
Dept: MULTI SPECIALTY CLINIC | Facility: CLINIC | Age: 89
End: 2025-06-12
Payer: COMMERCIAL

## 2025-06-12 LAB — RETINOPATHY: NORMAL

## 2025-06-12 ASSESSMENT — PATIENT HEALTH QUESTIONNAIRE - PHQ9
SUM OF ALL RESPONSES TO PHQ QUESTIONS 1-9: 2
SUM OF ALL RESPONSES TO PHQ QUESTIONS 1-9: 2
10. IF YOU CHECKED OFF ANY PROBLEMS, HOW DIFFICULT HAVE THESE PROBLEMS MADE IT FOR YOU TO DO YOUR WORK, TAKE CARE OF THINGS AT HOME, OR GET ALONG WITH OTHER PEOPLE: NOT DIFFICULT AT ALL

## 2025-06-13 ENCOUNTER — TRANSFERRED RECORDS (OUTPATIENT)
Dept: HEALTH INFORMATION MANAGEMENT | Facility: CLINIC | Age: 89
End: 2025-06-13
Payer: COMMERCIAL

## 2025-06-14 DIAGNOSIS — R60.9 EDEMA, UNSPECIFIED TYPE: ICD-10-CM

## 2025-06-14 DIAGNOSIS — I10 HYPERTENSION GOAL BP (BLOOD PRESSURE) < 140/90: ICD-10-CM

## 2025-06-16 RX ORDER — FUROSEMIDE 20 MG/1
20 TABLET ORAL DAILY
Qty: 90 TABLET | Refills: 1 | Status: SHIPPED | OUTPATIENT
Start: 2025-06-16

## 2025-06-17 ENCOUNTER — OFFICE VISIT (OUTPATIENT)
Dept: FAMILY MEDICINE | Facility: CLINIC | Age: 89
End: 2025-06-17
Attending: FAMILY MEDICINE
Payer: COMMERCIAL

## 2025-06-17 VITALS
DIASTOLIC BLOOD PRESSURE: 68 MMHG | OXYGEN SATURATION: 98 % | HEIGHT: 65 IN | HEART RATE: 60 BPM | TEMPERATURE: 97.9 F | RESPIRATION RATE: 16 BRPM | WEIGHT: 210.7 LBS | SYSTOLIC BLOOD PRESSURE: 130 MMHG | BODY MASS INDEX: 35.1 KG/M2

## 2025-06-17 DIAGNOSIS — R06.09 OTHER FORMS OF DYSPNEA: ICD-10-CM

## 2025-06-17 DIAGNOSIS — E11.22 TYPE 2 DIABETES MELLITUS WITH STAGE 3A CHRONIC KIDNEY DISEASE, WITHOUT LONG-TERM CURRENT USE OF INSULIN (H): Primary | ICD-10-CM

## 2025-06-17 DIAGNOSIS — Z91.09 ENVIRONMENTAL ALLERGIES: ICD-10-CM

## 2025-06-17 DIAGNOSIS — E78.5 HYPERLIPIDEMIA LDL GOAL <70: ICD-10-CM

## 2025-06-17 DIAGNOSIS — N18.31 TYPE 2 DIABETES MELLITUS WITH STAGE 3A CHRONIC KIDNEY DISEASE, WITHOUT LONG-TERM CURRENT USE OF INSULIN (H): Primary | ICD-10-CM

## 2025-06-17 DIAGNOSIS — I48.11 LONGSTANDING PERSISTENT ATRIAL FIBRILLATION (H): ICD-10-CM

## 2025-06-17 DIAGNOSIS — N20.0 CALCULUS OF KIDNEY: ICD-10-CM

## 2025-06-17 DIAGNOSIS — Z51.81 MEDICATION MONITORING ENCOUNTER: ICD-10-CM

## 2025-06-17 DIAGNOSIS — D64.9 ANEMIA, UNSPECIFIED TYPE: ICD-10-CM

## 2025-06-17 DIAGNOSIS — R60.9 EDEMA, UNSPECIFIED TYPE: ICD-10-CM

## 2025-06-17 DIAGNOSIS — N18.31 CHRONIC KIDNEY DISEASE, STAGE 3A (H): ICD-10-CM

## 2025-06-17 DIAGNOSIS — E66.01 MORBID OBESITY (H): ICD-10-CM

## 2025-06-17 DIAGNOSIS — C69.32 MALIGNANT NEOPLASM OF LEFT CHOROID (H): ICD-10-CM

## 2025-06-17 DIAGNOSIS — M15.0 PRIMARY OSTEOARTHRITIS INVOLVING MULTIPLE JOINTS: ICD-10-CM

## 2025-06-17 DIAGNOSIS — E11.9 TYPE 2 DIABETES, HBA1C GOAL < 7% (H): ICD-10-CM

## 2025-06-17 DIAGNOSIS — I10 HYPERTENSION GOAL BP (BLOOD PRESSURE) < 140/90: ICD-10-CM

## 2025-06-17 DIAGNOSIS — R80.9 PROTEINURIA, UNSPECIFIED TYPE: ICD-10-CM

## 2025-06-17 DIAGNOSIS — I27.20 PULMONARY HYPERTENSION (H): ICD-10-CM

## 2025-06-17 LAB
ANION GAP SERPL CALCULATED.3IONS-SCNC: 10 MMOL/L (ref 7–15)
BUN SERPL-MCNC: 29.9 MG/DL (ref 8–23)
CALCIUM SERPL-MCNC: 9.7 MG/DL (ref 8.8–10.4)
CHLORIDE SERPL-SCNC: 103 MMOL/L (ref 98–107)
CREAT SERPL-MCNC: 1.29 MG/DL (ref 0.67–1.17)
EGFRCR SERPLBLD CKD-EPI 2021: 53 ML/MIN/1.73M2
ERYTHROCYTE [DISTWIDTH] IN BLOOD BY AUTOMATED COUNT: 14.2 % (ref 10–15)
GLUCOSE SERPL-MCNC: 120 MG/DL (ref 70–99)
HCO3 SERPL-SCNC: 27 MMOL/L (ref 22–29)
HCT VFR BLD AUTO: 43.3 % (ref 40–53)
HGB BLD-MCNC: 13.9 G/DL (ref 13.3–17.7)
MCH RBC QN AUTO: 29 PG (ref 26.5–33)
MCHC RBC AUTO-ENTMCNC: 32.1 G/DL (ref 31.5–36.5)
MCV RBC AUTO: 90 FL (ref 78–100)
NT-PROBNP SERPL-MCNC: 2764 PG/ML (ref 0–852)
PLATELET # BLD AUTO: 216 10E3/UL (ref 150–450)
POTASSIUM SERPL-SCNC: 5.6 MMOL/L (ref 3.4–5.3)
RBC # BLD AUTO: 4.8 10E6/UL (ref 4.4–5.9)
SODIUM SERPL-SCNC: 140 MMOL/L (ref 135–145)
WBC # BLD AUTO: 8.7 10E3/UL (ref 4–11)

## 2025-06-17 PROCEDURE — 83880 ASSAY OF NATRIURETIC PEPTIDE: CPT | Performed by: FAMILY MEDICINE

## 2025-06-17 PROCEDURE — 3075F SYST BP GE 130 - 139MM HG: CPT | Performed by: FAMILY MEDICINE

## 2025-06-17 PROCEDURE — 80048 BASIC METABOLIC PNL TOTAL CA: CPT | Performed by: FAMILY MEDICINE

## 2025-06-17 PROCEDURE — G2211 COMPLEX E/M VISIT ADD ON: HCPCS | Performed by: FAMILY MEDICINE

## 2025-06-17 PROCEDURE — 36415 COLL VENOUS BLD VENIPUNCTURE: CPT | Performed by: FAMILY MEDICINE

## 2025-06-17 PROCEDURE — 99215 OFFICE O/P EST HI 40 MIN: CPT | Performed by: FAMILY MEDICINE

## 2025-06-17 PROCEDURE — 3078F DIAST BP <80 MM HG: CPT | Performed by: FAMILY MEDICINE

## 2025-06-17 PROCEDURE — 85027 COMPLETE CBC AUTOMATED: CPT | Performed by: FAMILY MEDICINE

## 2025-06-17 ASSESSMENT — ANXIETY QUESTIONNAIRES
5. BEING SO RESTLESS THAT IT IS HARD TO SIT STILL: NOT AT ALL
8. IF YOU CHECKED OFF ANY PROBLEMS, HOW DIFFICULT HAVE THESE MADE IT FOR YOU TO DO YOUR WORK, TAKE CARE OF THINGS AT HOME, OR GET ALONG WITH OTHER PEOPLE?: NOT DIFFICULT AT ALL
2. NOT BEING ABLE TO STOP OR CONTROL WORRYING: SEVERAL DAYS
GAD7 TOTAL SCORE: 1
6. BECOMING EASILY ANNOYED OR IRRITABLE: NOT AT ALL
3. WORRYING TOO MUCH ABOUT DIFFERENT THINGS: NOT AT ALL
4. TROUBLE RELAXING: NOT AT ALL
1. FEELING NERVOUS, ANXIOUS, OR ON EDGE: NOT AT ALL
IF YOU CHECKED OFF ANY PROBLEMS ON THIS QUESTIONNAIRE, HOW DIFFICULT HAVE THESE PROBLEMS MADE IT FOR YOU TO DO YOUR WORK, TAKE CARE OF THINGS AT HOME, OR GET ALONG WITH OTHER PEOPLE: NOT DIFFICULT AT ALL
7. FEELING AFRAID AS IF SOMETHING AWFUL MIGHT HAPPEN: NOT AT ALL
7. FEELING AFRAID AS IF SOMETHING AWFUL MIGHT HAPPEN: NOT AT ALL
GAD7 TOTAL SCORE: 1
GAD7 TOTAL SCORE: 1

## 2025-06-17 NOTE — PROGRESS NOTES
Assessment & Plan     Type 2 diabetes mellitus with stage 3a chronic kidney disease, without long-term current use of insulin (H)    - REVIEW OF HEALTH MAINTENANCE PROTOCOL ORDERS  - PRIMARY CARE FOLLOW-UP SCHEDULING  - Basic metabolic panel  (Ca, Cl, CO2, Creat, Gluc, K, Na, BUN)  - CBC with platelets    Type 2 diabetes, HbA1c goal < 7% (H)    - PRIMARY CARE FOLLOW-UP SCHEDULING  - REVIEW OF HEALTH MAINTENANCE PROTOCOL ORDERS  - PRIMARY CARE FOLLOW-UP SCHEDULING  - Basic metabolic panel  (Ca, Cl, CO2, Creat, Gluc, K, Na, BUN)  - CBC with platelets    Chronic kidney disease, stage 3a (H)    - REVIEW OF HEALTH MAINTENANCE PROTOCOL ORDERS  - PRIMARY CARE FOLLOW-UP SCHEDULING  - Basic metabolic panel  (Ca, Cl, CO2, Creat, Gluc, K, Na, BUN)  - CBC with platelets    Proteinuria, unspecified type    - REVIEW OF HEALTH MAINTENANCE PROTOCOL ORDERS  - PRIMARY CARE FOLLOW-UP SCHEDULING  - Basic metabolic panel  (Ca, Cl, CO2, Creat, Gluc, K, Na, BUN)    Hypertension goal BP (blood pressure) < 140/90    - PRIMARY CARE FOLLOW-UP SCHEDULING  - REVIEW OF HEALTH MAINTENANCE PROTOCOL ORDERS  - PRIMARY CARE FOLLOW-UP SCHEDULING  - Basic metabolic panel  (Ca, Cl, CO2, Creat, Gluc, K, Na, BUN)    Pulmonary hypertension (H)    - REVIEW OF HEALTH MAINTENANCE PROTOCOL ORDERS  - PRIMARY CARE FOLLOW-UP SCHEDULING  - Basic metabolic panel  (Ca, Cl, CO2, Creat, Gluc, K, Na, BUN)  - NT-proBNP  - CBC with platelets    Longstanding persistent atrial fibrillation (H)    - REVIEW OF HEALTH MAINTENANCE PROTOCOL ORDERS  - PRIMARY CARE FOLLOW-UP SCHEDULING  - Basic metabolic panel  (Ca, Cl, CO2, Creat, Gluc, K, Na, BUN)  - NT-proBNP  - CBC with platelets    Edema, unspecified type    - REVIEW OF HEALTH MAINTENANCE PROTOCOL ORDERS  - PRIMARY CARE FOLLOW-UP SCHEDULING  - Basic metabolic panel  (Ca, Cl, CO2, Creat, Gluc, K, Na, BUN)  - NT-proBNP    Hyperlipidemia LDL goal <70    - PRIMARY CARE FOLLOW-UP SCHEDULING  - REVIEW OF HEALTH MAINTENANCE  "PROTOCOL ORDERS  - PRIMARY CARE FOLLOW-UP SCHEDULING    Anemia, unspecified type    - PRIMARY CARE FOLLOW-UP SCHEDULING  - REVIEW OF HEALTH MAINTENANCE PROTOCOL ORDERS  - PRIMARY CARE FOLLOW-UP SCHEDULING  - CBC with platelets    Malignant neoplasm of left choroid (H)    - REVIEW OF HEALTH MAINTENANCE PROTOCOL ORDERS  - PRIMARY CARE FOLLOW-UP SCHEDULING    Environmental allergies    - PRIMARY CARE FOLLOW-UP SCHEDULING  - REVIEW OF HEALTH MAINTENANCE PROTOCOL ORDERS  - PRIMARY CARE FOLLOW-UP SCHEDULING    Nephrolithiasis    - PRIMARY CARE FOLLOW-UP SCHEDULING  - REVIEW OF HEALTH MAINTENANCE PROTOCOL ORDERS  - PRIMARY CARE FOLLOW-UP SCHEDULING    Primary osteoarthritis involving multiple joints    - PRIMARY CARE FOLLOW-UP SCHEDULING  - REVIEW OF HEALTH MAINTENANCE PROTOCOL ORDERS  - PRIMARY CARE FOLLOW-UP SCHEDULING    Morbid obesity (H)    - PRIMARY CARE FOLLOW-UP SCHEDULING  - REVIEW OF HEALTH MAINTENANCE PROTOCOL ORDERS  - PRIMARY CARE FOLLOW-UP SCHEDULING    Medication monitoring encounter    - PRIMARY CARE FOLLOW-UP SCHEDULING  - REVIEW OF HEALTH MAINTENANCE PROTOCOL ORDERS  - PRIMARY CARE FOLLOW-UP SCHEDULING  - Basic metabolic panel  (Ca, Cl, CO2, Creat, Gluc, K, Na, BUN)  - NT-proBNP  - CBC with platelets    Other forms of dyspnea    - NT-proBNP            BMI  Estimated body mass index is 35.61 kg/m  as calculated from the following:    Height as of this encounter: 1.638 m (5' 4.5\").    Weight as of this encounter: 95.6 kg (210 lb 11.2 oz).   Weight management plan: diet and exercise    Plan:    1) Medications: continue same, refills prn    2) Labs: reviewed, pending    3) Immunizations: advised covid / flu in fall, advised prevnar 20, consider hepatitis B    4) Imaging/Diagnostics: reviewed Holter / Echo    5) Consults: Cardiology    Return in about 6 months (around 12/17/2025) for Complete Physical, Medication Recheck Visit, Follow Up Chronic.    46 minutes spent by myself on the date of the encounter doing " chart review, history and exam, documentation and further activities per the note.    The longitudinal plan of care for the diagnosis(es)/condition(s) as documented were addressed during this visit. Due to the added complexity in care, I will continue to support Pat in the subsequent management and with ongoing continuity of care.    Shared Decision making completed.    Mallory Colon is a 89 year old, presenting for the following health issues:  Follow Up        6/17/2025    10:16 AM   Additional Questions   Roomed by Nhi JANE CMA   Accompanied by spouse     Via the Health Maintenance questionnaire, the patient has reported the following services have been completed -Eye Exam: [facility] eye 2025-06-12, this information has been sent to the abstraction team.  History of Present Illness       Diabetes:   He presents for follow up of diabetes.  He is checking home blood glucose a few times a month.   He checks blood glucose before meals.  Blood glucose is never over 200 and never under 70.  When his blood glucose is low, the patient is asymptomatic for confusion, blurred vision, lethargy and reports not feeling dizzy, shaky, or weak.   He has no concerns regarding his diabetes at this time.  He is having blurry vision.  The patient has had a diabetic eye exam in the last 12 months. Eye exam performed on 06 12 1925. Location of last eye exam [facility].        Heart Failure:  He presents for follow up of heart failure. He is not experiencing shortness of breath at night, with rest or with activity  He is experiencing lower extremity edema which is same as usual.   He has orthopenea and is not coughing at night. Patient is checking weight daily. He has recently had a None.  He has no side effects from medications.  He has had no other medical visits for heart failure since the last visit.    Hyperlipidemia:  He presents for follow up of hyperlipidemia.   He is taking medication to lower cholesterol. He is not having  myalgia or other side effects to statin medications.    Hypertension: He presents for follow up of hypertension.  He does not check blood pressure  regularly outside of the clinic. Outside blood pressures have been over 140/90. He does not follow a low salt diet.     He eats 0-1 servings of fruits and vegetables daily.He consumes 0 sweetened beverage(s) daily.He exercises with enough effort to increase his heart rate 30 to 60 minutes per day.  He exercises with enough effort to increase his heart rate 7 days per week.   He is taking medications regularly.      Last Echo:   Echo result w/o MOPS: Interpretation Summary The rhythm was atrial fibrillation.The left atrium is severely dilated.The right atrium is mildly dilated.There is mild to moderate mitral stenosis.Mild (35-45mmHg) pulmonary hypertension is present.There is mild concentric left ventricular hypertrophy.The visual ejection fraction is 55-60%.        Chronic A Fibrillation - Dr Ervin - See Holter    Edema - much improved - stable    Wt Readings from Last 4 Encounters:   06/17/25 95.6 kg (210 lb 11.2 oz)   04/23/25 97.1 kg (214 lb)   03/25/25 96.6 kg (213 lb)   03/19/25 95.4 kg (210 lb 4.8 oz)     Diabetes Follow-up    How often are you checking your blood sugar? A few times a week  What time of day are you checking your blood sugars (select all that apply)?  Before meals  Have you had any blood sugars above 200?  No  Have you had any blood sugars below 70?  No  What symptoms do you notice when your blood sugar is low?  None  What concerns do you have today about your diabetes? None   Do you have any of these symptoms? (Select all that apply)  No numbness or tingling in feet.  No redness, sores or blisters on feet.  No complaints of excessive thirst.  No reports of blurry vision.  No significant changes to weight.    Lab Results   Component Value Date    A1C 7.2 04/23/2025    A1C 6.6 12/16/2024    A1C 7.0 03/15/2024    A1C 6.8 12/15/2023    A1C 7.6  04/20/2023         CKD 3a / Hypertension Follow-up    Do you check your blood pressure regularly outside of the clinic? No   Are you following a low salt diet? No  Are your blood pressures ever more than 140 on the top number (systolic) OR more   than 90 on the bottom number (diastolic), for example 140/90? No    BP Readings from Last 3 Encounters:   06/17/25 130/68   04/23/25 130/60   03/25/25 122/72     Creatinine   Date Value Ref Range Status   04/23/2025 1.32 (H) 0.67 - 1.17 mg/dL Final     GFR Estimate   Date Value Ref Range Status   04/23/2025 52 (L) >60 mL/min/1.73m2 Final     Comment:     eGFR calculated using 2021 CKD-EPI equation.   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 - [facility] Eye - Dr Ayala    Environmental allergies - stable    Patient Active Problem List   Diagnosis    Nephrolithiasis    Choroid Melanoma    Hyperlipidemia LDL goal <70    Primary Osteoarthritis Of The Lumbar Vertebrae    Hypertension goal BP (blood pressure) < 140/90    Allergic rhinitis    Malignant Melanoma Of The Eye    Anemia    BPH (benign prostatic hyperplasia)    S/P total knee arthroplasty    Hemorrhage of rectum and anus    Superior mesenteric artery stenosis    Intestinal angina    Obesity (BMI 35.0-39.9) with comorbidity (H)    Type 2 diabetes, HbA1c goal < 7% (H)    CKD (chronic kidney disease) stage 2, GFR 60-89 ml/min    Environmental allergies    Type 2 diabetes mellitus with stage 2 chronic kidney disease, without long-term current use of insulin (H)    Chronic kidney disease, stage 3a (H)    Proteinuria, unspecified type    Type 2 diabetes mellitus with stage 3a chronic kidney disease, without long-term  current use of insulin (H)    Pulmonary hypertension (H)    Longstanding persistent atrial fibrillation (H)    Edema, unspecified type       Past Medical History:   Diagnosis Date    Cancer (H) 2000    Left Eye - treated with radiactive substance - vision ok    CKD (chronic kidney disease) stage 2, GFR 60-89 ml/min     Environmental allergies     History of transfusion     Hyperlipidemia LDL goal <70     Hypertension goal BP (blood pressure) < 140/90     Kidney disease     minimal change disease    Kidney stones     Osteoarthritis     Peptic ulceration 2011    see EGD that year    Pulmonary hypertension (H) 03/2025    mild    Type 2 diabetes, HbA1c goal < 7% (H) 2000       Past Surgical History:   Procedure Laterality Date    EYE SURGERY  2000    HC KNEE SCOPE, DIAGNOSTIC Left 2008    Description: Arthroscopy Knee Left;  Recorded: 03/17/2008;  Comments: cartilage surgery    NH TYMPANOPLASTY Left 1990    Tympanoplasty    TONSILLECTOMY  1945    ZZC RECONSTR TOTAL SHOULDER IMPLANT Right 1995    Description: Shoulder Arthroplasty Total Shoulder Replacement;  Recorded: 05/06/2008;    ZZC TOTAL KNEE ARTHROPLASTY Left 03/26/2018    Procedure: LEFT TOTAL KNEE ARTHROPLASTY;  Surgeon: Darrell Wu MD;  Location: [facility];  Service: Orthopedics       Current Outpatient Medications   Medication Sig Dispense Refill    acetaminophen (TYLENOL) 500 MG tablet [ACETAMINOPHEN (TYLENOL) 500 MG TABLET] Take 500 mg by mouth every 6 (six) hours as needed for pain.      amLODIPine (NORVASC) 10 MG tablet Take 0.5 tablets (5 mg) by mouth daily. 45 tablet 3    cetirizine (ZYRTEC) 10 mg cap [CETIRIZINE (ZYRTEC) 10 MG CAP] Take 10 mg by mouth daily as needed.       cholecalciferol, vitamin D3, 50 mcg (2,000 unit) Tab [CHOLECALCIFEROL, VITAMIN D3, 50 MCG (2,000 UNIT) TAB] Take 2,000 Units by mouth daily.      ELIQUIS ANTICOAGULANT 5 MG tablet TAKE 1 TABLET BY MOUTH TWICE A DAY 60 tablet 1    ferrous sulfate (FE TABS) 325 (65 Fe)  "MG EC tablet Take 325 mg by mouth every other day.      furosemide (LASIX) 20 MG tablet TAKE 1 TABLET BY MOUTH EVERY DAY 90 tablet 1    generic lancets [GENERIC LANCETS] Use 1 each As Directed daily. Dispense brand per patient's insurance at pharmacy discretion.(E11.9) 100 each 1    lisinopril (ZESTRIL) 5 MG tablet Take 1 tablet (5 mg) by mouth daily. 90 tablet 3    metFORMIN (GLUCOPHAGE XR) 500 MG 24 hr tablet Take 1 tablet (500 mg) by mouth daily (with dinner). 90 tablet 3    Multiple Vitamins-Minerals (PRESERVISION AREDS 2 PO) Take 2 tablets by mouth.      ONETOUCH ULTRA test strip USE TO TEST ONCE DAILY 100 strip 0    pravastatin (PRAVACHOL) 40 MG tablet TAKE 1 TABLET BY MOUTH EVERYDAY AT BEDTIME 90 tablet 3    psyllium (METAMUCIL/KONSYL) capsule Take 3 capsules by mouth 3 times daily.         No Known Allergies    Family History   Problem Relation Age of Onset    Diabetes Father     Diabetes Sister        Social History     Socioeconomic History    Marital status:      Spouse name: Sharon    Number of children: 5    Years of education: 12    Highest education level: None   Tobacco Use    Smoking status: Former     Types: Cigarettes    Smokeless tobacco: Former     Quit date: 12/8/1967    Tobacco comments:     Quit at age 28, smoked 1 PPD x 17 yrs     Quit chew at age 51, only months   Vaping Use    Vaping status: Never Used   Substance and Sexual Activity    Alcohol use: Not Currently     Alcohol/week: 0.0 - 2.0 standard drinks of alcohol     Comment: rare    Drug use: No    Sexual activity: Yes     Partners: Female     Birth control/protection: None   Social History Narrative     5 grown kids. 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      Social Drivers of Health     Financial Resource Strain: Low Risk  (12/16/2024)    Financial Resource Strain     Within the past 12 months, have you or your family members you live with been unable to get utilities (heat, electricity) when it was really needed?: " No   Food Insecurity: Low Risk  (12/16/2024)    Food Insecurity     Within the past 12 months, did you worry that your food would run out before you got money to buy more?: No     Within the past 12 months, did the food you bought just not last and you didn t have money to get more?: No   Transportation Needs: Low Risk  (12/16/2024)    Transportation Needs     Within the past 12 months, has lack of transportation kept you from medical appointments, getting your medicines, non-medical meetings or appointments, work, or from getting things that you need?: No   Physical Activity: Sufficiently Active (12/16/2024)    Exercise Vital Sign     Days of Exercise per Week: 7 days     Minutes of Exercise per Session: 60 min   Stress: No Stress Concern Present (12/16/2024)    Citizen of [place] [employer] - Occupational Stress Questionnaire     Feeling of Stress : Not at all   Social Connections: Unknown (12/16/2024)    Social Connection and Isolation Panel [NHANES]     Frequency of Social Gatherings with Friends and Family: Twice a week   Interpersonal Safety: Low Risk  (12/16/2024)    Interpersonal Safety     Do you feel physically and emotionally safe where you currently live?: Yes     Within the past 12 months, have you been hit, slapped, kicked or otherwise physically hurt by someone?: No     Within the past 12 months, have you been humiliated or emotionally abused in other ways by your partner or ex-partner?: No   Housing Stability: Low Risk  (12/16/2024)    Housing Stability     Do you have housing? : Yes     Are you worried about losing your housing?: No       Review of Systems  CONSTITUTIONAL: NEGATIVE for fever, chills, change in weight  INTEGUMENTARY/SKIN: NEGATIVE for worrisome rashes, moles or lesions  EYES: NEGATIVE for vision changes or irritation  ENT/MOUTH: NEGATIVE for ear, mouth and throat problems  RESP: NEGATIVE for significant cough or SOB  CV: NEGATIVE for chest pain, palpitations or peripheral  "edema  GI: NEGATIVE for nausea, abdominal pain, heartburn, or change in bowel habits  : NEGATIVE for frequency, dysuria, or hematuria  MUSCULOSKELETAL: NEGATIVE for significant arthralgias or myalgia  NEURO: NEGATIVE for weakness, dizziness or paresthesias  ENDOCRINE: NEGATIVE for temperature intolerance, skin/hair changes  HEME: NEGATIVE for bleeding problems  PSYCHIATRIC: NEGATIVE for changes in mood or affect      Objective    /68   Pulse 60   Temp 97.9  F (36.6  C) (Tympanic)   Resp 16   Ht 1.638 m (5' 4.5\")   Wt 95.6 kg (210 lb 11.2 oz)   SpO2 98%   BMI 35.61 kg/m    Body mass index is 35.61 kg/m .    Physical Exam   GENERAL: alert and no distress  EYES: Eyes grossly normal to inspection, PERRL and conjunctivae and sclerae normal  HENT: ear canals and TM's normal, nose and mouth without ulcers or lesions  NECK: no adenopathy, no asymmetry, masses, or scars  RESP: lungs clear to auscultation - no rales, rhonchi or wheezes  CV: regular rate and rhythm, normal S1 S2, no S3 or S4, no murmur, click or rub, no peripheral edema  ABDOMEN: soft, nontender, no hepatosplenomegaly, no masses and bowel sounds normal  MS: no gross musculoskeletal defects noted, no edema  SKIN: no suspicious lesions or rashes  NEURO: Normal strength and tone, mentation intact and speech normal  PSYCH: mentation appears normal, affect normal/bright    Reviewed labs, labs pending      Signed Electronically by:            Cornell Tomlinson MD, FAAFP, DABFM     [facility]  [facility]  [address] [place] [id number]  [email or web address] [place].[facility]   Office: [phone]  Fax: [phone]       "

## 2025-06-23 ENCOUNTER — TELEPHONE (OUTPATIENT)
Dept: FAMILY MEDICINE | Facility: CLINIC | Age: 89
End: 2025-06-23
Payer: COMMERCIAL

## 2025-06-23 DIAGNOSIS — N18.31 CHRONIC KIDNEY DISEASE, STAGE 3A (H): Primary | ICD-10-CM

## 2025-06-23 DIAGNOSIS — I10 HYPERTENSION GOAL BP (BLOOD PRESSURE) < 140/90: ICD-10-CM

## 2025-06-23 DIAGNOSIS — E87.5 HYPERKALEMIA: ICD-10-CM

## 2025-06-24 ENCOUNTER — LAB (OUTPATIENT)
Dept: LAB | Facility: CLINIC | Age: 89
End: 2025-06-24
Payer: COMMERCIAL

## 2025-06-24 DIAGNOSIS — I10 HYPERTENSION GOAL BP (BLOOD PRESSURE) < 140/90: ICD-10-CM

## 2025-06-24 DIAGNOSIS — N18.31 CHRONIC KIDNEY DISEASE, STAGE 3A (H): ICD-10-CM

## 2025-06-24 DIAGNOSIS — E87.5 HYPERKALEMIA: ICD-10-CM

## 2025-06-24 PROCEDURE — 84132 ASSAY OF SERUM POTASSIUM: CPT

## 2025-06-24 PROCEDURE — 36415 COLL VENOUS BLD VENIPUNCTURE: CPT

## 2025-06-25 ENCOUNTER — RESULTS FOLLOW-UP (OUTPATIENT)
Dept: FAMILY MEDICINE | Facility: CLINIC | Age: 89
End: 2025-06-25
Payer: COMMERCIAL

## 2025-06-25 LAB — POTASSIUM SERPL-SCNC: 5.1 MMOL/L (ref 3.4–5.3)

## 2025-06-25 NOTE — LETTER
[facility]  [address] [place] [address]  [phone]                    June 26, 2025    Preston Fortune  [address] [address] [address]      Dear Preston,    Here is a summary of your recent test results:    Potassium is normal   We advise:  Continue current cares    Your test results are enclosed.      Please contact me if you have any questions.             Thank you very much for trusting [facility].     Healthy regards,          Cornell Tomlinson M.D.        No results found for any visits on 06/25/25.

## 2025-07-10 ENCOUNTER — APPOINTMENT (OUTPATIENT)
Dept: CT IMAGING | Facility: CLINIC | Age: 89
DRG: 872 | End: 2025-07-10
Attending: EMERGENCY MEDICINE
Payer: COMMERCIAL

## 2025-07-10 ENCOUNTER — HOSPITAL ENCOUNTER (OUTPATIENT)
Facility: CLINIC | Age: 89
Setting detail: OBSERVATION
DRG: 872 | End: 2025-07-10
Attending: EMERGENCY MEDICINE | Admitting: INTERNAL MEDICINE
Payer: COMMERCIAL

## 2025-07-10 VITALS
TEMPERATURE: 100.1 F | RESPIRATION RATE: 25 BRPM | HEART RATE: 90 BPM | SYSTOLIC BLOOD PRESSURE: 138 MMHG | WEIGHT: 218 LBS | OXYGEN SATURATION: 91 % | BODY MASS INDEX: 36.84 KG/M2 | DIASTOLIC BLOOD PRESSURE: 78 MMHG

## 2025-07-10 DIAGNOSIS — R65.10 SIRS (SYSTEMIC INFLAMMATORY RESPONSE SYNDROME) (H): ICD-10-CM

## 2025-07-10 DIAGNOSIS — R78.81 GRAM-POSITIVE BACTEREMIA: Primary | ICD-10-CM

## 2025-07-10 DIAGNOSIS — K80.20 CHOLELITHIASIS WITHOUT CHOLECYSTITIS: ICD-10-CM

## 2025-07-10 DIAGNOSIS — Z71.89 OTHER SPECIFIED COUNSELING: Chronic | ICD-10-CM

## 2025-07-10 LAB
ALBUMIN SERPL BCG-MCNC: 4.6 G/DL (ref 3.5–5.2)
ALBUMIN UR-MCNC: 20 MG/DL
ALP SERPL-CCNC: 99 U/L (ref 40–150)
ALT SERPL W P-5'-P-CCNC: 12 U/L (ref 0–70)
ANION GAP SERPL CALCULATED.3IONS-SCNC: 15 MMOL/L (ref 7–15)
APPEARANCE UR: CLEAR
AST SERPL W P-5'-P-CCNC: 23 U/L (ref 0–45)
ATRIAL RATE - MUSE: NORMAL BPM
BASOPHILS # BLD AUTO: 0.1 10E3/UL (ref 0–0.2)
BASOPHILS NFR BLD AUTO: 0 %
BILIRUB SERPL-MCNC: 0.6 MG/DL
BILIRUB UR QL STRIP: NEGATIVE
BUN SERPL-MCNC: 45 MG/DL (ref 8–23)
CALCIUM SERPL-MCNC: 9.5 MG/DL (ref 8.8–10.4)
CHLORIDE SERPL-SCNC: 99 MMOL/L (ref 98–107)
COLOR UR AUTO: ABNORMAL
CREAT SERPL-MCNC: 1.31 MG/DL (ref 0.67–1.17)
DIASTOLIC BLOOD PRESSURE - MUSE: NORMAL MMHG
EGFRCR SERPLBLD CKD-EPI 2021: 52 ML/MIN/1.73M2
EOSINOPHIL # BLD AUTO: 0.1 10E3/UL (ref 0–0.7)
EOSINOPHIL NFR BLD AUTO: 1 %
ERYTHROCYTE [DISTWIDTH] IN BLOOD BY AUTOMATED COUNT: 13.9 % (ref 10–15)
GLUCOSE SERPL-MCNC: 186 MG/DL (ref 70–99)
GLUCOSE UR STRIP-MCNC: NEGATIVE MG/DL
HCO3 SERPL-SCNC: 23 MMOL/L (ref 22–29)
HCT VFR BLD AUTO: 41.4 % (ref 40–53)
HGB BLD-MCNC: 13.3 G/DL (ref 13.3–17.7)
HGB UR QL STRIP: NEGATIVE
HOLD SPECIMEN: NORMAL
HOLD SPECIMEN: NORMAL
IMM GRANULOCYTES # BLD: 0.2 10E3/UL
IMM GRANULOCYTES NFR BLD: 1 %
INTERPRETATION ECG - MUSE: NORMAL
KETONES UR STRIP-MCNC: NEGATIVE MG/DL
LACTATE SERPL-SCNC: 1.7 MMOL/L (ref 0.7–2)
LEUKOCYTE ESTERASE UR QL STRIP: NEGATIVE
LYMPHOCYTES # BLD AUTO: 0.9 10E3/UL (ref 0.8–5.3)
LYMPHOCYTES NFR BLD AUTO: 5 %
MCH RBC QN AUTO: 28.9 PG (ref 26.5–33)
MCHC RBC AUTO-ENTMCNC: 32.1 G/DL (ref 31.5–36.5)
MCV RBC AUTO: 90 FL (ref 78–100)
MONOCYTES # BLD AUTO: 0.9 10E3/UL (ref 0–1.3)
MONOCYTES NFR BLD AUTO: 4 %
MUCOUS THREADS #/AREA URNS LPF: PRESENT /LPF
NEUTROPHILS # BLD AUTO: 18.2 10E3/UL (ref 1.6–8.3)
NEUTROPHILS NFR BLD AUTO: 90 %
NITRATE UR QL: NEGATIVE
NRBC # BLD AUTO: 0 10E3/UL
NRBC BLD AUTO-RTO: 0 /100
P AXIS - MUSE: NORMAL DEGREES
PH UR STRIP: 5 [PH] (ref 5–7)
PLATELET # BLD AUTO: 224 10E3/UL (ref 150–450)
POTASSIUM SERPL-SCNC: 4.9 MMOL/L (ref 3.4–5.3)
PR INTERVAL - MUSE: NORMAL MS
PROT SERPL-MCNC: 7.5 G/DL (ref 6.4–8.3)
QRS DURATION - MUSE: 110 MS
QT - MUSE: 292 MS
QTC - MUSE: 385 MS
R AXIS - MUSE: -20 DEGREES
RBC # BLD AUTO: 4.61 10E6/UL (ref 4.4–5.9)
RBC URINE: 0 /HPF
SODIUM SERPL-SCNC: 137 MMOL/L (ref 135–145)
SP GR UR STRIP: 1.02 (ref 1–1.03)
SYSTOLIC BLOOD PRESSURE - MUSE: NORMAL MMHG
T AXIS - MUSE: -18 DEGREES
UROBILINOGEN UR STRIP-MCNC: NORMAL MG/DL
VENTRICULAR RATE- MUSE: 105 BPM
WBC # BLD AUTO: 20.3 10E3/UL (ref 4–11)
WBC URINE: 3 /HPF

## 2025-07-10 PROCEDURE — 96365 THER/PROPH/DIAG IV INF INIT: CPT | Mod: 59

## 2025-07-10 PROCEDURE — 36415 COLL VENOUS BLD VENIPUNCTURE: CPT | Performed by: EMERGENCY MEDICINE

## 2025-07-10 PROCEDURE — 258N000003 HC RX IP 258 OP 636: Performed by: EMERGENCY MEDICINE

## 2025-07-10 PROCEDURE — 87154 CUL TYP ID BLD PTHGN 6+ TRGT: CPT | Performed by: EMERGENCY MEDICINE

## 2025-07-10 PROCEDURE — 96375 TX/PRO/DX INJ NEW DRUG ADDON: CPT

## 2025-07-10 PROCEDURE — 87040 BLOOD CULTURE FOR BACTERIA: CPT | Performed by: EMERGENCY MEDICINE

## 2025-07-10 PROCEDURE — 93005 ELECTROCARDIOGRAM TRACING: CPT

## 2025-07-10 PROCEDURE — 85025 COMPLETE CBC W/AUTO DIFF WBC: CPT | Performed by: EMERGENCY MEDICINE

## 2025-07-10 PROCEDURE — 250N000011 HC RX IP 250 OP 636: Performed by: EMERGENCY MEDICINE

## 2025-07-10 PROCEDURE — 250N000013 HC RX MED GY IP 250 OP 250 PS 637: Performed by: EMERGENCY MEDICINE

## 2025-07-10 PROCEDURE — 96361 HYDRATE IV INFUSION ADD-ON: CPT

## 2025-07-10 PROCEDURE — 82435 ASSAY OF BLOOD CHLORIDE: CPT | Performed by: EMERGENCY MEDICINE

## 2025-07-10 PROCEDURE — 81001 URINALYSIS AUTO W/SCOPE: CPT | Performed by: EMERGENCY MEDICINE

## 2025-07-10 PROCEDURE — G0378 HOSPITAL OBSERVATION PER HR: HCPCS

## 2025-07-10 PROCEDURE — 99285 EMERGENCY DEPT VISIT HI MDM: CPT | Mod: 25

## 2025-07-10 PROCEDURE — 99222 1ST HOSP IP/OBS MODERATE 55: CPT | Performed by: INTERNAL MEDICINE

## 2025-07-10 PROCEDURE — 71275 CT ANGIOGRAPHY CHEST: CPT

## 2025-07-10 PROCEDURE — 83605 ASSAY OF LACTIC ACID: CPT | Performed by: EMERGENCY MEDICINE

## 2025-07-10 RX ORDER — PIPERACILLIN SODIUM, TAZOBACTAM SODIUM 4; .5 G/20ML; G/20ML
4.5 INJECTION, POWDER, LYOPHILIZED, FOR SOLUTION INTRAVENOUS ONCE
Status: COMPLETED | OUTPATIENT
Start: 2025-07-10 | End: 2025-07-10

## 2025-07-10 RX ORDER — IOPAMIDOL 755 MG/ML
500 INJECTION, SOLUTION INTRAVASCULAR ONCE
Status: COMPLETED | OUTPATIENT
Start: 2025-07-10 | End: 2025-07-10

## 2025-07-10 RX ORDER — ACETAMINOPHEN 500 MG
1000 TABLET ORAL ONCE
Status: COMPLETED | OUTPATIENT
Start: 2025-07-10 | End: 2025-07-10

## 2025-07-10 RX ADMIN — ACETAMINOPHEN 1000 MG: 500 TABLET, FILM COATED ORAL at 19:02

## 2025-07-10 RX ADMIN — IOPAMIDOL 100 ML: 755 INJECTION, SOLUTION INTRAVENOUS at 20:43

## 2025-07-10 RX ADMIN — VANCOMYCIN HYDROCHLORIDE 2000 MG: 10 INJECTION, POWDER, LYOPHILIZED, FOR SOLUTION INTRAVENOUS at 23:08

## 2025-07-10 RX ADMIN — PIPERACILLIN AND TAZOBACTAM 4.5 G: 4; .5 INJECTION, POWDER, FOR SOLUTION INTRAVENOUS at 22:45

## 2025-07-10 RX ADMIN — SODIUM CHLORIDE 1000 ML: 9 INJECTION, SOLUTION INTRAVENOUS at 19:03

## 2025-07-10 ASSESSMENT — ACTIVITIES OF DAILY LIVING (ADL)
ADLS_ACUITY_SCORE: 41

## 2025-07-10 ASSESSMENT — COLUMBIA-SUICIDE SEVERITY RATING SCALE - C-SSRS
6. HAVE YOU EVER DONE ANYTHING, STARTED TO DO ANYTHING, OR PREPARED TO DO ANYTHING TO END YOUR LIFE?: NO
1. IN THE PAST MONTH, HAVE YOU WISHED YOU WERE DEAD OR WISHED YOU COULD GO TO SLEEP AND NOT WAKE UP?: NO
2. HAVE YOU ACTUALLY HAD ANY THOUGHTS OF KILLING YOURSELF IN THE PAST MONTH?: NO

## 2025-07-10 NOTE — ED TRIAGE NOTES
Pt c/o chills that started earlier today- said he was uncontrollably shaking. Generalized weakness and fatigue,unable to get out of a chair himself. Pt febrile in triage. Denies any nausea/vomting or urinary symptoms.      Triage Assessment (Adult)       Row Name 07/10/25 1823          Triage Assessment    Airway WDL WDL        Respiratory WDL    Respiratory WDL WDL        Cardiac WDL    Cardiac WDL WDL        Cognitive/Neuro/Behavioral WDL    Cognitive/Neuro/Behavioral WDL WDL

## 2025-07-10 NOTE — ED PROVIDER NOTES
Emergency Department Note      History of Present Illness     Chief Complaint   Generalized Weakness      HPI   Preston Fortune is a 89 year old male with a history of CKD, T2DM and cancer presenting with generalized weakness. This afternoon, Isaiah endorses feeling chills, fatigue, and shaky. He reports always having a runny nose. Denies cough, urination, or pain anywhere.     Independent Historian   None    Review of External Notes   None    Past Medical History     Medical History and Problem List   Hypertension   Type 2 diabetes  Hyperlipidemia   Cancer  CKD  Kidney stones  Kidney disease   Osteoarthritis  Peptic ulceration   Superior mesenteric artery stenosis   Choroid melanoma   Melanoma of the eye   Leukocytosis     Medications   Amlodipine   Eliquis   Furosemide  Lisinopril  Metformin   Pravastatin     Surgical History   Tympanoplasty  Arthroscopy  Eye surgery     Physical Exam     Patient Vitals for the past 24 hrs:   BP Temp Temp src Pulse Resp SpO2 Weight   07/10/25 2117 -- -- -- 90 25 -- --   07/10/25 2115 138/78 -- -- 94 -- -- --   07/10/25 2114 -- 100.1  F (37.8  C) Oral -- -- -- --   07/10/25 2100 137/71 -- -- 92 -- (!) 91 % --   07/10/25 2000 (!) 170/77 -- -- 99 26 92 % --   07/10/25 1921 (!) 184/106 -- -- 105 29 (!) 90 % 98.9 kg (218 lb)   07/10/25 1917 (!) 184/106 -- -- 106 10 93 % --   07/10/25 1907 -- -- -- 112 15 (!) 91 % --   07/10/25 1900 -- -- -- 109 (!) 34 93 % --   07/10/25 1857 (!) 192/95 -- -- 108 (!) 34 93 % --   07/10/25 1850 (!) 189/102 -- -- 108 (!) 40 93 % --   07/10/25 1822 (!) 169/79 100.4  F (38  C) Oral 111 20 94 % 93.9 kg (207 lb)     Physical Exam  ***    Diagnostics     Lab Results   Labs Ordered and Resulted from Time of ED Arrival to Time of ED Departure   COMPREHENSIVE METABOLIC PANEL - Abnormal       Result Value    Sodium 137      Potassium 4.9      Carbon Dioxide (CO2) 23      Anion Gap 15      Urea Nitrogen 45.0 (*)     Creatinine 1.31 (*)     GFR Estimate 52 (*)      Calcium 9.5      Chloride 99      Glucose 186 (*)     Alkaline Phosphatase 99      AST 23      ALT 12      Protein Total 7.5      Albumin 4.6      Bilirubin Total 0.6     ROUTINE UA WITH MICROSCOPIC REFLEX TO CULTURE - Abnormal    Color Urine Light Yellow      Appearance Urine Clear      Glucose Urine Negative      Bilirubin Urine Negative      Ketones Urine Negative      Specific Gravity Urine 1.017      Blood Urine Negative      pH Urine 5.0      Protein Albumin Urine 20 (*)     Urobilinogen Urine Normal      Nitrite Urine Negative      Leukocyte Esterase Urine Negative      Mucus Urine Present (*)     RBC Urine 0      WBC Urine 3     CBC WITH PLATELETS AND DIFFERENTIAL - Abnormal    WBC Count 20.3 (*)     RBC Count 4.61      Hemoglobin 13.3      Hematocrit 41.4      MCV 90      MCH 28.9      MCHC 32.1      RDW 13.9      Platelet Count 224      % Neutrophils 90      % Lymphocytes 5      % Monocytes 4      % Eosinophils 1      % Basophils 0      % Immature Granulocytes 1      NRBCs per 100 WBC 0      Absolute Neutrophils 18.2 (*)     Absolute Lymphocytes 0.9      Absolute Monocytes 0.9      Absolute Eosinophils 0.1      Absolute Basophils 0.1      Absolute Immature Granulocytes 0.2      Absolute NRBCs 0.0     LACTIC ACID WHOLE BLOOD WITH 1X REPEAT IN 2 HR WHEN >2 - Normal    Lactic Acid, Initial 1.7     BLOOD CULTURE   BLOOD CULTURE       Imaging   CT Chest (PE) Abdomen Pelvis w Contrast   Final Result   IMPRESSION:   1.  No pulmonary embolism.   2.  Bronchiolar wall thickening compatible with bronchiolitis. Mild mosaic attenuation can be seen with air trapping.   3.  Very minimal interstitial change in the lung bases.   4.  Cholelithiasis.   5.  Large 12 x 11 mm stone in the distal left ureter without hydronephrosis. This is unchanged.   6.  Prostatic enlargement.          EKG   ECG results from 07/10/25   EKG 12 lead     Value    Systolic Blood Pressure     Diastolic Blood Pressure     Ventricular Rate 105     "Atrial Rate     OR Interval     QRS Duration 110        QTc 385    P Axis     R AXIS -20    T Axis -18    Interpretation ECG      Atrial fibrillation with rapid ventricular response  Low voltage QRS  Incomplete right bundle branch block  Cannot rule out Anterior infarct , age undetermined  Abnormal ECG  ECG reviewed by me at 1848         Independent Interpretation   I independently reviewed the ***. I see no*** evidence of ***.       ED Course      Medications Administered   Medications   vancomycin (VANCOCIN) 2,000 mg in 0.9% NaCl 500 mL intermittent infusion (2,000 mg Intravenous $New Bag 7/10/25 2308)   acetaminophen (TYLENOL) tablet 1,000 mg (1,000 mg Oral $Given 7/10/25 1902)   sodium chloride 0.9% BOLUS 1,000 mL (0 mLs Intravenous Stopped 7/10/25 2113)   sodium chloride (PF) 0.9% PF flush 100 mL (99 mLs Intravenous $Given 7/10/25 2043)   iopamidol (ISOVUE-370) solution 500 mL (100 mLs Intravenous $Given 7/10/25 2043)   piperacillin-tazobactam (ZOSYN) 4.5 g vial to attach to  mL bag (0 g Intravenous Stopped 7/10/25 2308)       Procedures   Procedures     Discussion of Management   Admitting Hospitalist, Dr. Freed    ED Course   ED Course as of 07/10/25 2316   Thu Jul 10, 2025   1849 I obtained the history and examined the patient as noted above.      2250 I rechecked and updated the patient.         Additional Documentation  None    Medical Decision Making / Diagnosis     CMS Diagnoses: IV Antibiotics given and/or elevated Lactate of 1.7 and no sepsis note found - Delete this reminder and enter the sepsis note or '.edcms' before signing chart.>>>{Sepsis/Septic Shock/Stemi/Stroke:022437::\"None\"}    MIPS   CT for PE was ordered because the patient is high risk for pulmonary embolism.               MDM   Preston Fortune is a 89 year old male ***    Disposition   The patient was admitted to the hospital.     Diagnosis   No diagnosis found.     Scribe Disclosure:  I, Shruthi Bailon, am serving as a " scribe at 6:50 PM on 7/10/2025 to document services personally performed by Marcio Josue MD based on my observations and the provider's statements to me.

## 2025-07-11 ENCOUNTER — APPOINTMENT (OUTPATIENT)
Dept: ULTRASOUND IMAGING | Facility: CLINIC | Age: 89
DRG: 872 | End: 2025-07-11
Attending: INTERNAL MEDICINE
Payer: COMMERCIAL

## 2025-07-11 ENCOUNTER — APPOINTMENT (OUTPATIENT)
Dept: MRI IMAGING | Facility: CLINIC | Age: 89
DRG: 872 | End: 2025-07-11
Attending: INTERNAL MEDICINE
Payer: COMMERCIAL

## 2025-07-11 LAB
ACB COMPLEX DNA BLD POS QL NAA+NON-PROBE: NOT DETECTED
ALBUMIN SERPL BCG-MCNC: 3.7 G/DL (ref 3.5–5.2)
ALP SERPL-CCNC: 66 U/L (ref 40–150)
ALT SERPL W P-5'-P-CCNC: 8 U/L (ref 0–70)
ANION GAP SERPL CALCULATED.3IONS-SCNC: 12 MMOL/L (ref 7–15)
AST SERPL W P-5'-P-CCNC: 14 U/L (ref 0–45)
ATRIAL RATE - MUSE: NORMAL BPM
B FRAGILIS DNA BLD POS QL NAA+NON-PROBE: NOT DETECTED
BILIRUB SERPL-MCNC: 0.9 MG/DL
BUN SERPL-MCNC: 37.5 MG/DL (ref 8–23)
C ALBICANS DNA BLD POS QL NAA+NON-PROBE: NOT DETECTED
C AURIS DNA BLD POS QL NAA+NON-PROBE: NOT DETECTED
C GATTII+NEOFOR DNA BLD POS QL NAA+N-PRB: NOT DETECTED
C GLABRATA DNA BLD POS QL NAA+NON-PROBE: NOT DETECTED
C KRUSEI DNA BLD POS QL NAA+NON-PROBE: NOT DETECTED
C PARAP DNA BLD POS QL NAA+NON-PROBE: NOT DETECTED
C TROPICLS DNA BLD POS QL NAA+NON-PROBE: NOT DETECTED
CALCIUM SERPL-MCNC: 8.4 MG/DL (ref 8.8–10.4)
CHLORIDE SERPL-SCNC: 104 MMOL/L (ref 98–107)
CREAT SERPL-MCNC: 1.31 MG/DL (ref 0.67–1.17)
CRP SERPL-MCNC: 91.32 MG/L
DIASTOLIC BLOOD PRESSURE - MUSE: NORMAL MMHG
E CLOAC COMP DNA BLD POS NAA+NON-PROBE: NOT DETECTED
E COLI DNA BLD POS QL NAA+NON-PROBE: NOT DETECTED
E FAECALIS DNA BLD POS QL NAA+NON-PROBE: NOT DETECTED
E FAECIUM DNA BLD POS QL NAA+NON-PROBE: NOT DETECTED
EGFRCR SERPLBLD CKD-EPI 2021: 52 ML/MIN/1.73M2
ENTEROBACTERALES DNA BLD POS NAA+N-PRB: NOT DETECTED
ERYTHROCYTE [DISTWIDTH] IN BLOOD BY AUTOMATED COUNT: 13.9 % (ref 10–15)
GLUCOSE BLDC GLUCOMTR-MCNC: 131 MG/DL (ref 70–99)
GLUCOSE BLDC GLUCOMTR-MCNC: 132 MG/DL (ref 70–99)
GLUCOSE BLDC GLUCOMTR-MCNC: 197 MG/DL (ref 70–99)
GLUCOSE SERPL-MCNC: 133 MG/DL (ref 70–99)
GP B STREP DNA BLD POS QL NAA+NON-PROBE: NOT DETECTED
HAEM INFLU DNA BLD POS QL NAA+NON-PROBE: NOT DETECTED
HCO3 SERPL-SCNC: 22 MMOL/L (ref 22–29)
HCT VFR BLD AUTO: 34.8 % (ref 40–53)
HGB BLD-MCNC: 11.7 G/DL (ref 13.3–17.7)
INTERPRETATION ECG - MUSE: NORMAL
K OXYTOCA DNA BLD POS QL NAA+NON-PROBE: NOT DETECTED
KLEBSIELLA SP DNA BLD POS QL NAA+NON-PRB: NOT DETECTED
KLEBSIELLA SP DNA BLD POS QL NAA+NON-PRB: NOT DETECTED
L MONOCYTOG DNA BLD POS QL NAA+NON-PROBE: NOT DETECTED
MCH RBC QN AUTO: 29.4 PG (ref 26.5–33)
MCHC RBC AUTO-ENTMCNC: 33.6 G/DL (ref 31.5–36.5)
MCV RBC AUTO: 87 FL (ref 78–100)
N MEN DNA BLD POS QL NAA+NON-PROBE: NOT DETECTED
P AERUGINOSA DNA BLD POS NAA+NON-PROBE: NOT DETECTED
P AXIS - MUSE: NORMAL DEGREES
PLATELET # BLD AUTO: 172 10E3/UL (ref 150–450)
POTASSIUM SERPL-SCNC: 4.3 MMOL/L (ref 3.4–5.3)
PR INTERVAL - MUSE: NORMAL MS
PROT SERPL-MCNC: 5.7 G/DL (ref 6.4–8.3)
PROTEUS SP DNA BLD POS QL NAA+NON-PROBE: NOT DETECTED
QRS DURATION - MUSE: 110 MS
QT - MUSE: 292 MS
QTC - MUSE: 385 MS
R AXIS - MUSE: -20 DEGREES
RBC # BLD AUTO: 3.98 10E6/UL (ref 4.4–5.9)
S AUREUS DNA BLD POS QL NAA+NON-PROBE: NOT DETECTED
S AUREUS+CONS DNA BLD POS NAA+NON-PROBE: NOT DETECTED
S EPIDERMIDIS DNA BLD POS QL NAA+NON-PRB: NOT DETECTED
S LUGDUNENSIS DNA BLD POS QL NAA+NON-PRB: NOT DETECTED
S MALTOPHILIA DNA BLD POS QL NAA+NON-PRB: NOT DETECTED
S MARCESCENS DNA BLD POS NAA+NON-PROBE: NOT DETECTED
S PNEUM DNA BLD POS QL NAA+NON-PROBE: NOT DETECTED
S PYO DNA BLD POS QL NAA+NON-PROBE: NOT DETECTED
SALMONELLA DNA BLD POS QL NAA+NON-PROBE: NOT DETECTED
SODIUM SERPL-SCNC: 138 MMOL/L (ref 135–145)
STREPTOCOCCUS DNA BLD POS NAA+NON-PROBE: DETECTED
SYSTOLIC BLOOD PRESSURE - MUSE: NORMAL MMHG
T AXIS - MUSE: -18 DEGREES
VENTRICULAR RATE- MUSE: 105 BPM
WBC # BLD AUTO: 24.2 10E3/UL (ref 4–11)

## 2025-07-11 PROCEDURE — A9585 GADOBUTROL INJECTION: HCPCS | Performed by: INTERNAL MEDICINE

## 2025-07-11 PROCEDURE — 250N000011 HC RX IP 250 OP 636: Performed by: INTERNAL MEDICINE

## 2025-07-11 PROCEDURE — 93922 UPR/L XTREMITY ART 2 LEVELS: CPT

## 2025-07-11 PROCEDURE — 36415 COLL VENOUS BLD VENIPUNCTURE: CPT | Performed by: INTERNAL MEDICINE

## 2025-07-11 PROCEDURE — 99233 SBSQ HOSP IP/OBS HIGH 50: CPT | Performed by: INTERNAL MEDICINE

## 2025-07-11 PROCEDURE — G0378 HOSPITAL OBSERVATION PER HR: HCPCS

## 2025-07-11 PROCEDURE — 258N000003 HC RX IP 258 OP 636: Performed by: INTERNAL MEDICINE

## 2025-07-11 PROCEDURE — 86140 C-REACTIVE PROTEIN: CPT | Performed by: INTERNAL MEDICINE

## 2025-07-11 PROCEDURE — 82040 ASSAY OF SERUM ALBUMIN: CPT | Performed by: INTERNAL MEDICINE

## 2025-07-11 PROCEDURE — 87040 BLOOD CULTURE FOR BACTERIA: CPT | Performed by: INTERNAL MEDICINE

## 2025-07-11 PROCEDURE — 99222 1ST HOSP IP/OBS MODERATE 55: CPT | Performed by: INTERNAL MEDICINE

## 2025-07-11 PROCEDURE — 73720 MRI LWR EXTREMITY W/O&W/DYE: CPT | Mod: RT

## 2025-07-11 PROCEDURE — 250N000013 HC RX MED GY IP 250 OP 250 PS 637: Performed by: INTERNAL MEDICINE

## 2025-07-11 PROCEDURE — 255N000002 HC RX 255 OP 636: Performed by: INTERNAL MEDICINE

## 2025-07-11 PROCEDURE — 120N000001 HC R&B MED SURG/OB

## 2025-07-11 PROCEDURE — 96376 TX/PRO/DX INJ SAME DRUG ADON: CPT

## 2025-07-11 PROCEDURE — 82962 GLUCOSE BLOOD TEST: CPT

## 2025-07-11 PROCEDURE — 85014 HEMATOCRIT: CPT | Performed by: INTERNAL MEDICINE

## 2025-07-11 RX ORDER — NICOTINE POLACRILEX 4 MG
15-30 LOZENGE BUCCAL
Status: DISCONTINUED | OUTPATIENT
Start: 2025-07-11 | End: 2025-07-15 | Stop reason: HOSPADM

## 2025-07-11 RX ORDER — ONDANSETRON 2 MG/ML
4 INJECTION INTRAMUSCULAR; INTRAVENOUS EVERY 6 HOURS PRN
Status: DISCONTINUED | OUTPATIENT
Start: 2025-07-11 | End: 2025-07-15 | Stop reason: HOSPADM

## 2025-07-11 RX ORDER — AMOXICILLIN 250 MG
2 CAPSULE ORAL 2 TIMES DAILY PRN
Status: DISCONTINUED | OUTPATIENT
Start: 2025-07-11 | End: 2025-07-15 | Stop reason: HOSPADM

## 2025-07-11 RX ORDER — ACETAMINOPHEN 650 MG/1
650 SUPPOSITORY RECTAL EVERY 4 HOURS PRN
Status: DISCONTINUED | OUTPATIENT
Start: 2025-07-11 | End: 2025-07-15 | Stop reason: HOSPADM

## 2025-07-11 RX ORDER — PRAVASTATIN SODIUM 20 MG
40 TABLET ORAL EVERY EVENING
Status: DISCONTINUED | OUTPATIENT
Start: 2025-07-11 | End: 2025-07-15 | Stop reason: HOSPADM

## 2025-07-11 RX ORDER — CEFTRIAXONE 2 G/1
2 INJECTION, POWDER, FOR SOLUTION INTRAMUSCULAR; INTRAVENOUS EVERY 24 HOURS
Status: DISCONTINUED | OUTPATIENT
Start: 2025-07-11 | End: 2025-07-15 | Stop reason: HOSPADM

## 2025-07-11 RX ORDER — LISINOPRIL 5 MG/1
5 TABLET ORAL DAILY
Status: DISCONTINUED | OUTPATIENT
Start: 2025-07-11 | End: 2025-07-15 | Stop reason: HOSPADM

## 2025-07-11 RX ORDER — ENOXAPARIN SODIUM 100 MG/ML
40 INJECTION SUBCUTANEOUS EVERY 24 HOURS
Status: DISCONTINUED | OUTPATIENT
Start: 2025-07-11 | End: 2025-07-12

## 2025-07-11 RX ORDER — ACETAMINOPHEN 325 MG/1
650 TABLET ORAL EVERY 4 HOURS PRN
Status: DISCONTINUED | OUTPATIENT
Start: 2025-07-11 | End: 2025-07-15 | Stop reason: HOSPADM

## 2025-07-11 RX ORDER — DEXTROSE MONOHYDRATE 25 G/50ML
25-50 INJECTION, SOLUTION INTRAVENOUS
Status: DISCONTINUED | OUTPATIENT
Start: 2025-07-11 | End: 2025-07-15 | Stop reason: HOSPADM

## 2025-07-11 RX ORDER — METFORMIN HYDROCHLORIDE 500 MG/1
500 TABLET, EXTENDED RELEASE ORAL
Status: DISCONTINUED | OUTPATIENT
Start: 2025-07-11 | End: 2025-07-11

## 2025-07-11 RX ORDER — GADOBUTROL 604.72 MG/ML
0.1 INJECTION INTRAVENOUS ONCE
Status: COMPLETED | OUTPATIENT
Start: 2025-07-11 | End: 2025-07-11

## 2025-07-11 RX ORDER — PIPERACILLIN SODIUM, TAZOBACTAM SODIUM 3; .375 G/15ML; G/15ML
3.38 INJECTION, POWDER, LYOPHILIZED, FOR SOLUTION INTRAVENOUS EVERY 6 HOURS
Status: DISCONTINUED | OUTPATIENT
Start: 2025-07-11 | End: 2025-07-11

## 2025-07-11 RX ORDER — BISACODYL 10 MG
10 SUPPOSITORY, RECTAL RECTAL DAILY PRN
Status: DISCONTINUED | OUTPATIENT
Start: 2025-07-11 | End: 2025-07-15 | Stop reason: HOSPADM

## 2025-07-11 RX ORDER — PROCHLORPERAZINE MALEATE 5 MG/1
5 TABLET ORAL EVERY 6 HOURS PRN
Status: DISCONTINUED | OUTPATIENT
Start: 2025-07-11 | End: 2025-07-15 | Stop reason: HOSPADM

## 2025-07-11 RX ORDER — HYDRALAZINE HYDROCHLORIDE 10 MG/1
10 TABLET, FILM COATED ORAL EVERY 4 HOURS PRN
Status: DISCONTINUED | OUTPATIENT
Start: 2025-07-11 | End: 2025-07-15 | Stop reason: HOSPADM

## 2025-07-11 RX ORDER — POLYETHYLENE GLYCOL 3350 17 G/17G
17 POWDER, FOR SOLUTION ORAL 2 TIMES DAILY PRN
Status: DISCONTINUED | OUTPATIENT
Start: 2025-07-11 | End: 2025-07-15 | Stop reason: HOSPADM

## 2025-07-11 RX ORDER — AMOXICILLIN 250 MG
1 CAPSULE ORAL 2 TIMES DAILY PRN
Status: DISCONTINUED | OUTPATIENT
Start: 2025-07-11 | End: 2025-07-15 | Stop reason: HOSPADM

## 2025-07-11 RX ORDER — ONDANSETRON 4 MG/1
4 TABLET, ORALLY DISINTEGRATING ORAL EVERY 6 HOURS PRN
Status: DISCONTINUED | OUTPATIENT
Start: 2025-07-11 | End: 2025-07-15 | Stop reason: HOSPADM

## 2025-07-11 RX ORDER — HYDRALAZINE HYDROCHLORIDE 20 MG/ML
10 INJECTION INTRAMUSCULAR; INTRAVENOUS EVERY 4 HOURS PRN
Status: DISCONTINUED | OUTPATIENT
Start: 2025-07-11 | End: 2025-07-15 | Stop reason: HOSPADM

## 2025-07-11 RX ORDER — PIPERACILLIN SODIUM, TAZOBACTAM SODIUM 4; .5 G/20ML; G/20ML
4.5 INJECTION, POWDER, LYOPHILIZED, FOR SOLUTION INTRAVENOUS EVERY 6 HOURS
Status: DISCONTINUED | OUTPATIENT
Start: 2025-07-11 | End: 2025-07-11

## 2025-07-11 RX ORDER — AMLODIPINE BESYLATE 5 MG/1
5 TABLET ORAL DAILY
Status: DISCONTINUED | OUTPATIENT
Start: 2025-07-11 | End: 2025-07-15 | Stop reason: HOSPADM

## 2025-07-11 RX ORDER — FUROSEMIDE 20 MG/1
20 TABLET ORAL DAILY
Status: DISCONTINUED | OUTPATIENT
Start: 2025-07-11 | End: 2025-07-15 | Stop reason: HOSPADM

## 2025-07-11 RX ADMIN — APIXABAN 5 MG: 5 TABLET, FILM COATED ORAL at 01:44

## 2025-07-11 RX ADMIN — VANCOMYCIN HYDROCHLORIDE 1250 MG: 5 INJECTION, POWDER, LYOPHILIZED, FOR SOLUTION INTRAVENOUS at 10:49

## 2025-07-11 RX ADMIN — PIPERACILLIN AND TAZOBACTAM 3.38 G: 3; .375 INJECTION, POWDER, FOR SOLUTION INTRAVENOUS at 09:53

## 2025-07-11 RX ADMIN — ACETAMINOPHEN 650 MG: 325 TABLET ORAL at 03:56

## 2025-07-11 RX ADMIN — APIXABAN 5 MG: 5 TABLET, FILM COATED ORAL at 08:56

## 2025-07-11 RX ADMIN — AMLODIPINE BESYLATE 5 MG: 5 TABLET ORAL at 08:56

## 2025-07-11 RX ADMIN — CEFTRIAXONE 2 G: 2 INJECTION, POWDER, FOR SOLUTION INTRAMUSCULAR; INTRAVENOUS at 15:24

## 2025-07-11 RX ADMIN — FUROSEMIDE 20 MG: 20 TABLET ORAL at 08:56

## 2025-07-11 RX ADMIN — ENOXAPARIN SODIUM 40 MG: 40 INJECTION SUBCUTANEOUS at 21:23

## 2025-07-11 RX ADMIN — GADOBUTROL 9.63 ML: 604.72 INJECTION INTRAVENOUS at 19:47

## 2025-07-11 RX ADMIN — LISINOPRIL 5 MG: 5 TABLET ORAL at 08:56

## 2025-07-11 RX ADMIN — PRAVASTATIN SODIUM 40 MG: 20 TABLET ORAL at 21:23

## 2025-07-11 RX ADMIN — PIPERACILLIN AND TAZOBACTAM 3.38 G: 3; .375 INJECTION, POWDER, FOR SOLUTION INTRAVENOUS at 03:57

## 2025-07-11 ASSESSMENT — ACTIVITIES OF DAILY LIVING (ADL)
ADLS_ACUITY_SCORE: 41
ADLS_ACUITY_SCORE: 51
DIFFICULTY_EATING/SWALLOWING: NO
ADLS_ACUITY_SCORE: 37
ADLS_ACUITY_SCORE: 41
ADLS_ACUITY_SCORE: 51
DOING_ERRANDS_INDEPENDENTLY_DIFFICULTY: NO
ADLS_ACUITY_SCORE: 42
ADLS_ACUITY_SCORE: 51
ADLS_ACUITY_SCORE: 37
ADLS_ACUITY_SCORE: 36
ADLS_ACUITY_SCORE: 41
ADLS_ACUITY_SCORE: 41
ADLS_ACUITY_SCORE: 51
WEAR_GLASSES_OR_BLIND: YES
WALKING_OR_CLIMBING_STAIRS_DIFFICULTY: NO
CONCENTRATING,_REMEMBERING_OR_MAKING_DECISIONS_DIFFICULTY: NO
ADLS_ACUITY_SCORE: 41
ADLS_ACUITY_SCORE: 42
DRESSING/BATHING_DIFFICULTY: NO
ADLS_ACUITY_SCORE: 34
DEPENDENT_IADLS:: INDEPENDENT
ADLS_ACUITY_SCORE: 37
ADLS_ACUITY_SCORE: 51
ADLS_ACUITY_SCORE: 37
ADLS_ACUITY_SCORE: 38
ADLS_ACUITY_SCORE: 37
VISION_MANAGEMENT: GLASSES
ADLS_ACUITY_SCORE: 41
ADLS_ACUITY_SCORE: 41
EQUIPMENT_CURRENTLY_USED_AT_HOME: WALKER, ROLLING
ADLS_ACUITY_SCORE: 51
CHANGE_IN_FUNCTIONAL_STATUS_SINCE_ONSET_OF_CURRENT_ILLNESS/INJURY: NO
FALL_HISTORY_WITHIN_LAST_SIX_MONTHS: NO
TOILETING_ISSUES: NO

## 2025-07-11 NOTE — H&P
[facility]    History and Physical - Hospitalist Service       Date of Admission:  7/10/2025    Assessment & Plan      Preston Fortune is a 89 year old male admitted on 7/10/2025 with weakness, chills, rigors, and fatigue. He has history of hypertension, hyperlipidemia, type 2 diabetes, atrial fibrillation, chronic anticoagulation with Eliquis, chronic kidney disease, peptic ulcer disease, melanoma of left eye, pulmonary hypertension, osteoarthritis, renal stone disease, and superior mesenteric artery stenosis.  He felt well earlier in the day.  He developed weakness, chills, rigors, and fatigue.  He was brought to the ED by his family for evaluation.  He denied focal symptoms of infection such as cough, chest pain, abdominal pain, diarrhea, dysuria, or skin change.  He does have some chronic lower leg erythema that is maybe a little bit worse in warmer than it normally is.    Emergency department evaluation showed temperature 100.4, heart rate in the 100s, respiratory rate 34 with some improvement while in the ED, and elevated blood pressure.  Laboratory evaluation showed BUN 45, creatinine 1.31, glucose 186, lactic acid 1.7, white blood cells 20.3, and unremarkable urine analysis.  CT of chest with PE protocol and abdomen and pelvis showed no PE.  It did show a 12 x 11 mm distal left ureter stent unchanged from prior.  It showed gallstones.  Patient was given vancomycin and Zosyn with concern about sepsis.  I was asked to admit him for further cares.    Problem list:    SIRS versus early sepsis of uncertain etiology  Possible right lower leg cellulitis  Leukocytosis  Tachycardia  -Patient had subjective fever and chills at home.  Temperature was 100.4 here.  He had associated tachycardia, tachypnea, and leukocytosis.  He does have some erythema on his right lower leg which is not necessarily new for him but may be a little bit worse than normal.  Inferior and superior margins were  outlined.  -Patient had a similar hospitalization at [facility] in 2020.  At that time there was concern about possible cholecystitis although he did not have associated abdominal pain.  He did not have cholecystectomy at that time.  -Patient with known large left ureter stone without hydronephrosis or obvious UTI.  -Admit to observation  -Continue Zosyn but not vancomycin  -Monitor right lower extremity and blood cultures  - If patient develops abdominal pain or nausea and vomiting would evaluate gallbladder further with ultrasound and possibly HIDA scan and surgery consult    Hypertension hyperlipidemia  Atrial fibrillation  Chronic anticoagulation with Eliquis  Pulmonary hypertension  -Resume prior to admission amlodipine, Lasix, lisinopril,  pravastatin, and Eliquis    Type 2 diabetes  Chronic kidney disease  -Resume prior to admission metformin  -I have ordered medium resistance NovoLog insulin sliding scale    Cholelithiasis  -No CT findings suggestive of cholecystitis  -No abdominal pain  -Would workup further if patient develops pain, nausea, or vomiting    Peptic ulcer disease  -Does not appear to be on medication for this    Renal stone disease  -Large distal left ureter stone without hydronephrosis or UTI         Observation Goals: -diagnostic tests and consults completed and resulted, -vital signs normal or at patient baseline, Nurse to notify provider when observation goals have been met and patient is ready for discharge.  Diet: Moderate Consistent Carb (60 g CHO per Meal) Diet    DVT Prophylaxis: DOAC  Dominguez Catheter: Not present  Lines: None     Cardiac Monitoring: ACTIVE order. Indication: possible sepsis  Code Status: Full Code      Clinically Significant Risk Factors Present on Admission                # Drug Induced Coagulation Defect: home medication list includes an anticoagulant medication    # Hypertension: Noted on problem list          # DMII: A1C = 7.2 % (Ref range: 0.0 - 5.6 %) within  "past 6 months    # Obesity: Estimated body mass index is 36.44 kg/m  as calculated from the following:    Height as of this encounter: 1.626 m (5' 4\").    Weight as of this encounter: 96.3 kg (212 lb 4.9 oz).              Disposition Plan     Medically Ready for Discharge: Anticipated in 2-4 Days           Jl Freed MD  Hospitalist Service  [facility]  Securely message with [employer] (more info)  Text page via [facility] Paging/Directory     ______________________________________________________________________    Chief Complaint   Weakness, chills, shakes, and fatigue    History is obtained from the patient, his wife, his son, ED provider, and the medical record    History of Present Illness   Preston Fortune is a 89 year old male admitted on 7/10/2025 with weakness, chills, rigors, and fatigue. He has history of hypertension, hyperlipidemia, type 2 diabetes, atrial fibrillation, chronic anticoagulation with Eliquis, chronic kidney disease, peptic ulcer disease, melanoma of left eye, pulmonary hypertension, osteoarthritis, renal stone disease, and superior mesenteric artery stenosis.  He felt well earlier in the day.  He developed weakness, chills, rigors, and fatigue.  He was brought to the ED by his family for evaluation.  He denied focal symptoms of infection such as cough, chest pain, abdominal pain, diarrhea, dysuria, or skin change.  He does have some chronic lower leg erythema that is maybe a little bit worse in warmer than it normally is.    Emergency department evaluation showed temperature 100.4, heart rate in the 100s, respiratory rate 34 with some improvement while in the ED, and elevated blood pressure.  Laboratory evaluation showed BUN 45, creatinine 1.31, glucose 186, lactic acid 1.7, white blood cells 20.3, and unremarkable urine analysis.  CT of chest with PE protocol and abdomen and pelvis showed no PE.  It did show a 12 x 11 mm distal left ureter stent unchanged from " prior.  It showed gallstones.  Patient was given vancomycin and Zosyn with concern about sepsis.  I was asked to admit him for further cares.      Past Medical History    Past Medical History:   Diagnosis Date    Cancer (H) 2000    Left Eye - treated with radiactive substance - vision ok    CKD (chronic kidney disease) stage 2, GFR 60-89 ml/min     Environmental allergies     History of transfusion     Hyperlipidemia LDL goal <70     Hypertension goal BP (blood pressure) < 140/90     Kidney disease     minimal change disease    Kidney stones     Osteoarthritis     Peptic ulceration 2011    see EGD that year    Pulmonary hypertension (H) 03/2025    mild    Type 2 diabetes, HbA1c goal < 7% (H) 2000       Past Surgical History   Past Surgical History:   Procedure Laterality Date    EYE SURGERY  2000    HC KNEE SCOPE, DIAGNOSTIC Left 2008    Description: Arthroscopy Knee Left;  Recorded: 03/17/2008;  Comments: cartilage surgery    OK TYMPANOPLASTY Left 1990    Tympanoplasty    TONSILLECTOMY  1945    [facility] RECONSTR TOTAL SHOULDER IMPLANT Right 1995    Description: Shoulder Arthroplasty Total Shoulder Replacement;  Recorded: 05/06/2008;    [facility] TOTAL KNEE ARTHROPLASTY Left 03/26/2018    Procedure: LEFT TOTAL KNEE ARTHROPLASTY;  Surgeon: Darrell Wu MD;  Location: [facility];  Service: Orthopedics       Prior to Admission Medications   Prior to Admission Medications   Prescriptions Last Dose Informant Patient Reported? Taking?   ELIQUIS ANTICOAGULANT 5 MG tablet 7/10/2025 Morning  No Yes   Sig: TAKE 1 TABLET BY MOUTH TWICE A DAY   Multiple Vitamins-Minerals (PRESERVISION AREDS 2 PO) 7/10/2025 Morning  Yes Yes   Sig: Take 1 tablet by mouth 2 times daily.   ONETOUCH ULTRA test strip   No No   Sig: USE TO TEST ONCE DAILY   acetaminophen (TYLENOL) 500 MG tablet Unknown  Yes Yes   Sig: [ACETAMINOPHEN (TYLENOL) 500 MG TABLET] Take 500 mg by mouth every 6 (six) hours as needed for pain.   amLODIPine (NORVASC) 10 MG  tablet 7/10/2025 Morning  No Yes   Sig: Take 0.5 tablets (5 mg) by mouth daily.   cetirizine (ZYRTEC) 10 mg cap Unknown  Yes Yes   Sig: [CETIRIZINE (ZYRTEC) 10 MG CAP] Take 10 mg by mouth daily as needed.    cholecalciferol, vitamin D3, 50 mcg (2,000 unit) Tab 7/10/2025 Morning  Yes Yes   Sig: [CHOLECALCIFEROL, VITAMIN D3, 50 MCG (2,000 UNIT) TAB] Take 2,000 Units by mouth daily.   ferrous sulfate (FE TABS) 325 (65 Fe) MG EC tablet 7/9/2025  Yes Yes   Sig: Take 325 mg by mouth every other day.   furosemide (LASIX) 20 MG tablet 7/10/2025 Morning  No Yes   Sig: TAKE 1 TABLET BY MOUTH EVERY DAY   generic lancets   No No   Sig: [GENERIC LANCETS] Use 1 each As Directed daily. Dispense brand per patient's insurance at pharmacy discretion.(E11.9)   lisinopril (ZESTRIL) 5 MG tablet 7/10/2025 Morning  No Yes   Sig: Take 1 tablet (5 mg) by mouth daily.   metFORMIN (GLUCOPHAGE XR) 500 MG 24 hr tablet 7/9/2025 Evening  No Yes   Sig: Take 1 tablet (500 mg) by mouth daily (with dinner).   pravastatin (PRAVACHOL) 40 MG tablet 7/9/2025 Bedtime  No Yes   Sig: TAKE 1 TABLET BY MOUTH EVERYDAY AT BEDTIME   psyllium (METAMUCIL/KONSYL) capsule 7/10/2025 Morning  Yes Yes   Sig: Take 2 capsules by mouth 3 times daily.      Facility-Administered Medications: None        Review of Systems    The 10 point Review of Systems is negative other than noted in the HPI or here.     Social History   I have reviewed this patient's social history and updated it with pertinent information if needed.  Social History     Tobacco Use    Smoking status: Former     Types: Cigarettes    Smokeless tobacco: Former     Quit date: 12/8/1967    Tobacco comments:     Quit at age 28, smoked 1 PPD x 17 yrs     Quit chew at age 51, only months   Vaping Use    Vaping status: Never Used   Substance Use Topics    Alcohol use: Not Currently     Alcohol/week: 0.0 - 2.0 standard drinks of alcohol     Comment: rare    Drug use: No         Family History   I have reviewed  this patient's family history and updated it with pertinent information if needed.  Family History   Problem Relation Age of Onset    Diabetes Father     Diabetes Sister          Allergies   No Known Allergies     Physical Exam   Vital Signs: Temp: 100.1  F (37.8  C) Temp src: Oral BP: 118/66 Pulse: 82   Resp: 28 SpO2: (!) 91 % O2 Device: None (Room air)    Weight: 212 lbs 4.85 oz    GENERAL: Pleasant and cooperative. No acute distress.  EYES: Pupils equal and round. No scleral erythema or icterus.  ENT: External ears are normal without deformity. Posterior oropharynx is without erythem, swelling, or exudate.  NECK: Supple. No masses or swelling. No tenderness. Thyroid is normal without mass or tenderness.  CHEST: Clear to auscultation. Normal breath sounds. No retractions.   CV: Regular rate and rhythm. No JVD. Pulses normal.  ABDOMEN: Bowel sounds present. No tenderness. No masses or hernia.  EXTREMETIES: No clubbing, cyanosis, or ischemia.  Right lower leg does have some anterior and medial erythema and warmth.  This is a chronic finding but may be a little bit worse than baseline.  It was outlined.  SKIN: Warm and dry to touch. No wounds or rashes.  NEUROLOGIC: Strength and sensation are normal. Deep tendon reflexes are normal. Cranial nerves are normal.      Medical Decision Making       65 MINUTES SPENT BY ME on the date of service doing chart review, history, exam, documentation & further activities per the note.      Data     I have personally reviewed the following data over the past 24 hrs:    20.3 (H)  \   13.3   / 224     137 99 45.0 (H) /  131 (H)   4.9 23 1.31 (H) \     ALT: 12 AST: 23 AP: 99 TBILI: 0.6   ALB: 4.6 TOT PROTEIN: 7.5 LIPASE: N/A     Procal: N/A CRP: N/A Lactic Acid: 1.7         Imaging results reviewed over the past 24 hrs:   Recent Results (from the past 24 hours)   CT Chest (PE) Abdomen Pelvis w Contrast    Narrative    EXAM: CT CHEST PE ABDOMEN PELVIS W CONTRAST  LOCATION: [facility]  DATE: 07/10/2025    INDICATION: Fever, tachcyardia, hypoxia; evaluate source of sepsis vs PE.  COMPARISON: 11/26/2020 CT abdomen and pelvis.  TECHNIQUE: CT chest pulmonary angiogram and routine CT abdomen and pelvis with IV contrast. Arterial phase through the chest and venous phase through the abdomen and pelvis. Multiplanar reformats and MIP reconstructions were performed. Dose reduction   techniques were used.   CONTRAST: 100 mL Isovue 370.    FINDINGS:  ANGIOGRAM CHEST: Pulmonary arteries are normal caliber and negative for pulmonary emboli. Thoracic aorta is negative for dissection. No CT evidence of right heart strain.     LUNGS AND PLEURA: Bronchiolar wall thickening compatible with bronchiolitis. Mild mosaic attenuation can be seen with air trapping. Mild atelectatic changes in the lung bases. Very minimal interstitial change in the lung bases.    MEDIASTINUM/AXILLAE: Normal.    CORONARY ARTERY CALCIFICATION: Severe.    HEPATOBILIARY: Calcified gallstone.    PANCREAS: Normal.    SPLEEN: Normal.    ADRENAL GLANDS: Normal.    KIDNEYS/BLADDER: A few cysts in the kidneys. No followup needed. There is a large 12 x 11 mm stone within the distal left ureter without hydronephrosis. This is unchanged.    BOWEL: Duodenal diverticulum. Normal appendix.    LYMPH NODES: Normal.    VASCULATURE: Normal.    PELVIC ORGANS: Prostatic enlargement.    MUSCULOSKELETAL: Lumbar degenerative change.      Impression    IMPRESSION:  1.  No pulmonary embolism.  2.  Bronchiolar wall thickening compatible with bronchiolitis. Mild mosaic attenuation can be seen with air trapping.  3.  Very minimal interstitial change in the lung bases.  4.  Cholelithiasis.  5.  Large 12 x 11 mm stone in the distal left ureter without hydronephrosis. This is unchanged.  6.  Prostatic enlargement.     Recent Labs   Lab 07/11/25  0109 07/10/25  1853   WBC  --  20.3*   HGB  --  13.3   MCV  --  90   PLT  --  224   NA  --  137    POTASSIUM  --  4.9   CHLORIDE  --  99   CO2  --  23   BUN  --  45.0*   CR  --  1.31*   ANIONGAP  --  15   ANG  --  9.5   * 186*   ALBUMIN  --  4.6   PROTTOTAL  --  7.5   BILITOTAL  --  0.6   ALKPHOS  --  99   ALT  --  12   AST  --  23

## 2025-07-11 NOTE — CONSULTS
Care Management Initial Consult    General Information  Assessment completed with: Patient,    Type of CM/SW Visit: Denies Needs at Discharge (elevated risk for unplanned readmission score)    Primary Care Provider verified and updated as needed:     Readmission within the last 30 days: no previous admission in last 30 days      Reason for Consult: utilization management concerns  Advance Care Planning: Advance Care Planning Reviewed: no concerns identified (living will on file)          Communication Assessment  Patient's communication style: spoken language (English or Bilingual)    Hearing Difficulty or Deaf: yes        Cognitive  Cognitive/Neuro/Behavioral: WDL                      Living Environment:   People in home: child(raul), adult, spouse     Current living Arrangements: town home      Able to return to prior arrangements: yes       Family/Social Support:  Care provided by: self  Provides care for: no one  Marital Status:   Support system: Wife  Sharon       Description of Support System: Supportive, Involved         Current Resources:   Patient receiving home care services: No        Community Resources:    Equipment currently used at home: none (patient has a walker and a cane at home if needed. He does not currently use any equipment.)  Supplies currently used at home: None    Employment/Financial:  Employment Status: retired        Financial Concerns: none           Does the patient's insurance plan have a 3 day qualifying hospital stay waiver?  Yes     Which insurance plan 3 day waiver is available? Alternative insurance waiver    Will the waiver be used for post-acute placement? No    Lifestyle & Psychosocial Needs:  Social Drivers of Health     Food Insecurity: Low Risk  (12/16/2024)    Food Insecurity     Within the past 12 months, did you worry that your food would run out before you got money to buy more?: No     Within the past 12 months, did the food you bought just not last and you  didn t have money to get more?: No   Depression: Not at risk (6/17/2025)    PHQ-2     PHQ-2 Score: 0   Housing Stability: Low Risk  (12/16/2024)    Housing Stability     Do you have housing? : Yes     Are you worried about losing your housing?: No   Tobacco Use: Medium Risk (6/17/2025)    Patient History     Smoking Tobacco Use: Former     Smokeless Tobacco Use: Former     Passive Exposure: Not on file   Financial Resource Strain: Low Risk  (12/16/2024)    Financial Resource Strain     Within the past 12 months, have you or your family members you live with been unable to get utilities (heat, electricity) when it was really needed?: No   Alcohol Use: Not on file   Transportation Needs: Low Risk  (12/16/2024)    Transportation Needs     Within the past 12 months, has lack of transportation kept you from medical appointments, getting your medicines, non-medical meetings or appointments, work, or from getting things that you need?: No   Physical Activity: Sufficiently Active (12/16/2024)    Exercise Vital Sign     Days of Exercise per Week: 7 days     Minutes of Exercise per Session: 60 min   Interpersonal Safety: Low Risk  (12/16/2024)    Interpersonal Safety     Do you feel physically and emotionally safe where you currently live?: Yes     Within the past 12 months, have you been hit, slapped, kicked or otherwise physically hurt by someone?: No     Within the past 12 months, have you been humiliated or emotionally abused in other ways by your partner or ex-partner?: No   Stress: No Stress Concern Present (12/16/2024)    [employer] - Occupational Stress Questionnaire     Feeling of Stress : Not at all   Social Connections: Unknown (12/16/2024)    Social Connection and Isolation Panel [NHANES]     Frequency of Communication with Friends and Family: Not on file     Frequency of Social Gatherings with Friends and Family: Twice a week     Attends Confucianist Services: Not on file     Active  Member of Clubs or Organizations: Not on file     Attends Club or Organization Meetings: Not on file     Marital Status: Not on file   Health Literacy: Not on file       Functional Status:  Prior to admission patient needed assistance:   Dependent ADLs:: Independent  Dependent IADLs:: Independent  Assesssment of Functional Status: At functional baseline    Mental Health Status:  Mental Health Status: No Current Concerns       Chemical Dependency Status:  Chemical Dependency Status: No Current Concerns             Values/Beliefs:  Spiritual, Cultural Beliefs, Church Practices, Values that affect care:     None          Additional Information:  CM met with patient at bedside. No needs identified. Patient hopeful to discharge home tomorrow.     Next Steps: No needs identified. Signing off. Please re consult CM if a need arises.     Haylie Krause RN BSN CM   Inpatient Care Management   [facility]

## 2025-07-11 NOTE — PHARMACY-VANCOMYCIN DOSING SERVICE
Pharmacy Vancomycin Initial Note  Date of Service 2025  Patient's  1935  89 year old, male    Indication: Bacteremia    Current estimated CrCl = Estimated Creatinine Clearance: 40 mL/min (A) (based on SCr of 1.31 mg/dL (H)).    Creatinine for last 3 days  7/10/2025:  6:53 PM Creatinine 1.31 mg/dL  2025:  5:53 AM Creatinine 1.31 mg/dL    Recent Vancomycin Level(s) for last 3 days  No results found for requested labs within last 3 days.      Vancomycin IV Administrations (past 72 hours)                     vancomycin (VANCOCIN) 2,000 mg in 0.9% NaCl 500 mL intermittent infusion (mg) 2,000 mg New Bag 07/10/25 2308                    Nephrotoxins and other renal medications (From now, onward)      Start     Dose/Rate Route Frequency Ordered Stop    25 1100  vancomycin (VANCOCIN) 1,250 mg in 0.9% NaCl 250 mL intermittent infusion         1,250 mg  over 90 Minutes Intravenous EVERY 24 HOURS 25 0649      25 0800  furosemide (LASIX) tablet 20 mg        Note to Pharmacy: PTA Sig:TAKE 1 TABLET BY MOUTH EVERY DAY      20 mg Oral DAILY 25 0100      25 0800  lisinopril (ZESTRIL) tablet 5 mg        Note to Pharmacy: PTA Sig:Take 1 tablet (5 mg) by mouth daily.      5 mg Oral DAILY 25 0100      25 0400  piperacillin-tazobactam (ZOSYN) 3.375 g vial to attach to  mL bag         3.375 g  over 30 Minutes Intravenous EVERY 6 HOURS 25 010              Contrast Orders - past 72 hours (72h ago, onward)      Start     Dose/Rate Route Frequency Stop    07/10/25 2045  iopamidol (ISOVUE-370) solution 500 mL         500 mL Intravenous ONCE 07/10/25 2043            [employer] Prediction of Planned Initial Vancomycin Regimen  Loading dose: 2000 mg  Regimen: 1250 mg IV every 24 hours.  Regimen Start time: 11:00 on 2025  Exposure target: AUC24 (range) 400-600 mg/L.hr   AUC24,ss: 538 mg/L.hr  Probability of AUC24 > 400: 79 %  Ctrough,ss: 17.3 mg/L  Probability of  Ctrough,ss > 20: 38 %  Probability of nephrotoxicity (Lodise ALEXANDRO 2009): 13 %        Plan:  Start vancomycin 1250 mg IV q24h  after loading dose.    Vancomycin monitoring method: AUC  Vancomycin therapeutic monitoring goal: 400-600 mg*h/L  Pharmacy will check vancomycin levels as appropriate in 1-3 Days.    Serum creatinine levels will be ordered daily for the first week of therapy and at least twice weekly for subsequent weeks.      Ramin Ferrer RPh

## 2025-07-11 NOTE — PLAN OF CARE
"Pt denies pain. Redness on RLE remains within drawn boundaries. SBA with walker GB. Eating and drinking. AxOx4. BC + strep, gram positive cocci. Awaiting further BC data. Pt's wife stated Pt was having rigors. Pt's temperature remained WDL.       Goal Outcome Evaluation:      Plan of Care Reviewed With: patient    Overall Patient Progress: no changeOverall Patient Progress: no change    Outcome Evaluation: SBA. AxOx4.          Problem: Adult Inpatient Plan of Care  Goal: Plan of Care Review  Description: The Plan of Care Review/Shift note should be completed every shift.  The Outcome Evaluation is a brief statement about your assessment that the patient is improving, declining, or no change.  This information will be displayed automatically on your shift  note.  Outcome: Not Progressing  Flowsheets (Taken 7/11/2025 1849)  Outcome Evaluation: SBA. AxOx4.  Plan of Care Reviewed With: patient  Overall Patient Progress: no change  Goal: Patient-Specific Goal (Individualized)  Description: You can add care plan individualizations to a care plan. Examples of Individualization might be:  \"Parent requests to be called daily at 9am for status\", \"I have a hard time hearing out of my right ear\", or \"Do not touch me to wake me up as it startles  me\".  Outcome: Not Progressing  Goal: Absence of Hospital-Acquired Illness or Injury  Outcome: Not Progressing  Intervention: Prevent Skin Injury  Recent Flowsheet Documentation  Taken 7/11/2025 0815 by Zeina Talbert, RN  Body Position: position changed independently  Goal: Optimal Comfort and Wellbeing  Outcome: Not Progressing  Goal: Readiness for Transition of Care  Outcome: Not Progressing  Intervention: Mutually Develop Transition Plan  Recent Flowsheet Documentation  Taken 7/11/2025 1600 by Zeina Talbert, RN  Equipment Currently Used at Home: walker, rolling     Problem: Delirium  Goal: Optimal Coping  Outcome: Not Progressing  Goal: Improved Behavioral Control  Outcome: " Not Progressing  Goal: Improved Attention and Thought Clarity  Outcome: Not Progressing  Goal: Improved Sleep  Outcome: Not Progressing

## 2025-07-11 NOTE — ED NOTES
[facility]  ED Nurse Handoff Report    ED Chief complaint: Generalized Weakness  . ED Diagnosis:   Final diagnoses:   None       Allergies: No Known Allergies    Code Status:     Activity level - Baseline/Home:  walker.  Activity Level - Current:   assist of 2.   Lift room needed: No.   Bariatric: No   Needed: No   Isolation: No.   Infection: Not Applicable.     Respiratory status: Room air    Vital Signs (within 30 minutes):   Vitals:    07/10/25 2100 07/10/25 2114 07/10/25 2115 07/10/25 2117   BP: 137/71  138/78    Pulse: 92  94 90   Resp:    25   Temp:  100.1  F (37.8  C)     TempSrc:  Oral     SpO2: (!) 91%      Weight:           Cardiac Rhythm:  ,      Pain level:    Patient confused: No.   Patient Falls Risk: nonskid shoes/slippers when out of bed, patient and family education, and activity supervised.   Elimination Status: Has voided     Patient Report - Initial Complaint: weakness.   Focused Assessment: Pt  is a 89 year old male with a history of CKD, T2DM and cancer presenting with generalized weakness. This afternoon, Isaiah endorses feeling chills, fatigue, and shaky. He reports always having a runny nose. Denies cough, urination, or pain anywhere.         Abnormal Results:   Labs Ordered and Resulted from Time of ED Arrival to Time of ED Departure   COMPREHENSIVE METABOLIC PANEL - Abnormal       Result Value    Sodium 137      Potassium 4.9      Carbon Dioxide (CO2) 23      Anion Gap 15      Urea Nitrogen 45.0 (*)     Creatinine 1.31 (*)     GFR Estimate 52 (*)     Calcium 9.5      Chloride 99      Glucose 186 (*)     Alkaline Phosphatase 99      AST 23      ALT 12      Protein Total 7.5      Albumin 4.6      Bilirubin Total 0.6     ROUTINE UA WITH MICROSCOPIC REFLEX TO CULTURE - Abnormal    Color Urine Light Yellow      Appearance Urine Clear      Glucose Urine Negative      Bilirubin Urine Negative      Ketones Urine Negative      Specific Gravity Urine 1.017      Blood  Urine Negative      pH Urine 5.0      Protein Albumin Urine 20 (*)     Urobilinogen Urine Normal      Nitrite Urine Negative      Leukocyte Esterase Urine Negative      Mucus Urine Present (*)     RBC Urine 0      WBC Urine 3     CBC WITH PLATELETS AND DIFFERENTIAL - Abnormal    WBC Count 20.3 (*)     RBC Count 4.61      Hemoglobin 13.3      Hematocrit 41.4      MCV 90      MCH 28.9      MCHC 32.1      RDW 13.9      Platelet Count 224      % Neutrophils 90      % Lymphocytes 5      % Monocytes 4      % Eosinophils 1      % Basophils 0      % Immature Granulocytes 1      NRBCs per 100 WBC 0      Absolute Neutrophils 18.2 (*)     Absolute Lymphocytes 0.9      Absolute Monocytes 0.9      Absolute Eosinophils 0.1      Absolute Basophils 0.1      Absolute Immature Granulocytes 0.2      Absolute NRBCs 0.0     LACTIC ACID WHOLE BLOOD WITH 1X REPEAT IN 2 HR WHEN >2 - Normal    Lactic Acid, Initial 1.7     BLOOD CULTURE   BLOOD CULTURE        CT Chest (PE) Abdomen Pelvis w Contrast   Final Result   IMPRESSION:   1.  No pulmonary embolism.   2.  Bronchiolar wall thickening compatible with bronchiolitis. Mild mosaic attenuation can be seen with air trapping.   3.  Very minimal interstitial change in the lung bases.   4.  Cholelithiasis.   5.  Large 12 x 11 mm stone in the distal left ureter without hydronephrosis. This is unchanged.   6.  Prostatic enlargement.          Treatments provided: see MAR, notes, and flowsheets  Family Comments: n/a  OBS brochure/video discussed/provided to patient:  No  ED Medications:   Medications   piperacillin-tazobactam (ZOSYN) 4.5 g vial to attach to  mL bag (4.5 g Intravenous $New Bag 7/10/25 2247)   vancomycin (VANCOCIN) 2,000 mg in 0.9% NaCl 500 mL intermittent infusion (has no administration in time range)   acetaminophen (TYLENOL) tablet 1,000 mg (1,000 mg Oral $Given 7/10/25 1902)   sodium chloride 0.9% BOLUS 1,000 mL (0 mLs Intravenous Stopped 7/10/25 2113)   sodium chloride (PF)  0.9% PF flush 100 mL (99 mLs Intravenous $Given 7/10/25 2043)   iopamidol (ISOVUE-370) solution 500 mL (100 mLs Intravenous $Given 7/10/25 2043)       Drips infusing:  Yes  For the majority of the shift this patient was Green.   Interventions performed were n/a.    Sepsis treatment initiated: No    Cares/treatment/interventions/medications to be completed following ED care: follow orders    ED Nurse Name: Tri Rivera RN  11:02 PM

## 2025-07-11 NOTE — CONSULTS
[facility]    Infectious Disease Consultation     Date of Admission:  7/10/2025  Date of Consult (When I saw the patient): 07/11/25    Assessment & Plan   Preston Fortune is a 89 year old male who was admitted on 7/10/2025.     Impression:  89 yo admitted with chills and rigors acute onset   PMH of hypertension, hyperlipidemia, type 2 diabetes, atrial fibrillation, chronic anticoagulation with Eliquis, chronic kidney disease, peptic ulcer disease, melanoma of left eye, pulmonary hypertension, osteoarthritis, renal stone disease, and superior mesenteric artery stenosis.   Blood culture 1//2 but 2 sts with strep species   Patient on vanco and zosyn   Slight erythema in the right shin with slight warmth, ? Early cellulitis     Recommendations   Stop vanco and zosyn   Start on ceftriaxone   Follow up on the full culture ID and susceptibilities   Follow up on the repeat blood culture       Willis Odonnell MD    Reason for Consult   Reason for consult: I was asked to evaluate this patient for bacteremia.    Primary Care Physician   Cornell Tomlinson    Chief Complaint   Fever, chills and rigors     History is obtained from the patient and medical records    History of Present Illness   Preston Fortune is a 89 year old male who presents with feevr and chills symptoms started acutely     Past Medical History   I have reviewed this patient's medical history and updated it with pertinent information if needed.   Past Medical History:   Diagnosis Date    Cancer (H) 2000    Left Eye - treated with radiactive substance - vision ok    CKD (chronic kidney disease) stage 2, GFR 60-89 ml/min     Environmental allergies     History of transfusion     Hyperlipidemia LDL goal <70     Hypertension goal BP (blood pressure) < 140/90     Kidney disease     minimal change disease    Kidney stones     Osteoarthritis     Peptic ulceration 2011    see EGD that year    Pulmonary hypertension (H) 03/2025    mild    Type 2 diabetes,  HbA1c goal < 7% (H) 2000       Past Surgical History   I have reviewed this patient's surgical history and updated it with pertinent information if needed.  Past Surgical History:   Procedure Laterality Date    EYE SURGERY  2000    HC KNEE SCOPE, DIAGNOSTIC Left 2008    Description: Arthroscopy Knee Left;  Recorded: 03/17/2008;  Comments: cartilage surgery    [place] TYMPANOPLASTY Left 1990    Tympanoplasty    TONSILLECTOMY  1945    ZZC RECONSTR TOTAL SHOULDER IMPLANT Right 1995    Description: Shoulder Arthroplasty Total Shoulder Replacement;  Recorded: 05/06/2008;    ZZC TOTAL KNEE ARTHROPLASTY Left 03/26/2018    Procedure: LEFT TOTAL KNEE ARTHROPLASTY;  Surgeon: Darrell Wu MD;  Location: [facility];  Service: Orthopedics       Prior to Admission Medications   Prior to Admission Medications   Prescriptions Last Dose Informant Patient Reported? Taking?   ELIQUIS ANTICOAGULANT 5 MG tablet 7/10/2025 Morning  No Yes   Sig: TAKE 1 TABLET BY MOUTH TWICE A DAY   Multiple Vitamins-Minerals (PRESERVISION AREDS 2 PO) 7/10/2025 Morning  Yes Yes   Sig: Take 1 tablet by mouth 2 times daily.   ONETOUCH ULTRA test strip   No No   Sig: USE TO TEST ONCE DAILY   acetaminophen (TYLENOL) 500 MG tablet Unknown  Yes Yes   Sig: [ACETAMINOPHEN (TYLENOL) 500 MG TABLET] Take 500 mg by mouth every 6 (six) hours as needed for pain.   amLODIPine (NORVASC) 10 MG tablet 7/10/2025 Morning  No Yes   Sig: Take 0.5 tablets (5 mg) by mouth daily.   cetirizine (ZYRTEC) 10 mg cap Unknown  Yes Yes   Sig: [CETIRIZINE (ZYRTEC) 10 MG CAP] Take 10 mg by mouth daily as needed.    cholecalciferol, vitamin D3, 50 mcg (2,000 unit) Tab 7/10/2025 Morning  Yes Yes   Sig: [CHOLECALCIFEROL, VITAMIN D3, 50 MCG (2,000 UNIT) TAB] Take 2,000 Units by mouth daily.   ferrous sulfate (FE TABS) 325 (65 Fe) MG EC tablet 7/9/2025  Yes Yes   Sig: Take 325 mg by mouth every other day.   furosemide (LASIX) 20 MG tablet 7/10/2025 Morning  No Yes   Sig: TAKE 1 TABLET BY  MOUTH EVERY DAY   generic lancets   No No   Sig: [GENERIC LANCETS] Use 1 each As Directed daily. Dispense brand per patient's insurance at pharmacy discretion.(E11.9)   lisinopril (ZESTRIL) 5 MG tablet 7/10/2025 Morning  No Yes   Sig: Take 1 tablet (5 mg) by mouth daily.   metFORMIN (GLUCOPHAGE XR) 500 MG 24 hr tablet 7/9/2025 Evening  No Yes   Sig: Take 1 tablet (500 mg) by mouth daily (with dinner).   pravastatin (PRAVACHOL) 40 MG tablet 7/9/2025 Bedtime  No Yes   Sig: TAKE 1 TABLET BY MOUTH EVERYDAY AT BEDTIME   psyllium (METAMUCIL/KONSYL) capsule 7/10/2025 Morning  Yes Yes   Sig: Take 2 capsules by mouth 3 times daily.      Facility-Administered Medications: None     Allergies   No Known Allergies    Immunization History   Immunization History   Administered Date(s) Administered    COVID-19 12+ (Pfizer) 10/10/2023, 10/14/2024    COVID-19 Bivalent 12+ (Pfizer) 09/27/2022    COVID-19 MONOVALENT 12+ (Pfizer) 02/11/2021, 03/04/2021, 10/18/2021    COVID-19 Monovalent 12+ (Pfizer 2022) 05/31/2022    DT (PEDS <7y) 10/29/1990, 11/20/2002    Flu, Unspecified 11/08/2007, 10/02/2008, 10/15/2009, 10/04/2010, 10/13/2011, 09/24/2012, 10/06/2020    HepA, Unspecified 09/03/2008, 07/06/2009    Influenza (High Dose) Trivalent,PF (Fluzone) 11/02/2016, 11/01/2017, 10/31/2018, 11/01/2019, 10/14/2024    Influenza Vaccine 65+ (FLUAD) 10/15/2022, 10/10/2023    Influenza Vaccine 65+ (Fluzone HD) 09/29/2021    Influenza Vaccine >6 months,quad, PF 10/12/2015    Pneumo Conj 13-V (2010&after) 10/12/2015    Pneumococcal 23 valent 11/17/1999, 09/03/2008, 05/18/2021    RSV Vaccine (Arexvy) 10/10/2023    TD,PF 7+ (Tenivac) 06/27/2022    TDAP (Adacel,Boostrix) 06/12/2012    Td,adult,historic,unspecified 11/20/2002    Zoster recombinant adjuvanted (Shingrix) 04/24/2019, 07/12/2019    Zoster vaccine, live 11/11/2008       Social History   I have reviewed this patient's social history and updated it with pertinent information if needed. Preston BROWN  "Tong  reports that he has quit smoking. His smoking use included cigarettes. He quit smokeless tobacco use about 57 years ago. He reports that he does not currently use alcohol. He reports that he does not use drugs.    Family History   I have reviewed this patient's family history and updated it with pertinent information if needed.   Family History   Problem Relation Age of Onset    Diabetes Father     Diabetes Sister        Review of Systems   The 10 point Review of Systems is negative    Physical Exam   Temp: 98.7  F (37.1  C) Temp src: Oral BP: 138/80 Pulse: 90   Resp: 16 SpO2: 95 % O2 Device: None (Room air)    Vital Signs with Ranges  Temp:  [98.7  F (37.1  C)-100.4  F (38  C)] 98.7  F (37.1  C)  Pulse:  [] 90  Resp:  [10-40] 16  BP: (116-192)/() 138/80  SpO2:  [90 %-95 %] 95 %  212 lbs 4.85 oz  Body mass index is 36.44 kg/m .    GENERAL APPEARANCE:  awake  EYES: Eyes grossly normal to inspection  NECK: no adenopathy  RESP: lungs clear   CV: regular rates and rhythm  LYMPHATICS: normal ant/post cervical and supraclavicular nodes  ABDOMEN: soft, nontender  MS: extremities normal  SKIN: no suspicious lesions or rashes        Data   All laboratory and imaging data in the past 24 hours reviewed  No results for input(s): \"CULT\" in the last 168 hours.  No lab results found.    Invalid input(s): \"UC\"       All cultures:  Recent Labs   Lab 07/10/25  1941 07/10/25  1859   CULTURE Positive on the 1st day of incubation*  Gram positive cocci in pairs and chains* Positive on the 1st day of incubation*  Gram positive cocci in pairs and chains*      Blood culture:  Results for orders placed or performed during the hospital encounter of 07/10/25   Blood Culture Peripheral blood (BC) Hand, Left    Collection Time: 07/10/25  7:41 PM    Specimen: Hand, Left; Peripheral blood (BC)   Result Value Ref Range    Culture Positive on the 1st day of incubation (A)     Culture Gram positive cocci in pairs and chains (AA)  "   Blood Culture Peripheral blood (BC) Arm, Right    Collection Time: 07/10/25  6:59 PM    Specimen: Arm, Right; Peripheral blood (BC)   Result Value Ref Range    Culture Positive on the 1st day of incubation (A)     Culture Gram positive cocci in pairs and chains (AA)       Urine culture:  No results found for this or any previous visit.

## 2025-07-11 NOTE — PLAN OF CARE
"PRIMARY DIAGNOSIS: Chills/Fatigue/Weakness  OUTPATIENT/OBSERVATION GOALS TO BE MET BEFORE DISCHARGE:  1. Pain Status: Improved-controlled with oral pain medications.    2. Return to near baseline physical activity: No    3. Cleared for discharge by consultants (if involved): No    Discharge Planner Nurse   Safe discharge environment identified: Yes  Barriers to discharge: Yes       /56 (BP Location: Right arm)   Pulse 81   Temp 99.1  F (37.3  C) (Oral)   Resp 16   Ht 1.626 m (5' 4\")   Wt 96.3 kg (212 lb 4.9 oz)   SpO2 95%   BMI 36.44 kg/m     Patient is Alert and Oriented x4. They are 1 Assist with Gait Belt and Walker.  Pt is a Diabetic diet.  They are denying pain.  Patient is Saline locked. Abx zosyn and vanco. Monitor for abd pain/N/V. Pt had fever/chills/fatigue in ED. Tylenol given. Pt denoes SOB/chest pain. Lactic was 1.7. Monitor for fevers/chills. Tele: afib CVR 77. BC gram positive in pairs and chains- Provider aware.     Please review provider order for any additional goals.   Nurse to notify provider when observation goals have been met and patient is ready for discharge.Goal Outcome Evaluation:      Plan of Care Reviewed With: patient    Overall Patient Progress: improvingOverall Patient Progress: improving    Outcome Evaluation: A&Ox4, Ax1 to commode. WBC 20.3, BC pending, Possible Cellulitis of RLE. abx, BS checks          "

## 2025-07-11 NOTE — ED NOTES
[facility]  ED Nurse Handoff Report    ED Chief complaint: Generalized Weakness  . ED Diagnosis:   Final diagnoses:   None       Allergies: No Known Allergies    Code Status: Full Code    Activity level - Baseline/Home:  independent.  Activity Level - Current:   assist of 2.   Lift room needed: Yes  Bariatric: Yes   Needed: No   Isolation: No.   Infection: Not Applicable.     Respiratory status: Room air    Vital Signs (within 30 minutes):   Vitals:    07/10/25 1857 07/10/25 1900 07/10/25 1907 07/10/25 1917   BP: (!) 192/95   (!) 184/106   Pulse: 108 109 112 106   Resp: (!) 34 (!) 34 15 10   Temp:       TempSrc:       SpO2: 93% 93% (!) 91% 93%   Weight:           Cardiac Rhythm:  ,      Pain level:    Patient confused: Yes.   Patient Falls Risk: patient and family education.   Elimination Status: needs to vpoid     Patient Report - Initial Complaint: Chills, fever.   Focused Assessment: Arrives in AFIB     Abnormal Results:   Labs Ordered and Resulted from Time of ED Arrival to Time of ED Departure   CBC WITH PLATELETS AND DIFFERENTIAL - Abnormal       Result Value    WBC Count 20.3 (*)     RBC Count 4.61      Hemoglobin 13.3      Hematocrit 41.4      MCV 90      MCH 28.9      MCHC 32.1      RDW 13.9      Platelet Count 224      % Neutrophils 90      % Lymphocytes 5      % Monocytes 4      % Eosinophils 1      % Basophils 0      % Immature Granulocytes 1      NRBCs per 100 WBC 0      Absolute Neutrophils 18.2 (*)     Absolute Lymphocytes 0.9      Absolute Monocytes 0.9      Absolute Eosinophils 0.1      Absolute Basophils 0.1      Absolute Immature Granulocytes 0.2      Absolute NRBCs 0.0     LACTIC ACID WHOLE BLOOD WITH 1X REPEAT IN 2 HR WHEN >2 - Normal    Lactic Acid, Initial 1.7     COMPREHENSIVE METABOLIC PANEL   ROUTINE UA WITH MICROSCOPIC REFLEX TO CULTURE   BLOOD CULTURE   BLOOD CULTURE        No orders to display       Treatments provided: IV fluids, tylenol  Family  Comments: Wife and son at bedside  OBS brochure/video discussed/provided to patient:  Yes  ED Medications:   Medications   sodium chloride 0.9% BOLUS 1,000 mL (1,000 mLs Intravenous $New Bag 7/10/25 1903)   acetaminophen (TYLENOL) tablet 1,000 mg (1,000 mg Oral $Given 7/10/25 1902)       Drips infusing:  No  For the majority of the shift this patient was Green.   Interventions performed were NA.    Sepsis treatment initiated: No    Cares/treatment/interventions/medications to be completed following ED care: See admission orders    ED Nurse Name: Domitila Maddox RN  7:18 PM

## 2025-07-11 NOTE — PHARMACY-ADMISSION MEDICATION HISTORY
Pharmacy Intern Admission Medication History    Admission medication history is complete. The information provided in this note is only as accurate as the sources available at the time of the update.    Information Source(s): Patient and CareEverywhere/SureScripts via in-person    Pertinent Information: None    Changes made to PTA medication list:  Added: None  Deleted: None  Changed: Preservision 2 tablets PO -> 1 tablet BID, Psyllium Capsule 3 capsules TID -> 2 capsules TID    Allergies reviewed with patient and updates made in EHR: yes    Medication History Completed By: Justino Kaur 7/10/2025 8:01 PM    PTA Med List   Medication Sig Last Dose/Taking    acetaminophen (TYLENOL) 500 MG tablet [ACETAMINOPHEN (TYLENOL) 500 MG TABLET] Take 500 mg by mouth every 6 (six) hours as needed for pain. Unknown    amLODIPine (NORVASC) 10 MG tablet Take 0.5 tablets (5 mg) by mouth daily. 7/10/2025 Morning    cetirizine (ZYRTEC) 10 mg cap [CETIRIZINE (ZYRTEC) 10 MG CAP] Take 10 mg by mouth daily as needed.  Unknown    cholecalciferol, vitamin D3, 50 mcg (2,000 unit) Tab [CHOLECALCIFEROL, VITAMIN D3, 50 MCG (2,000 UNIT) TAB] Take 2,000 Units by mouth daily. 7/10/2025 Morning    ELIQUIS ANTICOAGULANT 5 MG tablet TAKE 1 TABLET BY MOUTH TWICE A DAY 7/10/2025 Morning    ferrous sulfate (FE TABS) 325 (65 Fe) MG EC tablet Take 325 mg by mouth every other day. 7/9/2025    furosemide (LASIX) 20 MG tablet TAKE 1 TABLET BY MOUTH EVERY DAY 7/10/2025 Morning    lisinopril (ZESTRIL) 5 MG tablet Take 1 tablet (5 mg) by mouth daily. 7/10/2025 Morning    metFORMIN (GLUCOPHAGE XR) 500 MG 24 hr tablet Take 1 tablet (500 mg) by mouth daily (with dinner). 7/9/2025 Evening    Multiple Vitamins-Minerals (PRESERVISION AREDS 2 PO) Take 1 tablet by mouth 2 times daily. 7/10/2025 Morning    pravastatin (PRAVACHOL) 40 MG tablet TAKE 1 TABLET BY MOUTH EVERYDAY AT BEDTIME 7/9/2025 Bedtime    psyllium (METAMUCIL/KONSYL) capsule Take 2 capsules by mouth 3  times daily. 7/10/2025 Morning

## 2025-07-11 NOTE — PROGRESS NOTES
I was called with positive blood culture. Gram positive cocci in pairs in chains after 12 hours. I restarted vancomycin and changed to inpatient status. I will repeat blood culture today and tomorrow.

## 2025-07-11 NOTE — PLAN OF CARE
ROOM # 206-1    Living Situation : town house w daughter  Facility name:  : [facility]    Activity level at baseline: Ind w Cane  Activity level on admit: Ax2 walker    Who will be transporting you at discharge: TBD    Patient registered to observation; given Patient Bill of Rights; given the opportunity to ask questions about observation status and their plan of care.  Patient has been oriented to the observation room, bathroom and call light is in place.    Discussed discharge goals and expectations with patient/family.

## 2025-07-11 NOTE — PROGRESS NOTES
[facility]    Hospitalist Progress Note      Assessment & Plan   Preston Fortune is a 89 year old man who was admitted on 7/10/2025. PMH significant for hypertension, hyperlipidemia, type 2 diabetes, atrial fibrillation, chronic anticoagulation with Eliquis, chronic kidney disease, peptic ulcer disease, melanoma of left eye, pulmonary hypertension, osteoarthritis, renal stone disease, and superior mesenteric artery stenosis. Patient presented with weakness, chills, rigors, and fatigue. He reported feeling well earlier in the day. He was brought to the ED by his family for evaluation.  He denied focal symptoms of infection such as cough, chest pain, abdominal pain, diarrhea, dysuria, or skin change.  He does have some right lower leg erythema that wife notes has been present for several weeks and is slightly worse than it has been.     Emergency department evaluation showed temperature 100.4, heart rate in the 100s, respiratory rate 34 with some improvement while in the ED, and elevated blood pressure.  Laboratory evaluation showed BUN 45, creatinine 1.31, glucose 186, lactic acid 1.7, white blood cells 20.3, and unremarkable urine analysis.  CT of chest with PE protocol and abdomen and pelvis showed no PE.  It did show a 12 x 11 mm distal left ureter stent unchanged from prior.  It showed gallstones.  Patient was given vancomycin and Zosyn with concern for sepsis and admitted to hospitalist service.    Patient's blood cultures have since grown Strep species by prelim ID and antibiotics have been changed to ceftriaxone. Given patient's right lower leg erythema that wife notes has been present for several weeks and is slightly worse than it has been, obtaining MRI right lower extremity to rule out osteomyelitis. Infectious disease is consulted.      Sepsis  Strep bacteremia  Right lower leg cellulitis vs osteomyelitis  Leukocytosis  Tachycardia  -Patient had subjective fever and chills at home.   Temperature was 100.4 here.  He had associated tachycardia, tachypnea, and leukocytosis.  He does have some erythema on his right lower leg which is not necessarily new for him but may be a little bit worse than normal.  Inferior and superior margins were outlined.  -Patient had a similar hospitalization at [facility] in 2020.  At that time there was concern about possible cholecystitis although he did not have associated abdominal pain.  He did not have cholecystectomy at that time.  -Patient with known large left ureter stone without hydronephrosis or obvious UTI.  - Blood cultures are growing Strep species.  Continue to repeat blood cultures.  - Antibiotics have been changed to ceftriaxone.  - Infectious disease consulted and appreciate recommendations.  - Given patient's right lower leg erythema that wife notes has been present for several weeks and is slightly worse than it has been, obtaining MRI right lower extremity to rule out osteomyelitis. If positive, would consult orthopedics.  Holding DOAC in case of need for intervention.  - Also checking ABIs given suspicion for PVD with known SMA stenosis.     Hypertension   Hyperlipidemia  Atrial fibrillation  Chronic anticoagulation with Eliquis  Pulmonary hypertension  - Continue prior to admission amlodipine, Lasix, lisinopril, pravastatin.   - PTA Eliquis on hold initially in case of need for intervention above.     Type 2 diabetes  Hgb A1c 4/23/25 was 7.2%.   Holding PTA metformin while inpatient. AC/HS glucose checks and correction insulin ordered.     CKD 3a  Creatinine is 1.3 at baseline.   Renally dose medications and avoid nephrotoxins.   Continue to follow.     Cholelithiasis  -No CT findings suggestive of cholecystitis  -No abdominal pain  -Would workup further if patient develops pain, nausea, or vomiting     Peptic ulcer disease  -Does not appear to be on medication for this     Renal stone disease  -Large distal left ureter stone without  hydronephrosis or UTI     DVT Prophylaxis: Holding PTA DOAC in case of need for surgical intervention with evaluation for osteomyelitis. Will order prophylactic Lovenox pending DOAC hold.  Code Status: Full Code  Medically Ready for Discharge: Anticipated in 2-4 Days  Expected discharge: Anticipate hospital stay at least 3-4 more days pending further course.    Rebecca Mayer MD FACP  Hospitalist Service  [facility]      Interval History   Patient's initial blood cultures     -Data reviewed today: I reviewed all new labs and imaging results over the last 24 hours.     Physical Exam   Temp: 98.7  F (37.1  C) Temp src: Oral BP: 125/58 Pulse: 69   Resp: 16 SpO2: 95 % O2 Device: None (Room air)    Vitals:    07/10/25 1822 07/10/25 1921 07/11/25 0100   Weight: 93.9 kg (207 lb) 98.9 kg (218 lb) 96.3 kg (212 lb 4.9 oz)     Vital Signs with Ranges  Temp:  [98.7  F (37.1  C)-100.4  F (38  C)] 98.7  F (37.1  C)  Pulse:  [] 69  Resp:  [10-40] 16  BP: (116-192)/() 125/58  SpO2:  [90 %-95 %] 95 %  I/O last 3 completed shifts:  In: -   Out: 300 [Urine:300]    Constitutional: Pleasant older gentleman sitting up at side of bed. Wife and son at bedside. Alert and oriented x3. No acute distress. Appears non-toxic.   HEENT: NCAT. EOMI. Moist oral mucosa.  Respiratory: Clear to auscultation bilaterally. No crackles or wheezes. No increased work of breathing. On room air.   Cardiovascular: Irregularly irregular rhythm, regular rate. No murmur. Suspect some degree of peripheral vascular disease, no leg hair noted. 1+ DP/PT pulses noted.   GI: Soft, nontender, nondistended. Normoactive bowel sounds.   Extremities: Right lower extremity with a small healing eschar to anterior mid-tibial region. Surrounding erythema and warmth. Otherwise no gross deformities.   Neurologic: Alert and oriented x3. No focal neurologic deficits. Did not assess gait.    Medications   Current Facility-Administered Medications    Medication Dose Route Frequency Provider Last Rate Last Admin    Patient is already receiving anticoagulation with heparin, enoxaparin (LOVENOX), warfarin (COUMADIN)  or other anticoagulant medication   Does not apply Continuous PRN Jl Freed MD         Current Facility-Administered Medications   Medication Dose Route Frequency Provider Last Rate Last Admin    amLODIPine (NORVASC) tablet 5 mg  5 mg Oral Daily Jl Freed MD   5 mg at 07/11/25 0856    apixaban ANTICOAGULANT (ELIQUIS) tablet 5 mg  5 mg Oral BID Jl Freed MD   5 mg at 07/11/25 0856    furosemide (LASIX) tablet 20 mg  20 mg Oral Daily Jl Freed MD   20 mg at 07/11/25 0856    insulin aspart (NovoLOG) injection (RAPID ACTING)  1-7 Units Subcutaneous TID AC Jl Freed MD        insulin aspart (NovoLOG) injection (RAPID ACTING)  1-5 Units Subcutaneous At Bedtime Jl Freed MD        lisinopril (ZESTRIL) tablet 5 mg  5 mg Oral Daily Jl Freed MD   5 mg at 07/11/25 0856    metFORMIN (GLUCOPHAGE XR) 24 hr tablet 500 mg  500 mg Oral Daily with supper Jl Freed MD        piperacillin-tazobactam (ZOSYN) 3.375 g vial to attach to  mL bag  3.375 g Intravenous Q6H Jl Freed MD   3.375 g at 07/11/25 0953    pravastatin (PRAVACHOL) tablet 40 mg  40 mg Oral QPM Jl Freed MD        vancomycin (VANCOCIN) 1,250 mg in 0.9% NaCl 250 mL intermittent infusion  1,250 mg Intravenous Q24H Jl Freed MD   1,250 mg at 07/11/25 1049       Data   Recent Labs   Lab 07/11/25  0553 07/11/25  0109 07/10/25  1853   WBC 24.2*  --  20.3*   HGB 11.7*  --  13.3   MCV 87  --  90     --  224     --  137   POTASSIUM 4.3  --  4.9   CHLORIDE 104  --  99   CO2 22  --  23   BUN 37.5*  --  45.0*   CR 1.31*  --  1.31*   ANIONGAP 12  --  15   ANG 8.4*  --  9.5   * 131* 186*   ALBUMIN 3.7  --  4.6   PROTTOTAL 5.7*  --  7.5   BILITOTAL 0.9  --  0.6   ALKPHOS 66  --  99   ALT 8  --   12   AST 14  --  23       Recent Results (from the past 24 hours)   CT Chest (PE) Abdomen Pelvis w Contrast    Narrative    EXAM: CT CHEST PE ABDOMEN PELVIS W CONTRAST  LOCATION: [facility]  DATE: 07/10/2025    INDICATION: Fever, tachcyardia, hypoxia; evaluate source of sepsis vs PE.  COMPARISON: 11/26/2020 CT abdomen and pelvis.  TECHNIQUE: CT chest pulmonary angiogram and routine CT abdomen and pelvis with IV contrast. Arterial phase through the chest and venous phase through the abdomen and pelvis. Multiplanar reformats and MIP reconstructions were performed. Dose reduction   techniques were used.   CONTRAST: 100 mL Isovue 370.    FINDINGS:  ANGIOGRAM CHEST: Pulmonary arteries are normal caliber and negative for pulmonary emboli. Thoracic aorta is negative for dissection. No CT evidence of right heart strain.     LUNGS AND PLEURA: Bronchiolar wall thickening compatible with bronchiolitis. Mild mosaic attenuation can be seen with air trapping. Mild atelectatic changes in the lung bases. Very minimal interstitial change in the lung bases.    MEDIASTINUM/AXILLAE: Normal.    CORONARY ARTERY CALCIFICATION: Severe.    HEPATOBILIARY: Calcified gallstone.    PANCREAS: Normal.    SPLEEN: Normal.    ADRENAL GLANDS: Normal.    KIDNEYS/BLADDER: A few cysts in the kidneys. No followup needed. There is a large 12 x 11 mm stone within the distal left ureter without hydronephrosis. This is unchanged.    BOWEL: Duodenal diverticulum. Normal appendix.    LYMPH NODES: Normal.    VASCULATURE: Normal.    PELVIC ORGANS: Prostatic enlargement.    MUSCULOSKELETAL: Lumbar degenerative change.      Impression    IMPRESSION:  1.  No pulmonary embolism.  2.  Bronchiolar wall thickening compatible with bronchiolitis. Mild mosaic attenuation can be seen with air trapping.  3.  Very minimal interstitial change in the lung bases.  4.  Cholelithiasis.  5.  Large 12 x 11 mm stone in the distal left ureter without  hydronephrosis. This is unchanged.  6.  Prostatic enlargement.

## 2025-07-12 DIAGNOSIS — I48.91 ATRIAL FIBRILLATION, UNSPECIFIED TYPE (H): ICD-10-CM

## 2025-07-12 LAB
ALBUMIN SERPL BCG-MCNC: 3.4 G/DL (ref 3.5–5.2)
ALP SERPL-CCNC: 75 U/L (ref 40–150)
ALT SERPL W P-5'-P-CCNC: 10 U/L (ref 0–70)
ANION GAP SERPL CALCULATED.3IONS-SCNC: 12 MMOL/L (ref 7–15)
AST SERPL W P-5'-P-CCNC: 22 U/L (ref 0–45)
BASOPHILS # BLD AUTO: 0.1 10E3/UL (ref 0–0.2)
BASOPHILS NFR BLD AUTO: 0 %
BILIRUB DIRECT SERPL-MCNC: 0.18 MG/DL (ref 0–0.3)
BILIRUB SERPL-MCNC: 0.4 MG/DL
BUN SERPL-MCNC: 32.2 MG/DL (ref 8–23)
CALCIUM SERPL-MCNC: 8.8 MG/DL (ref 8.8–10.4)
CHLORIDE SERPL-SCNC: 104 MMOL/L (ref 98–107)
CREAT SERPL-MCNC: 1.28 MG/DL (ref 0.67–1.17)
CRP SERPL-MCNC: 208.06 MG/L
EGFRCR SERPLBLD CKD-EPI 2021: 53 ML/MIN/1.73M2
EOSINOPHIL # BLD AUTO: 0 10E3/UL (ref 0–0.7)
EOSINOPHIL NFR BLD AUTO: 0 %
ERYTHROCYTE [DISTWIDTH] IN BLOOD BY AUTOMATED COUNT: 14.2 % (ref 10–15)
ERYTHROCYTE [SEDIMENTATION RATE] IN BLOOD BY WESTERGREN METHOD: 27 MM/HR (ref 0–20)
GLUCOSE BLDC GLUCOMTR-MCNC: 142 MG/DL (ref 70–99)
GLUCOSE BLDC GLUCOMTR-MCNC: 156 MG/DL (ref 70–99)
GLUCOSE BLDC GLUCOMTR-MCNC: 184 MG/DL (ref 70–99)
GLUCOSE BLDC GLUCOMTR-MCNC: 220 MG/DL (ref 70–99)
GLUCOSE SERPL-MCNC: 166 MG/DL (ref 70–99)
HCO3 SERPL-SCNC: 22 MMOL/L (ref 22–29)
HCT VFR BLD AUTO: 33.8 % (ref 40–53)
HGB BLD-MCNC: 11.3 G/DL (ref 13.3–17.7)
IMM GRANULOCYTES # BLD: 0.1 10E3/UL
IMM GRANULOCYTES NFR BLD: 1 %
LYMPHOCYTES # BLD AUTO: 1.2 10E3/UL (ref 0.8–5.3)
LYMPHOCYTES NFR BLD AUTO: 8 %
MAGNESIUM SERPL-MCNC: 2 MG/DL (ref 1.7–2.3)
MCH RBC QN AUTO: 29.4 PG (ref 26.5–33)
MCHC RBC AUTO-ENTMCNC: 33.4 G/DL (ref 31.5–36.5)
MCV RBC AUTO: 88 FL (ref 78–100)
MONOCYTES # BLD AUTO: 1.1 10E3/UL (ref 0–1.3)
MONOCYTES NFR BLD AUTO: 7 %
NEUTROPHILS # BLD AUTO: 13.1 10E3/UL (ref 1.6–8.3)
NEUTROPHILS NFR BLD AUTO: 84 %
NRBC # BLD AUTO: 0 10E3/UL
NRBC BLD AUTO-RTO: 0 /100
PHOSPHATE SERPL-MCNC: 3.2 MG/DL (ref 2.5–4.5)
PLATELET # BLD AUTO: 172 10E3/UL (ref 150–450)
POTASSIUM SERPL-SCNC: 3.9 MMOL/L (ref 3.4–5.3)
PROT SERPL-MCNC: 6.2 G/DL (ref 6.4–8.3)
RBC # BLD AUTO: 3.84 10E6/UL (ref 4.4–5.9)
SODIUM SERPL-SCNC: 138 MMOL/L (ref 135–145)
WBC # BLD AUTO: 15.5 10E3/UL (ref 4–11)

## 2025-07-12 PROCEDURE — 120N000001 HC R&B MED SURG/OB

## 2025-07-12 PROCEDURE — 83735 ASSAY OF MAGNESIUM: CPT | Performed by: INTERNAL MEDICINE

## 2025-07-12 PROCEDURE — 87040 BLOOD CULTURE FOR BACTERIA: CPT | Performed by: INTERNAL MEDICINE

## 2025-07-12 PROCEDURE — 85652 RBC SED RATE AUTOMATED: CPT | Performed by: INTERNAL MEDICINE

## 2025-07-12 PROCEDURE — 82248 BILIRUBIN DIRECT: CPT | Performed by: INTERNAL MEDICINE

## 2025-07-12 PROCEDURE — 86140 C-REACTIVE PROTEIN: CPT | Performed by: INTERNAL MEDICINE

## 2025-07-12 PROCEDURE — 250N000013 HC RX MED GY IP 250 OP 250 PS 637: Performed by: INTERNAL MEDICINE

## 2025-07-12 PROCEDURE — 85018 HEMOGLOBIN: CPT | Performed by: INTERNAL MEDICINE

## 2025-07-12 PROCEDURE — 36415 COLL VENOUS BLD VENIPUNCTURE: CPT | Performed by: INTERNAL MEDICINE

## 2025-07-12 PROCEDURE — 250N000011 HC RX IP 250 OP 636: Performed by: INTERNAL MEDICINE

## 2025-07-12 PROCEDURE — 99233 SBSQ HOSP IP/OBS HIGH 50: CPT | Performed by: INTERNAL MEDICINE

## 2025-07-12 PROCEDURE — 84100 ASSAY OF PHOSPHORUS: CPT | Performed by: INTERNAL MEDICINE

## 2025-07-12 RX ADMIN — FUROSEMIDE 20 MG: 20 TABLET ORAL at 08:46

## 2025-07-12 RX ADMIN — CEFTRIAXONE 2 G: 2 INJECTION, POWDER, FOR SOLUTION INTRAMUSCULAR; INTRAVENOUS at 12:57

## 2025-07-12 RX ADMIN — ACETAMINOPHEN 650 MG: 325 TABLET ORAL at 19:55

## 2025-07-12 RX ADMIN — AMLODIPINE BESYLATE 5 MG: 5 TABLET ORAL at 08:46

## 2025-07-12 RX ADMIN — ACETAMINOPHEN 650 MG: 325 TABLET ORAL at 04:42

## 2025-07-12 RX ADMIN — ACETAMINOPHEN 650 MG: 325 TABLET ORAL at 11:24

## 2025-07-12 RX ADMIN — APIXABAN 5 MG: 5 TABLET, FILM COATED ORAL at 19:55

## 2025-07-12 RX ADMIN — PRAVASTATIN SODIUM 40 MG: 20 TABLET ORAL at 19:55

## 2025-07-12 RX ADMIN — LISINOPRIL 5 MG: 5 TABLET ORAL at 08:46

## 2025-07-12 ASSESSMENT — ACTIVITIES OF DAILY LIVING (ADL)
ADLS_ACUITY_SCORE: 35
ADLS_ACUITY_SCORE: 35
ADLS_ACUITY_SCORE: 37
ADLS_ACUITY_SCORE: 37
ADLS_ACUITY_SCORE: 35
ADLS_ACUITY_SCORE: 35
ADLS_ACUITY_SCORE: 37
ADLS_ACUITY_SCORE: 35
ADLS_ACUITY_SCORE: 35
ADLS_ACUITY_SCORE: 37
ADLS_ACUITY_SCORE: 35
ADLS_ACUITY_SCORE: 35
ADLS_ACUITY_SCORE: 37
ADLS_ACUITY_SCORE: 35
ADLS_ACUITY_SCORE: 37
ADLS_ACUITY_SCORE: 37
ADLS_ACUITY_SCORE: 35
ADLS_ACUITY_SCORE: 37

## 2025-07-12 NOTE — PLAN OF CARE
"Care from [id number]    Inpatient Progress Note:  For complete assessment see flow sheet documentation.    /66 (BP Location: Left arm)   Pulse 76   Temp 97.8  F (36.6  C) (Oral)   Resp 20   Ht 1.626 m (5' 4\")   Wt 96.3 kg (212 lb 4.9 oz)   SpO2 96%   BMI 36.44 kg/m            Orientation: WDL  Neuro: WDL, denies numbness or tingling  Pain status: tylenol for headache  Activity: SBA, walker, GB, walked entire unit with LILIAM. States he normally rides exercise bike and walks at home  Resp: WDL  Cardiac: on TELE: A-fib   GI: BM 7.12.25  : WDL  Infusions: Rocephin q 24  Pertinent labs: BC pending, previous BC growing strep  Diet: mod carb  Consults: ID  Discharge plan: home           Zeina Talbert RNGoal Outcome Evaluation:      Plan of Care Reviewed With: patient    Overall Patient Progress: no changeOverall Patient Progress: no change    Outcome Evaluation: SBA walker, GB. AxOx4. Eating and drinking. RLE redness remains within the defined boundary      Problem: Adult Inpatient Plan of Care  Goal: Plan of Care Review  Description: The Plan of Care Review/Shift note should be completed every shift.  The Outcome Evaluation is a brief statement about your assessment that the patient is improving, declining, or no change.  This information will be displayed automatically on your shift  note.  Outcome: Not Progressing  Flowsheets (Taken 7/12/2025 1328)  Outcome Evaluation: SBA walker, GB. AxOx4. Eating and drinking. RLE redness remains within the defined boundary  Plan of Care Reviewed With: patient  Overall Patient Progress: no change  Goal: Patient-Specific Goal (Individualized)  Description: You can add care plan individualizations to a care plan. Examples of Individualization might be:  \"Parent requests to be called daily at 9am for status\", \"I have a hard time hearing out of my right ear\", or \"Do not touch me to wake me up as it startles  me\".  Outcome: Not Progressing  Goal: Absence of Hospital-Acquired " Illness or Injury  Outcome: Not Progressing  Intervention: Identify and Manage Fall Risk  Recent Flowsheet Documentation  Taken 7/12/2025 1300 by Zeina Talbert RN  Safety Promotion/Fall Prevention:   activity supervised   treat reversible contributory factors   treat underlying cause   supervised activity   safety round/check completed   nonskid shoes/slippers when out of bed   clutter free environment maintained  Intervention: Prevent Skin Injury  Recent Flowsheet Documentation  Taken 7/12/2025 1300 by Zeina Talbert RN  Body Position: position changed independently  Goal: Optimal Comfort and Wellbeing  Outcome: Not Progressing  Goal: Readiness for Transition of Care  Outcome: Not Progressing     Problem: Delirium  Goal: Optimal Coping  Outcome: Not Progressing  Goal: Improved Behavioral Control  Outcome: Not Progressing  Intervention: Minimize Safety Risk  Recent Flowsheet Documentation  Taken 7/12/2025 1300 by Zeina Talbert RN  Enhanced Safety Measures: review medications for side effects with activity  Goal: Improved Attention and Thought Clarity  Outcome: Not Progressing  Goal: Improved Sleep  Outcome: Not Progressing     Problem: Comorbidity Management  Goal: Maintenance of Asthma Control  Outcome: Not Progressing  Intervention: Maintain Asthma Symptom Control  Recent Flowsheet Documentation  Taken 7/12/2025 1300 by Zeina Talbert RN  Medication Review/Management: medications reviewed  Goal: Maintenance of Behavioral Health Symptom Control  Outcome: Not Progressing  Intervention: Maintain Behavioral Health Symptom Control  Recent Flowsheet Documentation  Taken 7/12/2025 1300 by Zeina Talbert RN  Medication Review/Management: medications reviewed  Goal: Maintenance of COPD Symptom Control  Outcome: Not Progressing  Intervention: Maintain COPD Symptom Control  Recent Flowsheet Documentation  Taken 7/12/2025 1300 by Zeina Talbert RN  Medication Review/Management: medications reviewed  Goal:  Blood Glucose Levels Within Targeted Range  Outcome: Not Progressing  Intervention: Monitor and Manage Glycemia  Recent Flowsheet Documentation  Taken 7/12/2025 1300 by Zeina Talbert RN  Medication Review/Management: medications reviewed  Goal: Maintenance of Heart Failure Symptom Control  Outcome: Not Progressing  Intervention: Maintain Heart Failure Management  Recent Flowsheet Documentation  Taken 7/12/2025 1300 by Zeina Talbert RN  Medication Review/Management: medications reviewed  Goal: Blood Pressure in Desired Range  Outcome: Not Progressing  Intervention: Maintain Blood Pressure Management  Recent Flowsheet Documentation  Taken 7/12/2025 1300 by Zeina Talbert RN  Medication Review/Management: medications reviewed  Goal: Maintenance of Osteoarthritis Symptom Control  Outcome: Not Progressing  Intervention: Maintain Osteoarthritis Symptom Control  Recent Flowsheet Documentation  Taken 7/12/2025 1300 by Zeina Talbert RN  Activity Management:   activity adjusted per tolerance   activity encouraged   ambulated in room   ambulated to bathroom  Medication Review/Management: medications reviewed  Goal: Bariatric Home Regimen Maintained  Outcome: Not Progressing  Intervention: Maintain and Manage Postbariatric Surgery Care  Recent Flowsheet Documentation  Taken 7/12/2025 1300 by Zeina Talbert RN  Medication Review/Management: medications reviewed  Goal: Maintenance of Seizure Control  Outcome: Not Progressing  Intervention: Maintain Seizure Symptom Control  Recent Flowsheet Documentation  Taken 7/12/2025 1300 by Zeina Talbert RN  Medication Review/Management: medications reviewed

## 2025-07-12 NOTE — PLAN OF CARE
"[id number]    Inpatient Progress Note:    Orientation: A/O*3-4 forgetful  Pain status: At 0400 c/o headache pain 4/10 prn tylenol given  Activity: AX1  Peripheral edema: trace BLE  Resp: O2 1L when in sleep  Cardiac: Afib controlled 90s  GI: last BM 7/11  :  frequency    Skin: scattered bruising   LDA: PIV   Infusions: SL  Pertinent Labs:   Diet: Mod carb  Consults:   Discharge Plan: TBD    Will continue to monitor and provide cares.     Lakshmi Tabor RN       Goal Outcome Evaluation:      Plan of Care Reviewed With: patient    Overall Patient Progress: no changeOverall Patient Progress: no change    Outcome Evaluation: A/O*4, VSS,RA, AX1W/ [place] /GB. Denies pain, sent MRI, result pending.    Pt is a Diabetic diet. PV Saline locked.     Problem: Adult Inpatient Plan of Care  Goal: Plan of Care Review  Description: The Plan of Care Review/Shift note should be completed every shift.  The Outcome Evaluation is a brief statement about your assessment that the patient is improving, declining, or no change.  This information will be displayed automatically on your shift  note.  7/12/2025 0030 by Lakshmi Tabor RN  Outcome: Progressing  Flowsheets (Taken 7/12/2025 0030)  Plan of Care Reviewed With: patient  Overall Patient Progress: no change  7/11/2025 2118 by Lakshmi Tabor RN  Outcome: Progressing  Flowsheets (Taken 7/11/2025 2118)  Outcome Evaluation: A/O*4, VSS,RA, AX1W/ [place] /GB. Denies pain, sent MRI, result pending.  Plan of Care Reviewed With: patient  Overall Patient Progress: no change  Goal: Patient-Specific Goal (Individualized)  Description: You can add care plan individualizations to a care plan. Examples of Individualization might be:  \"Parent requests to be called daily at 9am for status\", \"I have a hard time hearing out of my right ear\", or \"Do not touch me to wake me up as it startles  me\".  7/12/2025 0030 by Lakshmi Tabor RN  Outcome: Progressing  7/11/2025 2118 by Lakshmi Tabor RN  Outcome: " Progressing  Goal: Absence of Hospital-Acquired Illness or Injury  7/12/2025 0030 by Lakshmi Tabor RN  Outcome: Progressing  7/11/2025 2118 by Lakshmi Tabor RN  Outcome: Progressing  Intervention: Identify and Manage Fall Risk  Recent Flowsheet Documentation  Taken 7/12/2025 0028 by Lakshmi Tabor RN  Safety Promotion/Fall Prevention:   activity supervised   assistive device/personal items within reach   clutter free environment maintained   nonskid shoes/slippers when out of bed   room organization consistent   safety round/check completed  Intervention: Prevent Skin Injury  Recent Flowsheet Documentation  Taken 7/12/2025 0028 by Lakshmi Tabor RN  Body Position: position changed independently  Goal: Optimal Comfort and Wellbeing  7/12/2025 0030 by Lakshmi Tabor RN  Outcome: Progressing  7/11/2025 2118 by Lakshmi Tabor RN  Outcome: Progressing  Goal: Readiness for Transition of Care  7/12/2025 0030 by Lakshmi Tabor RN  Outcome: Progressing  7/11/2025 2118 by Lakshmi Tabor RN  Outcome: Progressing

## 2025-07-12 NOTE — PLAN OF CARE
"Goal Outcome Evaluation:      Plan of Care Reviewed With: patient    Overall Patient Progress: no changeOverall Patient Progress: no change    Outcome Evaluation: A/O*4, VSS,RA, AX1W/ [place] /GB. Denies pain, sent MRI, result pending.    Problem: Adult Inpatient Plan of Care  Goal: Plan of Care Review  Description: The Plan of Care Review/Shift note should be completed every shift.  The Outcome Evaluation is a brief statement about your assessment that the patient is improving, declining, or no change.  This information will be displayed automatically on your shift  note.  Outcome: Progressing  Flowsheets (Taken 7/11/2025 2118)  Outcome Evaluation: A/O*4, VSS,RA, AX1W/ [place] /GB. Denies pain, sent MRI, result pending.  Plan of Care Reviewed With: patient  Overall Patient Progress: no change  Goal: Patient-Specific Goal (Individualized)  Description: You can add care plan individualizations to a care plan. Examples of Individualization might be:  \"Parent requests to be called daily at 9am for status\", \"I have a hard time hearing out of my right ear\", or \"Do not touch me to wake me up as it startles  me\".  Outcome: Progressing  Goal: Absence of Hospital-Acquired Illness or Injury  Outcome: Progressing  Goal: Optimal Comfort and Wellbeing  Outcome: Progressing  Goal: Readiness for Transition of Care  Outcome: Progressing           "

## 2025-07-12 NOTE — PROGRESS NOTES
[facility]    Hospitalist Progress Note      Assessment & Plan   Preston Fortune is a 89 year old man who was admitted on 7/10/2025. PMH significant for hypertension, hyperlipidemia, type 2 diabetes, atrial fibrillation, chronic anticoagulation with Eliquis, chronic kidney disease, peptic ulcer disease, melanoma of left eye, pulmonary hypertension, osteoarthritis, renal stone disease, and superior mesenteric artery stenosis. Patient presented with weakness, chills, rigors, and fatigue. He reported feeling well earlier in the day. He was brought to the ED by his family for evaluation.  He denied focal symptoms of infection such as cough, chest pain, abdominal pain, diarrhea, dysuria, or skin change.  He does have some right lower leg erythema that wife notes has been present for several weeks and is slightly worse than it has been.     Emergency department evaluation showed temperature 100.4, heart rate in the 100s, respiratory rate 34 with some improvement while in the ED, and elevated blood pressure.  Laboratory evaluation showed BUN 45, creatinine 1.31, glucose 186, lactic acid 1.7, white blood cells 20.3, and unremarkable urine analysis.  CT of chest with PE protocol and abdomen and pelvis showed no PE.  It did show a 12 x 11 mm distal left ureter stent unchanged from prior.  It showed gallstones.  Patient was given vancomycin and Zosyn with concern for sepsis and admitted to hospitalist service.    Patient's blood cultures have since grown Strep species by prelim ID and antibiotics have been changed to ceftriaxone. MRI negative for osteomyelitis. Continue ceftriaxone and follow cultures.       Sepsis  Strep bacteremia  Right lower leg cellulitis vs osteomyelitis  Leukocytosis  Tachycardia  -Patient had subjective fever and chills at home.  Temperature was 100.4 here.  He had associated tachycardia, tachypnea, and leukocytosis.  He does have some erythema on his right lower leg which is not  necessarily new for him but may be a little bit worse than normal.  Inferior and superior margins were outlined.  -Patient had a similar hospitalization at [facility] in 2020.  At that time there was concern about possible cholecystitis although he did not have associated abdominal pain.  He did not have cholecystectomy at that time.  -Patient with known large left ureter stone without hydronephrosis or obvious UTI.  - Blood cultures are growing Strep species.  Continue to repeat blood cultures.  - Antibiotics have been changed to ceftriaxone.  - Infectious disease consulted and appreciate recommendations.  - Given patient's right lower leg erythema that wife notes has been present for several weeks and is slightly worse than it has been, obtained MRI right lower extremity which ruled out osteomyelitis.   - Ordered ABIs given suspicion for PVD with known SMA stenosis, but unable to to complete due to vessels not being compressible. Since no evidence of osteomyelitis, will not pursue further inpatient vascular evaluation. Can follow with PCP.      Hypertension   Hyperlipidemia  Atrial fibrillation  Chronic anticoagulation with Eliquis  Pulmonary hypertension  - Continue prior to admission amlodipine, Lasix, lisinopril, pravastatin.   - Resuming DOAC    Type 2 diabetes  Hgb A1c 4/23/25 was 7.2%.   Holding PTA metformin while inpatient. AC/HS glucose checks and correction insulin ordered.     CKD 3a  Creatinine is 1.3 at baseline.   Renally dose medications and avoid nephrotoxins.   Continue to follow.     Cholelithiasis  -No CT findings suggestive of cholecystitis  -No abdominal pain  -Would workup further if patient develops pain, nausea, or vomiting     Peptic ulcer disease  -Does not appear to be on medication for this     Renal stone disease  -Large distal left ureter stone without hydronephrosis or UTI     DVT Prophylaxis: Will resume DOAC.  Code Status: Full Code  Medically Ready for Discharge: Anticipated in  2-4 Days  Expected discharge: Anticipate hospital stay at least 2-3 more days pending further course.    Rebecca Mayer MD FACP  Hospitalist Service  [facility]      Interval History   Patient's initial blood cultures with Strep ID pending.  Subsequent blood cultures negative to date.  Patient without any warmth, erythema to bilateral knees or shoulders.  Continuing on ceftriaxone.  Otherwise no acute events.    -Data reviewed today: I reviewed all new labs and imaging results over the last 24 hours.     Physical Exam   Temp: 97.8  F (36.6  C) Temp src: Oral BP: 137/66 Pulse: 76   Resp: 20 SpO2: 96 % O2 Device: None (Room air)    Vitals:    07/10/25 1822 07/10/25 1921 07/11/25 0100   Weight: 93.9 kg (207 lb) 98.9 kg (218 lb) 96.3 kg (212 lb 4.9 oz)     Vital Signs with Ranges  Temp:  [97.8  F (36.6  C)-98.8  F (37.1  C)] 97.8  F (36.6  C)  Pulse:  [] 76  Resp:  [16-22] 20  BP: (123-156)/(66-88) 137/66  SpO2:  [90 %-97 %] 96 %  No intake/output data recorded.    Constitutional: Pleasant older gentleman resting in bed. Wife at bedside. Somnolent, but able to wake up and answer questions appropriately. Oriented x3. No acute distress. Appears non-toxic.   HEENT: NCAT. EOMI. Moist oral mucosa.  Respiratory: Clear to auscultation bilaterally. No crackles or wheezes. No increased work of breathing. On room air.   Cardiovascular: Irregularly irregular rhythm, regular rate. No murmur. Suspect some degree of peripheral vascular disease, no leg hair noted. 1+ DP/PT pulses noted.   GI: Soft, nontender, nondistended. Normoactive bowel sounds.   Extremities: Right lower extremity with a small healing eschar to anterior mid-tibial region. Surrounding erythema and warmth. No warmth or swelling appreciated to bilateral knee or shoulder joints. Otherwise no gross deformities.   Neurologic: Alert and oriented x3. No focal neurologic deficits. Did not assess gait.    Medications   Current Facility-Administered  Medications   Medication Dose Route Frequency Provider Last Rate Last Admin    Patient is already receiving anticoagulation with heparin, enoxaparin (LOVENOX), warfarin (COUMADIN)  or other anticoagulant medication   Does not apply Continuous PRN Jl Freed MD         Current Facility-Administered Medications   Medication Dose Route Frequency Provider Last Rate Last Admin    amLODIPine (NORVASC) tablet 5 mg  5 mg Oral Daily Jl Freed MD   5 mg at 07/12/25 0846    [Held by provider] apixaban ANTICOAGULANT (ELIQUIS) tablet 5 mg  5 mg Oral BID Jl Freed MD   5 mg at 07/11/25 0856    cefTRIAXone (ROCEPHIN) 2 g vial to attach to  ml bag for ADULTS or NS 50 ml bag for PEDS  2 g Intravenous Q24H Willis Odonnell MD   2 g at 07/12/25 1257    enoxaparin ANTICOAGULANT (LOVENOX) injection 40 mg  40 mg Subcutaneous Q24H Rebecca Pickard MD   40 mg at 07/11/25 2123    furosemide (LASIX) tablet 20 mg  20 mg Oral Daily Jl Freed MD   20 mg at 07/12/25 0846    insulin aspart (NovoLOG) injection (RAPID ACTING)  1-7 Units Subcutaneous TID AC Jl Freed MD        insulin aspart (NovoLOG) injection (RAPID ACTING)  1-5 Units Subcutaneous At Bedtime Jl Freed MD        lisinopril (ZESTRIL) tablet 5 mg  5 mg Oral Daily Jl Freed MD   5 mg at 07/12/25 0846    pravastatin (PRAVACHOL) tablet 40 mg  40 mg Oral QPM Jl Freed MD   40 mg at 07/11/25 2123       Data   Recent Labs   Lab 07/12/25  0805 07/12/25  0529 07/12/25  0220 07/11/25  1651 07/11/25  0553 07/11/25  0109 07/10/25  1853   WBC  --  15.5*  --   --  24.2*  --  20.3*   HGB  --  11.3*  --   --  11.7*  --  13.3   MCV  --  88  --   --  87  --  90   PLT  --  172  --   --  172  --  224   NA  --  138  --   --  138  --  137   POTASSIUM  --  3.9  --   --  4.3  --  4.9   CHLORIDE  --  104  --   --  104  --  99   CO2  --  22  --   --  22  --  23   BUN  --  32.2*  --   --  37.5*  --  45.0*   CR  --  1.28*  --   --   1.31*  --  1.31*   ANIONGAP  --  12  --   --  12  --  15   ANG  --  8.8  --   --  8.4*  --  9.5   * 166* 156*   < > 133*   < > 186*   ALBUMIN  --  3.4*  --   --  3.7  --  4.6   PROTTOTAL  --  6.2*  --   --  5.7*  --  7.5   BILITOTAL  --  0.4  --   --  0.9  --  0.6   ALKPHOS  --  75  --   --  66  --  99   ALT  --  10  --   --  8  --  12   AST  --  22  --   --  14  --  23    < > = values in this interval not displayed.       Recent Results (from the past 24 hours)   US LYLE Doppler No Exercise    Narrative    EXAM: RESTING ANKLE-BRACHIAL INDICES (ABIs)  LOCATION: [facility]  DATE: 7/11/2025    INDICATION: Assess ABIs, concern for osteo and curious about PVD  COMPARISON: None.    LYLE FINDINGS:  RIGHT  Brachial: 166  Ankle (PT): Noncompressible Index: Unobtainable  Ankle (DP): Noncompressible Index: Unobtainable  Digit: 169 Index: 1.02    LEFT  Brachial: 157  Ankle (PT): Noncompressible Index: Unobtainable  Ankle (DP): Noncompressible Index: Unobtainable  Digit: 165 Index: 0.99    Distal posterior tibial and dorsalis pedis arteries are triphasic. Both digital artery waveforms are present.      Impression    IMPRESSION:  1.  Unable to obtain resting LYLE values given noncompressible arteries. Distal waveforms and TBI's are normal.   MR Tibia Fibula Lower Leg Right wo & w Contr    Narrative    EXAM: MR TIBIA FIBULA LOWER LEG RIGHT W/O and W CONTR  LOCATION: [facility]  DATE: 7/11/2025    INDICATION: Evaluate for osteomyelitis right lower extremity  COMPARISON: None.  TECHNIQUE: Routine. Additional postgadolinium T1 sequences were obtained.  IV CONTRAST: 10 ml Gadavist    FINDINGS:     Diffuse superficial soft tissue edema throughout both lower extremities, primarily within the subcutaneous fat. Mild diffuse intramuscular edema with associated fatty infiltration. No associated abnormal postcontrast enhancement. No fluid collection. No   full-thickness tendon tear.    Small  right knee effusion. Severe patellofemoral compartment osteoarthritis of the right knee with areas of complete chondral loss resulting in bone-on-bone articulation. Left total knee arthroplasty with metallic components creating susceptibility   artifact limiting evaluation of adjacent structures.    No fracture, contusion, or stress injury. No evidence of osteomyelitis. No abnormal intraosseous enhancement.      Impression    IMPRESSION:  1.  Diffuse soft tissue edema throughout both lower extremities.  2.  No fluid collection.  3.  No osteomyelitis.  4.  Right knee effusion. Severe right patellofemoral osteoarthritis.

## 2025-07-13 LAB
ANION GAP SERPL CALCULATED.3IONS-SCNC: 18 MMOL/L (ref 7–15)
BACTERIA SPEC CULT: ABNORMAL
BACTERIA SPEC CULT: ABNORMAL
BASOPHILS # BLD AUTO: 0 10E3/UL (ref 0–0.2)
BASOPHILS NFR BLD AUTO: 0 %
BUN SERPL-MCNC: 32.6 MG/DL (ref 8–23)
CALCIUM SERPL-MCNC: 9.3 MG/DL (ref 8.8–10.4)
CHLORIDE SERPL-SCNC: 103 MMOL/L (ref 98–107)
CREAT SERPL-MCNC: 1.2 MG/DL (ref 0.67–1.17)
EGFRCR SERPLBLD CKD-EPI 2021: 58 ML/MIN/1.73M2
EOSINOPHIL # BLD AUTO: 0.1 10E3/UL (ref 0–0.7)
EOSINOPHIL NFR BLD AUTO: 1 %
ERYTHROCYTE [DISTWIDTH] IN BLOOD BY AUTOMATED COUNT: 14.1 % (ref 10–15)
GLUCOSE BLDC GLUCOMTR-MCNC: 163 MG/DL (ref 70–99)
GLUCOSE BLDC GLUCOMTR-MCNC: 164 MG/DL (ref 70–99)
GLUCOSE BLDC GLUCOMTR-MCNC: 167 MG/DL (ref 70–99)
GLUCOSE BLDC GLUCOMTR-MCNC: 251 MG/DL (ref 70–99)
GLUCOSE SERPL-MCNC: 182 MG/DL (ref 70–99)
HCO3 SERPL-SCNC: 18 MMOL/L (ref 22–29)
HCT VFR BLD AUTO: 37.6 % (ref 40–53)
HGB BLD-MCNC: 12.2 G/DL (ref 13.3–17.7)
IMM GRANULOCYTES # BLD: 0.1 10E3/UL
IMM GRANULOCYTES NFR BLD: 1 %
LYMPHOCYTES # BLD AUTO: 1.2 10E3/UL (ref 0.8–5.3)
LYMPHOCYTES NFR BLD AUTO: 9 %
MCH RBC QN AUTO: 28.8 PG (ref 26.5–33)
MCHC RBC AUTO-ENTMCNC: 32.4 G/DL (ref 31.5–36.5)
MCV RBC AUTO: 89 FL (ref 78–100)
MONOCYTES # BLD AUTO: 0.9 10E3/UL (ref 0–1.3)
MONOCYTES NFR BLD AUTO: 7 %
NEUTROPHILS # BLD AUTO: 11 10E3/UL (ref 1.6–8.3)
NEUTROPHILS NFR BLD AUTO: 82 %
NRBC # BLD AUTO: 0 10E3/UL
NRBC BLD AUTO-RTO: 0 /100
PLATELET # BLD AUTO: 166 10E3/UL (ref 150–450)
POTASSIUM SERPL-SCNC: 4.4 MMOL/L (ref 3.4–5.3)
RBC # BLD AUTO: 4.24 10E6/UL (ref 4.4–5.9)
SODIUM SERPL-SCNC: 139 MMOL/L (ref 135–145)
WBC # BLD AUTO: 13.4 10E3/UL (ref 4–11)

## 2025-07-13 PROCEDURE — 80048 BASIC METABOLIC PNL TOTAL CA: CPT | Performed by: INTERNAL MEDICINE

## 2025-07-13 PROCEDURE — 36415 COLL VENOUS BLD VENIPUNCTURE: CPT | Performed by: INTERNAL MEDICINE

## 2025-07-13 PROCEDURE — G0545 PR INHRENT VISIT TO INPT/OBS W CNFRM/SUSPCT INFCT DIS BY INFCT DIS SPCIALST: HCPCS | Performed by: INTERNAL MEDICINE

## 2025-07-13 PROCEDURE — 99233 SBSQ HOSP IP/OBS HIGH 50: CPT | Performed by: INTERNAL MEDICINE

## 2025-07-13 PROCEDURE — 250N000013 HC RX MED GY IP 250 OP 250 PS 637: Performed by: INTERNAL MEDICINE

## 2025-07-13 PROCEDURE — 250N000011 HC RX IP 250 OP 636: Performed by: INTERNAL MEDICINE

## 2025-07-13 PROCEDURE — 99232 SBSQ HOSP IP/OBS MODERATE 35: CPT | Performed by: INTERNAL MEDICINE

## 2025-07-13 PROCEDURE — 85041 AUTOMATED RBC COUNT: CPT | Performed by: INTERNAL MEDICINE

## 2025-07-13 PROCEDURE — 120N000001 HC R&B MED SURG/OB

## 2025-07-13 RX ADMIN — CEFTRIAXONE 2 G: 2 INJECTION, POWDER, FOR SOLUTION INTRAMUSCULAR; INTRAVENOUS at 13:15

## 2025-07-13 RX ADMIN — APIXABAN 5 MG: 5 TABLET, FILM COATED ORAL at 08:29

## 2025-07-13 RX ADMIN — FUROSEMIDE 20 MG: 20 TABLET ORAL at 08:29

## 2025-07-13 RX ADMIN — LISINOPRIL 5 MG: 5 TABLET ORAL at 08:29

## 2025-07-13 RX ADMIN — AMLODIPINE BESYLATE 5 MG: 5 TABLET ORAL at 08:29

## 2025-07-13 RX ADMIN — APIXABAN 5 MG: 5 TABLET, FILM COATED ORAL at 19:45

## 2025-07-13 RX ADMIN — ACETAMINOPHEN 650 MG: 325 TABLET ORAL at 16:36

## 2025-07-13 RX ADMIN — PRAVASTATIN SODIUM 40 MG: 20 TABLET ORAL at 19:45

## 2025-07-13 RX ADMIN — ACETAMINOPHEN 650 MG: 325 TABLET ORAL at 08:29

## 2025-07-13 ASSESSMENT — ACTIVITIES OF DAILY LIVING (ADL)
ADLS_ACUITY_SCORE: 35
ADLS_ACUITY_SCORE: 35
ADLS_ACUITY_SCORE: 34
ADLS_ACUITY_SCORE: 39
ADLS_ACUITY_SCORE: 34
ADLS_ACUITY_SCORE: 35
ADLS_ACUITY_SCORE: 40
ADLS_ACUITY_SCORE: 35
ADLS_ACUITY_SCORE: 40
ADLS_ACUITY_SCORE: 37
ADLS_ACUITY_SCORE: 39
ADLS_ACUITY_SCORE: 35
ADLS_ACUITY_SCORE: 40
ADLS_ACUITY_SCORE: 37
ADLS_ACUITY_SCORE: 35
ADLS_ACUITY_SCORE: 37
ADLS_ACUITY_SCORE: 40
ADLS_ACUITY_SCORE: 35
ADLS_ACUITY_SCORE: 34
ADLS_ACUITY_SCORE: 35
ADLS_ACUITY_SCORE: 35

## 2025-07-13 NOTE — PROGRESS NOTES
[facility]    Hospitalist Progress Note      Assessment & Plan   Preston Fortune is a 89 year old man who was admitted on 7/10/2025. PMH significant for hypertension, hyperlipidemia, type 2 diabetes, atrial fibrillation, chronic anticoagulation with Eliquis, chronic kidney disease, peptic ulcer disease, melanoma of left eye, pulmonary hypertension, osteoarthritis, renal stone disease, and superior mesenteric artery stenosis. Patient presented with weakness, chills, rigors, and fatigue. He reported feeling well earlier in the day. He was brought to the ED by his family for evaluation.  He denied focal symptoms of infection such as cough, chest pain, abdominal pain, diarrhea, dysuria, or skin change.  He does have some right lower leg erythema that wife notes has been present for several weeks and is slightly worse than it has been.     Emergency department evaluation showed temperature 100.4, heart rate in the 100s, respiratory rate 34 with some improvement while in the ED, and elevated blood pressure.  Laboratory evaluation showed BUN 45, creatinine 1.31, glucose 186, lactic acid 1.7, white blood cells 20.3, and unremarkable urine analysis.  CT of chest with PE protocol and abdomen and pelvis showed no PE.  It did show a 12 x 11 mm distal left ureter stent unchanged from prior.  It showed gallstones.  Patient was given vancomycin and Zosyn with concern for sepsis and admitted to hospitalist service.    Patient's blood cultures have since grown Group C Strep. Antibiotics have been changed to ceftriaxone. MRI negative for osteomyelitis. Continue ceftriaxone for another 1-2 days and follow cultures.       Sepsis  Strep bacteremia  Right lower leg cellulitis vs osteomyelitis  Leukocytosis  Tachycardia  -Patient had subjective fever and chills at home.  Temperature was 100.4 here.  He had associated tachycardia, tachypnea, and leukocytosis.  He does have some erythema on his right lower leg which is  not necessarily new for him but may be a little bit worse than normal.  Inferior and superior margins were outlined.  -Patient had a similar hospitalization at [facility] in 2020.  At that time there was concern about possible cholecystitis although he did not have associated abdominal pain.  He did not have cholecystectomy at that time.  -Patient with known large left ureter stone without hydronephrosis or obvious UTI.  - Blood cultures are growing Group C Strep. Repeat blood cultures negative to date.  - Antibiotics have been changed to ceftriaxone.  - Infectious disease consulted and appreciate recommendations. ID recommends another 1-2 days IV antibiotics. Otherwise no acute events.   - Given patient's right lower leg erythema that wife notes has been present for several weeks and is slightly worse than it has been, obtained MRI right lower extremity which ruled out osteomyelitis.   - Ordered ABIs given suspicion for PVD with known SMA stenosis, but unable to to complete due to vessels not being compressible. Since no evidence of osteomyelitis, will not pursue further inpatient vascular evaluation. Can follow with PCP.      Hypertension   Hyperlipidemia  Atrial fibrillation  Chronic anticoagulation with Eliquis  Pulmonary hypertension  - Continue prior to admission amlodipine, Lasix, lisinopril, pravastatin.   - Have resumed DOAC    Type 2 diabetes  Hgb A1c 4/23/25 was 7.2%.   Holding PTA metformin while inpatient. AC/HS glucose checks and correction insulin ordered.     CKD 3a  Creatinine is 1.3 at baseline.   Renally dose medications and avoid nephrotoxins.   Continue to follow.     Cholelithiasis  -No CT findings suggestive of cholecystitis  -No abdominal pain  -Would workup further if patient develops pain, nausea, or vomiting     Peptic ulcer disease  -Does not appear to be on medication for this     Renal stone disease  -Large distal left ureter stone without hydronephrosis or UTI     DVT Prophylaxis:  DOAC  Code Status: Full Code  Medically Ready for Discharge: Anticipated in 2-4 Days  Expected discharge: Anticipate hospital stay at least 1-2 more days pending further course.    Rebecca Mayer MD FACP  Hospitalist Service  [facility]      Interval History   Patient's initial blood cultures with group C Strep. Repeat cultures negative to date. ID recommends another 1-2 days IV antibiotics. Otherwise no acute events.       -Data reviewed today: I reviewed all new labs and imaging results over the last 24 hours.     Physical Exam   Temp: 98.1  F (36.7  C) Temp src: Oral BP: (!) 158/88 Pulse: 78   Resp: 18 SpO2: 92 % O2 Device: None (Room air)    Vitals:    07/10/25 1822 07/10/25 1921 07/11/25 0100   Weight: 93.9 kg (207 lb) 98.9 kg (218 lb) 96.3 kg (212 lb 4.9 oz)     Vital Signs with Ranges  Temp:  [97.8  F (36.6  C)-98.8  F (37.1  C)] 98.1  F (36.7  C)  Pulse:  [70-85] 78  Resp:  [18-20] 18  BP: (138-158)/(66-88) 158/88  SpO2:  [92 %-93 %] 92 %  I/O last 3 completed shifts:  In: 460 [P.O.:460]  Out: -     Constitutional: Pleasant older gentleman sitting up in chair with family at bedside. Patient is eating a sandwich. Alert and oriented x 3. Answering questions appropriately. No acute distress. Appears non-toxic.   HEENT: NCAT. EOMI. Moist oral mucosa.  Respiratory: Clear to auscultation bilaterally. No crackles or wheezes. No increased work of breathing. On room air.   Cardiovascular: Irregularly irregular rhythm, regular rate. No murmur. Suspect some degree of peripheral vascular disease, no leg hair noted.    GI: Soft, nontender, nondistended. Normoactive bowel sounds.   Extremities: Right lower extremity with a small healing eschar to anterior mid-tibial region. Surrounding erythema is slightly improved, but still present. No longer with warmth. Otherwise no gross deformities.   Neurologic: Alert and oriented x3. No focal neurologic deficits. Did not assess gait.    Medications   Current  Facility-Administered Medications   Medication Dose Route Frequency Provider Last Rate Last Admin    Patient is already receiving anticoagulation with heparin, enoxaparin (LOVENOX), warfarin (COUMADIN)  or other anticoagulant medication   Does not apply Continuous PRN Jl Freed MD         Current Facility-Administered Medications   Medication Dose Route Frequency Provider Last Rate Last Admin    amLODIPine (NORVASC) tablet 5 mg  5 mg Oral Daily Jl Freed MD   5 mg at 07/13/25 0829    apixaban ANTICOAGULANT (ELIQUIS) tablet 5 mg  5 mg Oral BID Rebecca Pickard MD   5 mg at 07/13/25 0829    cefTRIAXone (ROCEPHIN) 2 g vial to attach to  ml bag for ADULTS or NS 50 ml bag for PEDS  2 g Intravenous Q24H Willis Odonnell MD   2 g at 07/13/25 1315    furosemide (LASIX) tablet 20 mg  20 mg Oral Daily Jl Freed MD   20 mg at 07/13/25 0829    insulin aspart (NovoLOG) injection (RAPID ACTING)  1-7 Units Subcutaneous TID AC Jl Freed MD        insulin aspart (NovoLOG) injection (RAPID ACTING)  1-5 Units Subcutaneous At Bedtime Jl Freed MD        lisinopril (ZESTRIL) tablet 5 mg  5 mg Oral Daily Jl Freed MD   5 mg at 07/13/25 0829    pravastatin (PRAVACHOL) tablet 40 mg  40 mg Oral QPM Jl Freed MD   40 mg at 07/12/25 1955       Data   Recent Labs   Lab 07/13/25  1204 07/13/25  0804 07/13/25  0741 07/12/25  0805 07/12/25  0529 07/11/25  1651 07/11/25  0553   WBC  --  13.4*  --   --  15.5*  --  24.2*   HGB  --  12.2*  --   --  11.3*  --  11.7*   MCV  --  89  --   --  88  --  87   PLT  --  166  --   --  172  --  172   NA  --  139  --   --  138  --  138   POTASSIUM  --  4.4  --   --  3.9  --  4.3   CHLORIDE  --  103  --   --  104  --  104   CO2  --  18*  --   --  22  --  22   BUN  --  32.6*  --   --  32.2*  --  37.5*   CR  --  1.20*  --   --  1.28*  --  1.31*   ANIONGAP  --  18*  --   --  12  --  12   ANG  --  9.3  --   --  8.8  --  8.4*   * 182* 164*    < > 166*   < > 133*   ALBUMIN  --   --   --   --  3.4*  --  3.7   PROTTOTAL  --   --   --   --  6.2*  --  5.7*   BILITOTAL  --   --   --   --  0.4  --  0.9   ALKPHOS  --   --   --   --  75  --  66   ALT  --   --   --   --  10  --  8   AST  --   --   --   --  22  --  14    < > = values in this interval not displayed.       No results found for this or any previous visit (from the past 24 hours).

## 2025-07-13 NOTE — PLAN OF CARE
Goal Outcome Evaluation:               PRIMARY DIAGNOSIS: SOFT TISSUE INFECTIONS  OUTPATIENT/OBSERVATION GOALS TO BE MET BEFORE DISCHARGE:  Vitals sign stable or return to baseline: Yes    Tolerating oral antibiotics or has home infusion set up if applicable: No    Pain status: Improved-controlled with oral pain medications.    Return to near baseline physical activity: Yes    Discharge Planner Nurse   Safe discharge environment identified: Yes  Barriers to discharge: Yes       Entered by: Preston Hollingsworth RN 07/13/2025 6:05 PM   2-4 more days of IV antbx per ID. Follow cultures.         Problem: Adult Inpatient Plan of Care  Goal: Absence of Hospital-Acquired Illness or Injury  Intervention: Identify and Manage Fall Risk  Recent Flowsheet Documentation  Taken 7/13/2025 1600 by Preston Hollingsworth RN  Safety Promotion/Fall Prevention:   activity supervised   safety round/check completed   patient and family education   nonskid shoes/slippers when out of bed   assistive device/personal items within reach  Intervention: Prevent and Manage VTE (Venous Thromboembolism) Risk  Recent Flowsheet Documentation  Taken 7/13/2025 1600 by Preston Hollingsworth RN  VTE Prevention/Management: patient refused intervention  Goal: Optimal Comfort and Wellbeing  Intervention: Monitor Pain and Promote Comfort  Recent Flowsheet Documentation  Taken 7/13/2025 1636 by Preston Hollingsworth RN  Pain Management Interventions: medication (see MAR)     Problem: Delirium  Goal: Improved Behavioral Control  Intervention: Minimize Safety Risk  Recent Flowsheet Documentation  Taken 7/13/2025 1600 by Preston Hollingsworth RN  Enhanced Safety Measures:   review medications for side effects with activity   pain management     Problem: Comorbidity Management  Goal: Maintenance of Asthma Control  Intervention: Maintain Asthma Symptom Control  Recent Flowsheet Documentation  Taken 7/13/2025 1600 by Preston Hollingsworth RN  Medication Review/Management:  medications reviewed  Goal: Maintenance of Behavioral Health Symptom Control  Intervention: Maintain Behavioral Health Symptom Control  Recent Flowsheet Documentation  Taken 7/13/2025 1600 by Preston Hollingsworth RN  Medication Review/Management: medications reviewed  Goal: Maintenance of COPD Symptom Control  Intervention: Maintain COPD Symptom Control  Recent Flowsheet Documentation  Taken 7/13/2025 1600 by Preston Hollingsworth RN  Medication Review/Management: medications reviewed  Goal: Blood Glucose Levels Within Targeted Range  Intervention: Monitor and Manage Glycemia  Recent Flowsheet Documentation  Taken 7/13/2025 1600 by Preston Hollingsworth RN  Medication Review/Management: medications reviewed  Goal: Maintenance of Heart Failure Symptom Control  Intervention: Maintain Heart Failure Management  Recent Flowsheet Documentation  Taken 7/13/2025 1600 by Preston Hollingsworth RN  Medication Review/Management: medications reviewed  Goal: Blood Pressure in Desired Range  Intervention: Maintain Blood Pressure Management  Recent Flowsheet Documentation  Taken 7/13/2025 1600 by Preston Hollingsworth RN  Medication Review/Management: medications reviewed  Goal: Maintenance of Osteoarthritis Symptom Control  Intervention: Maintain Osteoarthritis Symptom Control  Recent Flowsheet Documentation  Taken 7/13/2025 1600 by Preston Hollingsworth RN  Medication Review/Management: medications reviewed  Goal: Bariatric Home Regimen Maintained  Intervention: Maintain and Manage Postbariatric Surgery Care  Recent Flowsheet Documentation  Taken 7/13/2025 1600 by Preston Hollingsworth RN  Medication Review/Management: medications reviewed  Goal: Maintenance of Seizure Control  Intervention: Maintain Seizure Symptom Control  Recent Flowsheet Documentation  Taken 7/13/2025 1600 by Preston Hollingsworth RN  Medication Review/Management: medications reviewed

## 2025-07-13 NOTE — PLAN OF CARE
"Goal Outcome Evaluation:      Plan of Care Reviewed With: patient    Overall Patient Progress: no changeOverall Patient Progress: no change    Outcome Evaluation: Patient VSS, RA, c/o general  Pain 4/10, prn tylenol po is given, Mod carb diet, PIV SL. no changes  on tele Afib. pt SBA. Safety check is completed.    Problem: Adult Inpatient Plan of Care  Goal: Plan of Care Review  Description: The Plan of Care Review/Shift note should be completed every shift.  The Outcome Evaluation is a brief statement about your assessment that the patient is improving, declining, or no change.  This information will be displayed automatically on your shift  note.  Outcome: Progressing  Flowsheets (Taken 7/12/2025 1958)  Outcome Evaluation: Patient VSS, RA, c/o general  Pain 4/10, prn tylenol po is given, Mod carb diet, PIV SL. no changes  on tele Afib. pt SBA. Safety check is completed.  Plan of Care Reviewed With: patient  Overall Patient Progress: no change  Goal: Patient-Specific Goal (Individualized)  Description: You can add care plan individualizations to a care plan. Examples of Individualization might be:  \"Parent requests to be called daily at 9am for status\", \"I have a hard time hearing out of my right ear\", or \"Do not touch me to wake me up as it startles  me\".  Outcome: Progressing  Goal: Absence of Hospital-Acquired Illness or Injury  Outcome: Progressing  Intervention: Identify and Manage Fall Risk  Recent Flowsheet Documentation  Taken 7/12/2025 1953 by Lakshmi Tabor, RN  Safety Promotion/Fall Prevention:   activity supervised   treat reversible contributory factors   treat underlying cause   supervised activity   safety round/check completed   nonskid shoes/slippers when out of bed   clutter free environment maintained  Intervention: Prevent Skin Injury  Recent Flowsheet Documentation  Taken 7/12/2025 1953 by Lakshmi Tabor, RN  Body Position: position changed independently  Goal: Optimal Comfort and " Wellbeing  Outcome: Progressing  Goal: Readiness for Transition of Care  Outcome: Progressing

## 2025-07-13 NOTE — PLAN OF CARE
"Pt ambulated unit without assistance, walker, GB, SBA. Received PRN Tylenol for headache. Eating and drinking. AxOx4. Redness on RLE remains within defined boundaries. Eating and drinking. Showered. Hoping for discharge.        Goal Outcome Evaluation:      Plan of Care Reviewed With: patient    Overall Patient Progress: no changeOverall Patient Progress: no change    Outcome Evaluation: Walked halls, Tylenol for headache          Problem: Adult Inpatient Plan of Care  Goal: Plan of Care Review  Description: The Plan of Care Review/Shift note should be completed every shift.  The Outcome Evaluation is a brief statement about your assessment that the patient is improving, declining, or no change.  This information will be displayed automatically on your shift  note.  Outcome: Progressing  Flowsheets (Taken 7/13/2025 0923)  Outcome Evaluation: Walked halls, Tylenol for headache  Plan of Care Reviewed With: patient  Overall Patient Progress: no change  Goal: Patient-Specific Goal (Individualized)  Description: You can add care plan individualizations to a care plan. Examples of Individualization might be:  \"Parent requests to be called daily at 9am for status\", \"I have a hard time hearing out of my right ear\", or \"Do not touch me to wake me up as it startles  me\".  Outcome: Progressing  Goal: Absence of Hospital-Acquired Illness or Injury  Outcome: Progressing  Intervention: Prevent Skin Injury  Recent Flowsheet Documentation  Taken 7/13/2025 0792 by Zeina Talbert RN  Body Position: position changed independently  Goal: Optimal Comfort and Wellbeing  Outcome: Progressing  Goal: Readiness for Transition of Care  Outcome: Progressing     Problem: Delirium  Goal: Optimal Coping  Outcome: Progressing  Goal: Improved Behavioral Control  Outcome: Progressing  Goal: Improved Attention and Thought Clarity  Outcome: Progressing  Goal: Improved Sleep  Outcome: Progressing     Problem: Comorbidity Management  Goal: " Maintenance of Asthma Control  Outcome: Progressing  Goal: Maintenance of Behavioral Health Symptom Control  Outcome: Progressing  Goal: Maintenance of COPD Symptom Control  Outcome: Progressing  Goal: Blood Glucose Levels Within Targeted Range  Outcome: Progressing  Goal: Maintenance of Heart Failure Symptom Control  Outcome: Progressing  Goal: Blood Pressure in Desired Range  Outcome: Progressing  Goal: Maintenance of Osteoarthritis Symptom Control  Outcome: Progressing  Intervention: Maintain Osteoarthritis Symptom Control  Recent Flowsheet Documentation  Taken 7/13/2025 0829 by Zeina Talbert, RN  Assistive Device Utilized:   walker   gait belt  Activity Management:   activity adjusted per tolerance   activity encouraged   ambulated in room   ambulated outside room   ambulated to bathroom   back to bed   up in chair  Goal: Bariatric Home Regimen Maintained  Outcome: Progressing  Goal: Maintenance of Seizure Control  Outcome: Progressing     Problem: Infection  Goal: Absence of Infection Signs and Symptoms  Outcome: Progressing

## 2025-07-13 NOTE — PLAN OF CARE
"[id number]    Inpatient Progress Note:    BP (!) 156/79 (BP Location: Left arm)   Pulse 70   Temp 98.8  F (37.1  C) (Oral)   Resp 18   Ht 1.626 m (5' 4\")   Wt 96.3 kg (212 lb 4.9 oz)   SpO2 92%   BMI 36.44 kg/m           Orientation: A/O*3-4 forgetful  Pain status:  c/o headache pain 4/10 prn tylenol given  Activity: SBA w/walker GB  Peripheral edema: trace BLE  Resp: O2 1L when in sleep  Cardiac: Afib 61  GI: last BM 7/11  :  frequency    Skin: scattered bruising, RLE cellulitis   LDA: PIV   Infusions: SL/abx ceftriaxone  Pertinent Labs: Repeat blood culture  Diet: Mod carb  Consults: infectious Disease  Discharge Plan: TBD  Will continue to monitor and provide cares.     Lakshmi Tabor RN       Goal Outcome Evaluation:      Plan of Care Reviewed With: patient    Overall Patient Progress: no changeOverall Patient Progress: no change    Outcome Evaluation: Patient VSS, RA, c/o general  Pain 4/10, prn tylenol po is given, Mod carb diet, PIV SL. no changes  on tele Afib 61. pt SBA w/walker GB.    Problem: Adult Inpatient Plan of Care  Goal: Plan of Care Review  Description:   7/13/2025 0525 by Lakshmi Tabor RN  Outcome: Progressing  Flowsheets (Taken 7/13/2025 0525)  Plan of Care Reviewed With: patient  Overall Patient Progress: no change  7/12/2025 1958 by Lakshmi Tabor RN  Outcome: Progressing  Flowsheets (Taken 7/12/2025 1958)  Outcome Evaluation: Patient VSS, RA, c/o general  Pain 4/10, prn tylenol po is given, Mod carb diet, PIV SL. no changes  on tele Afib. pt SBA. Safety check is completed.  Plan of Care Reviewed With: patient  Overall Patient Progress: no change  Goal: Patient-Specific Goal (Individualized)  Description: You can add care plan individualizations to a care plan. Examples of Individualization might be:  \"Parent requests to be called daily at 9am for status\", \"I have a hard time hearing out of my right ear\", or \"Do not touch me to wake me up as it startles  me\".  7/13/2025 0525 by " Taher, Jawaher, RN  Outcome: Progressing  7/12/2025 1958 by Lakshmi Tabor RN  Outcome: Progressing  Goal: Absence of Hospital-Acquired Illness or Injury  7/13/2025 0525 by Lakshmi Tabor RN  Outcome: Progressing  7/12/2025 1958 by Lakshmi Tabor RN  Outcome: Progressing  Intervention: Identify and Manage Fall Risk  Recent Flowsheet Documentation  Taken 7/13/2025 0000 by Lakshmi Tabor RN  Safety Promotion/Fall Prevention:   activity supervised   treat reversible contributory factors   treat underlying cause   supervised activity   safety round/check completed   nonskid shoes/slippers when out of bed   clutter free environment maintained  Taken 7/12/2025 1953 by Lakshmi Tabor RN  Safety Promotion/Fall Prevention:   activity supervised   treat reversible contributory factors   treat underlying cause   supervised activity   safety round/check completed   nonskid shoes/slippers when out of bed   clutter free environment maintained  Intervention: Prevent Skin Injury  Recent Flowsheet Documentation  Taken 7/13/2025 0000 by Lakshmi Tabor RN  Body Position: position changed independently  Taken 7/12/2025 1953 by Lakshmi Tabor RN  Body Position: position changed independently  Goal: Optimal Comfort and Wellbeing  7/13/2025 0525 by Lakshmi Tabor RN  Outcome: Progressing  7/12/2025 1958 by Lakshmi Tabor RN  Outcome: Progressing  Goal: Readiness for Transition of Care  7/13/2025 0525 by Lakshmi Tabor RN  Outcome: Progressing  7/12/2025 1958 by Lakshmi aTbor RN  Outcome: Progressing

## 2025-07-13 NOTE — PROGRESS NOTES
[facility]    Infectious Disease Progress Note    Date of Service (when I saw the patient): 07/13/2025     Assessment & Plan   Preston Fortune is a 89 year old male who was admitted on 7/10/2025.     Impression:  89 yo admitted with chills and rigors acute onset   PMH of hypertension, hyperlipidemia, type 2 diabetes, atrial fibrillation, chronic anticoagulation with Eliquis, chronic kidney disease, peptic ulcer disease, melanoma of left eye, pulmonary hypertension, osteoarthritis, renal stone disease, and superior mesenteric artery stenosis.   Blood culture 1//2 but 2 sets with strep species   Slight erythema in the right shin with slight warmth, ? Early cellulitis MRI is negative for any deep infection     Recommendations   Continue on ceftriaxone another 24- 48 hours   Watch repeat culture   Continue to follow the redness in the leg       Willis Odonnell MD    Interval History   Tolerating antibiotics ok   No new rashes or issues with antibiotics   Labs reviewed   No changes to past medical, social or family history         Physical Exam   Temp: 98.1  F (36.7  C) Temp src: Oral BP: (!) 158/88 Pulse: 78   Resp: 18 SpO2: 92 % O2 Device: None (Room air)    Vitals:    07/10/25 1822 07/10/25 1921 07/11/25 0100   Weight: 93.9 kg (207 lb) 98.9 kg (218 lb) 96.3 kg (212 lb 4.9 oz)     Vital Signs with Ranges  Temp:  [97.8  F (36.6  C)-98.8  F (37.1  C)] 98.1  F (36.7  C)  Pulse:  [70-85] 78  Resp:  [18-20] 18  BP: (138-158)/(66-88) 158/88  SpO2:  [92 %-93 %] 92 %    Constitutional: Awake, alert, cooperative, no apparent distress  Lungs: Clear to auscultation bilaterally, no crackles or wheezing  Cardiovascular: Regular rate and rhythm, normal S1 and S2, and no murmur noted  Abdomen: Normal bowel sounds, soft, non-distended, non-tender  Skin: redness still persists in the right shin    Other:    Medications   Current Facility-Administered Medications   Medication Dose Route Frequency Provider Last Rate  "Last Admin    Patient is already receiving anticoagulation with heparin, enoxaparin (LOVENOX), warfarin (COUMADIN)  or other anticoagulant medication   Does not apply Continuous PRN Jl Freed MD         Current Facility-Administered Medications   Medication Dose Route Frequency Provider Last Rate Last Admin    amLODIPine (NORVASC) tablet 5 mg  5 mg Oral Daily Jl Freed MD   5 mg at 07/13/25 0829    apixaban ANTICOAGULANT (ELIQUIS) tablet 5 mg  5 mg Oral BID Rebecca Pickard MD   5 mg at 07/13/25 0829    cefTRIAXone (ROCEPHIN) 2 g vial to attach to  ml bag for ADULTS or NS 50 ml bag for PEDS  2 g Intravenous Q24H Willis Odonnell MD   2 g at 07/12/25 1257    furosemide (LASIX) tablet 20 mg  20 mg Oral Daily Jl Freed MD   20 mg at 07/13/25 0829    insulin aspart (NovoLOG) injection (RAPID ACTING)  1-7 Units Subcutaneous TID AC Jl Freed MD        insulin aspart (NovoLOG) injection (RAPID ACTING)  1-5 Units Subcutaneous At Bedtime Jl Freed MD        lisinopril (ZESTRIL) tablet 5 mg  5 mg Oral Daily Jl Freed MD   5 mg at 07/13/25 0829    pravastatin (PRAVACHOL) tablet 40 mg  40 mg Oral QPM Jl Freed MD   40 mg at 07/12/25 1955       Data   All microbiology laboratory data reviewed.  Recent Labs   Lab Test 07/13/25  0804 07/12/25  0529 07/11/25  0553   WBC 13.4* 15.5* 24.2*   HGB 12.2* 11.3* 11.7*   HCT 37.6* 33.8* 34.8*   MCV 89 88 87    172 172     Recent Labs   Lab Test 07/13/25  0804 07/12/25  0529 07/11/25  0553   CR 1.20* 1.28* 1.31*     Recent Labs   Lab Test 07/12/25  0529   SED 27*     No lab results found.    Invalid input(s): \"UC\"    All cultures:  Recent Labs   Lab 07/12/25  0534 07/12/25  0529 07/11/25  1007 07/11/25  0838 07/10/25  1941 07/10/25  1859   CULTURE No growth after 1 day No growth after 1 day No growth after 1 day No growth after 2 days Positive on the 1st day of incubation*  Streptococcus dysgalactiae (Group C " Streptococcus)* Positive on the 1st day of incubation*  Streptococcus dysgalactiae (Group C Streptococcus)*      Blood culture:  Results for orders placed or performed during the hospital encounter of 07/10/25   Blood Culture Peripheral blood (BC) Hand, Left    Collection Time: 07/12/25  5:34 AM    Specimen: Hand, Left; Peripheral blood (BC)   Result Value Ref Range    Culture No growth after 1 day    Blood Culture Peripheral blood (BC) Hand, Right    Collection Time: 07/12/25  5:29 AM    Specimen: Hand, Right; Peripheral blood (BC)   Result Value Ref Range    Culture No growth after 1 day    Blood Culture Peripheral blood (BC) Hand, Left    Collection Time: 07/11/25 10:07 AM    Specimen: Hand, Left; Peripheral blood (BC)   Result Value Ref Range    Culture No growth after 1 day    Blood Culture Peripheral blood (BC) Arm, Left    Collection Time: 07/11/25  8:38 AM    Specimen: Arm, Left; Peripheral blood (BC)   Result Value Ref Range    Culture No growth after 2 days    Blood Culture Peripheral blood (BC) Hand, Left    Collection Time: 07/10/25  7:41 PM    Specimen: Hand, Left; Peripheral blood (BC)   Result Value Ref Range    Culture Positive on the 1st day of incubation (A)     Culture Streptococcus dysgalactiae (Group C Streptococcus) (AA)    Blood Culture Peripheral blood (BC) Arm, Right    Collection Time: 07/10/25  6:59 PM    Specimen: Arm, Right; Peripheral blood (BC)   Result Value Ref Range    Culture Positive on the 1st day of incubation (A)     Culture Streptococcus dysgalactiae (Group C Streptococcus) (AA)        Susceptibility    Streptococcus dysgalactiae (Group C Streptococcus) - SAY     Penicillin 0.016 Susceptible ug/mL     Ceftriaxone 0.047 Susceptible ug/mL     Meropenem 0.012 Susceptible ug/mL     Vancomycin 0.5 Susceptible ug/mL     Cefotaxime 0.023 Susceptible ug/mL      Urine culture:  No results found for this or any previous visit.

## 2025-07-14 ENCOUNTER — ENROLLMENT (OUTPATIENT)
Dept: HOME HEALTH SERVICES | Facility: HOME HEALTH | Age: 89
End: 2025-07-14
Payer: COMMERCIAL

## 2025-07-14 LAB
ANION GAP SERPL CALCULATED.3IONS-SCNC: 11 MMOL/L (ref 7–15)
BACTERIA SPEC CULT: ABNORMAL
BACTERIA SPEC CULT: ABNORMAL
BASOPHILS # BLD AUTO: 0.1 10E3/UL (ref 0–0.2)
BASOPHILS NFR BLD AUTO: 1 %
BUN SERPL-MCNC: 41.1 MG/DL (ref 8–23)
CALCIUM SERPL-MCNC: 8.7 MG/DL (ref 8.8–10.4)
CHLORIDE SERPL-SCNC: 106 MMOL/L (ref 98–107)
CREAT SERPL-MCNC: 1.27 MG/DL (ref 0.67–1.17)
EGFRCR SERPLBLD CKD-EPI 2021: 54 ML/MIN/1.73M2
EOSINOPHIL # BLD AUTO: 0.5 10E3/UL (ref 0–0.7)
EOSINOPHIL NFR BLD AUTO: 6 %
ERYTHROCYTE [DISTWIDTH] IN BLOOD BY AUTOMATED COUNT: 13.8 % (ref 10–15)
GLUCOSE BLDC GLUCOMTR-MCNC: 150 MG/DL (ref 70–99)
GLUCOSE BLDC GLUCOMTR-MCNC: 167 MG/DL (ref 70–99)
GLUCOSE BLDC GLUCOMTR-MCNC: 205 MG/DL (ref 70–99)
GLUCOSE SERPL-MCNC: 138 MG/DL (ref 70–99)
HCO3 SERPL-SCNC: 24 MMOL/L (ref 22–29)
HCT VFR BLD AUTO: 32.9 % (ref 40–53)
HGB BLD-MCNC: 10.6 G/DL (ref 13.3–17.7)
IMM GRANULOCYTES # BLD: 0.1 10E3/UL
IMM GRANULOCYTES NFR BLD: 1 %
LYMPHOCYTES # BLD AUTO: 1.4 10E3/UL (ref 0.8–5.3)
LYMPHOCYTES NFR BLD AUTO: 17 %
MCH RBC QN AUTO: 28.8 PG (ref 26.5–33)
MCHC RBC AUTO-ENTMCNC: 32.2 G/DL (ref 31.5–36.5)
MCV RBC AUTO: 89 FL (ref 78–100)
MONOCYTES # BLD AUTO: 0.7 10E3/UL (ref 0–1.3)
MONOCYTES NFR BLD AUTO: 8 %
NEUTROPHILS # BLD AUTO: 5.8 10E3/UL (ref 1.6–8.3)
NEUTROPHILS NFR BLD AUTO: 68 %
NRBC # BLD AUTO: 0 10E3/UL
NRBC BLD AUTO-RTO: 0 /100
PLATELET # BLD AUTO: 168 10E3/UL (ref 150–450)
POTASSIUM SERPL-SCNC: 3.8 MMOL/L (ref 3.4–5.3)
RBC # BLD AUTO: 3.68 10E6/UL (ref 4.4–5.9)
SODIUM SERPL-SCNC: 141 MMOL/L (ref 135–145)
WBC # BLD AUTO: 8.6 10E3/UL (ref 4–11)

## 2025-07-14 PROCEDURE — 99232 SBSQ HOSP IP/OBS MODERATE 35: CPT | Performed by: INTERNAL MEDICINE

## 2025-07-14 PROCEDURE — 85025 COMPLETE CBC W/AUTO DIFF WBC: CPT | Performed by: INTERNAL MEDICINE

## 2025-07-14 PROCEDURE — 36415 COLL VENOUS BLD VENIPUNCTURE: CPT | Performed by: INTERNAL MEDICINE

## 2025-07-14 PROCEDURE — 250N000013 HC RX MED GY IP 250 OP 250 PS 637: Performed by: INTERNAL MEDICINE

## 2025-07-14 PROCEDURE — 120N000001 HC R&B MED SURG/OB

## 2025-07-14 PROCEDURE — 82310 ASSAY OF CALCIUM: CPT | Performed by: INTERNAL MEDICINE

## 2025-07-14 PROCEDURE — 250N000012 HC RX MED GY IP 250 OP 636 PS 637: Performed by: INTERNAL MEDICINE

## 2025-07-14 PROCEDURE — 250N000011 HC RX IP 250 OP 636: Performed by: INTERNAL MEDICINE

## 2025-07-14 RX ORDER — CEFTRIAXONE 1 G/1
2000 INJECTION, POWDER, FOR SOLUTION INTRAMUSCULAR; INTRAVENOUS DAILY
Qty: 1 EACH | Refills: 1 | Status: SHIPPED | OUTPATIENT
Start: 2025-07-14 | End: 2025-07-15

## 2025-07-14 RX ORDER — APIXABAN 5 MG/1
5 TABLET, FILM COATED ORAL 2 TIMES DAILY
Qty: 180 TABLET | Refills: 1 | Status: SHIPPED | OUTPATIENT
Start: 2025-07-14

## 2025-07-14 RX ADMIN — AMLODIPINE BESYLATE 5 MG: 5 TABLET ORAL at 08:30

## 2025-07-14 RX ADMIN — PRAVASTATIN SODIUM 40 MG: 20 TABLET ORAL at 19:54

## 2025-07-14 RX ADMIN — APIXABAN 5 MG: 5 TABLET, FILM COATED ORAL at 19:53

## 2025-07-14 RX ADMIN — LISINOPRIL 5 MG: 5 TABLET ORAL at 08:30

## 2025-07-14 RX ADMIN — INSULIN ASPART 1 UNITS: 100 INJECTION, SOLUTION INTRAVENOUS; SUBCUTANEOUS at 21:56

## 2025-07-14 RX ADMIN — FUROSEMIDE 20 MG: 20 TABLET ORAL at 08:30

## 2025-07-14 RX ADMIN — CEFTRIAXONE 2 G: 2 INJECTION, POWDER, FOR SOLUTION INTRAMUSCULAR; INTRAVENOUS at 13:25

## 2025-07-14 RX ADMIN — ACETAMINOPHEN 650 MG: 325 TABLET ORAL at 19:53

## 2025-07-14 RX ADMIN — APIXABAN 5 MG: 5 TABLET, FILM COATED ORAL at 08:30

## 2025-07-14 ASSESSMENT — ACTIVITIES OF DAILY LIVING (ADL)
ADLS_ACUITY_SCORE: 37

## 2025-07-14 NOTE — PLAN OF CARE
INPATIENT NOTE: CARES PROVIDED FROM [id number]  Diagnosis:  Strep bacteremia.    Temp: 98.6  F (37  C) Temp src: Oral BP: 139/73 Pulse: 70   Resp: 16 SpO2: 95 % O2 Device: None (Room air)      Pt is Aox4, VSS on RA. Denies CP/SOB/Nausea. RLE with redness, leg is outlined. Denies numbness and tingling to RLE. Up SBA with walker and GB. Ambulates halls with family. Tolerating mod carb meals. Educated pt and family on sliding scale insulin due to taking metformin at home. Tele: A.fib controlled 70s. Able to make needs known.     Pain: Denies pain.     Lines: New IV placed, SL in-between IV Rocephin q24h.     Plan: Per ID, needs Midline and infusion outpatient. SW following for infusion setup. Vascular access consult for midline placement. Educated pt on what to expect for midline placement. Follow cultures.     Plan of Care Reviewed With: patient, family  Overall Patient Progress: improving  Outcome Evaluation: AOx4, VSS on RA. Denies pain. Up SBA with walker. IV Rocephin. Plan for midline placement once infusion plan is set. Anticipate discharge tomorrow.    Problem: Adult Inpatient Plan of Care  Goal: Plan of Care Review  Recent Flowsheet Documentation  Taken 7/14/2025 1411 by Namita Gonzalez RN  Outcome Evaluation: AOx4, VSS on RA. Denies pain. Up SBA with walker. IV Rocephin. Plan for midline placement once infusion plan is set. Anticipate discharge tomorrow.  Plan of Care Reviewed With:   patient   family  Overall Patient Progress: improving  Goal: Absence of Hospital-Acquired Illness or Injury  Intervention: Identify and Manage Fall Risk  Recent Flowsheet Documentation  Taken 7/14/2025 0893 by Namita Gonzalez RN  Safety Promotion/Fall Prevention:   activity supervised   safety round/check completed   patient and family education   nonskid shoes/slippers when out of bed   assistive device/personal items within reach  Intervention: Prevent Skin Injury  Recent Flowsheet  Documentation  Taken 7/14/2025 0827 by Namita Gonzalez RN  Body Position: position changed independently  Intervention: Prevent and Manage VTE (Venous Thromboembolism) Risk  Recent Flowsheet Documentation  Taken 7/14/2025 0827 by Namita Gonzalez RN  VTE Prevention/Management:   patient refused intervention   SCDs off (sequential compression devices)  Intervention: Prevent Infection  Recent Flowsheet Documentation  Taken 7/14/2025 0827 by Namita Gonzalez RN  Infection Prevention:   rest/sleep promoted   personal protective equipment utilized   equipment surfaces disinfected   hand hygiene promoted     Problem: Comorbidity Management  Goal: Blood Glucose Levels Within Targeted Range  7/14/2025 1411 by Namita Gonzalez RN  Outcome: Progressing  Intervention: Monitor and Manage Glycemia  Recent Flowsheet Documentation  Taken 7/14/2025 0827 by Namita Gonzalez RN  Medication Review/Management: medications reviewed     Problem: Comorbidity Management  Goal: Blood Pressure in Desired Range  7/14/2025 1411 by Namita Gonzalez RN  Outcome: Progressing  Intervention: Maintain Blood Pressure Management  Recent Flowsheet Documentation  Taken 7/14/2025 0827 by Namita Gonzalez RN  Medication Review/Management: medications reviewed     Problem: Infection  Goal: Absence of Infection Signs and Symptoms  7/14/2025 1411 by Namita Gonzalez RN  Outcome: Progressing

## 2025-07-14 NOTE — PROGRESS NOTES
[facility]    Infectious Disease Progress Note    Date of Service (when I saw the patient): 07/14/2025     Assessment & Plan   Preston Fortune is a 89 year old male who was admitted on 7/10/2025.     Impression:  89 yo admitted with chills and rigors acute onset   PMH of hypertension, hyperlipidemia, type 2 diabetes, atrial fibrillation, chronic anticoagulation with Eliquis, chronic kidney disease, peptic ulcer disease, melanoma of left eye, pulmonary hypertension, osteoarthritis, renal stone disease, and superior mesenteric artery stenosis.   Blood culture 1//2 but 2 sets with strep species   Slight erythema in the right shin with slight warmth, ? 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  Definitely full 2 weeks IV, soc sx , midline orders in as though home IV, if infusion center will place care plan  Discussed in detail pt and family      Luis Enrique Boyd MD    Interval History   Tolerating antibiotics ok   No new rashes or issues with antibiotics   Labs reviewed   No changes to past medical, social or family history   Looks well, no fever   leg OK      Physical Exam   Temp: 97.8  F (36.6  C) Temp src: Oral BP: (!) 153/86 Pulse: 82   Resp: 16 SpO2: 97 % O2 Device: None (Room air)    Vitals:    07/10/25 1822 07/10/25 1921 07/11/25 0100   Weight: 93.9 kg (207 lb) 98.9 kg (218 lb) 96.3 kg (212 lb 4.9 oz)     Vital Signs with Ranges  Temp:  [97.6  F (36.4  C)-98.3  F (36.8  C)] 97.8  F (36.6  C)  Pulse:  [59-82] 82  Resp:  [16-18] 16  BP: (121-153)/(57-86) 153/86  SpO2:  [94 %-97 %] 97 %    Constitutional: Awake, alert, cooperative, no apparent distress  Lungs: Clear to auscultation bilaterally, no crackles or wheezing  Cardiovascular: Regular rate and rhythm, normal S1 and S2, and no murmur noted  Abdomen: Normal bowel sounds, soft, non-distended, non-tender  Skin:  "redness still persists in the right shin  sl edema, is likely the source   Other:    Medications   Current Facility-Administered Medications   Medication Dose Route Frequency Provider Last Rate Last Admin    Patient is already receiving anticoagulation with heparin, enoxaparin (LOVENOX), warfarin (COUMADIN)  or other anticoagulant medication   Does not apply Continuous PRN Jl Freed MD         Current Facility-Administered Medications   Medication Dose Route Frequency Provider Last Rate Last Admin    amLODIPine (NORVASC) tablet 5 mg  5 mg Oral Daily Jl Freed MD   5 mg at 07/14/25 0830    apixaban ANTICOAGULANT (ELIQUIS) tablet 5 mg  5 mg Oral BID Rebecca Pickard MD   5 mg at 07/14/25 0830    cefTRIAXone (ROCEPHIN) 2 g vial to attach to  ml bag for ADULTS or NS 50 ml bag for PEDS  2 g Intravenous Q24H Willis Odonnell MD   2 g at 07/13/25 1315    furosemide (LASIX) tablet 20 mg  20 mg Oral Daily Jl Freed MD   20 mg at 07/14/25 0830    insulin aspart (NovoLOG) injection (RAPID ACTING)  1-7 Units Subcutaneous TID AC Jl Freed MD   1 Units at 07/13/25 1741    insulin aspart (NovoLOG) injection (RAPID ACTING)  1-5 Units Subcutaneous At Bedtime Jl Freed MD        lisinopril (ZESTRIL) tablet 5 mg  5 mg Oral Daily Jl Freed MD   5 mg at 07/14/25 0830    pravastatin (PRAVACHOL) tablet 40 mg  40 mg Oral QPM Jl Freed MD   40 mg at 07/13/25 1945       Data   All microbiology laboratory data reviewed.  Recent Labs   Lab Test 07/14/25  0539 07/13/25  0804 07/12/25  0529   WBC 8.6 13.4* 15.5*   HGB 10.6* 12.2* 11.3*   HCT 32.9* 37.6* 33.8*   MCV 89 89 88    166 172     Recent Labs   Lab Test 07/14/25  0539 07/13/25  0804 07/12/25  0529   CR 1.27* 1.20* 1.28*     Recent Labs   Lab Test 07/12/25  0529   SED 27*     No lab results found.    Invalid input(s): \"UC\"    All cultures:  Recent Labs   Lab 07/12/25  0534 07/12/25  0529 07/11/25  1007 " 07/11/25  0838 07/10/25  1941 07/10/25  1859   CULTURE No growth after 2 days No growth after 2 days No growth after 2 days No growth after 3 days Positive on the 1st day of incubation*  Streptococcus dysgalactiae (Group C Streptococcus)* Positive on the 1st day of incubation*  Streptococcus dysgalactiae (Group C Streptococcus)*      Blood culture:  Results for orders placed or performed during the hospital encounter of 07/10/25   Blood Culture Peripheral blood (BC) Hand, Left    Collection Time: 07/12/25  5:34 AM    Specimen: Hand, Left; Peripheral blood (BC)   Result Value Ref Range    Culture No growth after 2 days    Blood Culture Peripheral blood (BC) Hand, Right    Collection Time: 07/12/25  5:29 AM    Specimen: Hand, Right; Peripheral blood (BC)   Result Value Ref Range    Culture No growth after 2 days    Blood Culture Peripheral blood (BC) Hand, Left    Collection Time: 07/11/25 10:07 AM    Specimen: Hand, Left; Peripheral blood (BC)   Result Value Ref Range    Culture No growth after 2 days    Blood Culture Peripheral blood (BC) Arm, Left    Collection Time: 07/11/25  8:38 AM    Specimen: Arm, Left; Peripheral blood (BC)   Result Value Ref Range    Culture No growth after 3 days    Blood Culture Peripheral blood (BC) Hand, Left    Collection Time: 07/10/25  7:41 PM    Specimen: Hand, Left; Peripheral blood (BC)   Result Value Ref Range    Culture Positive on the 1st day of incubation (A)     Culture Streptococcus dysgalactiae (Group C Streptococcus) (AA)    Blood Culture Peripheral blood (BC) Arm, Right    Collection Time: 07/10/25  6:59 PM    Specimen: Arm, Right; Peripheral blood (BC)   Result Value Ref Range    Culture Positive on the 1st day of incubation (A)     Culture Streptococcus dysgalactiae (Group C Streptococcus) (AA)        Susceptibility    Streptococcus dysgalactiae (Group C Streptococcus) - SAY     Penicillin 0.016 Susceptible ug/mL     Ceftriaxone 0.047 Susceptible ug/mL     Meropenem  0.012 Susceptible ug/mL     Vancomycin 0.5 Susceptible ug/mL     Cefotaxime 0.023 Susceptible ug/mL      Urine culture:  No results found for this or any previous visit.

## 2025-07-14 NOTE — PROGRESS NOTES
[facility]    Hospitalist Progress Note      Assessment & Plan   Preston Fortune is a 89 year old man who was admitted on 7/10/2025. PMH significant for hypertension, hyperlipidemia, type 2 diabetes, atrial fibrillation, chronic anticoagulation with Eliquis, chronic kidney disease, peptic ulcer disease, melanoma of left eye, pulmonary hypertension, osteoarthritis, renal stone disease, and superior mesenteric artery stenosis. Patient presented with weakness, chills, rigors, and fatigue. He reported feeling well earlier in the day. He was brought to the ED by his family for evaluation.  He denied focal symptoms of infection such as cough, chest pain, abdominal pain, diarrhea, dysuria, or skin change.  He does have some right lower leg erythema that wife notes has been present for several weeks and is slightly worse than it has been.     Emergency department evaluation showed temperature 100.4, heart rate in the 100s, respiratory rate 34 with some improvement while in the ED, and elevated blood pressure.  Laboratory evaluation showed BUN 45, creatinine 1.31, glucose 186, lactic acid 1.7, white blood cells 20.3, and unremarkable urine analysis.  CT of chest with PE protocol and abdomen and pelvis showed no PE.  It did show a 12 x 11 mm distal left ureter stent unchanged from prior.  It showed gallstones.  Patient was given vancomycin and Zosyn with concern for sepsis and admitted to hospitalist service.    Patient's blood cultures have since grown Group C Strep. Antibiotics have been changed to ceftriaxone. MRI negative for osteomyelitis. Continue ceftriaxone for another 1-2 days and follow cultures.       Sepsis  Strep bacteremia  Right lower leg cellulitis vs osteomyelitis  Leukocytosis  Tachycardia  -Patient had subjective fever and chills at home.  Temperature was 100.4 here.  He had associated tachycardia, tachypnea, and leukocytosis.  He does have some erythema on his right lower leg which is  not necessarily new for him but may be a little bit worse than normal.  Inferior and superior margins were outlined.  -Patient had a similar hospitalization at [facility] in 2020.  At that time there was concern about possible cholecystitis although he did not have associated abdominal pain.  He did not have cholecystectomy at that time.  -Patient with known large left ureter stone without hydronephrosis or obvious UTI.  - Blood cultures are growing Group C Strep. Repeat blood cultures negative to date.  - Antibiotics have been changed to ceftriaxone.  - Infectious disease consulted and appreciate recommendations.  ID recommends that patient complete 2 full weeks IV antibiotics.  Midline has been ordered and planning for home infusions.  - Given patient's right lower leg erythema that wife notes has been present for several weeks and is slightly worse than it has been, obtained MRI right lower extremity which ruled out osteomyelitis.   - Ordered ABIs given suspicion for PVD with known SMA stenosis, but unable to to complete due to vessels not being compressible. Since no evidence of osteomyelitis, will not pursue further inpatient vascular evaluation. Can follow with PCP.      Hypertension   Hyperlipidemia  Persistent atrial fibrillation  Chronic anticoagulation with Eliquis  Pulmonary hypertension  - Continue prior to admission amlodipine, Lasix, lisinopril, pravastatin.   - Have resumed DOAC    Type 2 diabetes  Hgb A1c 4/23/25 was 7.2%.   Holding PTA metformin while inpatient. AC/HS glucose checks and correction insulin ordered.     CKD 3a  Creatinine is 1.3 at baseline.   Renally dose medications and avoid nephrotoxins.   Continue to follow.     Cholelithiasis  -No CT findings suggestive of cholecystitis  -No abdominal pain  -Would workup further if patient develops pain, nausea, or vomiting     Peptic ulcer disease  -Does not appear to be on medication for this     Renal stone disease  -Large distal left ureter  stone without hydronephrosis or UTI     DVT Prophylaxis: DOAC  Code Status: Full Code  Medically Ready for Discharge: Anticipated Tomorrow  Expected discharge: Anticipate discharge home tomorrow pending midline and if home infusions can be arranged.      Rebecca Mayer MD FACP  Hospitalist Service  [facility]      Interval History   Infectious disease recommends 14 day course of IV Rocephin.  Pending midline and coordination for home infusion.      -Data reviewed today: I reviewed all new labs and imaging results over the last 24 hours.     Physical Exam   Temp: 98.6  F (37  C) Temp src: Oral BP: 139/73 Pulse: 70   Resp: 16 SpO2: 95 % O2 Device: None (Room air)    Vitals:    07/10/25 1822 07/10/25 1921 07/11/25 0100   Weight: 93.9 kg (207 lb) 98.9 kg (218 lb) 96.3 kg (212 lb 4.9 oz)     Vital Signs with Ranges  Temp:  [97.6  F (36.4  C)-98.6  F (37  C)] 98.6  F (37  C)  Pulse:  [59-82] 70  Resp:  [16-18] 16  BP: (128-153)/(65-86) 139/73  SpO2:  [94 %-97 %] 95 %  No intake/output data recorded.    Constitutional: Pleasant older gentleman resting in bed with family at bedside and on speakerphone. Alert and oriented x 3. Answering questions appropriately. No acute distress. Appears non-toxic.   HEENT: NCAT. EOMI. Moist oral mucosa.  Respiratory: Clear to auscultation bilaterally. No crackles or wheezes. No increased work of breathing. On room air.   Cardiovascular: Irregularly irregular rhythm, regular rate. No murmur. Suspect some degree of peripheral vascular disease, no leg hair noted.    GI: Soft, nontender, nondistended. Normoactive bowel sounds.   Extremities: Right lower extremity with a small healing eschar to anterior mid-tibial region. Surrounding erythema continues to appear slightly improved, but still present. No longer warm. Otherwise no gross deformities.   Neurologic: Alert and oriented x3. No focal neurologic deficits. Did not assess gait.    Medications   Current  Facility-Administered Medications   Medication Dose Route Frequency Provider Last Rate Last Admin    Patient is already receiving anticoagulation with heparin, enoxaparin (LOVENOX), warfarin (COUMADIN)  or other anticoagulant medication   Does not apply Continuous PRN Jl Freed MD         Current Facility-Administered Medications   Medication Dose Route Frequency Provider Last Rate Last Admin    amLODIPine (NORVASC) tablet 5 mg  5 mg Oral Daily Jl Freed MD   5 mg at 07/14/25 0830    apixaban ANTICOAGULANT (ELIQUIS) tablet 5 mg  5 mg Oral BID Rebecca Pickard MD   5 mg at 07/14/25 0830    cefTRIAXone (ROCEPHIN) 2 g vial to attach to  ml bag for ADULTS or NS 50 ml bag for PEDS  2 g Intravenous Q24H Willis Odonnell MD   Stopped at 07/14/25 1400    furosemide (LASIX) tablet 20 mg  20 mg Oral Daily Jl Freed MD   20 mg at 07/14/25 0830    insulin aspart (NovoLOG) injection (RAPID ACTING)  1-7 Units Subcutaneous TID AC Jl Freed MD   1 Units at 07/14/25 1244    insulin aspart (NovoLOG) injection (RAPID ACTING)  1-5 Units Subcutaneous At Bedtime Jl Freed MD        lisinopril (ZESTRIL) tablet 5 mg  5 mg Oral Daily Jl Freed MD   5 mg at 07/14/25 0830    pravastatin (PRAVACHOL) tablet 40 mg  40 mg Oral QPM Jl Freed MD   40 mg at 07/13/25 1945       Data   Recent Labs   Lab 07/14/25  1158 07/14/25  0539 07/13/25  2125 07/13/25  1204 07/13/25  0804 07/12/25  0805 07/12/25  0529 07/11/25  1651 07/11/25  0553   WBC  --  8.6  --   --  13.4*  --  15.5*  --  24.2*   HGB  --  10.6*  --   --  12.2*  --  11.3*  --  11.7*   MCV  --  89  --   --  89  --  88  --  87   PLT  --  168  --   --  166  --  172  --  172   NA  --  141  --   --  139  --  138  --  138   POTASSIUM  --  3.8  --   --  4.4  --  3.9  --  4.3   CHLORIDE  --  106  --   --  103  --  104  --  104   CO2  --  24  --   --  18*  --  22  --  22   BUN  --  41.1*  --   --  32.6*  --  32.2*  --  37.5*   CR   --  1.27*  --   --  1.20*  --  1.28*  --  1.31*   ANIONGAP  --  11  --   --  18*  --  12  --  12   ANG  --  8.7*  --   --  9.3  --  8.8  --  8.4*   * 138* 163*   < > 182*   < > 166*   < > 133*   ALBUMIN  --   --   --   --   --   --  3.4*  --  3.7   PROTTOTAL  --   --   --   --   --   --  6.2*  --  5.7*   BILITOTAL  --   --   --   --   --   --  0.4  --  0.9   ALKPHOS  --   --   --   --   --   --  75  --  66   ALT  --   --   --   --   --   --  10  --  8   AST  --   --   --   --   --   --  22  --  14    < > = values in this interval not displayed.       No results found for this or any previous visit (from the past 24 hours).

## 2025-07-14 NOTE — PLAN OF CARE
Orientation: A/O*3-4 forgetful  Pain status:  c/o headache pain 4/10 prn tylenol given  Activity: SBA w/walker GB  Peripheral edema: trace BLE  Resp: RA  Cardiac: Afib 61  GI: last BM 7/13  :  frequency    Skin: scattered bruising, LE cellulitis   LDA: PIV   Infusions: SL/abx ceftriaxone  Pertinent Labs: Repeat blood culture  Diet: Mod carb  Consults: infectious Disease  Discharge Plan: Waiting BC Result   Will continue to monitor and provide cares.   Goal Outcome Evaluation:      Plan of Care Reviewed With: patient    Overall Patient Progress: no changeOverall Patient Progress: no change    Outcome Evaluation: Patient VSS, RA, c/o general  Pain 4/10, prn tylenol po is given, Mod carb diet, PIV SL. no changes  on tele Afib. pt SBA. Safety check is completed.    Problem: Adult Inpatient Plan of Care  Goal: Absence of Hospital-Acquired Illness or Injury  Intervention: Identify and Manage Fall Risk  Recent Flowsheet Documentation  Taken 7/13/2025 1947 by Lakshmi Tabor RN  Safety Promotion/Fall Prevention:   activity supervised   safety round/check completed   patient and family education   nonskid shoes/slippers when out of bed   assistive device/personal items within reach  Intervention: Prevent and Manage VTE (Venous Thromboembolism) Risk  Recent Flowsheet Documentation  Taken 7/13/2025 1947 by Lakshmi Tabor RN  VTE Prevention/Management: patient refused intervention

## 2025-07-14 NOTE — PLAN OF CARE
Goal Outcome Evaluation:      Plan of Care Reviewed With: patient    Overall Patient Progress: no changeOverall Patient Progress: no change             Problem: Adult Inpatient Plan of Care  Goal: Absence of Hospital-Acquired Illness or Injury  Intervention: Identify and Manage Fall Risk  Recent Flowsheet Documentation  Taken 7/14/2025 0000 by Lakshmi Tabor RN  Safety Promotion/Fall Prevention:   activity supervised   safety round/check completed   patient and family education   nonskid shoes/slippers when out of bed   assistive device/personal items within reach  Taken 7/13/2025 1947 by Lakshmi Tabor RN  Safety Promotion/Fall Prevention:   activity supervised   safety round/check completed   patient and family education   nonskid shoes/slippers when out of bed   assistive device/personal items within reach  Intervention: Prevent and Manage VTE (Venous Thromboembolism) Risk  Recent Flowsheet Documentation  Taken 7/14/2025 0000 by Lakshmi Tabor RN  VTE Prevention/Management: patient refused intervention  Taken 7/13/2025 1947 by Lakshmi Tabor RN  VTE Prevention/Management: patient refused intervention

## 2025-07-14 NOTE — CONSULTS
Care Management Follow Up    Length of Stay (days): 3    Expected Discharge Date: 07/15/2025     Concerns to be Addressed: no discharge needs identified     Patient plan of care discussed at interdisciplinary rounds: Yes    Anticipated Discharge Disposition: Home Infusion  Anticipated Discharge Services: None  Anticipated Discharge DME: None    Patient/family educated on Medicare website which has current facility and service quality ratings:  yes  Education Provided on the Discharge Plan:  yes  Patient/Family in Agreement with the Plan:  yes    Referrals Placed by CM/SW:  Home Infusion    Discussed  Partnership in Safe Discharge Planning  document with patient/family: No     Handoff Completed: Yes, [facility] PCP: Internal handoff referral completed    Additional Information:  Per provider patient will now require IV abx at discharge.     CM spoke w patient and family at bedside, they were aware and agreeable to this. They would like to move forward with plans for home infusion, CM sent benefit check. Patient's family would like to be involved w home infusion teaching as well.     The patient shared that he lives w his spouse and adult daughter in a single family home, is typically independent w ambulation but uses a cane when outside the home. He denied using medical equipment including oxygen at baseline.     Next Steps: follow up on [facility] benefit check    Joanie Calles RN, BSN  Inpatient Care Coordination  [facility]  [phone]   Ambulatory

## 2025-07-15 ENCOUNTER — HOME INFUSION BILLING (OUTPATIENT)
Dept: HOME HEALTH SERVICES | Facility: HOME HEALTH | Age: 89
End: 2025-07-15
Payer: COMMERCIAL

## 2025-07-15 ENCOUNTER — HOME INFUSION (OUTPATIENT)
Dept: HOME HEALTH SERVICES | Facility: HOME HEALTH | Age: 89
End: 2025-07-15
Payer: COMMERCIAL

## 2025-07-15 VITALS
HEIGHT: 64 IN | DIASTOLIC BLOOD PRESSURE: 73 MMHG | OXYGEN SATURATION: 97 % | RESPIRATION RATE: 18 BRPM | BODY MASS INDEX: 36.25 KG/M2 | HEART RATE: 67 BPM | WEIGHT: 212.3 LBS | TEMPERATURE: 97.5 F | SYSTOLIC BLOOD PRESSURE: 149 MMHG

## 2025-07-15 DIAGNOSIS — R78.81 GRAM-POSITIVE BACTEREMIA: Primary | ICD-10-CM

## 2025-07-15 DIAGNOSIS — R78.81 GRAM-POSITIVE BACTEREMIA: ICD-10-CM

## 2025-07-15 LAB
ANION GAP SERPL CALCULATED.3IONS-SCNC: 10 MMOL/L (ref 7–15)
BASOPHILS # BLD AUTO: 0.1 10E3/UL (ref 0–0.2)
BASOPHILS NFR BLD AUTO: 1 %
BUN SERPL-MCNC: 33.8 MG/DL (ref 8–23)
CALCIUM SERPL-MCNC: 8.8 MG/DL (ref 8.8–10.4)
CHLORIDE SERPL-SCNC: 106 MMOL/L (ref 98–107)
CREAT SERPL-MCNC: 1.12 MG/DL (ref 0.67–1.17)
EGFRCR SERPLBLD CKD-EPI 2021: 63 ML/MIN/1.73M2
EOSINOPHIL # BLD AUTO: 0.6 10E3/UL (ref 0–0.7)
EOSINOPHIL NFR BLD AUTO: 6 %
ERYTHROCYTE [DISTWIDTH] IN BLOOD BY AUTOMATED COUNT: 13.7 % (ref 10–15)
GLUCOSE BLDC GLUCOMTR-MCNC: 127 MG/DL (ref 70–99)
GLUCOSE BLDC GLUCOMTR-MCNC: 134 MG/DL (ref 70–99)
GLUCOSE BLDC GLUCOMTR-MCNC: 188 MG/DL (ref 70–99)
GLUCOSE SERPL-MCNC: 139 MG/DL (ref 70–99)
HCO3 SERPL-SCNC: 23 MMOL/L (ref 22–29)
HCT VFR BLD AUTO: 34.2 % (ref 40–53)
HGB BLD-MCNC: 11.2 G/DL (ref 13.3–17.7)
IMM GRANULOCYTES # BLD: 0.1 10E3/UL
IMM GRANULOCYTES NFR BLD: 1 %
LYMPHOCYTES # BLD AUTO: 1.6 10E3/UL (ref 0.8–5.3)
LYMPHOCYTES NFR BLD AUTO: 17 %
MAGNESIUM SERPL-MCNC: 2.1 MG/DL (ref 1.7–2.3)
MCH RBC QN AUTO: 29.2 PG (ref 26.5–33)
MCHC RBC AUTO-ENTMCNC: 32.7 G/DL (ref 31.5–36.5)
MCV RBC AUTO: 89 FL (ref 78–100)
MONOCYTES # BLD AUTO: 0.8 10E3/UL (ref 0–1.3)
MONOCYTES NFR BLD AUTO: 9 %
NEUTROPHILS # BLD AUTO: 5.9 10E3/UL (ref 1.6–8.3)
NEUTROPHILS NFR BLD AUTO: 66 %
NRBC # BLD AUTO: 0 10E3/UL
NRBC BLD AUTO-RTO: 0 /100
PLATELET # BLD AUTO: 202 10E3/UL (ref 150–450)
POTASSIUM SERPL-SCNC: 3.9 MMOL/L (ref 3.4–5.3)
POTASSIUM SERPL-SCNC: 4 MMOL/L (ref 3.4–5.3)
RBC # BLD AUTO: 3.83 10E6/UL (ref 4.4–5.9)
SODIUM SERPL-SCNC: 139 MMOL/L (ref 135–145)
WBC # BLD AUTO: 9 10E3/UL (ref 4–11)

## 2025-07-15 PROCEDURE — 272N000748 HC KIT, CATH 3FR OR 4FR SINGLE LUMEN POWERMIDLINE

## 2025-07-15 PROCEDURE — 83735 ASSAY OF MAGNESIUM: CPT | Performed by: STUDENT IN AN ORGANIZED HEALTH CARE EDUCATION/TRAINING PROGRAM

## 2025-07-15 PROCEDURE — 272N000433 HC KIT CATH IV 18 OR 20G CM, POWERGLIDE W MAX BARRIER

## 2025-07-15 PROCEDURE — 36415 COLL VENOUS BLD VENIPUNCTURE: CPT | Performed by: INTERNAL MEDICINE

## 2025-07-15 PROCEDURE — 250N000011 HC RX IP 250 OP 636: Performed by: INTERNAL MEDICINE

## 2025-07-15 PROCEDURE — 85004 AUTOMATED DIFF WBC COUNT: CPT | Performed by: INTERNAL MEDICINE

## 2025-07-15 PROCEDURE — 99239 HOSP IP/OBS DSCHRG MGMT >30: CPT | Performed by: INTERNAL MEDICINE

## 2025-07-15 PROCEDURE — 250N000009 HC RX 250: Performed by: INTERNAL MEDICINE

## 2025-07-15 PROCEDURE — 82565 ASSAY OF CREATININE: CPT | Performed by: INTERNAL MEDICINE

## 2025-07-15 PROCEDURE — 36569 INSJ PICC 5 YR+ W/O IMAGING: CPT

## 2025-07-15 PROCEDURE — 84132 ASSAY OF SERUM POTASSIUM: CPT | Performed by: STUDENT IN AN ORGANIZED HEALTH CARE EDUCATION/TRAINING PROGRAM

## 2025-07-15 PROCEDURE — 250N000013 HC RX MED GY IP 250 OP 250 PS 637: Performed by: INTERNAL MEDICINE

## 2025-07-15 PROCEDURE — 36415 COLL VENOUS BLD VENIPUNCTURE: CPT | Performed by: STUDENT IN AN ORGANIZED HEALTH CARE EDUCATION/TRAINING PROGRAM

## 2025-07-15 RX ORDER — CEFTRIAXONE SODIUM 10 G/100ML
2000 INJECTION, POWDER, FOR SOLUTION INTRAVENOUS EVERY 24 HOURS
Qty: 999999 ML | Refills: 0 | Status: DISPENSED | OUTPATIENT
Start: 2025-07-15 | End: 2025-07-21

## 2025-07-15 RX ADMIN — FUROSEMIDE 20 MG: 20 TABLET ORAL at 07:49

## 2025-07-15 RX ADMIN — LIDOCAINE HYDROCHLORIDE 2 ML: 10 INJECTION, SOLUTION INFILTRATION; PERINEURAL at 13:43

## 2025-07-15 RX ADMIN — ACETAMINOPHEN 650 MG: 325 TABLET ORAL at 03:22

## 2025-07-15 RX ADMIN — CEFTRIAXONE 2 G: 2 INJECTION, POWDER, FOR SOLUTION INTRAMUSCULAR; INTRAVENOUS at 12:50

## 2025-07-15 RX ADMIN — AMLODIPINE BESYLATE 5 MG: 5 TABLET ORAL at 07:49

## 2025-07-15 RX ADMIN — APIXABAN 5 MG: 5 TABLET, FILM COATED ORAL at 07:49

## 2025-07-15 RX ADMIN — LISINOPRIL 5 MG: 5 TABLET ORAL at 07:49

## 2025-07-15 ASSESSMENT — ACTIVITIES OF DAILY LIVING (ADL)
ADLS_ACUITY_SCORE: 37
ADLS_ACUITY_SCORE: 40
ADLS_ACUITY_SCORE: 37
ADLS_ACUITY_SCORE: 40
ADLS_ACUITY_SCORE: 37

## 2025-07-15 NOTE — DISCHARGE SUMMARY
[facility]    Discharge Summary  Hospitalist    Date of Admission:  7/10/2025  Date of Discharge:  7/15/2025  Discharging Provider: Rebecca Mayer MD  Date of Service (when I saw the patient): 07/15/25    Discharge Diagnoses   Sepsis  Group C Strep bacteremia  Right lower leg cellulitis  Leukocytosis  Tachycardia  Hypertension   Hyperlipidemia  Persistent atrial fibrillation  Chronic anticoagulation with Eliquis  Pulmonary hypertension  Type 2 diabetes  CKD 3a  Cholelithiasis  Peptic ulcer disease  Renal stone disease    History of Present Illness   Preston Fortune is a 89 year old man who was admitted on 7/10/2025. PMH significant for hypertension, hyperlipidemia, type 2 diabetes, atrial fibrillation, chronic anticoagulation with Eliquis, chronic kidney disease, peptic ulcer disease, melanoma of left eye, pulmonary hypertension, osteoarthritis, renal stone disease, and superior mesenteric artery stenosis. Patient presented with weakness, chills, rigors, and fatigue. He reported feeling well earlier in the day. He was brought to the ED by his family for evaluation.  He denied focal symptoms of infection such as cough, chest pain, abdominal pain, diarrhea, dysuria, or skin change.  He does have some right lower leg erythema that wife notes has been present for several weeks and is slightly worse than it has been.      Emergency department evaluation showed temperature 100.4, heart rate in the 100s, respiratory rate 34 with some improvement while in the ED, and elevated blood pressure.  Laboratory evaluation showed BUN 45, creatinine 1.31, glucose 186, lactic acid 1.7, white blood cells 20.3, and unremarkable urine analysis.  CT of chest with PE protocol and abdomen and pelvis showed no PE.  It did show a 12 x 11 mm distal left ureter stent unchanged from prior.  It showed gallstones.  Patient was given vancomycin and Zosyn with concern for sepsis and admitted to hospitalist service.      Patient's blood cultures have since grown Group C Strep. Antibiotics have been changed to ceftriaxone. MRI negative for osteomyelitis. Infectious disease recommending continue ceftriaxone for total 14 day course. Midline being placed and patient will complete antibiotics via home infusion.     Hospital Course   Preston Fortune was admitted on 7/10/2025.  The following problems were addressed during his hospitalization:    Sepsis  Group C Strep bacteremia  Right lower leg cellulitis  Leukocytosis  Tachycardia  -Patient had subjective fever and chills at home.  Temperature was 100.4 here.  He had associated tachycardia, tachypnea, and leukocytosis.  He does have some erythema on his right lower leg which is not necessarily new for him but may be a little bit worse than normal.  Inferior and superior margins were outlined.  -Patient had a similar hospitalization at [facility] in 2020.  At that time there was concern about possible cholecystitis although he did not have associated abdominal pain.  He did not have cholecystectomy at that time.  -Patient with known large left ureter stone without hydronephrosis or obvious UTI.  - Blood cultures are growing Group C Strep. Repeat blood cultures negative to date.  - Antibiotics have been changed to ceftriaxone.  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 No warmth or swelling.   - Infectious disease consulted and appreciate recommendations.  ID recommends that patient complete 2 full weeks IV ceftriaxone.  Midline has been ordered and planning for home infusions.  - Given patient's right lower leg erythema that wife notes has been present for several weeks and is slightly worse than it has been, obtained MRI right lower extremity which ruled out osteomyelitis.   - Ordered ABIs given suspicion for PVD with known SMA stenosis, but unable to to complete due to vessels not being compressible. Since no evidence of osteomyelitis, will not pursue  further inpatient vascular evaluation. Can follow with PCP.      Hypertension   Hyperlipidemia  Persistent atrial fibrillation  Chronic anticoagulation with Eliquis  Pulmonary hypertension  - Continue prior to admission amlodipine, Lasix, lisinopril, pravastatin.   - Have resumed DOAC     Type 2 diabetes  Hgb A1c 4/23/25 was 7.2%.   Held PTA metformin while inpatient. Resuming on discharge.     CKD 3a  Creatinine is 1.3 at baseline.   Renally dose medications and avoid nephrotoxins.   Continue to follow.     Cholelithiasis  -No CT findings suggestive of cholecystitis  -No abdominal pain  -Would workup further if patient develops pain, nausea, or vomiting     Peptic ulcer disease  -Does not appear to be on medication for this     Renal stone disease  -Large distal left ureter stone without hydronephrosis or UTI. Follow with PCP.     Rebecca Mayer MD FACP  Hospitalist Service  [facility]      Significant Results and Procedures   Imaging studies and labs below.     Two different blood cultures collected 7/10/25 grew Group G Strep        Pending Results   These results will be followed up by primary care and infectious disease team as needed. Repeat blood cultures remain negative to date.   Unresulted Labs Ordered in the Past 30 Days of this Admission       Date and Time Order Name Status Description    7/12/2025 12:00 AM Blood Culture Peripheral blood (BC) Hand, Right Preliminary     7/12/2025 12:00 AM Blood Culture Peripheral blood (BC) Hand, Left Preliminary     7/11/2025  9:58 AM Blood Culture Peripheral blood (BC) Hand, Left Preliminary     7/11/2025  6:26 AM Blood Culture Peripheral blood (BC) Arm, Left Preliminary             Code Status   Full Code       Primary Care Physician   Cornell Tomlinson    Physical Exam   Temp: 97.5  F (36.4  C) Temp src: Oral BP: (!) 149/73 Pulse: 67   Resp: 18 SpO2: 97 % O2 Device: None (Room air)    Vitals:    07/10/25 1822 07/10/25 1921 07/11/25 0100   Weight: 93.9 kg  (207 lb) 98.9 kg (218 lb) 96.3 kg (212 lb 4.9 oz)     Vital Signs with Ranges  Temp:  [97.4  F (36.3  C)-98.6  F (37  C)] 97.5  F (36.4  C)  Pulse:  [55-73] 67  Resp:  [16-20] 18  BP: (137-156)/(58-82) 149/73  SpO2:  [93 %-97 %] 97 %  I/O last 3 completed shifts:  In: 240 [P.O.:240]  Out: -     Constitutional: Pleasant older gentleman sitting up in chair with family at bedside. Alert and oriented x 3. Answering questions appropriately. No acute distress. Appears non-toxic.   HEENT: NCAT. EOMI. Moist oral mucosa.  Respiratory: Clear to auscultation bilaterally. No crackles or wheezes. No increased work of breathing. On room air.   Cardiovascular: Irregularly irregular rhythm, regular rate. No murmur. Suspect some degree of peripheral vascular disease, no leg hair noted.    GI: Soft, nontender, nondistended. Normoactive bowel sounds.   Extremities: Right lower extremity with a small well-healing wound to anterior mid-tibial region. Surrounding erythema appears slightly improved from past days, but still present. No longer warm. Otherwise no gross deformities.   Neurologic: Alert and oriented x3. No focal neurologic deficits. Did not assess gait.       Discharge Disposition   Discharged to home  Condition at discharge: Stable    Consultations This Hospital Stay   PHARMACY TO DOSE VANCO  PHARMACY TO DOSE VANCO  CARE MANAGEMENT / SOCIAL WORK IP CONSULT  CARE MANAGEMENT / SOCIAL WORK IP CONSULT  INFECTIOUS DISEASES IP CONSULT  VASCULAR ACCESS ADULT IP CONSULT  CARE MANAGEMENT / SOCIAL WORK IP CONSULT    Time Spent on this Encounter   IRebecca MD, personally saw the patient today and spent greater than 30 minutes discharging this patient.    Discharge Orders      Primary Care - Care Coordination Referral      Home Infusion Referral      Home Care Referral      Reason for your hospital stay    You were hospitalized for evaluation of weakness and chills. You were found to have a fever and your blood cultures  grew Group C Strep. Infectious disease recommends a 14 day course of antibiotics with ceftriaxone. You will need to follow with your primary care provider for hospital follow up within 1 week.     Activity    Your activity upon discharge: activity as tolerated     IV access    You are going home with the following vascular access device: Midline.     Diet    Follow this diet upon discharge: Diabetic diet     Hospital Follow-up with Existing Primary Care Provider (PCP)          Discharge Medications   Current Discharge Medication List        START taking these medications    Details   cefTRIAXone (ROCEPHIN) 1 GM vial Inject 2 g (2,000 mg) over 15-30 minutes into the vein daily for 7 days. CBC with differential, creatinine, SGOT weekly while on this medication to be faxed to Dr. Boyd office.  Qty: 1 each, Refills: 1    Associated Diagnoses: Gram-positive bacteremia           CONTINUE these medications which have NOT CHANGED    Details   acetaminophen (TYLENOL) 500 MG tablet [ACETAMINOPHEN (TYLENOL) 500 MG TABLET] Take 500 mg by mouth every 6 (six) hours as needed for pain.      amLODIPine (NORVASC) 10 MG tablet Take 0.5 tablets (5 mg) by mouth daily.  Qty: 45 tablet, Refills: 3    Associated Diagnoses: Chronic kidney disease, stage 3a (H); Hypertension goal BP (blood pressure) < 140/90      cetirizine (ZYRTEC) 10 mg cap [CETIRIZINE (ZYRTEC) 10 MG CAP] Take 10 mg by mouth daily as needed.       cholecalciferol, vitamin D3, 50 mcg (2,000 unit) Tab [CHOLECALCIFEROL, VITAMIN D3, 50 MCG (2,000 UNIT) TAB] Take 2,000 Units by mouth daily.      ferrous sulfate (FE TABS) 325 (65 Fe) MG EC tablet Take 325 mg by mouth every other day.      furosemide (LASIX) 20 MG tablet TAKE 1 TABLET BY MOUTH EVERY DAY  Qty: 90 tablet, Refills: 1    Associated Diagnoses: Edema, unspecified type; Hypertension goal BP (blood pressure) < 140/90      lisinopril (ZESTRIL) 5 MG tablet Take 1 tablet (5 mg) by mouth daily.  Qty: 90 tablet, Refills: 3     Associated Diagnoses: Type 2 diabetes mellitus with stage 2 chronic kidney disease, without long-term current use of insulin (H); Type 2 diabetes, HbA1c goal < 7% (H); Proteinuria, unspecified type      metFORMIN (GLUCOPHAGE XR) 500 MG 24 hr tablet Take 1 tablet (500 mg) by mouth daily (with dinner).  Qty: 90 tablet, Refills: 3    Associated Diagnoses: Type 2 diabetes mellitus with stage 3a chronic kidney disease, without long-term current use of insulin (H); Type 2 diabetes mellitus with other circulatory complication, without long-term current use of insulin (H); Type 2 diabetes, HbA1c goal < 7% (H)      Multiple Vitamins-Minerals (PRESERVISION AREDS 2 PO) Take 1 tablet by mouth 2 times daily.      pravastatin (PRAVACHOL) 40 MG tablet TAKE 1 TABLET BY MOUTH EVERYDAY AT BEDTIME  Qty: 90 tablet, Refills: 3    Associated Diagnoses: Type 2 diabetes mellitus with stage 2 chronic kidney disease, without long-term current use of insulin (H); Type 2 diabetes, HbA1c goal < 7% (H)      psyllium (METAMUCIL/KONSYL) capsule Take 2 capsules by mouth 3 times daily.      apixaban ANTICOAGULANT (ELIQUIS ANTICOAGULANT) 5 MG tablet TAKE 1 TABLET BY MOUTH TWICE A DAY  Qty: 180 tablet, Refills: 1    Associated Diagnoses: Atrial fibrillation, unspecified type (H)      generic lancets [GENERIC LANCETS] Use 1 each As Directed daily. Dispense brand per patient's insurance at pharmacy discretion.(E11.9)  Qty: 100 each, Refills: 1    Associated Diagnoses: Diabetes mellitus type 2, noninsulin dependent (H)      ONETOUCH ULTRA test strip USE TO TEST ONCE DAILY  Qty: 100 strip, Refills: 0    Associated Diagnoses: Diabetes mellitus type 2, noninsulin dependent (H)           Allergies   No Known Allergies  Data   Most Recent 3 CBC's:  Recent Labs   Lab Test 07/15/25  0544 07/14/25  0539 07/13/25  0804   WBC 9.0 8.6 13.4*   HGB 11.2* 10.6* 12.2*   MCV 89 89 89    168 166      Most Recent 3 BMP's:  Recent Labs   Lab Test 07/15/25  1100  07/15/25  0746 07/15/25  0544 07/15/25  0208 07/15/25  0024 07/14/25  1158 07/14/25  0539 07/13/25  1204 07/13/25  0804   NA  --   --  139  --   --   --  141  --  139   POTASSIUM  --   --  3.9  --  4.0  --  3.8  --  4.4   CHLORIDE  --   --  106  --   --   --  106  --  103   CO2  --   --  23  --   --   --  24  --  18*   BUN  --   --  33.8*  --   --   --  41.1*  --  32.6*   CR  --   --  1.12  --   --   --  1.27*  --  1.20*   ANIONGAP  --   --  10  --   --   --  11  --  18*   ANG  --   --  8.8  --   --   --  8.7*  --  9.3   * 127* 139*   < >  --    < > 138*   < > 182*    < > = values in this interval not displayed.     Most Recent 2 LFT's:  Recent Labs   Lab Test 07/12/25  0529 07/11/25  0553   AST 22 14   ALT 10 8   ALKPHOS 75 66   BILITOTAL 0.4 0.9     Results for orders placed or performed during the hospital encounter of 07/10/25   CT Chest (PE) Abdomen Pelvis w Contrast    Narrative    EXAM: CT CHEST PE ABDOMEN PELVIS W CONTRAST  LOCATION: [facility]  DATE: 07/10/2025    INDICATION: Fever, tachcyardia, hypoxia; evaluate source of sepsis vs PE.  COMPARISON: 11/26/2020 CT abdomen and pelvis.  TECHNIQUE: CT chest pulmonary angiogram and routine CT abdomen and pelvis with IV contrast. Arterial phase through the chest and venous phase through the abdomen and pelvis. Multiplanar reformats and MIP reconstructions were performed. Dose reduction   techniques were used.   CONTRAST: 100 mL Isovue 370.    FINDINGS:  ANGIOGRAM CHEST: Pulmonary arteries are normal caliber and negative for pulmonary emboli. Thoracic aorta is negative for dissection. No CT evidence of right heart strain.     LUNGS AND PLEURA: Bronchiolar wall thickening compatible with bronchiolitis. Mild mosaic attenuation can be seen with air trapping. Mild atelectatic changes in the lung bases. Very minimal interstitial change in the lung bases.    MEDIASTINUM/AXILLAE: Normal.    CORONARY ARTERY CALCIFICATION:  Severe.    HEPATOBILIARY: Calcified gallstone.    PANCREAS: Normal.    SPLEEN: Normal.    ADRENAL GLANDS: Normal.    KIDNEYS/BLADDER: A few cysts in the kidneys. No followup needed. There is a large 12 x 11 mm stone within the distal left ureter without hydronephrosis. This is unchanged.    BOWEL: Duodenal diverticulum. Normal appendix.    LYMPH NODES: Normal.    VASCULATURE: Normal.    PELVIC ORGANS: Prostatic enlargement.    MUSCULOSKELETAL: Lumbar degenerative change.      Impression    IMPRESSION:  1.  No pulmonary embolism.  2.  Bronchiolar wall thickening compatible with bronchiolitis. Mild mosaic attenuation can be seen with air trapping.  3.  Very minimal interstitial change in the lung bases.  4.  Cholelithiasis.  5.  Large 12 x 11 mm stone in the distal left ureter without hydronephrosis. This is unchanged.  6.  Prostatic enlargement.   US LYLE Doppler No Exercise    Narrative    EXAM: RESTING ANKLE-BRACHIAL INDICES (ABIs)  LOCATION: [facility]  DATE: 7/11/2025    INDICATION: Assess ABIs, concern for osteo and curious about PVD  COMPARISON: None.    LYLE FINDINGS:  RIGHT  Brachial: 166  Ankle (PT): Noncompressible Index: Unobtainable  Ankle (DP): Noncompressible Index: Unobtainable  Digit: 169 Index: 1.02    LEFT  Brachial: 157  Ankle (PT): Noncompressible Index: Unobtainable  Ankle (DP): Noncompressible Index: Unobtainable  Digit: 165 Index: 0.99    Distal posterior tibial and dorsalis pedis arteries are triphasic. Both digital artery waveforms are present.      Impression    IMPRESSION:  1.  Unable to obtain resting LYLE values given noncompressible arteries. Distal waveforms and TBI's are normal.   MR Tibia Fibula Lower Leg Right wo & w Contr    Narrative    EXAM: MR TIBIA FIBULA LOWER LEG RIGHT W/O and W CONTR  LOCATION: [facility]  DATE: 7/11/2025    INDICATION: Evaluate for osteomyelitis right lower extremity  COMPARISON: None.  TECHNIQUE: Routine. Additional  postgadolinium T1 sequences were obtained.  IV CONTRAST: 10 ml Gadavist    FINDINGS:     Diffuse superficial soft tissue edema throughout both lower extremities, primarily within the subcutaneous fat. Mild diffuse intramuscular edema with associated fatty infiltration. No associated abnormal postcontrast enhancement. No fluid collection. No   full-thickness tendon tear.    Small right knee effusion. Severe patellofemoral compartment osteoarthritis of the right knee with areas of complete chondral loss resulting in bone-on-bone articulation. Left total knee arthroplasty with metallic components creating susceptibility   artifact limiting evaluation of adjacent structures.    No fracture, contusion, or stress injury. No evidence of osteomyelitis. No abnormal intraosseous enhancement.      Impression    IMPRESSION:  1.  Diffuse soft tissue edema throughout both lower extremities.  2.  No fluid collection.  3.  No osteomyelitis.  4.  Right knee effusion. Severe right patellofemoral osteoarthritis.

## 2025-07-15 NOTE — LETTER
Home Infusion Plan of Care 7/15/25   Effective from: 7/15/2025  Effective to: 7/15/2026  Last Updated On: 7/15/2025    Plan ID: [id number]                Participants as of Finalize on 7/15/2025      Name Type Comments Contact Info    Luis Enrique Boyd MD Interdisciplinary  [facility] [address] 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     Cardiovascular/Peripheral Vascular  Hypertension goal BP (blood pressure) < 140/90  Longstanding persistent atrial fibrillation (H)  Pulmonary hypertension (H)     Respiratory/Allergy  Allergic rhinitis  Environmental allergies     Endocrine  Hyperlipidemia LDL goal <70  Morbid obesity (H)  Type 2 diabetes mellitus with stage 2 chronic kidney disease, without long-term current use of insulin (H)  Type 2 diabetes mellitus with stage 3a chronic kidney disease, without long-term current use of insulin (H)  Type 2 diabetes, HbA1c goal < 7% (H)    Blood  Anemia     FEN/Gastrointestinal  Cholelithiasis without cholecystitis  Hemorrhage of rectum and anus  Intestinal angina  Superior mesenteric artery stenosis     Genitourinary/Renal  BPH (benign prostatic hyperplasia)  CKD (chronic kidney disease) stage 2, GFR 60-89 ml/min  Chronic kidney disease, stage 3a (H)  Nephrolithiasis  Proteinuria, unspecified type            Orthopedic/Musculoskeletal  Lumbosacral spondylosis without myelopathy  S/P total knee arthroplasty     ID/Immune/Lymphatic  SIRS (systemic inflammatory response syndrome) (H)    "  Other  Edema, unspecified type                                  Active Services       Anti-infective       Therapy Start: Therapy End: Linked Line: Line Type: Lumens:       -- -- Midline Catheter Single Lumen Left Basilic Midline 1       Medications     Sodium Chloride Flush     cefTRIAXone Sodium (ROCEPHIN)              Equipment  None            Notes                                 Medications       acetaminophen (TYLENOL) 500 MG tablet    amLODIPine (NORVASC) 10 MG tablet    apixaban ANTICOAGULANT (ELIQUIS ANTICOAGULANT) 5 MG tablet    cefTRIAXone (ROCEPHIN) injection    cetirizine (ZYRTEC) 10 mg cap    cholecalciferol, vitamin D3, 50 mcg (2,000 unit) Tab    Emergency Supply Kit, Central,    ferrous sulfate (FE TABS) 325 (65 Fe) MG EC tablet    furosemide (LASIX) 20 MG tablet    generic lancets    lisinopril (ZESTRIL) 5 MG tablet    metFORMIN (GLUCOPHAGE XR) 500 MG 24 hr tablet    Multiple Vitamins-Minerals (PRESERVISION AREDS 2 PO)    ONETOUCH ULTRA test strip    pravastatin (PRAVACHOL) 40 MG tablet    psyllium (METAMUCIL/KONSYL) capsule    sodium chloride, PF, 0.9% PF flush           Hospital Medications       acetaminophen (TYLENOL) Suppository 650 mg    Linked Group 1: Placed in \"Or\" Linked Group    acetaminophen (TYLENOL) tablet 650 mg    Linked Group 1: Placed in \"Or\" Linked Group    amLODIPine (NORVASC) tablet 5 mg    apixaban ANTICOAGULANT (ELIQUIS) tablet 5 mg    bisacodyl (DULCOLAX) suppository 10 mg    cefTRIAXone (ROCEPHIN) 2 g vial to attach to  ml bag for ADULTS or NS 50 ml bag for PEDS    dextrose 50 % injection 25-50 mL    Linked Group 2: Placed in \"Or\" Linked Group    furosemide (LASIX) tablet 20 mg    glucagon injection 1 mg    Linked Group 2: Placed in \"Or\" Linked Group    glucose gel 15-30 g    Linked Group 2: Placed in \"Or\" Linked Group    hydrALAZINE (APRESOLINE) injection 10 mg    Linked Group 3: Placed in \"Or\" Linked Group    hydrALAZINE (APRESOLINE) tablet 10 mg    Linked Group " "3: Placed in \"Or\" Linked Group    insulin aspart (NovoLOG) injection (RAPID ACTING)    insulin aspart (NovoLOG) injection (RAPID ACTING)    lidocaine 1 % 0.1-5 mL    lisinopril (ZESTRIL) tablet 5 mg    melatonin tablet 1 mg    ondansetron (ZOFRAN ODT) ODT tab 4 mg    Linked Group 4: Placed in \"Or\" Linked Group    ondansetron (ZOFRAN) injection 4 mg    Linked Group 4: Placed in \"Or\" Linked Group    Patient is already receiving anticoagulation with heparin, enoxaparin (LOVENOX), warfarin (COUMADIN)  or other anticoagulant medication    polyethylene glycol (MIRALAX) Packet 17 g    pravastatin (PRAVACHOL) tablet 40 mg    prochlorperazine (COMPAZINE) injection 5 mg    Linked Group 5: Placed in \"Or\" Linked Group    prochlorperazine (COMPAZINE) tablet 5 mg    Linked Group 5: Placed in \"Or\" Linked Group    senna-docusate (SENOKOT-S/PERICOLACE) 8.6-50 MG per tablet 1 tablet    Linked Group 6: Placed in \"Or\" Linked Group    senna-docusate (SENOKOT-S/PERICOLACE) 8.6-50 MG per tablet 2 tablet    Linked Group 6: Placed in \"Or\" Linked Group           Template: Bridging - Nursing       Problem: Bridging Nursing Needed       Disciplines: Nursing, Home Infusion, PHARM      Goal: Care Coordination       Disciplines: Nursing, Home Infusion, PHARM      Intervention: CARE COORD: 1-3 Skilled Nurse visits for teaching until patient is independent with therapy               Intervention: Care Coordination: [facility] will bill:  [facility] for nursing visits                                                                      Template: [facility] ANTI-INFECTIVE DISPENSING AND MONITORING - Pharmacy       Problem: Additional Care       Disciplines: Home Infusion      Goal: Flushing orders       Disciplines: Home Infusion      Intervention: Per [facility] policy                               Problem: Lab Plans       Disciplines: Nursing      Goal: Lab details below       Disciplines: Nursing      Intervention: Collect on Wednesdays: CBC diff/plt, " creatinine, AST/ALT       Description: Following providers: [facility]                              Problem: Patient Care       Disciplines: Home Infusion      Goal: Therapy will resolve infection with minimal side effects       Disciplines: Home Infusion              Goal: Patient and/or caregiver is knowledgable of disease process and management of ordered therapy       Disciplines: Home Infusion              Goal: Patient and/or caregiver report changes in medications to [facility] clinicians or prescriber       Disciplines: Home Infusion              Goal: Patient and/or caregiver report new or worsening symptoms and/or medication side effects to [facility] clinicians or prescriber       Disciplines: Home Infusion              Goal: Patient and/or caregiver will demonstrate/verbalize safe storage, maintenance, and disposal of soutions, supplies, and equipment       Disciplines: Home Infusion              Goal: Patient and/or caregiver will adhere to plan of care       Disciplines: Home Infusion              Goal: Patient and/or caregiver, prescriber and [facility] staff collaborate to create the plan of care       Disciplines: Home Infusion              Goal: Patient will adhere to prescribed medication regimen at 90% thoughout length of therapy       Disciplines: Home Infusion                      Problem: Pharmacy Care       Disciplines: Home Infusion      Goal: Pharmacist 24/7 support       Disciplines: Home Infusion              Goal: Pharmacist to evaluate, design, and prepare therapy to ensure compatibility with VAD, infusion pump, supplies, patient ability, and therapy goals       Disciplines: Home Infusion              Goal: Pharmacist to monitor lab results and adjust IV medication doses per [facility] policy       Disciplines: Home Infusion              Goal: Pharmacist to perform medication review for all drug interactions, contraindications, and therapy duplication       Disciplines: Home Infusion              Goal: Pharmacist to  perform remote monitoring for response to therapy, side effects, administration related reactions, and VAD events       Disciplines: Home Infusion              Goal: Pharmacist to provide education for home infusion medication side effects, interactions, adverse reactions, and infusion-related reactions       Disciplines: Home Infusion              Goal: Pharmacist to review, develop, modify the monitoring plan       Disciplines: Home Infusion              Goal: Provide an adequate supply of medication to ensure continued therapy while waiting for lab results or provider follow up       Disciplines: Home Infusion              Goal: Site Care supplies for access device       Disciplines: Home Infusion              Goal: Supplies to administer ordered therapy per [facility] policy       Disciplines: Home Infusion                          Current Participants as of 7/15/2025      Name Type Comments Contact Info    Luis Enrique Boyd MD Interdisciplinary  [facility] [address] [place] [address]. [address] [facility] [address]   #[phone]    Signature pending    Sam Tomlinson, RN Nursing  No address on file    Electronically signed by Sam Tomlinson RN at 7/15/2025  2:02 PM CDT    Angeles Sebastian RPH Interdisciplinary  No address on file

## 2025-07-15 NOTE — PROVIDER NOTIFICATION
Cross[facility] notified of multiple pauses on telemetry for 1.6, 2.0, and 2.3 second pauses. Pt checked on by writer, pt has no complaints and is asymptomatic.

## 2025-07-15 NOTE — PROGRESS NOTES
[facility]     Received request for benefit check should pt require home IV abx. Pt only has Medicare, which does not cover IV ABX in the home. (Pt would have coverage for short term TCU or IC).      Below is what pt would be responsible for if pt wanted to go w [facility] home infusion:  Drug would go to Part-D (pt would be responsible for the co-pay per dispense)  Pt would have to self-pay for the per-sly (daily)  If not homebound, nursing would also be self-pay (per visit)     Cost for Ceftriaxone 2gm q24h is $33.65/day for drug and supplies.    [facility] has no line preference.     For nursing, patient should have coverage if homebound, however [facility] is not contracted with Medicare and an outside nursing agency would be utilized instead. If patient is not homebound, there is no coverage and [facility] can see patient if patient agrees to self-pay for $90 per visit.    Spoke with pt and his wife, Sharno to introduce home infusion and review benefits. Isaiah and Jamila would like to proceed with [facility] for home IV abx needs. They are requesting a home RN visit for IV teaching so their daughter, Kaila can be present as Kaila will most likely manage pt's daily infusions. [facility] will begin looking for a home care agency for home RN.     Addendum @ 1093: [facility] has accepted for home RN.      Thank you for the referral     Namita Snowden RN  [facility] Liaison  [phone] (Mon thru Fri 8am - 5pm)  [phone] Office

## 2025-07-15 NOTE — PROGRESS NOTES
Patient with frequent pauses of 1.6, 2, 2.3-second.  Patient asymptomatic.  Blood pressure stable.  Patient is here with group C strep bacteremia and on appropriate antibiotics.  Group C strep really causes endocarditis.  Will check K and mag.  Will continue telemetry.  Will get EKG.    Addendum:  EKG with Afib, vent rate in 60s. No high grade AV block noticed.

## 2025-07-15 NOTE — PROGRESS NOTES
Care Management Discharge Note    Discharge Date: 07/15/2025       Discharge Disposition: Home Infusion w [facility], HHRN  Discharge Services: None  Discharge DME: None  Discharge Transportation: family or friend will provide    Private pay costs discussed: poss OOP cost for home infusion    Education Provided on the Discharge Plan: Yes  Persons Notified of Discharge Plans: patient, spouse, bedside/charge RN, hospitalist, [facility]  Patient/Family in Agreement with the Plan:  yes    Handoff Referral Completed: Yes, [facility] PCP: Internal handoff referral completed    Additional Information:  Anticipate patient will be discharging home w home infusion services for IV abx today.     Patient was agreeable to [facility] providing home infusion services and feels he can manage this at home w assist from family, [facility] have reviewed benefits w him. [facility] will do home infusion teaching w patient and family at their home tomorrow per their request. Patient will need line placement and to receive his abx dose today prior to discharge.     [facility] will obtain HH RN as well.     No additional discharge needs were identified.     Addendum 1243: [facility] accepted for HH RN. Contact info for [facility] and [facility] added to AVS.    Joanie Calles, RN, BSN  Inpatient Care Coordination  [facility]  [phone]

## 2025-07-15 NOTE — PLAN OF CARE
"Pt is Aox4 and calm. IV is saline locked. Pt is up SBA with walker to the bathroom. Pt is on telemetry showing A-fib SVR at 52 bpm. Pt getting Rocephin Q24hr. Pt had headache and later some back pain and was given a dose of PRN tylenol for each instance. Telemetry had called and announced multiple pauses for 1.6, 2.0, and 2.3 seconds each - crosscover was paged and orders were put in for lab draw and an EKG - no further orders after these results came back. Pt has some redness in the RLE. ID and SW following. Continue to follow plan of care.     BP (!) 154/82 (BP Location: Right arm)   Pulse 73   Temp 97.9  F (36.6  C) (Oral)   Resp 18   Ht 1.626 m (5' 4\")   Wt 96.3 kg (212 lb 4.9 oz)   SpO2 93%   BMI 36.44 kg/m               Goal Outcome Evaluation:      Plan of Care Reviewed With: patient    Overall Patient Progress: improvingOverall Patient Progress: improving         Problem: Comorbidity Management  Goal: Maintenance of Asthma Control  Outcome: Progressing  Intervention: Maintain Asthma Symptom Control  Recent Flowsheet Documentation  Taken 7/15/2025 0315 by Izaiah Kapadia RN  Medication Review/Management: medications reviewed  Taken 7/14/2025 1953 by Izaiah Kapadia RN  Medication Review/Management: medications reviewed  Goal: Maintenance of Behavioral Health Symptom Control  Outcome: Progressing  Intervention: Maintain Behavioral Health Symptom Control  Recent Flowsheet Documentation  Taken 7/15/2025 0315 by Izaiah Kapadia RN  Medication Review/Management: medications reviewed  Taken 7/14/2025 1953 by Izaiah Kapadia RN  Medication Review/Management: medications reviewed  Goal: Maintenance of COPD Symptom Control  Outcome: Progressing  Intervention: Maintain COPD Symptom Control  Recent Flowsheet Documentation  Taken 7/15/2025 0315 by Izaiah Kapadia RN  Medication Review/Management: medications reviewed  Taken 7/14/2025 1953 by Izaiah Kapadia RN  Medication Review/Management: medications reviewed  Goal: " Blood Glucose Levels Within Targeted Range  Outcome: Progressing  Intervention: Monitor and Manage Glycemia  Recent Flowsheet Documentation  Taken 7/15/2025 0315 by Izaiah Kapadia RN  Medication Review/Management: medications reviewed  Taken 7/14/2025 1953 by Izaiah Kapadia RN  Medication Review/Management: medications reviewed  Goal: Maintenance of Heart Failure Symptom Control  Outcome: Progressing  Intervention: Maintain Heart Failure Management  Recent Flowsheet Documentation  Taken 7/15/2025 0315 by Izaiah Kapadia RN  Medication Review/Management: medications reviewed  Taken 7/14/2025 1953 by Izaiah Kapadia RN  Medication Review/Management: medications reviewed  Goal: Blood Pressure in Desired Range  Outcome: Progressing  Intervention: Maintain Blood Pressure Management  Recent Flowsheet Documentation  Taken 7/15/2025 0315 by Izaiah Kapadia RN  Medication Review/Management: medications reviewed  Taken 7/14/2025 1953 by Izaiah Kapadia RN  Medication Review/Management: medications reviewed  Goal: Maintenance of Osteoarthritis Symptom Control  Outcome: Progressing  Intervention: Maintain Osteoarthritis Symptom Control  Recent Flowsheet Documentation  Taken 7/15/2025 0315 by Izaiah Kapadia RN  Assistive Device Utilized:   walker   gait belt  Activity Management: ambulated to bathroom  Medication Review/Management: medications reviewed  Taken 7/14/2025 1953 by Izaiah Kapadia RN  Medication Review/Management: medications reviewed  Goal: Bariatric Home Regimen Maintained  Outcome: Progressing  Intervention: Maintain and Manage Postbariatric Surgery Care  Recent Flowsheet Documentation  Taken 7/15/2025 0315 by Izaiah Kapadia RN  Medication Review/Management: medications reviewed  Taken 7/14/2025 1953 by Izaiah Kapadia RN  Medication Review/Management: medications reviewed  Goal: Maintenance of Seizure Control  Outcome: Progressing  Intervention: Maintain Seizure Symptom Control  Recent Flowsheet Documentation  Taken  "7/15/2025 0315 by Izaiah Kapadia RN  Medication Review/Management: medications reviewed  Taken 7/14/2025 1953 by Izaiah Kapadia RN  Medication Review/Management: medications reviewed     Problem: Infection  Goal: Absence of Infection Signs and Symptoms  Outcome: Progressing     Problem: Adult Inpatient Plan of Care  Goal: Plan of Care Review  Description: The Plan of Care Review/Shift note should be completed every shift.  The Outcome Evaluation is a brief statement about your assessment that the patient is improving, declining, or no change.  This information will be displayed automatically on your shift  note.  Outcome: Progressing  Flowsheets (Taken 7/15/2025 0332)  Plan of Care Reviewed With: patient  Overall Patient Progress: improving  Goal: Patient-Specific Goal (Individualized)  Description: You can add care plan individualizations to a care plan. Examples of Individualization might be:  \"Parent requests to be called daily at 9am for status\", \"I have a hard time hearing out of my right ear\", or \"Do not touch me to wake me up as it startles  me\".  Outcome: Progressing  Goal: Absence of Hospital-Acquired Illness or Injury  Outcome: Progressing  Intervention: Identify and Manage Fall Risk  Recent Flowsheet Documentation  Taken 7/15/2025 0315 by Izaiah Kapadia RN  Safety Promotion/Fall Prevention:   assistive device/personal items within reach   clutter free environment maintained   nonskid shoes/slippers when out of bed   safety round/check completed  Taken 7/14/2025 1953 by Izaiah Kapadia RN  Safety Promotion/Fall Prevention:   assistive device/personal items within reach   clutter free environment maintained   nonskid shoes/slippers when out of bed   safety round/check completed  Intervention: Prevent Skin Injury  Recent Flowsheet Documentation  Taken 7/15/2025 0315 by Izaiah Kapadia RN  Body Position: position changed independently  Taken 7/14/2025 1953 by Izaiah Kapadia RN  Body Position: position changed " independently  Intervention: Prevent and Manage VTE (Venous Thromboembolism) Risk  Recent Flowsheet Documentation  Taken 7/14/2025 1953 by Izaiah Kapadia RN  VTE Prevention/Management:   patient refused intervention   SCDs off (sequential compression devices)  Goal: Optimal Comfort and Wellbeing  Outcome: Progressing  Intervention: Monitor Pain and Promote Comfort  Recent Flowsheet Documentation  Taken 7/15/2025 0322 by Izaiah Kapadia, RN  Pain Management Interventions: medication (see MAR)  Taken 7/14/2025 1953 by Izaiah Kapadia RN  Pain Management Interventions: medication (see MAR)  Goal: Readiness for Transition of Care  Outcome: Progressing

## 2025-07-15 NOTE — DISCHARGE SUMMARY
Patient's After Visit Summary was reviewed with patient, Spouse, and Son  Patient verbalized understanding of After Visit Summary, recommended follow up and was given an opportunity to ask questions.   Discharge medications sent home with patient/family: Not applicable   Discharged with spouse and son    Educated all on AVS and Midline care.  Assisted down via WC.  OBSERVATION patient END time: 1793

## 2025-07-15 NOTE — PLAN OF CARE
INPATIENT NOTE: CARES PROVIDED FROM [id number]  Diagnosis:  Strep bacteremia.    Temp: 97.5  F (36.4  C) Temp src: Oral BP: (!) 149/73 Pulse: 67   Resp: 18 SpO2: 97 % O2 Device: None (Room air)      Pt is Aox4, elevated on RA. Denies CP/SOB/Nausea. RLE with redness, leg is outlined. Denies numbness and tingling to RLE. Up SBA with walker and GB. Ambulates halls with family. Tolerating mod carb meals. Midline placed. Given IV rocephin. Educated pt and family on Midline care. Plan to do home IV abx infusion.     Pain: Denies pain.     Lines: Midline on left arm; SL. PIV removed prior to discharge.     Plan: Discharge home after midline placement and IV Abx given.   Plan of Care Reviewed With: patient, family  Overall Patient Progress: improving  Outcome Evaluation: AOx4. elevated BP. SBA. IV rocephin. Midline placed. DIscharge home with IV Abx.    Problem: Adult Inpatient Plan of Care  Goal: Plan of Care Review  Recent Flowsheet Documentation  Taken 7/15/2025 1440 by Namita Gonzalez RN  Outcome Evaluation: AOx4. elevated BP. SBA. IV rocephin. Midline placed. DIscharge home with IV Abx.  Plan of Care Reviewed With:   patient   family  Overall Patient Progress: improving  Goal: Optimal Comfort and Wellbeing  Intervention: Monitor Pain and Promote Comfort  Recent Flowsheet Documentation  Taken 7/15/2025 0749 by Namita Gonzalez RN  Pain Management Interventions:   medication offered but refused   pain management plan reviewed with patient/caregiver   repositioned     Problem: Comorbidity Management  Goal: Blood Glucose Levels Within Targeted Range  Outcome: Progressing     Problem: Comorbidity Management  Goal: Blood Pressure in Desired Range  Outcome: Progressing     Problem: Infection  Goal: Absence of Infection Signs and Symptoms  Outcome: Progressing

## 2025-07-15 NOTE — PROCEDURES
[facility]    Single Lumen Midline Placement    Date/Time: 7/15/2025 1:34 PM    Performed by: Rl Lucio RN  Authorized by: Jl Freed MD  Indications: vascular access      UNIVERSAL PROTOCOL   Site Marked: Yes  Prior Images Obtained and Reviewed:  Yes  Required items: Required blood products, implants, devices and special equipment available    Patient identity confirmed:  Verbally with patient  NA - No sedation, light sedation, or local anesthesia  Confirmation Checklist:  Patient's identity using two indicators, relevant allergies, procedure was appropriate and matched the consent or emergent situation and correct equipment/implants were available  Time out: Immediately prior to the procedure a time out was called    Universal Protocol: the Joint Commission Universal Protocol was followed    Preparation: Patient was prepped and draped in usual sterile fashion    ESBL (mL):  1     ANESTHESIA    Local Anesthetic:  Lidocaine 1% without epinephrine  Anesthetic Total (mL):  2        Preparation: skin prepped with 2% chlorhexidine  Skin prep agent: skin prep agent completely dried prior to procedure  Sterile barriers: maximum sterile barriers were used: cap, mask, sterile gown, sterile gloves, and large sterile sheet  Hand hygiene: hand hygiene performed prior to central venous catheter insertion  Type of line used: Midline  Catheter type: single lumen  Lumen type: non-valved  Catheter size: 4 Fr  Brand: Bard  Lot number: [id number]  Placement method: ultrasound  Number of attempts: 1  Difficulty threading catheter: no  Successful placement: yes  Orientation: left  Catheter to Vein (%): 31  Location: basilic vein  Arm circumference: adults 10 cm and peds 7 cm  Extremity circumference: 34  Visible catheter length: 2  Internal length: 18 cm  Total catheter length: 20  Dressing and securement: antibiotic disc placed, blood cleaned with CHG, dressing applied and subcutaneous anchor  securement system  Post procedure assessment: blood return through all ports and free fluid flow  PROCEDURE Describe Procedure: Successful placement of 4 fr single lumen midline in the basilic vein of the LUE with US guidance. Patient tolerated procedure well. Line flushes easily with brisk blood return. Line OK to use, RN notified.   Patient Tolerance:  Patient tolerated the procedure well with no immediate complications

## 2025-07-15 NOTE — DISCHARGE INSTRUCTIONS
Your doctor has ordered IV antibiotics after your hospital stay.  This service will be provided by [facility]. They will contact you regarding your first visit.   If you have any questions about this service, please call them at [phone].    HOMECARE NOTE:   Your doctor has ordered home care to help you after your hospital stay.  The staff will contact you to schedule your first visit.  This service will be provided by [facility].  If you have any question, or have not received a call within 48 hours of discharge, please call them at [phone]. *please see homecare quality ratings for all homecares in your area at www.medicare.gov/care-compare

## 2025-07-15 NOTE — PROGRESS NOTES
[facility]  Start of Care/Resumption of Care Note    [facility] SOC    DX: Gram-positive bacteremia [R78.81]     Therapy: Anti-Infective and     Next Dose Due: 7/16/2025 at 1300    Delivery plan:  to deliver by EOD    In-basket sent to [facility] Scheduling, requesting visit tomorrow around the time the next dose is due    Per [facility] care plan, labs are due on Wednesdays    Nursing plan: Agency: [facility]. [facility] to follow for nursing until agency takes over after regional assist + teach.     Teaching Plan: Liaison Teach Done?: No    Other Info: None    Sam Tomlinson, RN 07/15/25

## 2025-07-16 ENCOUNTER — LAB REQUISITION (OUTPATIENT)
Dept: LAB | Facility: CLINIC | Age: 89
End: 2025-07-16
Payer: COMMERCIAL

## 2025-07-16 ENCOUNTER — TELEPHONE (OUTPATIENT)
Dept: FAMILY MEDICINE | Facility: CLINIC | Age: 89
End: 2025-07-16

## 2025-07-16 ENCOUNTER — PATIENT OUTREACH (OUTPATIENT)
Dept: CARE COORDINATION | Facility: CLINIC | Age: 89
End: 2025-07-16
Payer: COMMERCIAL

## 2025-07-16 ENCOUNTER — HOME CARE VISIT (OUTPATIENT)
Dept: HOME HEALTH SERVICES | Facility: HOME HEALTH | Age: 89
End: 2025-07-16
Payer: COMMERCIAL

## 2025-07-16 DIAGNOSIS — R78.81 BACTEREMIA: ICD-10-CM

## 2025-07-16 LAB
ALBUMIN SERPL BCG-MCNC: 3.6 G/DL (ref 3.5–5.2)
ALP SERPL-CCNC: 95 U/L (ref 40–150)
ALT SERPL W P-5'-P-CCNC: 19 U/L (ref 0–70)
ANION GAP SERPL CALCULATED.3IONS-SCNC: 11 MMOL/L (ref 7–15)
AST SERPL W P-5'-P-CCNC: 17 U/L (ref 0–45)
ATRIAL RATE - MUSE: NORMAL BPM
BACTERIA SPEC CULT: NO GROWTH
BACTERIA SPEC CULT: NO GROWTH
BASOPHILS # BLD AUTO: 0.1 10E3/UL (ref 0–0.2)
BASOPHILS NFR BLD AUTO: 1 %
BILIRUB SERPL-MCNC: 0.3 MG/DL
BUN SERPL-MCNC: 32.3 MG/DL (ref 8–23)
CALCIUM SERPL-MCNC: 8.9 MG/DL (ref 8.8–10.4)
CHLORIDE SERPL-SCNC: 103 MMOL/L (ref 98–107)
CREAT SERPL-MCNC: 1.21 MG/DL (ref 0.67–1.17)
DIASTOLIC BLOOD PRESSURE - MUSE: NORMAL MMHG
EGFRCR SERPLBLD CKD-EPI 2021: 57 ML/MIN/1.73M2
EOSINOPHIL # BLD AUTO: 0.4 10E3/UL (ref 0–0.7)
EOSINOPHIL NFR BLD AUTO: 4 %
ERYTHROCYTE [DISTWIDTH] IN BLOOD BY AUTOMATED COUNT: 13.9 % (ref 10–15)
GLUCOSE SERPL-MCNC: 234 MG/DL (ref 70–99)
HCO3 SERPL-SCNC: 24 MMOL/L (ref 22–29)
HCT VFR BLD AUTO: 33.7 % (ref 40–53)
HGB BLD-MCNC: 11 G/DL (ref 13.3–17.7)
HOLD SPECIMEN: NORMAL
HOLD SPECIMEN: NORMAL
IMM GRANULOCYTES # BLD: 0.2 10E3/UL
IMM GRANULOCYTES NFR BLD: 2 %
INTERPRETATION ECG - MUSE: NORMAL
LYMPHOCYTES # BLD AUTO: 1 10E3/UL (ref 0.8–5.3)
LYMPHOCYTES NFR BLD AUTO: 10 %
MCH RBC QN AUTO: 29.3 PG (ref 26.5–33)
MCHC RBC AUTO-ENTMCNC: 32.6 G/DL (ref 31.5–36.5)
MCV RBC AUTO: 90 FL (ref 78–100)
MONOCYTES # BLD AUTO: 0.7 10E3/UL (ref 0–1.3)
MONOCYTES NFR BLD AUTO: 7 %
NEUTROPHILS # BLD AUTO: 7.7 10E3/UL (ref 1.6–8.3)
NEUTROPHILS NFR BLD AUTO: 76 %
NRBC # BLD AUTO: 0 10E3/UL
NRBC BLD AUTO-RTO: 0 /100
P AXIS - MUSE: NORMAL DEGREES
PLATELET # BLD AUTO: 233 10E3/UL (ref 150–450)
POTASSIUM SERPL-SCNC: 4 MMOL/L (ref 3.4–5.3)
PR INTERVAL - MUSE: NORMAL MS
PROT SERPL-MCNC: 6.2 G/DL (ref 6.4–8.3)
QRS DURATION - MUSE: 110 MS
QT - MUSE: 450 MS
QTC - MUSE: 468 MS
R AXIS - MUSE: -24 DEGREES
RBC # BLD AUTO: 3.75 10E6/UL (ref 4.4–5.9)
SODIUM SERPL-SCNC: 138 MMOL/L (ref 135–145)
SYSTOLIC BLOOD PRESSURE - MUSE: NORMAL MMHG
T AXIS - MUSE: -22 DEGREES
VENTRICULAR RATE- MUSE: 65 BPM
WBC # BLD AUTO: 10.1 10E3/UL (ref 4–11)

## 2025-07-16 PROCEDURE — 82310 ASSAY OF CALCIUM: CPT | Performed by: INTERNAL MEDICINE

## 2025-07-16 PROCEDURE — 85004 AUTOMATED DIFF WBC COUNT: CPT | Performed by: INTERNAL MEDICINE

## 2025-07-16 NOTE — PROGRESS NOTES
Clinic Care Coordination Contact  Transitions of Care Outreach  Chief Complaint   Patient presents with    Clinic Care Coordination - Initial    Clinic Care Coordination - Post Hospital     Most Recent Admission Date: 7/10/2025   Most Recent Admission Diagnosis: SIRS (systemic inflammatory response syndrome) (H) - R65.10  Cholelithiasis without cholecystitis - K80.20     Most Recent Discharge Date: 7/15/2025   Most Recent Discharge Diagnosis: SIRS (systemic inflammatory response syndrome) (H) - R65.10  Cholelithiasis without cholecystitis - K80.20  Other specified counseling - Z71.89  Gram-positive bacteremia - R78.81     Transitions of Care Assessment    Discharge Assessment  How are you doing now that you are home?: Doing alright. Has a follow up with his PCP.  How are your symptoms? (Red Flag symptoms escalate to triage hotline per guidelines): Improved  Do you know how to contact your clinic care team if you have future questions or changes to your health status? : Yes  Does the patient have their discharge instructions? : Yes  Does the patient have questions regarding their discharge instructions? : No  Were you started on any new medications or were there changes to any of your previous medications? : Yes  Does the patient have all of their medications?: Yes  Do you have questions regarding any of your medications? : No  Do you have all of your needed medical supplies or equipment (DME)?  (i.e. oxygen tank, CPAP, cane, etc.): Yes    Post-op (CHW CTA Only)  If the patient had a surgery or procedure, do they have any questions for a nurse?: No    Post-op (Clinicians Only)  Did the patient have surgery or a procedure: Yes (Single lumen PICC placement)  Incision: closed  Drainage: No  Bleeding: none  Fever: No  Chills: No  Redness: No  Warmth: No  Swelling: No  Incision site pain: No  Closure: none  Eating & Drinking: eating and drinking without complaints/concerns  PO Intake: other (Low carb diet)  Bowel  Function: normal  Date of last BM: 07/16/25  Urinary Status: voiding without complaint/concerns    Follow up Plan     Discharge Follow-Up  Discharge follow up appointment scheduled in alignment with recommended follow up timeframe or Transitions of Risk Category? (Low = within 30 days; Moderate= within 14 days; High= within 7 days): Yes  Discharge Follow Up Appointment Date: 07/22/25  Discharge Follow Up Appointment Scheduled with?: Primary Care Provider    RN CC contacted patient for post-hospital follow up and to introduce Care Coordination. Full assessment not indicated as patient declined to have Care Coordination support at this time. He was agreeable to receiving an introduction letter with additional information on Care Coordination via [employer]. Verified patient address in chart is valid.     RN CC will send patient introduction letter via [employer].     Addendum: RN CC printed introduction letter while in clinic on 7/17/25 and mailed to patient via [employer] Postal [employer].     Future Appointments   Date Time Provider Department Center   7/16/2025  1:00 PM Lissette Lockhart RN [facility]   7/22/2025  2:00 PM Cornell Tomlinson MD [facility]   12/18/2025 10:30 AM Cornell Tomlinson MD [facility]     Outpatient Plan as outlined on AVS reviewed with patient.    For any urgent concerns, please contact our 24 hour nurse triage line: [phone] ([phone])       Jessica Mckeon RN, BSN, CPHN, [facility]  [facility], and [facility]  [email or web address]  Office: [phone]  Employed by [facility]

## 2025-07-16 NOTE — PROGRESS NOTES
Upon arrival, patients home care agency was on site ([facility]). [facility] nurses will do patients initial [facility] nurse visit canceled.

## 2025-07-16 NOTE — TELEPHONE ENCOUNTER
Home Care is calling regarding an established patient with [facility].  Requesting orders from: Cornell Tomlinson RN APPROVED: RN able to provide verbal orders.  Home Care will send orders for signature.  RN will close encounter.  Is this a request for a temporary pause in the home care episode?  No    Orders Requested    Skilled Nursing  Request for initial certification (first set of orders)   Frequency: 2/wk for 1 week, 1/week for 3 weeks and 3 prn   RN gave verbal order: Yes      Physical Therapy  Request for initial evaluation and treatment (one time)   RN gave verbal order: Yes      Occupational Therapy  Request for initial evaluation and treatment (one time)   RN gave verbal order: Yes      Phone number Home Care can be reached at:    Okay to leave a detailed message?: Yes    Contacts       Contact Date/Time Type Contact Phone/Fax    07/16/2025 03:19 PM CDT Phone (Incoming) LIANA Cunningham [phone]     Joni Berry RN

## 2025-07-16 NOTE — LETTER
[facility] CARE COORDINATION  [address] [address]    July 16, 2025    Preston Fortune  [address]  [place] [address]      Dear Isaiah,    I am a clinic care coordinator who works with Cornell Tomlinson MD with the [facility]. I wanted to introduce myself and provide you with my contact information for you to be able to call me with any questions or concerns. I wanted to thank you for spending the time to talk with me.  Below is a description of clinic care coordination and how I can further assist you.       The clinic care coordination team is made up of a registered nurse, , financial resource worker and community health worker who understand the health care system. The goal of clinic care coordination is to help you manage your health and improve access to the health care system. Our team works alongside your provider to assist you in determining your health and social needs. We can help you obtain health care and community resources, providing you with necessary information and education. We can work with you through any barriers and develop a care plan that helps coordinate and strengthen the communication between you and your care team.  Our services are voluntary and are offered without charge to you personally.    Please feel free to contact me with any questions or concerns regarding care coordination and what we can offer.      We are focused on providing you with the highest-quality healthcare experience possible.    Sincerely,     Jessica Mckeon RN, BSN, CPHN, CCM  [facility]ulatory Care Management  [facility], and [facility]  [email or web address]  Office: [phone]  Employed by [facility] [employer]     Additional Information: on Care Coordination.     WHAT IS CARE COORDINATION?      [facility] Care Coordination supports patients and families dealing with chronic or complex health conditions,  developmental issues, and social service needs. This service is available to patients of all ages, from babies to seniors. When you re facing a difficult decision about caring for yourself or someone you love, we can help you understand your options. We identify and refer you to community resources that help with financial, legal, mental health, transportation, and other issues. We also help with your medical and related education needs.     IS CARE COORDINATION RIGHT FOR ME? Discuss a referral to Care Coordination with your primary care provider or care team member.     HOW CAN I CONNECT WITH CARE COORDINATION?  Contact your clinic.  Speak with your doctor or clinic staff.  Discuss care coordination with hospital staff before discharge.     MEET YOUR CARE COORDINATION TEAM     Registered Nurse Care Coordinator  Provides education on medications, disease management, and new diagnoses.  Addresses concerns about medical conditions and connects you to resources.  Develops patient-centered goals and communicates with patient s care team.     Social Work Care Coordinator  Provides education on emotional wellbeing.  Connects you to a variety of community-based resources.  Communicates with care team and community partners.     Community Health Worker  Identifies health and social barriers and connects you with community resources.  Develops nonclinical patient and family centered goals.  Enhances communication between patients and care teams.     Financial Resource Worker  Assists patients with applying for [place] ([place], [place], [place]  Helps patients with applying for county benefits.  Connects patients with [facility].

## 2025-07-17 LAB
BACTERIA SPEC CULT: NO GROWTH
BACTERIA SPEC CULT: NO GROWTH

## 2025-07-22 ENCOUNTER — HOME INFUSION BILLING (OUTPATIENT)
Dept: HOME HEALTH SERVICES | Facility: HOME HEALTH | Age: 89
End: 2025-07-22
Payer: COMMERCIAL

## 2025-07-22 ENCOUNTER — TELEPHONE (OUTPATIENT)
Dept: FAMILY MEDICINE | Facility: CLINIC | Age: 89
End: 2025-07-22

## 2025-07-22 ENCOUNTER — OFFICE VISIT (OUTPATIENT)
Dept: FAMILY MEDICINE | Facility: CLINIC | Age: 89
End: 2025-07-22
Payer: COMMERCIAL

## 2025-07-22 VITALS
BODY MASS INDEX: 35.8 KG/M2 | DIASTOLIC BLOOD PRESSURE: 60 MMHG | TEMPERATURE: 97.8 F | HEIGHT: 64 IN | SYSTOLIC BLOOD PRESSURE: 134 MMHG | HEART RATE: 81 BPM | WEIGHT: 209.7 LBS | OXYGEN SATURATION: 97 % | RESPIRATION RATE: 16 BRPM

## 2025-07-22 DIAGNOSIS — N18.31 CHRONIC KIDNEY DISEASE, STAGE 3A (H): ICD-10-CM

## 2025-07-22 DIAGNOSIS — C69.32 MALIGNANT NEOPLASM OF LEFT CHOROID (H): ICD-10-CM

## 2025-07-22 DIAGNOSIS — E11.9 TYPE 2 DIABETES, HBA1C GOAL < 7% (H): ICD-10-CM

## 2025-07-22 DIAGNOSIS — I27.20 PULMONARY HYPERTENSION (H): ICD-10-CM

## 2025-07-22 DIAGNOSIS — A40.8 SEPSIS DUE TO OTHER STREPTOCOCCUS SPECIES WITHOUT ACUTE ORGAN DYSFUNCTION (H): ICD-10-CM

## 2025-07-22 DIAGNOSIS — M15.0 PRIMARY OSTEOARTHRITIS INVOLVING MULTIPLE JOINTS: ICD-10-CM

## 2025-07-22 DIAGNOSIS — A49.1 GROUP C STREPTOCOCCAL INFECTION: Primary | ICD-10-CM

## 2025-07-22 DIAGNOSIS — I10 HYPERTENSION GOAL BP (BLOOD PRESSURE) < 140/90: ICD-10-CM

## 2025-07-22 DIAGNOSIS — L03.115 CELLULITIS OF RIGHT LOWER EXTREMITY: ICD-10-CM

## 2025-07-22 DIAGNOSIS — E66.01 MORBID OBESITY (H): ICD-10-CM

## 2025-07-22 DIAGNOSIS — E78.5 HYPERLIPIDEMIA LDL GOAL <70: ICD-10-CM

## 2025-07-22 DIAGNOSIS — Z51.81 MEDICATION MONITORING ENCOUNTER: ICD-10-CM

## 2025-07-22 DIAGNOSIS — N20.0 CALCULUS OF KIDNEY: ICD-10-CM

## 2025-07-22 DIAGNOSIS — E11.22 TYPE 2 DIABETES MELLITUS WITH STAGE 3A CHRONIC KIDNEY DISEASE, WITHOUT LONG-TERM CURRENT USE OF INSULIN (H): ICD-10-CM

## 2025-07-22 DIAGNOSIS — E11.59 TYPE 2 DIABETES MELLITUS WITH OTHER CIRCULATORY COMPLICATION, WITHOUT LONG-TERM CURRENT USE OF INSULIN (H): ICD-10-CM

## 2025-07-22 DIAGNOSIS — N18.31 TYPE 2 DIABETES MELLITUS WITH STAGE 3A CHRONIC KIDNEY DISEASE, WITHOUT LONG-TERM CURRENT USE OF INSULIN (H): ICD-10-CM

## 2025-07-22 DIAGNOSIS — R80.9 PROTEINURIA, UNSPECIFIED TYPE: ICD-10-CM

## 2025-07-22 LAB
BASOPHILS # BLD AUTO: 0 10E3/UL (ref 0–0.2)
BASOPHILS NFR BLD AUTO: 0 %
EOSINOPHIL # BLD AUTO: 0.4 10E3/UL (ref 0–0.7)
EOSINOPHIL NFR BLD AUTO: 3 %
ERYTHROCYTE [DISTWIDTH] IN BLOOD BY AUTOMATED COUNT: 14.1 % (ref 10–15)
ERYTHROCYTE [SEDIMENTATION RATE] IN BLOOD BY WESTERGREN METHOD: 23 MM/HR (ref 0–20)
HCT VFR BLD AUTO: 38.4 % (ref 40–53)
HGB BLD-MCNC: 12.1 G/DL (ref 13.3–17.7)
IMM GRANULOCYTES # BLD: 0.1 10E3/UL
IMM GRANULOCYTES NFR BLD: 1 %
LYMPHOCYTES # BLD AUTO: 1.7 10E3/UL (ref 0.8–5.3)
LYMPHOCYTES NFR BLD AUTO: 15 %
MCH RBC QN AUTO: 29 PG (ref 26.5–33)
MCHC RBC AUTO-ENTMCNC: 31.5 G/DL (ref 31.5–36.5)
MCV RBC AUTO: 92 FL (ref 78–100)
MONOCYTES # BLD AUTO: 0.9 10E3/UL (ref 0–1.3)
MONOCYTES NFR BLD AUTO: 8 %
NEUTROPHILS # BLD AUTO: 8.1 10E3/UL (ref 1.6–8.3)
NEUTROPHILS NFR BLD AUTO: 72 %
PLATELET # BLD AUTO: 317 10E3/UL (ref 150–450)
RBC # BLD AUTO: 4.17 10E6/UL (ref 4.4–5.9)
WBC # BLD AUTO: 11.1 10E3/UL (ref 4–11)

## 2025-07-22 PROCEDURE — 1111F DSCHRG MED/CURRENT MED MERGE: CPT | Performed by: FAMILY MEDICINE

## 2025-07-22 PROCEDURE — 80053 COMPREHEN METABOLIC PANEL: CPT | Performed by: FAMILY MEDICINE

## 2025-07-22 PROCEDURE — 36415 COLL VENOUS BLD VENIPUNCTURE: CPT | Performed by: FAMILY MEDICINE

## 2025-07-22 PROCEDURE — 85025 COMPLETE CBC W/AUTO DIFF WBC: CPT | Performed by: FAMILY MEDICINE

## 2025-07-22 PROCEDURE — 85652 RBC SED RATE AUTOMATED: CPT | Performed by: FAMILY MEDICINE

## 2025-07-22 PROCEDURE — 3075F SYST BP GE 130 - 139MM HG: CPT | Performed by: FAMILY MEDICINE

## 2025-07-22 PROCEDURE — 3078F DIAST BP <80 MM HG: CPT | Performed by: FAMILY MEDICINE

## 2025-07-22 PROCEDURE — 86140 C-REACTIVE PROTEIN: CPT | Performed by: FAMILY MEDICINE

## 2025-07-22 PROCEDURE — 99495 TRANSJ CARE MGMT MOD F2F 14D: CPT | Performed by: FAMILY MEDICINE

## 2025-07-22 NOTE — PROGRESS NOTES
Assessment & Plan     Group C streptococcal infection    - Comprehensive metabolic panel (BMP + Alb, Alk Phos, ALT, AST, Total. Bili, TP)  - CBC with platelets and differential  - ESR: Erythrocyte sedimentation rate  - CRP, inflammation  - PRIMARY CARE FOLLOW-UP SCHEDULING    Sepsis due to other Streptococcus species without acute organ dysfunction (H)    - Comprehensive metabolic panel (BMP + Alb, Alk Phos, ALT, AST, Total. Bili, TP)  - CBC with platelets and differential  - ESR: Erythrocyte sedimentation rate  - CRP, inflammation  - PRIMARY CARE FOLLOW-UP SCHEDULING    Cellulitis of right lower extremity    - Comprehensive metabolic panel (BMP + Alb, Alk Phos, ALT, AST, Total. Bili, TP)  - CBC with platelets and differential  - ESR: Erythrocyte sedimentation rate  - CRP, inflammation  - PRIMARY CARE FOLLOW-UP SCHEDULING    Chronic kidney disease, stage 3a (H)    - Comprehensive metabolic panel (BMP + Alb, Alk Phos, ALT, AST, Total. Bili, TP)  - CBC with platelets and differential  - PRIMARY CARE FOLLOW-UP SCHEDULING    Type 2 diabetes mellitus with stage 3a chronic kidney disease, without long-term current use of insulin (H)    - Comprehensive metabolic panel (BMP + Alb, Alk Phos, ALT, AST, Total. Bili, TP)  - PRIMARY CARE FOLLOW-UP SCHEDULING    Type 2 diabetes mellitus with other circulatory complication, without long-term current use of insulin (H)    - Comprehensive metabolic panel (BMP + Alb, Alk Phos, ALT, AST, Total. Bili, TP)  - PRIMARY CARE FOLLOW-UP SCHEDULING    Type 2 diabetes, HbA1c goal < 7% (H)    - Comprehensive metabolic panel (BMP + Alb, Alk Phos, ALT, AST, Total. Bili, TP)  - PRIMARY CARE FOLLOW-UP SCHEDULING    Pulmonary hypertension (H)    - Comprehensive metabolic panel (BMP + Alb, Alk Phos, ALT, AST, Total. Bili, TP)  - PRIMARY CARE FOLLOW-UP SCHEDULING    Proteinuria, unspecified type    - Comprehensive metabolic panel (BMP + Alb, Alk Phos, ALT, AST, Total. Bili, TP)  - PRIMARY CARE  FOLLOW-UP SCHEDULING    Hypertension goal BP (blood pressure) < 140/90    - Comprehensive metabolic panel (BMP + Alb, Alk Phos, ALT, AST, Total. Bili, TP)  - PRIMARY CARE FOLLOW-UP SCHEDULING    Hyperlipidemia LDL goal <70    - Comprehensive metabolic panel (BMP + Alb, Alk Phos, ALT, AST, Total. Bili, TP)  - PRIMARY CARE FOLLOW-UP SCHEDULING    Malignant neoplasm of left choroid (H)    - Comprehensive metabolic panel (BMP + Alb, Alk Phos, ALT, AST, Total. Bili, TP)  - CBC with platelets and differential  - PRIMARY CARE FOLLOW-UP SCHEDULING    Nephrolithiasis    - Comprehensive metabolic panel (BMP + Alb, Alk Phos, ALT, AST, Total. Bili, TP)  - PRIMARY CARE FOLLOW-UP SCHEDULING    Primary osteoarthritis involving multiple joints    - PRIMARY CARE FOLLOW-UP SCHEDULING    Morbid obesity (H)    - PRIMARY CARE FOLLOW-UP SCHEDULING    Medication monitoring encounter    - Comprehensive metabolic panel (BMP + Alb, Alk Phos, ALT, AST, Total. Bili, TP)  - CBC with platelets and differential  - ESR: Erythrocyte sedimentation rate  - CRP, inflammation  - PRIMARY CARE FOLLOW-UP SCHEDULING    MED REC REQUIRED  Post Medication Reconciliation Status: discharge medications reconciled, continue medications without change    Plan:    1) Medications: complete rocephin, continue other meds    2) Labs: reviewed - pending    3) Immunizations: NA    4) Imaging/Diagnostics: reviewed    5) Consults: Cardiology, consider vascular, Home care    6) consider psychology / medications for irritability    Return in about 6 weeks (around 9/2/2025) for Medication Recheck Visit, Follow Up Acute, Follow Up Chronic.    47 minutes spent by myself on the date of the encounter doing chart review, history and exam, documentation and further activities per the note.    The longitudinal plan of care for the diagnosis(es)/condition(s) as documented were addressed during this visit. Due to the added complexity in care, I will continue to support Pat in the  subsequent management and with ongoing continuity of care.    Shared Decision making completed.    Subjective   Isaiah is a 89 year old, presenting for the following health issues:  Hospital F/U        7/22/2025     1:45 PM   Additional Questions   Roomed by Nhi JANE CMA   Accompanied by spouse and son     HPI          7/16/2025   Post Discharge Outreach   How are you doing now that you are home? Doing alright. Has a follow up with his PCP.   How are your symptoms? (Red Flag symptoms escalate to triage hotline per guidelines) Improved   Does the patient have their discharge instructions?  Yes   Does the patient have questions regarding their discharge instructions?  No   Were you started on any new medications or were there changes to any of your previous medications?  Yes   Does the patient have all of their medications? Yes   Do you have questions regarding any of your medications?  No   Do you have all of your needed medical supplies or equipment (DME)?  (i.e. oxygen tank, CPAP, cane, etc.) Yes   Discharge Follow Up Appointment Date 7/22/2025   Discharge Follow Up Appointment Scheduled with? Primary Care Provider       Hospital Follow-up Visit:    Hospital/Nursing Home/IP Rehab Facility: [facility]  Most Recent Admission Date: 7/10/2025   Most Recent Admission Diagnosis: SIRS (systemic inflammatory response syndrome) (H) - R65.10  Cholelithiasis without cholecystitis - K80.20     Most Recent Discharge Date: 7/15/2025   Most Recent Discharge Diagnosis: SIRS (systemic inflammatory response syndrome) (H) - R65.10  Cholelithiasis without cholecystitis - K80.20  Other specified counseling - Z71.89  Gram-positive bacteremia - R78.81   Do you have any other stressors you would like to discuss with your provider? Health Concerns    Problems taking medications regularly:  None  Medication changes since discharge: rocephin injection  Problems adhering to non-medication therapy:  None    Summary of  hospitalization:  [facility] discharge summary reviewed  Diagnostic Tests/Treatments reviewed.  Follow up needed: none  Other Healthcare Providers Involved in Patient s Care:         None  Update since discharge: improved.       Home x 1 week, Home care with PT/RN, PICC line with Rocephin, through 7/23/2025, no f/c/s, some loose stools x 2-3 per day - brown/dark - appetite fine - sleep fine - no pain - no RLE issues, less red, less warmth, no discharge - overall 80+% better, feels like needs to get a little stronger, notes some frustration, short at times    [facility]     Discharge Summary  Hospitalist     Date of Admission:  7/10/2025  Date of Discharge:  7/15/2025  Discharging Provider: Rebecca Mayer MD  Date of Service (when I saw the patient): 07/15/25     Discharge Diagnoses  Sepsis  Group C Strep bacteremia  Right lower leg cellulitis  Leukocytosis  Tachycardia  Hypertension   Hyperlipidemia  Persistent atrial fibrillation  Chronic anticoagulation with Eliquis  Pulmonary hypertension  Type 2 diabetes  CKD 3a  Cholelithiasis  Peptic ulcer disease  Renal stone disease     History of Present Illness  Preston Fortune is a 89 year old man who was admitted on 7/10/2025. PMH significant for hypertension, hyperlipidemia, type 2 diabetes, atrial fibrillation, chronic anticoagulation with Eliquis, chronic kidney disease, peptic ulcer disease, melanoma of left eye, pulmonary hypertension, osteoarthritis, renal stone disease, and superior mesenteric artery stenosis. Patient presented with weakness, chills, rigors, and fatigue. He reported feeling well earlier in the day. He was brought to the ED by his family for evaluation.  He denied focal symptoms of infection such as cough, chest pain, abdominal pain, diarrhea, dysuria, or skin change.  He does have some right lower leg erythema that wife notes has been present for several weeks and is slightly worse than it has been.       Emergency department evaluation showed temperature 100.4, heart rate in the 100s, respiratory rate 34 with some improvement while in the ED, and elevated blood pressure.  Laboratory evaluation showed BUN 45, creatinine 1.31, glucose 186, lactic acid 1.7, white blood cells 20.3, and unremarkable urine analysis.  CT of chest with PE protocol and abdomen and pelvis showed no PE.  It did show a 12 x 11 mm distal left ureter stent unchanged from prior.  It showed gallstones.  Patient was given vancomycin and Zosyn with concern for sepsis and admitted to hospitalist service.     Patient's blood cultures have since grown Group C Strep. Antibiotics have been changed to ceftriaxone. MRI negative for osteomyelitis. Infectious disease recommending continue ceftriaxone for total 14 day course. Midline being placed and patient will complete antibiotics via home infusion.      Hospital Course  Preston Fortune was admitted on 7/10/2025.  The following problems were addressed during his hospitalization:     Sepsis  Group C Strep bacteremia  Right lower leg cellulitis  Leukocytosis  Tachycardia  -Patient had subjective fever and chills at home.  Temperature was 100.4 here.  He had associated tachycardia, tachypnea, and leukocytosis.  He does have some erythema on his right lower leg which is not necessarily new for him but may be a little bit worse than normal.  Inferior and superior margins were outlined.  -Patient had a similar hospitalization at [facility] in 2020.  At that time there was concern about possible cholecystitis although he did not have associated abdominal pain.  He did not have cholecystectomy at that time.  -Patient with known large left ureter stone without hydronephrosis or obvious UTI.  - Blood cultures are growing Group C Strep. Repeat blood cultures negative to date.  - Antibiotics have been changed to ceftriaxone.  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 No warmth or  swelling.   - Infectious disease consulted and appreciate recommendations.  ID recommends that patient complete 2 full weeks IV ceftriaxone.  Midline has been ordered and planning for home infusions.  - Given patient's right lower leg erythema that wife notes has been present for several weeks and is slightly worse than it has been, obtained MRI right lower extremity which ruled out osteomyelitis.   - Ordered ABIs given suspicion for PVD with known SMA stenosis, but unable to to complete due to vessels not being compressible. Since no evidence of osteomyelitis, will not pursue further inpatient vascular evaluation. Can follow with PCP.      Hypertension   Hyperlipidemia  Persistent atrial fibrillation  Chronic anticoagulation with Eliquis  Pulmonary hypertension  - Continue prior to admission amlodipine, Lasix, lisinopril, pravastatin.   - Have resumed DOAC     Type 2 diabetes  Hgb A1c 4/23/25 was 7.2%.   Held PTA metformin while inpatient. Resuming on discharge.     CKD 3a  Creatinine is 1.3 at baseline.   Renally dose medications and avoid nephrotoxins.   Continue to follow.     Cholelithiasis  -No CT findings suggestive of cholecystitis  -No abdominal pain  -Would workup further if patient develops pain, nausea, or vomiting     Peptic ulcer disease  -Does not appear to be on medication for this     Renal stone disease  -Large distal left ureter stone without hydronephrosis or UTI. Follow with PCP.      Rebecca Mayer MD FACP  Hospitalist Service  [facility]        Significant Results and Procedures  Imaging studies and labs below.      Two different blood cultures collected 7/10/25 grew Group G Strep          Pending Results  These results will be followed up by primary care and infectious disease team as needed. Repeat blood cultures remain negative to date.   Unresulted Labs Ordered in the Past 30 Days of this Admission         Date and Time Order Name Status Description     7/12/2025 12:00 AM  Blood Culture Peripheral blood (BC) Hand, Right Preliminary       7/12/2025 12:00 AM Blood Culture Peripheral blood (BC) Hand, Left Preliminary       7/11/2025  9:58 AM Blood Culture Peripheral blood (BC) Hand, Left Preliminary       7/11/2025  6:26 AM Blood Culture Peripheral blood (BC) Arm, Left Preliminary        Discharge Orders          Primary Care - Care Coordination Referral       Home Infusion Referral       Home Care Referral       Reason for your hospital stay     You were hospitalized for evaluation of weakness and chills. You were found to have a fever and your blood cultures grew Group C Strep. Infectious disease recommends a 14 day course of antibiotics with ceftriaxone. You will need to follow with your primary care provider for hospital follow up within 1 week.          Activity     Your activity upon discharge: activity as tolerated          IV access     You are going home with the following vascular access device: Midline.          Diet     Follow this diet upon discharge: Diabetic diet          Hospital Follow-up with Existing Primary Care Provider (PCP)            Discharge Medications  Current Discharge Medication List               START taking these medications     Details   cefTRIAXone (ROCEPHIN) 1 GM vial Inject 2 g (2,000 mg) over 15-30 minutes into the vein daily for 7 days. CBC with differential, creatinine, SGOT weekly while on this medication to be faxed to Dr. Boyd office.  Qty: 1 each, Refills: 1     Associated Diagnoses: Gram-positive bacteremia                   CONTINUE these medications which have NOT CHANGED     Details   acetaminophen (TYLENOL) 500 MG tablet [ACETAMINOPHEN (TYLENOL) 500 MG TABLET] Take 500 mg by mouth every 6 (six) hours as needed for pain.       amLODIPine (NORVASC) 10 MG tablet Take 0.5 tablets (5 mg) by mouth daily.  Qty: 45 tablet, Refills: 3     Associated Diagnoses: Chronic kidney disease, stage 3a (H); Hypertension goal BP (blood pressure) < 140/90        cetirizine (ZYRTEC) 10 mg cap [CETIRIZINE (ZYRTEC) 10 MG CAP] Take 10 mg by mouth daily as needed.        cholecalciferol, vitamin D3, 50 mcg (2,000 unit) Tab [CHOLECALCIFEROL, VITAMIN D3, 50 MCG (2,000 UNIT) TAB] Take 2,000 Units by mouth daily.       ferrous sulfate (FE TABS) 325 (65 Fe) MG EC tablet Take 325 mg by mouth every other day.       furosemide (LASIX) 20 MG tablet TAKE 1 TABLET BY MOUTH EVERY DAY  Qty: 90 tablet, Refills: 1     Associated Diagnoses: Edema, unspecified type; Hypertension goal BP (blood pressure) < 140/90       lisinopril (ZESTRIL) 5 MG tablet Take 1 tablet (5 mg) by mouth daily.  Qty: 90 tablet, Refills: 3     Associated Diagnoses: Type 2 diabetes mellitus with stage 2 chronic kidney disease, without long-term current use of insulin (H); Type 2 diabetes, HbA1c goal < 7% (H); Proteinuria, unspecified type       metFORMIN (GLUCOPHAGE XR) 500 MG 24 hr tablet Take 1 tablet (500 mg) by mouth daily (with dinner).  Qty: 90 tablet, Refills: 3     Associated Diagnoses: Type 2 diabetes mellitus with stage 3a chronic kidney disease, without long-term current use of insulin (H); Type 2 diabetes mellitus with other circulatory complication, without long-term current use of insulin (H); Type 2 diabetes, HbA1c goal < 7% (H)       Multiple Vitamins-Minerals (PRESERVISION AREDS 2 PO) Take 1 tablet by mouth 2 times daily.       pravastatin (PRAVACHOL) 40 MG tablet TAKE 1 TABLET BY MOUTH EVERYDAY AT BEDTIME  Qty: 90 tablet, Refills: 3     Associated Diagnoses: Type 2 diabetes mellitus with stage 2 chronic kidney disease, without long-term current use of insulin (H); Type 2 diabetes, HbA1c goal < 7% (H)       psyllium (METAMUCIL/KONSYL) capsule Take 2 capsules by mouth 3 times daily.       apixaban ANTICOAGULANT (ELIQUIS ANTICOAGULANT) 5 MG tablet TAKE 1 TABLET BY MOUTH TWICE A DAY  Qty: 180 tablet, Refills: 1     Associated Diagnoses: Atrial fibrillation, unspecified type (H)       generic lancets  [GENERIC LANCETS] Use 1 each As Directed daily. Dispense brand per patient's insurance at pharmacy discretion.(E11.9)  Qty: 100 each, Refills: 1     Associated Diagnoses: Diabetes mellitus type 2, noninsulin dependent (H)       ONETOUCH ULTRA test strip USE TO TEST ONCE DAILY  Qty: 100 strip, Refills: 0     Associated Diagnoses: Diabetes mellitus type 2, noninsulin dependent (H)             Plan of care communicated with patient and family           Patient Active Problem List   Diagnosis    Nephrolithiasis    Choroid Melanoma    Hyperlipidemia LDL goal <70    Primary Osteoarthritis Of The Lumbar Vertebrae    Hypertension goal BP (blood pressure) < 140/90    Allergic rhinitis    Malignant Melanoma Of The Eye    Anemia    BPH (benign prostatic hyperplasia)    S/P total knee arthroplasty    Hemorrhage of rectum and anus    Superior mesenteric artery stenosis    Intestinal angina    Obesity (BMI 35.0-39.9) with comorbidity (H)    Type 2 diabetes, HbA1c goal < 7% (H)    Environmental allergies    Type 2 diabetes mellitus with stage 2 chronic kidney disease, without long-term current use of insulin (H)    Chronic kidney disease, stage 3a (H)    Proteinuria, unspecified type    Type 2 diabetes mellitus with stage 3a chronic kidney disease, without long-term current use of insulin (H)    Pulmonary hypertension (H)    Longstanding persistent atrial fibrillation (H)    Edema, unspecified type    SIRS (systemic inflammatory response syndrome) (H)    Cholelithiasis without cholecystitis       Past Medical History:   Diagnosis Date    Cancer (H) 2000    Left Eye - treated with radiactive substance - vision ok    CKD (chronic kidney disease) stage 2, GFR 60-89 ml/min     Environmental allergies     History of transfusion     Hyperlipidemia LDL goal <70     Hypertension goal BP (blood pressure) < 140/90     Kidney disease     minimal change disease    Kidney stones     Osteoarthritis     Peptic ulceration 2011    see EGD that  "year    Pulmonary hypertension (H) 03/2025    mild    Type 2 diabetes, HbA1c goal < 7% (H) 2000       Past Surgical History:   Procedure Laterality Date    EYE SURGERY  2000    HC KNEE SCOPE, DIAGNOSTIC Left 2008    Description: Arthroscopy Knee Left;  Recorded: 03/17/2008;  Comments: cartilage surgery    VT TYMPANOPLASTY Left 1990    Tympanoplasty    TONSILLECTOMY  1945    ZZC RECONSTR TOTAL SHOULDER IMPLANT Right 1995    Description: Shoulder Arthroplasty Total Shoulder Replacement;  Recorded: 05/06/2008;    [facility] TOTAL KNEE ARTHROPLASTY Left 03/26/2018    Procedure: LEFT TOTAL KNEE ARTHROPLASTY;  Surgeon: Darrell Wu MD;  Location: [facility];  Service: Orthopedics       Current Outpatient Medications   Medication Sig Dispense Refill    acetaminophen (TYLENOL) 500 MG tablet [ACETAMINOPHEN (TYLENOL) 500 MG TABLET] Take 500 mg by mouth every 6 (six) hours as needed for pain.      amLODIPine (NORVASC) 10 MG tablet Take 0.5 tablets (5 mg) by mouth daily. 45 tablet 3    apixaban ANTICOAGULANT (ELIQUIS ANTICOAGULANT) 5 MG tablet TAKE 1 TABLET BY MOUTH TWICE A  tablet 1    cefTRIAXone (ROCEPHIN) injection Inject 20 mLs (2,000 mg) over 10 minutes into the vein via push every 24 hours for 6 days. 120 mL 0    cetirizine (ZYRTEC) 10 mg cap [CETIRIZINE (ZYRTEC) 10 MG CAP] Take 10 mg by mouth daily as needed.       cholecalciferol, vitamin D3, 50 mcg (2,000 unit) Tab [CHOLECALCIFEROL, VITAMIN D3, 50 MCG (2,000 UNIT) TAB] Take 2,000 Units by mouth daily.      Emergency Supply Kit, Central, Patient use for emergency only. Contents: 3 sodium chloride 0.9% flushes, 1 dressing kit, 1 microclave ext set 14\", 4 nitrile gloves (med), 6 alcohol prep pads, 1 bacitracin, 1 syringe (10 cc 20 G 1\"). Call [phone] to reorder. [id number] kit 0    ferrous sulfate (FE TABS) 325 (65 Fe) MG EC tablet Take 325 mg by mouth every other day.      furosemide (LASIX) 20 MG tablet TAKE 1 TABLET BY MOUTH EVERY DAY 90 tablet 1    " "generic lancets [GENERIC LANCETS] Use 1 each As Directed daily. Dispense brand per patient's insurance at pharmacy discretion.(E11.9) 100 each 1    lisinopril (ZESTRIL) 5 MG tablet Take 1 tablet (5 mg) by mouth daily. 90 tablet 3    metFORMIN (GLUCOPHAGE XR) 500 MG 24 hr tablet Take 1 tablet (500 mg) by mouth daily (with dinner). 90 tablet 3    Multiple Vitamins-Minerals (PRESERVISION AREDS 2 PO) Take 1 tablet by mouth 2 times daily.      ONETOUCH ULTRA test strip USE TO TEST ONCE DAILY 100 strip 0    pravastatin (PRAVACHOL) 40 MG tablet TAKE 1 TABLET BY MOUTH EVERYDAY AT BEDTIME 90 tablet 3    psyllium (METAMUCIL/KONSYL) capsule Take 2 capsules by mouth 3 times daily.      sodium chloride, PF, 0.9% PF flush Inject 10 mLs into the vein as needed for line flush. Flush IV before and after medication administration as directed and/or at least every 12 hours. [id number] mL 0       No Known Allergies    Family History   Problem Relation Age of Onset    Diabetes Father     Diabetes Sister        Social History     Socioeconomic History    Marital status:      Spouse name: Sharon    Number of children: 5    Years of education: 12    Highest education level: None   Tobacco Use    Smoking status: Former     Types: Cigarettes    Smokeless tobacco: Former     Quit date: 12/8/1967    Tobacco comments:     Quit at age 28, smoked 1 PPD x 17 yrs     Quit chew at age 51, only months   Vaping Use    Vaping status: Never Used   Substance and Sexual Activity    Alcohol use: Not Currently     Alcohol/week: 0.0 - 2.0 standard drinks of alcohol     Comment: rare    Drug use: No    Sexual activity: Yes     Partners: Female     Birth control/protection: None   Social History Narrative     5 grown kids. 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      Social Drivers of Health     Financial Resource Strain: Low Risk  (7/11/2025)    Financial Resource Strain     Within the past 12 months, have you or your family members you live with " been unable to get utilities (heat, electricity) when it was really needed?: No   Food Insecurity: Low Risk  (7/11/2025)    Food Insecurity     Within the past 12 months, did you worry that your food would run out before you got money to buy more?: No     Within the past 12 months, did the food you bought just not last and you didn t have money to get more?: No   Transportation Needs: Low Risk  (7/11/2025)    Transportation Needs     Within the past 12 months, has lack of transportation kept you from medical appointments, getting your medicines, non-medical meetings or appointments, work, or from getting things that you need?: No   Physical Activity: Sufficiently Active (12/16/2024)    Exercise Vital Sign     Days of Exercise per Week: 7 days     Minutes of Exercise per Session: 60 min   Stress: No Stress Concern Present (12/16/2024)    [employer] [place] [employer] - Occupational Stress Questionnaire     Feeling of Stress : Not at all   Social Connections: Unknown (12/16/2024)    Social Connection and Isolation Panel [NHANES]     Frequency of Social Gatherings with Friends and Family: Twice a week   Interpersonal Safety: Low Risk  (7/11/2025)    Interpersonal Safety     Do you feel physically and emotionally safe where you currently live?: Yes     Within the past 12 months, have you been hit, slapped, kicked or otherwise physically hurt by someone?: No     Within the past 12 months, have you been humiliated or emotionally abused in other ways by your partner or ex-partner?: No   Housing Stability: Low Risk  (7/11/2025)    Housing Stability     Do you have housing? : Yes     Are you worried about losing your housing?: No       Review of Systems  Constitutional, HEENT, cardiovascular, pulmonary, GI, , musculoskeletal, neuro, skin, endocrine and psych systems are negative, except as otherwise noted.      Objective    /60   Pulse 81   Temp 97.8  F (36.6  C) (Tympanic)   Resp 16   Ht 1.626 m (5'  "4\")   Wt 95.1 kg (209 lb 11.2 oz)   SpO2 97%   BMI 35.99 kg/m    Body mass index is 35.99 kg/m .  Physical Exam   GENERAL: alert and no distress  EYES: Eyes grossly normal to inspection, PERRL and conjunctivae and sclerae normal  HENT: ear canals and TM's normal, nose and mouth without ulcers or lesions  NECK: no adenopathy, no asymmetry, masses, or scars  RESP: lungs clear to auscultation - no rales, rhonchi or wheezes  CV: regular rate and rhythm, normal S1 S2, no S3 or S4, no murmur, click or rub, no peripheral edema  ABDOMEN: soft, nontender, no hepatosplenomegaly, no masses and bowel sounds normal  MS: no gross musculoskeletal defects noted, no edema  SKIN: no suspicious lesions or rashes - see below  NEURO: Normal strength and tone, mentation intact and speech normal  PSYCH: mentation appears normal, affect normal/bright    Reviewed imaging / labs, labs pending            Signed Electronically by:            Cornell Tomlinson MD, FAAFP, DABFM     [facility]  [address] [place] [id number]  [email or web address]   Office: [phone]  Fax: [phone]       "

## 2025-07-22 NOTE — TELEPHONE ENCOUNTER
Home Care is calling regarding an established patient with [facility].  Requesting orders from: Cornell Tomlinson RN APPROVED: RN able to provide verbal orders.  Home Care will send orders for signature.  RN will close encounter.  Is this a request for a temporary pause in the home care episode?  No    Orders Requested    Physical Therapy  Request for initial certification (first set of orders)   Frequency: Once a week for 6 weeks.   RN gave verbal order: Yes        Phone number Home Care can be reached at: [phone]  Okay to leave a detailed message?: Yes    Contacts       Contact Date/Time Type Contact Phone/Fax    07/22/2025 11:08 AM CDT Phone (Incoming) Amauri Danielle (Home Care) [phone]          Vidhya Mayorga RN

## 2025-07-23 LAB
ALBUMIN SERPL BCG-MCNC: 4 G/DL (ref 3.5–5.2)
ALP SERPL-CCNC: 106 U/L (ref 40–150)
ALT SERPL W P-5'-P-CCNC: 13 U/L (ref 0–70)
ANION GAP SERPL CALCULATED.3IONS-SCNC: 11 MMOL/L (ref 7–15)
AST SERPL W P-5'-P-CCNC: 18 U/L (ref 0–45)
BILIRUB SERPL-MCNC: 0.2 MG/DL
BUN SERPL-MCNC: 25.7 MG/DL (ref 8–23)
CALCIUM SERPL-MCNC: 9.6 MG/DL (ref 8.8–10.4)
CHLORIDE SERPL-SCNC: 103 MMOL/L (ref 98–107)
CREAT SERPL-MCNC: 1.25 MG/DL (ref 0.67–1.17)
CRP SERPL-MCNC: 17.4 MG/L
EGFRCR SERPLBLD CKD-EPI 2021: 55 ML/MIN/1.73M2
GLUCOSE SERPL-MCNC: 194 MG/DL (ref 70–99)
HCO3 SERPL-SCNC: 27 MMOL/L (ref 22–29)
POTASSIUM SERPL-SCNC: 5.5 MMOL/L (ref 3.4–5.3)
PROT SERPL-MCNC: 7 G/DL (ref 6.4–8.3)
SODIUM SERPL-SCNC: 141 MMOL/L (ref 135–145)

## 2025-07-23 PROCEDURE — A4223 INFUSION SUPPLIES W/O PUMP: HCPCS

## 2025-07-28 ENCOUNTER — OFFICE VISIT (OUTPATIENT)
Dept: FAMILY MEDICINE | Facility: CLINIC | Age: 89
End: 2025-07-28
Payer: COMMERCIAL

## 2025-07-28 ENCOUNTER — NURSE TRIAGE (OUTPATIENT)
Dept: FAMILY MEDICINE | Facility: CLINIC | Age: 89
End: 2025-07-28

## 2025-07-28 VITALS
OXYGEN SATURATION: 98 % | BODY MASS INDEX: 35.34 KG/M2 | WEIGHT: 207 LBS | HEIGHT: 64 IN | HEART RATE: 100 BPM | DIASTOLIC BLOOD PRESSURE: 72 MMHG | TEMPERATURE: 98.7 F | SYSTOLIC BLOOD PRESSURE: 120 MMHG | RESPIRATION RATE: 16 BRPM

## 2025-07-28 DIAGNOSIS — A04.9 BACTERIAL INTESTINAL INFECTION, UNSPECIFIED: ICD-10-CM

## 2025-07-28 DIAGNOSIS — R19.7 DIARRHEA, UNSPECIFIED TYPE: Primary | ICD-10-CM

## 2025-07-28 DIAGNOSIS — Z92.89 HISTORY OF RECENT HOSPITALIZATION: ICD-10-CM

## 2025-07-28 DIAGNOSIS — R10.9 STOMACH CRAMPS: ICD-10-CM

## 2025-07-28 DIAGNOSIS — Z92.29 HISTORY OF LONG TERM USE OF ANTIBIOTICS: ICD-10-CM

## 2025-07-28 DIAGNOSIS — E87.6 HYPOKALEMIA: ICD-10-CM

## 2025-07-28 LAB
BASOPHILS # BLD AUTO: 0.1 10E3/UL (ref 0–0.2)
BASOPHILS NFR BLD AUTO: 1 %
EOSINOPHIL # BLD AUTO: 1 10E3/UL (ref 0–0.7)
EOSINOPHIL NFR BLD AUTO: 11 %
ERYTHROCYTE [DISTWIDTH] IN BLOOD BY AUTOMATED COUNT: 14.1 % (ref 10–15)
ERYTHROCYTE [SEDIMENTATION RATE] IN BLOOD BY WESTERGREN METHOD: 17 MM/HR (ref 0–20)
HCT VFR BLD AUTO: 38.3 % (ref 40–53)
HGB BLD-MCNC: 12.3 G/DL (ref 13.3–17.7)
IMM GRANULOCYTES # BLD: 0 10E3/UL
IMM GRANULOCYTES NFR BLD: 0 %
LYMPHOCYTES # BLD AUTO: 1.8 10E3/UL (ref 0.8–5.3)
LYMPHOCYTES NFR BLD AUTO: 20 %
MCH RBC QN AUTO: 29.3 PG (ref 26.5–33)
MCHC RBC AUTO-ENTMCNC: 32.1 G/DL (ref 31.5–36.5)
MCV RBC AUTO: 91 FL (ref 78–100)
MONOCYTES # BLD AUTO: 0.8 10E3/UL (ref 0–1.3)
MONOCYTES NFR BLD AUTO: 9 %
NEUTROPHILS # BLD AUTO: 5.3 10E3/UL (ref 1.6–8.3)
NEUTROPHILS NFR BLD AUTO: 58 %
PLATELET # BLD AUTO: 322 10E3/UL (ref 150–450)
RBC # BLD AUTO: 4.2 10E6/UL (ref 4.4–5.9)
WBC # BLD AUTO: 9 10E3/UL (ref 4–11)

## 2025-07-28 PROCEDURE — 82150 ASSAY OF AMYLASE: CPT | Performed by: NURSE PRACTITIONER

## 2025-07-28 PROCEDURE — 3078F DIAST BP <80 MM HG: CPT | Performed by: NURSE PRACTITIONER

## 2025-07-28 PROCEDURE — 36415 COLL VENOUS BLD VENIPUNCTURE: CPT | Performed by: NURSE PRACTITIONER

## 2025-07-28 PROCEDURE — 85652 RBC SED RATE AUTOMATED: CPT | Performed by: NURSE PRACTITIONER

## 2025-07-28 PROCEDURE — 3074F SYST BP LT 130 MM HG: CPT | Performed by: NURSE PRACTITIONER

## 2025-07-28 PROCEDURE — 85025 COMPLETE CBC W/AUTO DIFF WBC: CPT | Performed by: NURSE PRACTITIONER

## 2025-07-28 PROCEDURE — 80053 COMPREHEN METABOLIC PANEL: CPT | Performed by: NURSE PRACTITIONER

## 2025-07-28 PROCEDURE — 83690 ASSAY OF LIPASE: CPT | Performed by: NURSE PRACTITIONER

## 2025-07-28 PROCEDURE — 99214 OFFICE O/P EST MOD 30 MIN: CPT | Performed by: NURSE PRACTITIONER

## 2025-07-28 PROCEDURE — 86140 C-REACTIVE PROTEIN: CPT | Performed by: NURSE PRACTITIONER

## 2025-07-28 ASSESSMENT — ENCOUNTER SYMPTOMS: DIARRHEA: 1

## 2025-07-28 NOTE — TELEPHONE ENCOUNTER
Nurse Triage SBAR    Is this a 2nd Level Triage? YES, LICENSED PRACTITIONER REVIEW IS REQUIRED    Situation:     Patient reports new onset diarrhea from abx     Background:     Hosp f/up appt on 7/22/25  7/10-7/15 hospitalized  Stopped abx at home 7/23/25    Assessment:     Patient reports loose diarrhea, unsure when it started.     5/6 in the last 24 hours.    Reports symptoms are worse at night and has tried probiotics with no improvement     Reports he is drinking plenty of water and does not feel dehydrated. Appetite has not been affected     Denies vomiting/nausea, chest pain, abdominal cramping, SOB, fever, blood in stool    Protocol Recommended Disposition:   See in Office Today    Recommendation:     Recommend UC in [place], refused UC and only wants to be seen in [place]    Scheduled with OK from Haylie Pratt     Reviewed care advice and when to proceed to ED/call 911    Patient stated understanding and had no further questions.    Advised patient to call [phone] and ask to speak to a triage nurse with any further questions or concerns.     Future Appointments 7/28/2025 - 1/24/2026        Date Visit Type Length Department Provider     7/28/2025  1:30 PM OFFICE VISIT 30 min  FAMILY Haylie Rose APRN CNP    Location Instructions:     [facility] is located at [address], along [address] Free parking is available; access the lot by turning north from [address]  onto [address], then west onto [address].              9/2/2025 11:30 AM OFFICE VISIT 30 min  FAMILY Cornell Espitia MD    Location Instructions:     [facility] is located at [address] , [address] Free parking is available; access the lot by turning [address] from [address] onto [address], then west onto [address].              12/18/2025 10:30 AM ANNUAL WELLNESS 30 min  FAMILY Cornell Espitia  MD    Location Instructions:     [facility] is located at [address] [place] [address], along [address] Free parking is available; access the lot by turning north from [address] onto [address], then west onto [address].                   Discussed in person     Does the patient meet one of the following criteria for ADS visit consideration? 16+ years old, with an [facility] PCP     TIP  Providers, please consider if this condition is appropriate for management at one of our Acute and Diagnostic Services sites.     If patient is a good candidate, please use dotphrase <dot>triageresponse and select Refer to ADS to document.    Reason for Disposition   MODERATE diarrhea (e.g., 4-6 times / day more than normal) and present > 48 hours (2 days)    Additional Information   Negative: Shock suspected (e.g., cold/pale/clammy skin, too weak to stand, low BP, rapid pulse)   Negative: Difficult to awaken or acting confused (e.g., disoriented, slurred speech)   Negative: Sounds like a life-threatening emergency to the triager   Negative: Vomiting also present and worse than the diarrhea   Negative: Blood in stool and without diarrhea   Negative: Diarrhea begins while taking an antibiotic by mouth (oral antibiotic)   Negative: SEVERE abdominal pain (e.g., excruciating) and present > 1 hour   Negative: SEVERE abdominal pain and age > 60 years   Negative: Bloody, black, or tarry bowel movements  (Exception: Chronic-unchanged black-grey bowel movements and is taking iron pills or Pepto-Bismol.)   Negative: SEVERE diarrhea (e.g., 7 or more times / day more than normal) and age > 60 years   Negative: Constant abdominal pain lasting > 2 hours   Negative: Drinking very little and dehydration suspected (e.g., no urine > 12 hours, very dry mouth, very lightheaded)   Negative: Patient sounds very sick or weak to the triager   Negative: SEVERE diarrhea (e.g., 7 or more times / day more than normal) and  "present > 24 hours (1 day)    Answer Assessment - Initial Assessment Questions  1. DIARRHEA SEVERITY: \"How bad is the diarrhea?\" \"How many more stools have you had in the past 24 hours than normal?\"       Patient reports 5/6 in the last 24 hours   2. ONSET: \"When did the diarrhea begin?\"       Unsure   3. STOOL DESCRIPTION:  \"How loose or watery is the diarrhea?\" \"What is the stool color?\" \"Is there any blood or mucous in the stool?\"      Patient reports there is no form. Brown \"wet sand\" per patient. Not complete water   4. VOMITING: \"Are you also vomiting?\" If Yes, ask: \"How many times in the past 24 hours?\"       Denies vomiting   5. ABDOMEN PAIN: \"Are you having any abdomen pain?\" If Yes, ask: \"What does it feel like?\" (e.g., crampy, dull, intermittent, constant)       Denies abd pain or cramping   6. ABDOMEN PAIN SEVERITY: If present, ask: \"How bad is the pain?\"  (e.g., Scale 1-10; mild, moderate, or severe)      No abdominal pain   7. ORAL INTAKE: If vomiting, \"Have you been able to drink liquids?\" \"How much liquids have you had in the past 24 hours?\"      Patient repots eating and drinking like normal   8. HYDRATION: \"Any signs of dehydration?\" (e.g., dry mouth [not just dry lips], too weak to stand, dizziness, new weight loss) \"When did you last urinate?\"      Denies dehydration symptoms, reports he is drinking plenty of water   9. EXPOSURE: \"Have you traveled to a foreign country recently?\" \"Have you been exposed to anyone with diarrhea?\" \"Could you have eaten any food that was spoiled?\"      Denies travel   10. ANTIBIOTIC USE: \"Are you taking antibiotics now or have you taken antibiotics in the past 2 months?\"        Has been on rocephin from hospitalization and at home  Taking rocephin once daily IV at home through PICC line    11. OTHER SYMPTOMS: \"Do you have any other symptoms?\" (e.g., fever, blood in stool)  Denies fever, chest pain, difficulty breathing, blood in stool   Has tried probiotics       " "  Reports symptoms are worse at night   12. PREGNANCY: \"Is there any chance you are pregnant?\" \"When was your last menstrual period?\"        N/a    Protocols used: Diarrhea-A-[place]    "

## 2025-07-28 NOTE — PROGRESS NOTES
{PROVIDER CHARTING PREFERENCE:099724}    Subjective   Isaiah is a 89 year old, presenting for the following health issues:  Diarrhea        7/28/2025     1:15 PM   Additional Questions   Roomed by Liz CASTREJON   Accompanied by Self     Diarrhea    History of Present Illness       Reason for visit:  Diahrrea    He eats 0-1 servings of fruits and vegetables daily.He consumes 4 sweetened beverage(s) daily.He exercises with enough effort to increase his heart rate 30 to 60 minutes per day.  He exercises with enough effort to increase his heart rate 3 or less days per week.   He is taking medications regularly.      Triaged - 7/28/2025 - 10:16a  Nurse Triage SBAR     Is this a 2nd Level Triage? YES, LICENSED PRACTITIONER REVIEW IS REQUIRED     Situation:      Patient reports new onset diarrhea from abx      Background:      Hosp f/up appt on 7/22/25  7/10-7/15 hospitalized  Stopped abx at home 7/23/25     Assessment:      Patient reports loose diarrhea, unsure when it started.      5/6 in the last 24 hours.     Reports symptoms are worse at night and has tried probiotics with no improvement , Taking Imodium PRN--      Reports he is drinking plenty of water and does not feel dehydrated. Appetite has not been affected      Denies vomiting/nausea, chest pain, abdominal cramping, SOB, fever, blood in stool     Protocol Recommended Disposition:   See in Office Today     Recommendation:      Recommend UC in [place], refused UC and only wants to be seen in [place]     Scheduled with OK from Haylie Pratt      Reviewed care advice and when to proceed to ED/call 911     Patient stated understanding and had no further questions.    Advised patient to call [phone] and ask to speak to a triage nurse with any further questions or concerns.        Discussed in person      Does the patient meet one of the following criteria for ADS visit consideration? 16+ years old, with an [facility] PCP      TIP  Providers, please consider if this  "condition is appropriate for management at one of our Acute and Diagnostic Services sites.      If patient is a good candidate, please use dotphrase <dot>triageresponse and select Refer to ADS to document.     Reason for Disposition   MODERATE diarrhea (e.g., 4-6 times / day more than normal) and present > 48 hours (2 days)      Answer Assessment - Initial Assessment Questions  1. DIARRHEA SEVERITY: \"How bad is the diarrhea?\" \"How many more stools have you had in the past 24 hours than normal?\"       Patient reports 5/6 in the last 24 hours   2. ONSET: \"When did the diarrhea begin?\"       Unsure   3. STOOL DESCRIPTION:  \"How loose or watery is the diarrhea?\" \"What is the stool color?\" \"Is there any blood or mucous in the stool?\"      Patient reports there is no form. Brown \"wet sand\" per patient. Not complete water   4. VOMITING: \"Are you also vomiting?\" If Yes, ask: \"How many times in the past 24 hours?\"       Denies vomiting   5. ABDOMEN PAIN: \"Are you having any abdomen pain?\" If Yes, ask: \"What does it feel like?\" (e.g., crampy, dull, intermittent, constant)       Denies abd pain or cramping   6. ABDOMEN PAIN SEVERITY: If present, ask: \"How bad is the pain?\"  (e.g., Scale 1-10; mild, moderate, or severe)      No abdominal pain   7. ORAL INTAKE: If vomiting, \"Have you been able to drink liquids?\" \"How much liquids have you had in the past 24 hours?\"      Patient repots eating and drinking like normal   8. HYDRATION: \"Any signs of dehydration?\" (e.g., dry mouth [not just dry lips], too weak to stand, dizziness, new weight loss) \"When did you last urinate?\"      Denies dehydration symptoms, reports he is drinking plenty of water   9. EXPOSURE: \"Have you traveled to a foreign country recently?\" \"Have you been exposed to anyone with diarrhea?\" \"Could you have eaten any food that was spoiled?\"      Denies travel   10. ANTIBIOTIC USE: \"Are you taking antibiotics now or have you taken antibiotics in the past 2 " "months?\"        Has been on rocephin from hospitalization and at home  Taking rocephin once daily IV at home through PICC line    11. OTHER SYMPTOMS: \"Do you have any other symptoms?\" (e.g., fever, blood in stool)  Denies fever, chest pain, difficulty breathing, blood in stool   Has tried probiotics         Reports symptoms are worse at night   12. PREGNANCY: \"Is there any chance you are pregnant?\" \"When was your last menstrual period?\"        N/a    Protocols used: Diarrhea-A-[place]          {ROS Picklists (Optional):114475}      Objective    /72 (BP Location: Left arm, Patient Position: Chair, Cuff Size: Adult Regular)   Pulse 100   Temp 98.7  F (37.1  C) (Tympanic)   Resp 16   Ht 1.626 m (5' 4\")   Wt 93.9 kg (207 lb)   SpO2 98%   BMI 35.53 kg/m    Body mass index is 35.53 kg/m .  Physical Exam   {Exam List (Optional):581158}               Signed Electronically by: BTEO Wellington CNP  {Email feedback regarding this note to [email or web address]   :[email or web address]}  " "stools have you had in the past 24 hours than normal?\"       Patient reports 5/6 in the last 24 hours   2. ONSET: \"When did the diarrhea begin?\"       Unsure   3. STOOL DESCRIPTION:  \"How loose or watery is the diarrhea?\" \"What is the stool color?\" \"Is there any blood or mucous in the stool?\"      Patient reports there is no form. Brown \"wet sand\" per patient. Not complete water   4. VOMITING: \"Are you also vomiting?\" If Yes, ask: \"How many times in the past 24 hours?\"       Denies vomiting   5. ABDOMEN PAIN: \"Are you having any abdomen pain?\" If Yes, ask: \"What does it feel like?\" (e.g., crampy, dull, intermittent, constant)       Denies abd pain or cramping   6. ABDOMEN PAIN SEVERITY: If present, ask: \"How bad is the pain?\"  (e.g., Scale 1-10; mild, moderate, or severe)      No abdominal pain   7. ORAL INTAKE: If vomiting, \"Have you been able to drink liquids?\" \"How much liquids have you had in the past 24 hours?\"      Patient repots eating and drinking like normal   8. HYDRATION: \"Any signs of dehydration?\" (e.g., dry mouth [not just dry lips], too weak to stand, dizziness, new weight loss) \"When did you last urinate?\"      Denies dehydration symptoms, reports he is drinking plenty of water   9. EXPOSURE: \"Have you traveled to a foreign country recently?\" \"Have you been exposed to anyone with diarrhea?\" \"Could you have eaten any food that was spoiled?\"      Denies travel   10. ANTIBIOTIC USE: \"Are you taking antibiotics now or have you taken antibiotics in the past 2 months?\"        Has been on rocephin from hospitalization and at home  Taking rocephin once daily IV at home through PICC line    11. OTHER SYMPTOMS: \"Do you have any other symptoms?\" (e.g., fever, blood in stool)  Denies fever, chest pain, difficulty breathing, blood in stool   Has tried probiotics         Reports symptoms are worse at night   12. PREGNANCY: \"Is there any chance you are pregnant?\" \"When was your last menstrual period?\"        " "N/a    Protocols used: Diarrhea-A-[place]            Review of Systems  Constitutional, neuro, ENT, endocrine, pulmonary, cardiac, gastrointestinal, genitourinary, musculoskeletal, integument and psychiatric systems are negative, except as otherwise noted.      Objective    /72 (BP Location: Left arm, Patient Position: Chair, Cuff Size: Adult Regular)   Pulse 100   Temp 98.7  F (37.1  C) (Tympanic)   Resp 16   Ht 1.626 m (5' 4\")   Wt 93.9 kg (207 lb)   SpO2 98%   BMI 35.53 kg/m    Body mass index is 35.53 kg/m .  Physical Exam   GENERAL: alert and no distress  RESP: lungs clear to auscultation - no rales, rhonchi or wheezes  CV: regular rate and rhythm, normal S1 S2, no S3 or S4, no murmur, click or rub, no peripheral edema  ABDOMEN: soft, nontender, no hepatosplenomegaly, no masses and bowel sounds normal  MS: no gross musculoskeletal defects noted, no edema  SKIN: see below  PSYCH: mentation appears normal, affect normal/bright                Signed Electronically by: BETO Wellington CNP    "

## 2025-07-29 ENCOUNTER — TELEPHONE (OUTPATIENT)
Dept: FAMILY MEDICINE | Facility: CLINIC | Age: 89
End: 2025-07-29

## 2025-07-29 ENCOUNTER — HOSPITAL ENCOUNTER (EMERGENCY)
Facility: CLINIC | Age: 89
Discharge: HOME | End: 2025-07-29
Attending: EMERGENCY MEDICINE | Admitting: EMERGENCY MEDICINE
Payer: COMMERCIAL

## 2025-07-29 ENCOUNTER — APPOINTMENT (OUTPATIENT)
Dept: LAB | Facility: CLINIC | Age: 89
End: 2025-07-29
Payer: COMMERCIAL

## 2025-07-29 ENCOUNTER — RESULTS FOLLOW-UP (OUTPATIENT)
Dept: FAMILY MEDICINE | Facility: CLINIC | Age: 89
End: 2025-07-29

## 2025-07-29 VITALS
WEIGHT: 210.1 LBS | RESPIRATION RATE: 18 BRPM | HEART RATE: 67 BPM | BODY MASS INDEX: 36.06 KG/M2 | DIASTOLIC BLOOD PRESSURE: 78 MMHG | OXYGEN SATURATION: 97 % | SYSTOLIC BLOOD PRESSURE: 130 MMHG | TEMPERATURE: 98.6 F

## 2025-07-29 DIAGNOSIS — E87.5 HYPERKALEMIA: Primary | ICD-10-CM

## 2025-07-29 DIAGNOSIS — N18.31 CHRONIC KIDNEY DISEASE, STAGE 3A (H): ICD-10-CM

## 2025-07-29 DIAGNOSIS — N17.9 AKI (ACUTE KIDNEY INJURY): ICD-10-CM

## 2025-07-29 LAB
ALBUMIN SERPL BCG-MCNC: 4 G/DL (ref 3.5–5.2)
ALBUMIN SERPL BCG-MCNC: 4 G/DL (ref 3.5–5.2)
ALP SERPL-CCNC: 107 U/L (ref 40–150)
ALP SERPL-CCNC: 96 U/L (ref 40–150)
ALT SERPL W P-5'-P-CCNC: 9 U/L (ref 0–70)
ALT SERPL W P-5'-P-CCNC: 9 U/L (ref 0–70)
AMYLASE SERPL-CCNC: 61 U/L (ref 28–100)
ANION GAP SERPL CALCULATED.3IONS-SCNC: 11 MMOL/L (ref 7–15)
ANION GAP SERPL CALCULATED.3IONS-SCNC: 14 MMOL/L (ref 7–15)
AST SERPL W P-5'-P-CCNC: 16 U/L (ref 0–45)
AST SERPL W P-5'-P-CCNC: 17 U/L (ref 0–45)
BASOPHILS # BLD AUTO: 0.1 10E3/UL (ref 0–0.2)
BASOPHILS NFR BLD AUTO: 1 %
BILIRUB SERPL-MCNC: 0.4 MG/DL
BILIRUB SERPL-MCNC: 0.4 MG/DL
BUN SERPL-MCNC: 37.5 MG/DL (ref 8–23)
BUN SERPL-MCNC: 39.8 MG/DL (ref 8–23)
C CAYETANENSIS DNA STL QL NAA+NON-PROBE: NEGATIVE
C DIFF TOX B STL QL: NEGATIVE
CALCIUM SERPL-MCNC: 9.5 MG/DL (ref 8.8–10.4)
CALCIUM SERPL-MCNC: 9.7 MG/DL (ref 8.8–10.4)
CAMPYLOBACTER DNA SPEC NAA+PROBE: NEGATIVE
CHLORIDE SERPL-SCNC: 102 MMOL/L (ref 98–107)
CHLORIDE SERPL-SCNC: 99 MMOL/L (ref 98–107)
CREAT SERPL-MCNC: 1.77 MG/DL (ref 0.67–1.17)
CREAT SERPL-MCNC: 1.81 MG/DL (ref 0.67–1.17)
CRP SERPL-MCNC: 5 MG/L
CRYPTOSP DNA STL QL NAA+NON-PROBE: NEGATIVE
EC STX1+STX2 GENES STL QL NAA+NON-PROBE: NEGATIVE
EGFRCR SERPLBLD CKD-EPI 2021: 35 ML/MIN/1.73M2
EGFRCR SERPLBLD CKD-EPI 2021: 36 ML/MIN/1.73M2
EOSINOPHIL # BLD AUTO: 1.2 10E3/UL (ref 0–0.7)
EOSINOPHIL NFR BLD AUTO: 13 %
ERYTHROCYTE [DISTWIDTH] IN BLOOD BY AUTOMATED COUNT: 14 % (ref 10–15)
G LAMBLIA DNA STL QL NAA+NON-PROBE: NEGATIVE
GLUCOSE SERPL-MCNC: 154 MG/DL (ref 70–99)
GLUCOSE SERPL-MCNC: 172 MG/DL (ref 70–99)
HCO3 SERPL-SCNC: 22 MMOL/L (ref 22–29)
HCO3 SERPL-SCNC: 26 MMOL/L (ref 22–29)
HCT VFR BLD AUTO: 39.7 % (ref 40–53)
HGB BLD-MCNC: 12.7 G/DL (ref 13.3–17.7)
HOLD SPECIMEN: NORMAL
HOLD SPECIMEN: NORMAL
IMM GRANULOCYTES # BLD: 0.1 10E3/UL
IMM GRANULOCYTES NFR BLD: 1 %
LIPASE SERPL-CCNC: 15 U/L (ref 13–60)
LYMPHOCYTES # BLD AUTO: 1.8 10E3/UL (ref 0.8–5.3)
LYMPHOCYTES NFR BLD AUTO: 19 %
MCH RBC QN AUTO: 29.2 PG (ref 26.5–33)
MCHC RBC AUTO-ENTMCNC: 32 G/DL (ref 31.5–36.5)
MCV RBC AUTO: 91 FL (ref 78–100)
MONOCYTES # BLD AUTO: 0.9 10E3/UL (ref 0–1.3)
MONOCYTES NFR BLD AUTO: 9 %
NEUTROPHILS # BLD AUTO: 5.5 10E3/UL (ref 1.6–8.3)
NEUTROPHILS NFR BLD AUTO: 58 %
NOROVIRUS GI+II RNA STL QL NAA+NON-PROBE: NEGATIVE
NRBC # BLD AUTO: 0 10E3/UL
NRBC BLD AUTO-RTO: 0 /100
PLATELET # BLD AUTO: 332 10E3/UL (ref 150–450)
POTASSIUM SERPL-SCNC: 5.2 MMOL/L (ref 3.4–5.3)
POTASSIUM SERPL-SCNC: 5.9 MMOL/L (ref 3.4–5.3)
POTASSIUM SERPL-SCNC: 5.9 MMOL/L (ref 3.4–5.3)
PROT SERPL-MCNC: 6.9 G/DL (ref 6.4–8.3)
PROT SERPL-MCNC: 7.1 G/DL (ref 6.4–8.3)
RBC # BLD AUTO: 4.35 10E6/UL (ref 4.4–5.9)
SALMONELLA SP RPOD STL QL NAA+PROBE: NEGATIVE
SHIGELLA SP+EIEC IPAH ST NAA+NON-PROBE: NEGATIVE
SODIUM SERPL-SCNC: 136 MMOL/L (ref 135–145)
SODIUM SERPL-SCNC: 138 MMOL/L (ref 135–145)
VIBRIO DNA SPEC NAA+PROBE: NEGATIVE
WBC # BLD AUTO: 9.5 10E3/UL (ref 4–11)
Y ENTEROCOL DNA STL QL NAA+PROBE: NEGATIVE

## 2025-07-29 PROCEDURE — 99284 EMERGENCY DEPT VISIT MOD MDM: CPT | Mod: 25

## 2025-07-29 PROCEDURE — 99284 EMERGENCY DEPT VISIT MOD MDM: CPT | Performed by: EMERGENCY MEDICINE

## 2025-07-29 PROCEDURE — 96360 HYDRATION IV INFUSION INIT: CPT

## 2025-07-29 PROCEDURE — 87493 C DIFF AMPLIFIED PROBE: CPT | Performed by: NURSE PRACTITIONER

## 2025-07-29 PROCEDURE — 36415 COLL VENOUS BLD VENIPUNCTURE: CPT | Performed by: EMERGENCY MEDICINE

## 2025-07-29 PROCEDURE — 93005 ELECTROCARDIOGRAM TRACING: CPT

## 2025-07-29 PROCEDURE — 258N000003 HC RX IP 258 OP 636: Performed by: EMERGENCY MEDICINE

## 2025-07-29 PROCEDURE — 87506 IADNA-DNA/RNA PROBE TQ 6-11: CPT | Mod: 59 | Performed by: NURSE PRACTITIONER

## 2025-07-29 PROCEDURE — 85004 AUTOMATED DIFF WBC COUNT: CPT | Performed by: EMERGENCY MEDICINE

## 2025-07-29 PROCEDURE — 84132 ASSAY OF SERUM POTASSIUM: CPT | Performed by: EMERGENCY MEDICINE

## 2025-07-29 PROCEDURE — 80053 COMPREHEN METABOLIC PANEL: CPT | Performed by: EMERGENCY MEDICINE

## 2025-07-29 RX ADMIN — SODIUM CHLORIDE 1000 ML: 0.9 INJECTION, SOLUTION INTRAVENOUS at 15:48

## 2025-07-29 ASSESSMENT — ACTIVITIES OF DAILY LIVING (ADL)
ADLS_ACUITY_SCORE: 54

## 2025-07-29 NOTE — RESULT ENCOUNTER NOTE
Note to Staff: please call the patient to explain results.    Waiting for stools culture but unfortunately his labs from yesterday show his potassium is even higher at 5.9 and creatinine is now up to 1.8 needs ED eval to rule out acute kidney injury with his dehydration/diarrhea.  This was discussed with PCP.

## 2025-07-29 NOTE — DISCHARGE INSTRUCTIONS
Please have your electrolytes and kidney function rechecked in 2 days.    Please drink plenty of water 6- 8 glasses of water daily.      Please return to the emergency department if you start to have symptoms of chest pain, shortness of breath, lightheadedness.

## 2025-07-29 NOTE — TELEPHONE ENCOUNTER
Patient calls back     Advised of note from Haylie Pratt    Patient is able to drive to the ER, agreed to go, wife will come with    Patient stated understanding and had no further questions.    Advised patient to call [phone] and ask to speak to a triage nurse with any further questions or concerns.

## 2025-07-29 NOTE — ED PROVIDER NOTES
Emergency Department Note      History of Present Illness     Chief Complaint   Abnormal Labs      HPI   Preston Fortune is an 89 year old male, anticoagulated on Eliquis, with a past medical history of melanoma, kidney disease, and atrial fibrillation who presents to the emergency department for evaluation of abnormal labs. Patient reports that he was recently hospitalized with sepsis, but improved and was discharged on 7/15/25. He stopped antibiotic use via PICC line on 7/23/25. He has had diarrhea since discharge and notes having a stool sample currently being analyzed. Recent labs from an office visit yesterday show hyperkalemia and poor kidney function. He denies any chest pain, shortness of breath, fever, body aches, nausea, vomiting, or hematochezia.    Independent Historian   None    Review of External Notes   Reviewed patient's family medicine note, and the patient has had diarrhea as well as hyperkalemia    Past Medical History     Medical History and Problem List   Allergic rhinitis  Anemia  Choroid melanoma  CKD (chronic kidney disease) stage 3a  Environmental allergies  History of transfusion  Hyperlipidemia  Hypertension  Kidney disease  Kidney stones  Osteoarthritis  Peptic ulceration  Pulmonary hypertension  Type 2 diabetes  Superior mesenteric artery stenosis  BPH  Afib  Obesity  SIRS    Medications   amlodipine  apixaban  furosemide  lisinopril  metformin  pravastatin      Surgical History   Eye surgery  Knee arthroscopy left  Tympanoplasty  Tonsillectomy  Total shoulder replacement  Total knee arthroplasty    Physical Exam     Patient Vitals for the past 24 hrs:   BP Temp Temp src Pulse Resp SpO2 Weight   07/29/25 1700 130/78 -- -- 67 18 97 % --   07/29/25 1640 127/63 -- -- 74 18 97 % --   07/29/25 1600 129/65 -- -- 82 17 96 % --   07/29/25 1504 (!) 141/85 98.6  F (37  C) Oral 81 18 95 % --   07/29/25 1501 -- -- -- -- -- -- 95.3 kg (210 lb 1.6 oz)     Physical Exam  General: Well-nourished,  resting comfortably when I enter the room  Eyes: Pupils equal, conjunctivae pink no scleral icterus or conjunctival injection  ENT:  Moist mucus membranes  Respiratory:  Lungs clear to auscultation bilaterally, no crackles/rubs/wheezes.  Good air movement  CV: Normal rate and rhythm, no murmurs  GI:  Abdomen soft and non-distended.  No tenderness, guarding or rebound  Skin: Warm, dry.  No rashes or petechiae  Musculoskeletal: No peripheral edema or calf tenderness  Neuro: Alert and oriented to person/place/time  Psychiatric: Normal affect    Diagnostics     Lab Results   Labs Ordered and Resulted from Time of ED Arrival to Time of ED Departure   COMPREHENSIVE METABOLIC PANEL (LIMITED OCCURRENCES) - Abnormal       Result Value    Sodium 136      Potassium 5.9 (*)     Carbon Dioxide (CO2) 26      Anion Gap 11      Urea Nitrogen 39.8 (*)     Creatinine 1.81 (*)     GFR Estimate 35 (*)     Calcium 9.5      Chloride 99      Glucose 172 (*)     Alkaline Phosphatase 107      AST 16      ALT 9      Protein Total 7.1      Albumin 4.0      Bilirubin Total 0.4     CBC WITH PLATELETS AND DIFFERENTIAL - Abnormal    WBC Count 9.5      RBC Count 4.35 (*)     Hemoglobin 12.7 (*)     Hematocrit 39.7 (*)     MCV 91      MCH 29.2      MCHC 32.0      RDW 14.0      Platelet Count 332      % Neutrophils 58      % Lymphocytes 19      % Monocytes 9      % Eosinophils 13      % Basophils 1      % Immature Granulocytes 1      NRBCs per 100 WBC 0      Absolute Neutrophils 5.5      Absolute Lymphocytes 1.8      Absolute Monocytes 0.9      Absolute Eosinophils 1.2 (*)     Absolute Basophils 0.1      Absolute Immature Granulocytes 0.1      Absolute NRBCs 0.0     POTASSIUM (LIMITED OCCURRENCES) - Normal    Potassium 5.2         Imaging   No orders to display       EKG   ECG taken at 1517, ECG read at 1524  Atrial fibrillation  Low voltage QRS  Incomplete right bundle branch block  Cannot rule out anterior infarct age undetermined  Abnormal ECG    Rate 79 bpm. MT interval * ms. QRS duration 100 ms. QT/QTc 388/444 ms. P-R-T axes * -13 -9.    Independent Interpretation   None    ED Course      Medications Administered   Medications   sodium chloride 0.9% BOLUS 1,000 mL (0 mLs Intravenous Stopped 7/29/25 1640)       Procedures   Procedures     Discussion of Management   None    ED Course   ED Course as of 07/29/25 1733   Tue Jul 29, 2025   1533 I obtained history and examined the patient as noted above.    1722 I rechecked with the patient and updated. We discussed plans for discharge and the patient is agreeable with this plan.       Additional Documentation  None    Medical Decision Making / Diagnosis     CMS Diagnoses: None    MIPS   None       MDM   Preston Fortune is a 89 year old male with a history of A-fib on Eliquis, chronic kidney disease, hypertension, hyperlipidemia presenting to the emergency department with a complaint of abnormal labs.  Otherwise the patient is feeling at his baseline.  On review of the patient's chart, his potassium was 5.9 in the outpatient setting with an TONIA and advised to come to the emergency department.  On repeat exam, potassium is still 5.9.  Patient does have a slight TONIA.  His EKG does not show any changes and I do not think that he needs any calcium.  I think he is dehydrated secondary to diarrhea.  Patient denies any recent medication changes.  Patient is given a liter of IV fluid.  Potassium is rechecked and is within acceptable limits.  I do not think that the patient needs admission at this point for his hyperkalemia as I think it is secondary to dehydration.  He is not having any nausea or vomiting that is precluding him from oral intake.  The patient has been drinking Gatorade I did advise to start drinking water. I also advised to have his labs rechecked in the next couple of days with primary care.  If he starts to have any chest pain, shortness of breath, uncontrollable nausea or vomiting then he needs to  return to the emergency department.  I did speak with the patient about admission and I did offer to admit him for observation overnight and the patient refuses.  I think this is reasonable.  He will have his labs rechecked, he will drink water.  His stool studies outpatient are pending.  He will follow-up with his primary care physician, and the patient is discharged home.    Disposition   The patient was discharged.     Diagnosis     ICD-10-CM    1. Hyperkalemia  E87.5       2. TONIA (acute kidney injury)  N17.9            Discharge Medications   New Prescriptions    No medications on file         Scribe Disclosure:  IMesfin, am serving as a scribe at 4:29 PM on 7/29/2025 to document services personally performed by Marlene Nye MD based on my observations and the provider's statements to me.     Scribe Disclosure:  I, Presley Peterson, am serving as a scribe  for Mesfin Gay at 3:54 PM on 7/29/2025 to document services personally performed by Marlene Nye MD based on my observations and the provider's statements to me.          Marlene Nye MD  07/29/25 [id number]

## 2025-07-30 LAB
ATRIAL RATE - MUSE: NORMAL BPM
DIASTOLIC BLOOD PRESSURE - MUSE: NORMAL MMHG
INTERPRETATION ECG - MUSE: NORMAL
P AXIS - MUSE: NORMAL DEGREES
PR INTERVAL - MUSE: NORMAL MS
QRS DURATION - MUSE: 100 MS
QT - MUSE: 388 MS
QTC - MUSE: 444 MS
R AXIS - MUSE: -13 DEGREES
SYSTOLIC BLOOD PRESSURE - MUSE: NORMAL MMHG
T AXIS - MUSE: -9 DEGREES
VENTRICULAR RATE- MUSE: 79 BPM

## 2025-07-30 NOTE — TELEPHONE ENCOUNTER
Situation     Patient thought he had another message to call back.     Background   Reviewed encounters. - nothing new since yesterday when he was notified and was advised to go to er.     Has been noticed of all lab results.   He went to er yesterday  Potassium   Date Value Ref Range Status   07/29/2025 5.2 3.4 - 5.3 mmol/L Final   06/27/2022 4.4 3.5 - 5.0 mmol/L Final     Assessment   Patient states she is still having diarrhea but improved   Stool is lighter brown in color  Frequency has lessened   In the last 24 hours 2 bm's during the day ( smaller amount)   Last night had 4 small diarrhea episodes.     Patient states he is taking a probiotic  He is eating and drinking well    Denied any fever, abdominal pain blood or black appearance in stools.    Recommendations   Explained to see keep Friday's apt. Call and talk with a triager nurse before that or after if things persistent or worsen.

## 2025-07-31 NOTE — TELEPHONE ENCOUNTER
Patient calls back for results   Patient has appt tomorrow    Future Appointments 7/31/2025 - 1/27/2026        Date Visit Type Length Department Provider     8/1/2025  1:00 PM OFFICE VISIT 30 min  FAMILY PRACTICE  Arrive at:  FAMILY PRACTICE Marie Bell PA-C    Location Instructions:     [facility] is located at [address], [address] Free parking is available; access the lot by turning [address] from [address]  onto [address], then west onto [address].              9/2/2025 11:30 AM OFFICE VISIT 30 min  FAMILY PRACTICE Cornell Tomlinson MD    Location Instructions:     [facility] is located at [address], [address] Free parking is available; access the lot by turning [address] from [address] onto [address], then west onto [address].              12/18/2025 10:30 AM ANNUAL WELLNESS 30 min  FAMILY PRACTICE Cornell Tomlinson MD    Location Instructions:     [facility] is located at [address], [address] Free parking is available; access the lot by turning north from [address]  onto [address], then west onto [address].                   Will have labs completed at \[facility]\"" f/up    Reviewed result note with patient    Patient stated understanding and had no further questions.    Advised patient to call [phone] and ask to speak to a triage nurse with any further questions or concerns.

## 2025-07-31 NOTE — RESULT ENCOUNTER NOTE
Please call Isaiah,     Here is a summary of your recent test results:    Stool is thankfully negative for a bacterial or viral infection.   I am glad he was able to get fluids in the emergency room.   Please have follow up labs for his potassium and kidney function today tomorrow or Monday.  Labs ordered.   Be seen if any signs of dehydration.     For additional lab test information, [email or web address] is an excellent reference.    In addition, here is a list of due or overdue Health Maintenance reminders:    COVID-19 Vaccine(8 - 2024-25 season) due on 04/14/2025    Please call us at [phone] (or use [employer]) to address the above recommendations if needed.    Thank you for choosing [facility].  It was an honor and a privilege to participate in your care.       Healthy regards,    Haylie Pratt, FERNIE  [facility]

## 2025-08-01 ENCOUNTER — OFFICE VISIT (OUTPATIENT)
Dept: FAMILY MEDICINE | Facility: CLINIC | Age: 89
End: 2025-08-01
Payer: COMMERCIAL

## 2025-08-01 VITALS
BODY MASS INDEX: 35.85 KG/M2 | RESPIRATION RATE: 16 BRPM | OXYGEN SATURATION: 98 % | TEMPERATURE: 97.6 F | HEART RATE: 91 BPM | WEIGHT: 210 LBS | HEIGHT: 64 IN | SYSTOLIC BLOOD PRESSURE: 124 MMHG | DIASTOLIC BLOOD PRESSURE: 76 MMHG

## 2025-08-01 DIAGNOSIS — N17.9 AKI (ACUTE KIDNEY INJURY): ICD-10-CM

## 2025-08-01 DIAGNOSIS — E87.6 HYPOKALEMIA: Primary | ICD-10-CM

## 2025-08-01 LAB
ANION GAP SERPL CALCULATED.3IONS-SCNC: 13 MMOL/L (ref 7–15)
BUN SERPL-MCNC: 31.5 MG/DL (ref 8–23)
CALCIUM SERPL-MCNC: 9.3 MG/DL (ref 8.8–10.4)
CHLORIDE SERPL-SCNC: 102 MMOL/L (ref 98–107)
CREAT SERPL-MCNC: 1.31 MG/DL (ref 0.67–1.17)
EGFRCR SERPLBLD CKD-EPI 2021: 52 ML/MIN/1.73M2
GLUCOSE SERPL-MCNC: 168 MG/DL (ref 70–99)
HCO3 SERPL-SCNC: 23 MMOL/L (ref 22–29)
POTASSIUM SERPL-SCNC: 5.3 MMOL/L (ref 3.4–5.3)
SODIUM SERPL-SCNC: 138 MMOL/L (ref 135–145)

## 2025-08-01 PROCEDURE — 80048 BASIC METABOLIC PNL TOTAL CA: CPT

## 2025-08-01 PROCEDURE — 3074F SYST BP LT 130 MM HG: CPT

## 2025-08-01 PROCEDURE — 3078F DIAST BP <80 MM HG: CPT

## 2025-08-01 PROCEDURE — 36415 COLL VENOUS BLD VENIPUNCTURE: CPT

## 2025-08-01 PROCEDURE — G2211 COMPLEX E/M VISIT ADD ON: HCPCS

## 2025-08-01 PROCEDURE — 99213 OFFICE O/P EST LOW 20 MIN: CPT

## 2025-08-01 PROCEDURE — 1111F DSCHRG MED/CURRENT MED MERGE: CPT

## 2025-08-04 ENCOUNTER — PATIENT OUTREACH (OUTPATIENT)
Dept: CARE COORDINATION | Facility: CLINIC | Age: 89
End: 2025-08-04
Payer: COMMERCIAL

## 2025-08-05 ENCOUNTER — TELEPHONE (OUTPATIENT)
Dept: FAMILY MEDICINE | Facility: CLINIC | Age: 89
End: 2025-08-05
Payer: COMMERCIAL

## 2025-08-06 DIAGNOSIS — Z53.9 DIAGNOSIS NOT YET DEFINED: Primary | ICD-10-CM

## 2025-08-06 PROCEDURE — G0180 MD CERTIFICATION HHA PATIENT: HCPCS | Performed by: FAMILY MEDICINE

## 2025-08-07 ENCOUNTER — TELEPHONE (OUTPATIENT)
Dept: FAMILY MEDICINE | Facility: CLINIC | Age: 89
End: 2025-08-07
Payer: COMMERCIAL

## 2025-08-08 ENCOUNTER — TELEPHONE (OUTPATIENT)
Dept: FAMILY MEDICINE | Facility: CLINIC | Age: 89
End: 2025-08-08
Payer: COMMERCIAL

## 2025-08-12 ENCOUNTER — OFFICE VISIT (OUTPATIENT)
Dept: FAMILY MEDICINE | Facility: CLINIC | Age: 89
End: 2025-08-12
Payer: COMMERCIAL

## 2025-08-12 VITALS
BODY MASS INDEX: 35.73 KG/M2 | DIASTOLIC BLOOD PRESSURE: 66 MMHG | HEIGHT: 64 IN | WEIGHT: 209.3 LBS | RESPIRATION RATE: 16 BRPM | TEMPERATURE: 97.6 F | OXYGEN SATURATION: 97 % | SYSTOLIC BLOOD PRESSURE: 128 MMHG | HEART RATE: 61 BPM

## 2025-08-12 DIAGNOSIS — R80.9 PROTEINURIA, UNSPECIFIED TYPE: ICD-10-CM

## 2025-08-12 DIAGNOSIS — E11.9 TYPE 2 DIABETES, HBA1C GOAL < 7% (H): ICD-10-CM

## 2025-08-12 DIAGNOSIS — E78.5 HYPERLIPIDEMIA LDL GOAL <70: ICD-10-CM

## 2025-08-12 DIAGNOSIS — R06.02 SHORTNESS OF BREATH: ICD-10-CM

## 2025-08-12 DIAGNOSIS — N18.31 TYPE 2 DIABETES MELLITUS WITH STAGE 3A CHRONIC KIDNEY DISEASE, WITHOUT LONG-TERM CURRENT USE OF INSULIN (H): ICD-10-CM

## 2025-08-12 DIAGNOSIS — Z51.81 MEDICATION MONITORING ENCOUNTER: ICD-10-CM

## 2025-08-12 DIAGNOSIS — E11.59 TYPE 2 DIABETES MELLITUS WITH OTHER CIRCULATORY COMPLICATION, WITHOUT LONG-TERM CURRENT USE OF INSULIN (H): ICD-10-CM

## 2025-08-12 DIAGNOSIS — I48.11 LONGSTANDING PERSISTENT ATRIAL FIBRILLATION (H): ICD-10-CM

## 2025-08-12 DIAGNOSIS — I10 HYPERTENSION GOAL BP (BLOOD PRESSURE) < 140/90: ICD-10-CM

## 2025-08-12 DIAGNOSIS — N18.31 CHRONIC KIDNEY DISEASE, STAGE 3A (H): ICD-10-CM

## 2025-08-12 DIAGNOSIS — E66.01 MORBID OBESITY (H): ICD-10-CM

## 2025-08-12 DIAGNOSIS — E87.5 HYPERKALEMIA: ICD-10-CM

## 2025-08-12 DIAGNOSIS — C69.92: ICD-10-CM

## 2025-08-12 DIAGNOSIS — L98.9 SKIN LESION: Primary | ICD-10-CM

## 2025-08-12 DIAGNOSIS — M47.817 LUMBOSACRAL SPONDYLOSIS WITHOUT MYELOPATHY: ICD-10-CM

## 2025-08-12 DIAGNOSIS — I27.20 PULMONARY HYPERTENSION (H): ICD-10-CM

## 2025-08-12 DIAGNOSIS — E11.22 TYPE 2 DIABETES MELLITUS WITH STAGE 3A CHRONIC KIDNEY DISEASE, WITHOUT LONG-TERM CURRENT USE OF INSULIN (H): ICD-10-CM

## 2025-08-12 LAB
ALBUMIN SERPL BCG-MCNC: 4 G/DL (ref 3.5–5.2)
ALP SERPL-CCNC: 82 U/L (ref 40–150)
ALT SERPL W P-5'-P-CCNC: 10 U/L (ref 0–70)
ANION GAP SERPL CALCULATED.3IONS-SCNC: 11 MMOL/L (ref 7–15)
AST SERPL W P-5'-P-CCNC: 22 U/L (ref 0–45)
BASOPHILS # BLD AUTO: 0.1 10E3/UL (ref 0–0.2)
BASOPHILS NFR BLD AUTO: 1 %
BILIRUB SERPL-MCNC: 0.6 MG/DL
BUN SERPL-MCNC: 39 MG/DL (ref 8–23)
CALCIUM SERPL-MCNC: 9.7 MG/DL (ref 8.8–10.4)
CHLORIDE SERPL-SCNC: 102 MMOL/L (ref 98–107)
CHOLEST SERPL-MCNC: 138 MG/DL
CREAT SERPL-MCNC: 1.17 MG/DL (ref 0.67–1.17)
CRP SERPL-MCNC: <3 MG/L
EGFRCR SERPLBLD CKD-EPI 2021: 60 ML/MIN/1.73M2
EOSINOPHIL # BLD AUTO: 1 10E3/UL (ref 0–0.7)
EOSINOPHIL NFR BLD AUTO: 13 %
ERYTHROCYTE [DISTWIDTH] IN BLOOD BY AUTOMATED COUNT: 14.2 % (ref 10–15)
ERYTHROCYTE [SEDIMENTATION RATE] IN BLOOD BY WESTERGREN METHOD: 13 MM/HR (ref 0–20)
EST. AVERAGE GLUCOSE BLD GHB EST-MCNC: 143 MG/DL
FASTING STATUS PATIENT QL REPORTED: YES
FASTING STATUS PATIENT QL REPORTED: YES
GLUCOSE SERPL-MCNC: 110 MG/DL (ref 70–99)
HBA1C MFR BLD: 6.6 % (ref 0–5.6)
HCO3 SERPL-SCNC: 27 MMOL/L (ref 22–29)
HCT VFR BLD AUTO: 38.1 % (ref 40–53)
HDLC SERPL-MCNC: 44 MG/DL
HGB BLD-MCNC: 12.2 G/DL (ref 13.3–17.7)
IMM GRANULOCYTES # BLD: 0 10E3/UL
IMM GRANULOCYTES NFR BLD: 0 %
LDLC SERPL CALC-MCNC: 73 MG/DL
LYMPHOCYTES # BLD AUTO: 1.7 10E3/UL (ref 0.8–5.3)
LYMPHOCYTES NFR BLD AUTO: 22 %
MCH RBC QN AUTO: 29.3 PG (ref 26.5–33)
MCHC RBC AUTO-ENTMCNC: 32 G/DL (ref 31.5–36.5)
MCV RBC AUTO: 91 FL (ref 78–100)
MONOCYTES # BLD AUTO: 0.8 10E3/UL (ref 0–1.3)
MONOCYTES NFR BLD AUTO: 10 %
NEUTROPHILS # BLD AUTO: 4.3 10E3/UL (ref 1.6–8.3)
NEUTROPHILS NFR BLD AUTO: 55 %
NONHDLC SERPL-MCNC: 94 MG/DL
NT-PROBNP SERPL-MCNC: 3088 PG/ML (ref 0–852)
PLATELET # BLD AUTO: 201 10E3/UL (ref 150–450)
POTASSIUM SERPL-SCNC: 4.8 MMOL/L (ref 3.4–5.3)
PROT SERPL-MCNC: 7 G/DL (ref 6.4–8.3)
RBC # BLD AUTO: 4.17 10E6/UL (ref 4.4–5.9)
SODIUM SERPL-SCNC: 140 MMOL/L (ref 135–145)
TRIGL SERPL-MCNC: 105 MG/DL
WBC # BLD AUTO: 7.9 10E3/UL (ref 4–11)

## 2025-08-12 NOTE — PATIENT INSTRUCTIONS
Based on our discussion, I have outlined the following instructions for you:      - Get a stool test done to find out if there is an infection like C. diff or any other.  - Do not take Imodium until we know what is causing the diarrhea.  - Eat plain foods and drink plenty of fluids to stay hydrated.  - Stop taking metformin for now while you have diarrhea.  - Have your potassium levels checked with your lab tests.  - Follow up with dermatology or Dr. Tomlinson to take a small sample taken from your leg sore to make sure it is not cancer.    Thank you again for your visit, and we look forward to supporting you in your journey to better health.